# Patient Record
Sex: FEMALE | Race: WHITE | Employment: OTHER | URBAN - METROPOLITAN AREA
[De-identification: names, ages, dates, MRNs, and addresses within clinical notes are randomized per-mention and may not be internally consistent; named-entity substitution may affect disease eponyms.]

---

## 2017-01-04 ENCOUNTER — OFFICE VISIT (OUTPATIENT)
Dept: FAMILY MEDICINE | Facility: CLINIC | Age: 72
End: 2017-01-04
Payer: COMMERCIAL

## 2017-01-04 VITALS
DIASTOLIC BLOOD PRESSURE: 82 MMHG | WEIGHT: 149 LBS | TEMPERATURE: 98.1 F | OXYGEN SATURATION: 99 % | RESPIRATION RATE: 12 BRPM | BODY MASS INDEX: 23.95 KG/M2 | HEIGHT: 66 IN | SYSTOLIC BLOOD PRESSURE: 120 MMHG | HEART RATE: 98 BPM

## 2017-01-04 DIAGNOSIS — M54.41 BILATERAL LOW BACK PAIN WITH RIGHT-SIDED SCIATICA, UNSPECIFIED CHRONICITY: ICD-10-CM

## 2017-01-04 PROCEDURE — 99213 OFFICE O/P EST LOW 20 MIN: CPT | Performed by: NURSE PRACTITIONER

## 2017-01-04 NOTE — MR AVS SNAPSHOT
After Visit Summary   1/4/2017    Kahlil Caputo    MRN: [id number]           Patient Information     Date Of Birth          1945        Visit Information        Provider Department      1/4/2017 1:30 PM Haase, Susan Rachele, APRN CNP [facility]        Today's Diagnoses     Bilateral low back pain with right-sided sciatica, unspecified chronicity            Follow-ups after your visit        Follow-up notes from your care team     Return in about 4 weeks (around 2/1/2017).      Your next 10 appointments already scheduled     Jan 31, 2017 10:00 AM   New Visit with Pilar Ayala MD   [facility] ([facility])    [address]  [address]  [place] [address]   [phone]              Future tests that were ordered for you today     Open Future Orders        Priority Expected Expires Ordered    MR Lumbar Spine w/o Contrast Routine  1/4/2018 1/4/2017            Who to contact     If you have questions or need follow up information about today's clinic visit or your schedule please contact [facility] directly at [phone].  Normal or non-critical lab and imaging results will be communicated to you by [employer], letter or phone within 4 business days after the clinic has received the results. If you do not hear from us within 7 days, please contact the clinic through [employer] or phone. If you have a critical or abnormal lab result, we will notify you by phone as soon as possible.  Submit refill requests through [employer] or call your pharmacy and they will forward the refill request to us. Please allow 3 business days for your refill to be completed.          Additional Information About Your Visit        MyChart Information     [employer] gives you secure access to your electronic health record. If you see a primary care provider, you can also send messages to your care team and make appointments. If you have questions,  "please call your primary care clinic.  If you do not have a primary care provider, please call [phone] and they will assist you.        Care EveryWhere ID     This is your Care EveryWhere ID. This could be used by other organizations to access your [place] medical records  [id number]        Your Vitals Were     Pulse Temperature Respirations Height BMI (Body Mass Index) Pulse Oximetry    98 98.1  F (36.7  C) (Oral) 12 5' 6\" (1.676 m) 24.06 kg/m2 99%       Blood Pressure from Last 3 Encounters:   01/04/17 120/82   12/15/16 130/70   12/05/16 125/68    Weight from Last 3 Encounters:   01/04/17 149 lb (67.586 kg)   12/15/16 149 lb (67.586 kg)   11/16/16 149 lb (67.586 kg)               Primary Care Provider Office Phone # Fax #    Susan Rachele Haase, APRN [phone]       [address] [address]        Thank you!     Thank you for choosing [facility]  for your care. Our goal is always to provide you with excellent care. Hearing back from our patients is one way we can continue to improve our services. Please take a few minutes to complete the written survey that you may receive in the mail after your visit with us. Thank you!             Your Updated Medication List - Protect others around you: Learn how to safely use, store and throw away your medicines at www.disposemymeds.org.          This list is accurate as of: 1/4/17  1:58 PM.  Always use your most recent med list.                   Brand Name Dispense Instructions for use    ascorbic acid 500 MG tablet    VITAMIN C     Take by mouth 2 times daily       calcium carbonate 500 MG tablet    OS-THEA 500 mg Koi. Ca    100 tablet    Take 2 tablets by mouth 2 times daily       CHLOR-TRIMETON 4 MG tablet   Generic drug:  chlorpheniramine      Take 4 mg by mouth every 6 hours as needed for allergies or rhinitis       furosemide 20 MG tablet    LASIX    90 tablet    Take 1 tablet " (20 mg) by mouth daily 1-2 daily       HYDROcodone-acetaminophen 5-325 MG per tablet    NORCO    90 tablet    Take 1-2 tablets by mouth every 6 hours as needed for moderate to severe pain       latanoprost 0.005 % ophthalmic solution    XALATAN     Place 1 drop into both eyes At Bedtime       levothyroxine 150 MCG tablet    SYNTHROID/LEVOTHROID    90 tablet    Take 1 tablet (150 mcg) by mouth every morning       melatonin 3 MG tablet      Take 1-2 tablets (3-6 mg) by mouth nightly as needed for sleep (OTC)       MULTI-VITAMIN PO      Take by mouth daily       STATIN NOT PRESCRIBED (INTENTIONAL)     0 each    1 each See Admin Instructions Statin not prescribed intentionally due to Allergy to statin       vitamin D 1000 UNITS capsule      Take 2 capsules by mouth daily

## 2017-01-04 NOTE — NURSING NOTE
"Chief Complaint   Patient presents with     Musculoskeletal Problem       Initial /82 mmHg  Pulse 98  Temp(Src) 98.1  F (36.7  C) (Oral)  Resp 12  Ht 5' 6\" (1.676 m)  Wt 149 lb (67.586 kg)  BMI 24.06 kg/m2  SpO2 99% Estimated body mass index is 24.06 kg/(m^2) as calculated from the following:    Height as of this encounter: 5' 6\" (1.676 m).    Weight as of this encounter: 149 lb (67.586 kg).  BP completed using cuff size: yolanda Herzog CMA      "

## 2017-01-04 NOTE — PROGRESS NOTES
SUBJECTIVE:                                                    Kahlil Caputo is a 71 year old female who presents to clinic today for the following health issues:    Musculoskeletal problem/pain    Duration: years but much worse in the last few months    Description  Location: R low back and leg    Intensity:  Mild-severe    Accompanying signs and symptoms: none    History  Previous similar problem: YES  Previous evaluation:  x-ray and orthopedic evaluation    Precipitating or alleviating factors:  Trauma or overuse: no   Aggravating factors include: none    Therapies tried and outcome: ice, bio freeze, hydrocodone   Lower back pain that radiates down the outer aspect of the leg and down the calf.    Denies RLE numbness, tingling, bowel/bladder issues.   Rates pain 4/10, at times increases 10/10.  Awake during the night with pain.      Problem list and histories reviewed & adjusted, as indicated.  Additional history: as documented    Patient Active Problem List   Diagnosis     Hypothyroidism     Chronic airway obstruction (H)     Disorder of bone and cartilage     Small bowel obstruction (H)     Stricture of small intestine (H)     S/P Left total hip arthroplasty 11/25/13     Osteoarthritis of hip     Ileitis (ileocecal resection 8/2013)     Lumbago     Tricuspid regurgitation     Palpitations     PVC's (premature ventricular contractions)     Family history of premature CAD     Chronic pain     Pulmonary nodules     ACP (advance care planning)     Coronary artery disease involving native coronary artery of native heart without angina pectoris     Past Surgical History   Procedure Laterality Date     C nonspecific procedure  1970's     D& C X 2     C nonspecific procedure  1964     PILONIDAL CYSTECTOMY     C nonspecific procedure  1959     T&A     C nonspecific procedure  1991     BREAST BX & CERVICAL POLYP     Hc colonoscopy thru stoma, diagnostic       2003     Laparotomy exploratory  8/30/2013     Procedure:  LAPAROTOMY EXPLORATORY;  LAPAROTOMY EXPLORATORY, Ileocecal Resection Resection, Removal of Retained Pill Cam;  Surgeon: Mitchell Fraser MD;  Location: [facility] OR     Resection ileocecal  8/30/2013     Procedure: RESECTION ILEOCECAL;;  Surgeon: Mitchell Fraser MD;  Location: [facility] OR     Arthroplasty hip  11/25/2013     Procedure: ARTHROPLASTY HIP;  Left Total Hip Arthroplasty  ;  Surgeon: Luis Marshall MD;  Location: [facility] OR       Social History   Substance Use Topics     Smoking status: Former Smoker -- 1.00 packs/day for 50 years     Types: Cigarettes     Quit date: 06/07/2010     Smokeless tobacco: Never Used      Comment: Quit June 7, 2010     Alcohol Use: 0.0 oz/week     0 Standard drinks or equivalent per week      Comment: 1 glass wine daily     Family History   Problem Relation Age of Onset     Alcohol/Drug Mother      HEART DISEASE Mother      cardiomyopathy     Alcohol/Drug Father      HEART DISEASE Father      CAD -  1st MI mid 50's     Myocardial Infarction Father      X4     OSTEOPOROSIS Maternal Grandmother      CANCER Maternal Grandfather      stomach or throat - martini drinker/pipe smoker     Breast Cancer No family hx of      Cancer - colorectal No family hx of      Myocardial Infarction Brother          Current Outpatient Prescriptions   Medication Sig Dispense Refill     furosemide (LASIX) 20 MG tablet Take 1 tablet (20 mg) by mouth daily 1-2 daily 90 tablet 1     HYDROcodone-acetaminophen (NORCO) 5-325 MG per tablet Take 1-2 tablets by mouth every 6 hours as needed for moderate to severe pain 90 tablet 0     STATIN NOT PRESCRIBED, INTENTIONAL, 1 each See Admin Instructions Statin not prescribed intentionally due to Allergy to statin 0 each 0     levothyroxine (SYNTHROID, LEVOTHROID) 150 MCG tablet Take 1 tablet (150 mcg) by mouth every morning 90 tablet 3     ascorbic acid (VITAMIN C) 500 MG tablet Take by mouth 2 times daily       chlorpheniramine (CHLOR-TRIMETON) 4 MG tablet Take 4 mg by  "mouth every 6 hours as needed for allergies or rhinitis       Cholecalciferol (VITAMIN D) 1000 UNITS capsule Take 2 capsules by mouth daily        latanoprost (XALATAN) 0.005 % ophthalmic solution Place 1 drop into both eyes At Bedtime        melatonin 3 MG tablet Take 1-2 tablets (3-6 mg) by mouth nightly as needed for sleep (OTC)       Multiple Vitamin (MULTI-VITAMIN PO) Take by mouth daily        calcium carbonate 500 MG tablet Take 2 tablets by mouth 2 times daily  100 tablet 3     Allergies   Allergen Reactions     Latex Difficulty breathing     sensativity - cough, eyes water     Nsaids Shortness Of Breath and Other (See Comments)     bronchospams     Aspirin Difficulty breathing     tight cough     Codeine GI Disturbance     upset GI     Pravastatin      Myalgias     Simvastatin      Myalgias     Tramadol      Heart burn,  Felt \"clammy\", unable to pass gas, unable to urinate and had severe nausea       ROS:  C: NEGATIVE for fever, chills, change in weight  MUSCULOSKELETAL: see HPI   NEURO: NEGATIVE for weakness, dizziness or paresthesias    OBJECTIVE:                                                    /82 mmHg  Pulse 98  Temp(Src) 98.1  F (36.7  C) (Oral)  Resp 12  Ht 5' 6\" (1.676 m)  Wt 149 lb (67.586 kg)  BMI 24.06 kg/m2  SpO2 99%  Body mass index is 24.06 kg/(m^2).   GENERAL: healthy, alert and no distress  MS: right para lumbar spinal tenderness, no gross musculoskeletal defects noted, no edema  NEURO: RLE with normal strength, tone, reflexes.  Able to walk on toes and heels.         ASSESSMENT:                                                    See below    PLAN:                                                    Kahlil was seen today for musculoskeletal problem.    Diagnoses and all orders for this visit:    Bilateral low back pain with right-sided sciatica, unspecified chronicity  -     MR Lumbar Spine w/o Contrast; Future    Also discussed starting on gabapentin, will check with her " insurance.    Has follow up scheduled with pain clinic on 1/31/2017  FUTURE APPOINTMENTS:       - Follow-up visit in 3 months, sooner as needed.    Susan Haase, APRN [facility]

## 2017-01-09 ENCOUNTER — HOSPITAL ENCOUNTER (OUTPATIENT)
Dept: MRI IMAGING | Facility: CLINIC | Age: 72
Discharge: HOME | End: 2017-01-09
Attending: NURSE PRACTITIONER | Admitting: NURSE PRACTITIONER
Payer: COMMERCIAL

## 2017-01-09 DIAGNOSIS — M54.41 BILATERAL LOW BACK PAIN WITH RIGHT-SIDED SCIATICA, UNSPECIFIED CHRONICITY: ICD-10-CM

## 2017-01-09 PROCEDURE — 72148 MRI LUMBAR SPINE W/O DYE: CPT

## 2017-01-11 NOTE — PROGRESS NOTES
Quick Note:    BRADLEY Guillory,  Your MRI is basically unchanged from the scan that was done in 2015. Please let me know if you have any questions.  Sincerely,   Susan Haase, KAR  ______

## 2017-01-31 ENCOUNTER — OFFICE VISIT (OUTPATIENT)
Dept: PALLIATIVE MEDICINE | Facility: CLINIC | Age: 72
End: 2017-01-31
Payer: COMMERCIAL

## 2017-01-31 VITALS
HEART RATE: 81 BPM | OXYGEN SATURATION: 96 % | DIASTOLIC BLOOD PRESSURE: 72 MMHG | WEIGHT: 154 LBS | SYSTOLIC BLOOD PRESSURE: 134 MMHG | BODY MASS INDEX: 24.87 KG/M2

## 2017-01-31 DIAGNOSIS — M25.551 RIGHT HIP PAIN: ICD-10-CM

## 2017-01-31 DIAGNOSIS — G89.29 CHRONIC LUMBOSACRAL PAIN: ICD-10-CM

## 2017-01-31 DIAGNOSIS — G89.4 CHRONIC PAIN SYNDROME: ICD-10-CM

## 2017-01-31 DIAGNOSIS — M79.18 CHRONIC GLUTEAL PAIN: ICD-10-CM

## 2017-01-31 DIAGNOSIS — G89.29 CHRONIC GLUTEAL PAIN: ICD-10-CM

## 2017-01-31 DIAGNOSIS — M70.61 GREATER TROCHANTERIC BURSITIS OF RIGHT HIP: ICD-10-CM

## 2017-01-31 DIAGNOSIS — M54.50 CHRONIC LUMBOSACRAL PAIN: ICD-10-CM

## 2017-01-31 PROCEDURE — 99205 OFFICE O/P NEW HI 60 MIN: CPT | Performed by: PAIN MEDICINE

## 2017-01-31 RX ORDER — GABAPENTIN 100 MG/1
100 CAPSULE ORAL 3 TIMES DAILY
Qty: 90 CAPSULE | Refills: 3 | Status: SHIPPED | OUTPATIENT
Start: 2017-01-31 | End: 2017-07-29 | Stop reason: SINTOL

## 2017-01-31 ASSESSMENT — PAIN SCALES - GENERAL: PAINLEVEL: MODERATE PAIN (4)

## 2017-01-31 NOTE — PATIENT INSTRUCTIONS
PATIENT INSTRUCTIONS:    1. Will order MRI of the pelvis (hips) to evaluate for underlying structural issue(s) that may be contributing to your pain symptoms, as well as to assist with procedure planning. Please schedule this at your convenience.     2. Recommend trial of Gabapentin. Start 100 mg at bedtime. If well tolerated, may increase after > 5-7 days to 100 mg morning and 100 mg night. If well tolerated, may increase after > 5-7 days to 100 mg morning, 100 mg afternoon, 100 mg night. This may take several weeks to demonstrate full effect. This must be taken consistently. Risks/side effects: fatigue, incoordination, water retention/weight gain, mood changes.     3. It is reasonable to try Capsaicin. This needs to be applied consistently, usually 3 times per day. Wear gloves/wash hands, do not rub eyes with application. This may also take several weeks to demonstrate full effect.     4. We talked about possible diagnostic medial branch block of the lumbar spine to determine if a significant portion of your pain is coming from the joints of the back. We will discuss this more in the future.     5. Continue with home exercises. May consider additional therapeutics for you.     6. Return to clinic 1-2 weeks after MRI is complete, if you do not start the Gabapentin medication. Return to clinic at 4-6 weeks to discuss MRI results and medication effect, if you do start the Gabapentin medication.     7. Will have you sign release of medical information form so that records from [facility] (Dr. Luis Marshall) can be obtained.     Thank you!    Scheduling number to the [facility]: [phone]. Call this number to schedule or change appointments.    Nurse Triage line: [phone]  Call this number with any questions or concerns. You can leave a message anytime. Please leave a detailed message. Calls are returned between 8 AM and 4 PM Monday through Thursday and from 8 AM to 12 Noon on  Fridays. We usually get back to you within 24 to 48 hours depending on the issue/request.     We believe regular attendance is key to your success in our program.    Any time you are unable to keep your appointment we ask that you call us at least 24 hours in advance to let us know. This will allow us to offer the appointment time to another patient.

## 2017-01-31 NOTE — PROGRESS NOTES
"  [facility] Consultation    Date of visit: 01/31/2017    PCP/Consulting Provider: Susan Rachele Haase, APRN CNP    Reason for consultation: \"Comprehensive Evaluation and Management\" for \"sacroiliac joint pain, lower back pain\".    History of Present Illness: Kahlil Caputo is a 71-year-old female who presents for initial evaluation of chief complaint of low back pain.    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 She describes episodes of significant muscle spasm, for which she required ED treatment in the past. Over the years, she has been able to manage her symptoms with exercise. However, her pain has been progressively worse over the last 5 years, without clear inciting event. She reports undergoing MRI of the lumbar spine, after which she began spinal injections, first at [facility] then at [facility]. She states that she has also been evaluated for hip issues. She reports undergoing left hip replacement in 11/2013 per Dr. Luis Marshall. She notes that her right hip  looked good . She states that she  will never have surgery  for her back.    She describes an aching and sharp pain of the right >> left low back/buttock. Her history is also significant for pain of the mid back. She reports pain of the right greater trochanter. She notes occasional pain shooting pain down the right lateral thigh to the level of the knee. Her pain may be intermittent or constant, from 2/10 to 10/10 in intensity. Her pain is worse with standing more than 15 minutes (e.g. washing dishes at the sink, vacuuming) or walking more than 20-30 minutes. She notes pain with transitional movements, such as getting out of a chair or car. Going up/down stairs may be painful. She notes that she is sensitive to weather changes. Her pain is improved with changing positions, reducing her activities, or using ice. She denies numbness or paresthesias. She reports " sense of weakness, noting that she is  guarded because it hurts ; she denies foot drag/drop or falls. She denies saddle anesthesia or bowel/bladder incontinence.    Pain Treatments:  1. Clinics: She has been treated at [facility] in the past, mostly for hip-related issues.  2. Medications: She reports use of Hydrocodone-Acetaminophen 5-325 mg 1 tab QHS, which helps her to sleep; she may take an additional 0.5 tab during the day. She notes that she does not take more pain mediation because this may make her  groggy . She reports use of Biofreeze for the right  hip  and left wrist.   3. Therapeutics: She recalls two courses of physical therapy in the past, targeting the hips and low back. Her last course of physical therapy was around 2015. She performs daily home exercises consisting of stretching, range of motion, core, and balance exercises. She walks for 15-20 minutes at the [facility]. She participates in [employer] classes 3 days per week. She has undergone chiropractic adjustment per She Oakes in 2013. She has performed relaxation exercises on her own; she notes that she learned these when pregnant and has taught her son breathing exercises for his sports-induced asthma. She denies pain counseling in the past.  4. Pain procedures: She reports undergoing (?)hip injection under ultrasound per Ros Chang PA-C (Dr. Luis Marshall) at [facility]. She reports some benefit from spinal injection in the past. Chart review indicates right L5-S1 transforaminal epidural steroid injection at [facility] on 05/07/15 (0% immediate relief), and left L5-S1 transforaminal epidural steroid injection at [facility] on 05/28/15 (60% immediate relief). She was more recently seen by me for right SI joint injection on 12/05/16 (66% immediate relief).  5. Surgery: Left total hip arthoplasty per Dr. Luis Marshall, [facility], on 11/25/13. Denies spine surgery in the past.    Review of Pain Questionnaire:  Please see the [facility] health questionnaire, which the patient completed and reviewed with me in detail.    Review of Electronic Chart: Today I have also reviewed available medical information in the patient's medical record at [facility] ([facility]), including relevant provider notes, laboratory work, and imaging.     Review of [place] Prescription Monitoring Program (): Today I have also reviewed the patient's history of controlled substance use, as provided by [place] licensed pharmacies and prescriber dispensers. Hydrocodone-Acetaminophen 5-325 mg per Susan Haase, APRN CNP, #90 tabs filled on 02/29/16, 05/04/16, 07/07/16, 09/09/16, 11/08/16, 12/27/16.    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     Chronic pain      hip and left shoulder     PVC's (premature ventricular contractions)      Family history of premature CAD      Coronary artery disease involving native coronary artery of native heart without angina pectoris 11/16/2016       Past Surgical History:  Past Surgical History   Procedure Laterality Date     C nonspecific procedure  1970's     D& C X 2     C nonspecific procedure  1964     PILONIDAL CYSTECTOMY     C nonspecific procedure  1959     T&A     C nonspecific procedure  1991     BREAST BX & CERVICAL POLYP     Hc colonoscopy thru stoma, diagnostic       2003     Laparotomy exploratory  8/30/2013     Procedure: LAPAROTOMY EXPLORATORY;  LAPAROTOMY EXPLORATORY, Ileocecal Resection Resection, Removal of Retained Pill Cam;  Surgeon: Mitchell Fraser MD;  Location:   OR     Resection ileocecal  8/30/2013     Procedure: RESECTION ILEOCECAL;;  Surgeon: Mitchell Fraser MD;  Location: [facility] OR     Arthroplasty hip  11/25/2013     Procedure: ARTHROPLASTY HIP;  Left Total Hip Arthroplasty  ;  Surgeon: Luis Marshall MD;  Location: [facility] OR       Medications:  Current Outpatient Prescriptions   Medication Sig Dispense Refill     gabapentin (NEURONTIN) 100 MG capsule Take 1 capsule (100 mg) by mouth 3 times daily 90 capsule 3     furosemide (LASIX) 20 MG tablet Take 1 tablet (20 mg) by mouth daily 1-2 daily 90 tablet 1     HYDROcodone-acetaminophen (NORCO) 5-325 MG per tablet Take 1-2 tablets by mouth every 6 hours as needed for moderate to severe pain 90 tablet 0     STATIN NOT PRESCRIBED, INTENTIONAL, 1 each See Admin Instructions Statin not prescribed intentionally due to Allergy to statin 0 each 0     levothyroxine (SYNTHROID, LEVOTHROID) 150 MCG tablet Take 1 tablet (150 mcg) by mouth every morning 90 tablet 3     ascorbic acid (VITAMIN C) 500 MG tablet Take by mouth 2 times daily       chlorpheniramine (CHLOR-TRIMETON) 4 MG tablet Take 4 mg by mouth every 6 hours as needed for allergies or rhinitis       Cholecalciferol (VITAMIN D) 1000 UNITS capsule Take 2 capsules by mouth daily        latanoprost (XALATAN) 0.005 % ophthalmic solution Place 1 drop into both eyes At Bedtime        melatonin 3 MG tablet Take 1-2 tablets (3-6 mg) by mouth nightly as needed for sleep (OTC)       Multiple Vitamin (MULTI-VITAMIN PO) Take by mouth daily        calcium carbonate 500 MG tablet Take 2 tablets by mouth 2 times daily  100 tablet 3       Allergies:  Allergies   Allergen Reactions     Latex Difficulty breathing     sensativity - cough, eyes water     Nsaids Shortness Of Breath and Other (See Comments)     bronchospams     Aspirin Difficulty breathing     tight cough     Codeine GI Disturbance     upset GI     Pravastatin      Myalgias     Simvastatin      Myalgias     Tramadol      Heart  "burn,  Felt \"clammy\", unable to pass gas, unable to urinate and had severe nausea       Psychosocial History:  Home situation: She is  with two children, age 39 and 41 years. She lives alone.  Occupation/Schooling: She completed a college education. She is retired from work as a RN (registered nurse).  Tobacco use: She reports previous tobacco use.   Alcohol use: She reports 3-4 alcoholic beverages per week.   Illicit drug use: Denies.  Mental health history: She denies depression, anxiety, mood swings, or suicidal ideation.    Functional History: She is independent with self care, although reports decreased ADLs and recreational activities due to pain. See Pain Treatments (above) regarding exercise program.    Family history:  Family History   Problem Relation Age of Onset     Alcohol/Drug Mother      HEART DISEASE Mother      cardiomyopathy     Alcohol/Drug Father      HEART DISEASE Father      CAD -  1st MI mid 50's     Myocardial Infarction Father      X4     OSTEOPOROSIS Maternal Grandmother      CANCER Maternal Grandfather      stomach or throat - martini drinker/pipe smoker     Breast Cancer No family hx of      Cancer - colorectal No family hx of      Myocardial Infarction Brother        Review of Systems: As above. A 12-point review of systems was significant for thyroid disease, joint pain, arthritis, stiffness, and back pain.     Physical Exam:  /72 mmHg  Pulse 81  Wt 69.854 kg (154 lb)  SpO2 96%  Breastfeeding? No  Constitutional: Well developed, well nourished, appears stated age.  HEENT: Head atraumatic, normocephalic. Eyes without conjunctival injection or jaundice.  CV/Resp: Symmetric chest wall excursion. Non-labored breathing. No audible wheezing.  Gastrointestinal: Abdomen non-distended.  Skin: No rash or lesions of posterior torso or extremities.   Extremities: Peripheral pulses intact. No distal cyanosis or edema.  Psychiatric/mental status: Alert, without lethargy or stupor. " Speech fluent. Appropriate affect. Mood normal. Able to follow commands without difficulty.   Musculoskeletal exam: No midline spinal tenderness. Mild tenderness of right lumbar facets. Tenderness of right posterolateral gluteal region and right greater trochanter. Mild right SI joint tenderness; LEXUS associated with slight increase in gluteal discomfort on right. No focal piriformis tenderness; FADIR negative. Lumbar range of motion somewhat stiff. No significant change in pain with lumbar facet loading. SLR negative. Hip range of motion reduced to internal more than external rotation on right more than left; she is noted to have tight hip girdle with some groin discomfort noted on right, although this is not consistent with her baseline pain.  Neurologic exam: DTR 1+ at bilateral patella, trace at Achilles. Toes down going. No ankle clonus. Sensation intact to light touch throughout distal bilateral lower extremity dermatomes. Manual muscle testing demonstrates give-way weakness at right hip flexors. Power otherwise 5/5 throughout bilateral hip abductors/adductors, knee flexors/extensors, ADF, APF, EHL.    Diagnostic/Ancillary tests:  01/09/17 MRI lumbar spine: The report is dictated assuming five lumbar-type vertebral bodies. Sagittal images demonstrate normal vertebral body height. Mild degenerative endplate changes at L1-L2, bone marrow signal is otherwise unremarkable. Tip of the conus medullaris and cauda equina are unremarkable. T12-L1: No disc herniation or stenosis. Facet joints are  unremarkable. L1-L2: Broad-based disc bulge lateralizes to the left. Mild central stenosis. Neural foramina are patent. Facet joints are unremarkable. L2-L3: Mild disc bulge and facet hypertrophy. Mild central stenosis. Neural foramina are patent. L3-L4: Mild disc bulge and facet hypertrophy. Mild-to-moderate central stenosis. Neural foramina are patent. L4-L5: Mild disc bulge and facet hypertrophy. Mild-to-moderate central  stenosis. Mild inferior foraminal narrowing bilaterally. L5-S1: Advanced degenerative disc disease with mild disc bulge lateralizing to the left. Mild central stenosis. There is contact without compression of the descending left S1 nerve root. Mild left foraminal stenosis. Right neural foramen is patent. Paraspinous soft tissues: Unremarkable. Impression: 1. At L1-L2, and L2-L3 there is mild central stenosis. 2. At L3-L4, and L4-L5 there is mild-to-moderate central stenosis. 3. Mild inferior foraminal narrowing bilaterally at L4-L5. 4. At L5-S1 there is mild central stenosis. Contact without compression of the descending left S1 nerve root as a result of asymmetric disc bulge. Mild left foraminal stenosis. 5. Findings are very similar to the comparison study.    10/20/15 MRI thoracic spine: Vertebral body bone marrow signal is normal, and the alignment is normal through T12. Thoracic spinal cord is normal with the conus at T12-L1. No compression fractures. There are multiple Schmorl's nodes in the midthoracic and lower thoracic regions from T7-T12. No reactive bone marrow signal change or evidence for acute abnormalities. There is a small central and left central broad-based disc protrusion at T7-T8. This is best seen on series 8 image 28 and series 3 image 9. Exam is otherwise negative. Impression: 1. Small central/left central disc protrusion T7-T8. 2. Schmorl's nodes in the mid and lower thoracic spine. 3. No thoracic cord abnormality through T12.    04/21/15 bone densitometry: Lumbar Spine (L1-L4) T-score: -1.3. Left Femoral Neck T-score: N/A. Right Femoral Neck T-score: -2.3. Lumbar (L1-L4) BMD: 1.036 Previous: 1.063. Total Hip Mean BMD: 0.796 Previous: 0.859. Impression: Osteopenia (low bone mass). Degenerative changes of the spine. Recommendations include ensuring adequate daily Calcium and Vitamin D intake. Follow up scan can be considered in three years.    11/25/13 x-ray left hip (portable): There are new  postsurgical changes of a left total hip arthroplasty. Arthroplasty components are intact and alignment is unremarkable. There is air in the soft tissues about the left hip consistent with recent surgery.    Screening tools:  DIRE Score for ongoing opioid management is calculated as follows:    Diagnosis = 2 pts    Intractability = 2 pts    Risk (Psych + Chem hlth + Reliability + Social) = 10 pts    Efficacy = 2 pts      DIRE Score = 16       7-13: likely NOT suitable candidate for long-term opioid analgesia       14-21: may be a suitable candidate for long-term opioid analgesia     Opioid Risk Tool (ORT) score is calculated as follows:    Family History of Substance Abuse: 1 pt    Personal History of Substance Abuse: 0 pt    Age: 0 pt    History of Pre-adolescent Sexual Abuse: 0 pt    Psychological Disease: 0 pt      ORT Score = 1        0-3: Low risk for opioid abuse       4-7: Moderate risk for opioid abuse       >/= 8: High risk for opioid abuse    Assessment: Kahlil Caputo is a 71-year-old female who presents with a chronic pain syndrome of multifactorial etiology, as follows:      Chronic back pain, predominantly lower back although she reports history of mid-back symptoms as well. Presentation suggests possible discogenic, facetogenic, and myofascial etiology. 10/20/15 MRI thoracic spine demonstrates mild degenerative changes, including small central/left disc protrusion at T7-8 and Schmorl's nodes from T7-T12. 01/09/17 MRI lumbar spine demonstrates stable multilevel degenerative changes, including advanced disc disease and mild left disc bulge at L5-S1 with contact of the left S1 nerve root. There is facet hypertrophy at multiple levels. Central stenosis is most pronounced (mild to moderate) at L3-4 and L4-5.    Right gluteal/thigh pain. Presentation suggests gluteal tendinopathy/greater trochanteric bursitis. There may be a right SI joint component; she has had some limited improvement with right SI joint  injection in the past. Right hip osteoarthritis may be contributing. Today's exam is less consistent with piriformis syndrome.    Osteopenia.    History of Crohn's Disease.    Chronic opioid maintenance for pain.    Recommendations:  The following recommendations were given to the patient. Diagnosis, treatment options, risks, benefits, and alternatives were discussed, and all questions were answered. The patient expressed understanding of the plan for management.      She was asked to sign a release of medical information form so that records can be obtained from [facility] (Ros Chang PA-C, Dr. Luis Marshall).    Will order MRI of the pelvis to evaluate for any underlying issues that may be contributing to her pain symptoms, as well as to assist with procedural planning.    If perceived to be beneficial in maintaining overall level of pain control and daily function, it may be reasonable to continue Hydrocodone-Acetaminophen for as needed use; would avoid significant dose escalation over time. Continue to assess for cognitive effects and fall risk.    Recommend trial of Gabapentin. Given history of medication sensitivity, recommend low dose/slow titration; instructions were provided up to 100 mg TID. Risks/side effects were discussed.    Recommend trial of Capsaicin, which can be purchased over-the-counter. Appropriate use of this medication was discussed.    Continue home exercises. May consider additional therapeutics in the future.    Pending imaging results, may consider candidacy for right gluteal/piriformis vs. right greater trochanteric hip injection.    We discussed possible lumbar medial branch block/radiofrequency lesioning procedure for axial component.    Of note, the patient was somewhat hesitant to start Gabapentin. If she does not choose to start this medication, she should return to clinic 1-2 weeks after MRI, to review findings and discuss interventional/therapeutic options  further. If she chooses to start this medication, she was asked to return at 4-6 weeks, for medication titration.    Total time spent was 60 minutes. Greater than 50% of face-to-face time was spent in patient counseling and/or coordination of care.    Pilar Ayala MD  [facility]

## 2017-01-31 NOTE — NURSING NOTE
"Chief Complaint   Patient presents with     Consult       Initial /72 mmHg  Pulse 81  Wt 69.854 kg (154 lb)  SpO2 96%  Breastfeeding? No Estimated body mass index is 24.87 kg/(m^2) as calculated from the following:    Height as of 1/4/17: 1.676 m (5' 6\").    Weight as of this encounter: 69.854 kg (154 lb).  BP completed using cuff size: yolanda Chung CMA   [facility]    "

## 2017-01-31 NOTE — MR AVS SNAPSHOT
After Visit Summary   1/31/2017    Kahlil Caputo    MRN: [id number]           Patient Information     Date Of Birth          1945        Visit Information        Provider Department      1/31/2017 10:00 AM Pilar Ayala MD [facility]        Today's Diagnoses     Neuropathic pain    -  1     Radicular low back pain         Chronic gluteal pain         Bilateral hip pain         Trochanteric bursitis of both hips           Care Instructions    PATIENT INSTRUCTIONS:    1. Will order MRI of the pelvis (hips) to evaluate for underlying structural issue(s) that may be contributing to your pain symptoms, as well as to assist with procedure planning. Please schedule this at your convenience.     2. Recommend trial of Gabapentin. Start 100 mg at bedtime. If well tolerated, may increase after > 5-7 days to 100 mg morning and 100 mg night. If well tolerated, may increase after > 5-7 days to 100 mg morning, 100 mg afternoon, 100 mg night. This may take several weeks to demonstrate full effect. This must be taken consistently. Risks/side effects: fatigue, incoordination, water retention/weight gain, mood changes.     3. It is reasonable to try Capsaicin. This needs to be applied consistently, usually 3 times per day. Wear gloves/wash hands, do not rub eyes with application. This may also take several weeks to demonstrate full effect.     4. We talked about possible diagnostic medial branch block of the lumbar spine to determine if a significant portion of your pain is coming from the joints of the back. We will discuss this more in the future.     5. Continue with home exercises. May consider additional therapeutics for you.     6. Return to clinic 1-2 weeks after MRI is complete, if you do not start the Gabapentin medication. Return to clinic at 4-6 weeks to discuss MRI results and medication effect, if you do start the Gabapentin medication.     7. Will have you sign release of medical  information form so that records from [facility] (Dr. Luis Marshall) can be obtained.     Thank you!    Scheduling number to the [facility]: [phone]. Call this number to schedule or change appointments.    Nurse Triage line: [phone]  Call this number with any questions or concerns. You can leave a message anytime. Please leave a detailed message. Calls are returned between 8 AM and 4 PM Monday through Thursday and from 8 AM to 12 Noon on Fridays. We usually get back to you within 24 to 48 hours depending on the issue/request.     We believe regular attendance is key to your success in our program.    Any time you are unable to keep your appointment we ask that you call us at least 24 hours in advance to let us know. This will allow us to offer the appointment time to another patient.           Follow-ups after your visit        Future tests that were ordered for you today     Open Future Orders        Priority Expected Expires Ordered    MR Pelvis w/o Contrast Routine  1/31/2018 1/31/2017            Who to contact     If you have questions or need follow up information about today's clinic visit or your schedule please contact [facility] directly at [phone].  Normal or non-critical lab and imaging results will be communicated to you by MyChart, letter or phone within 4 business days after the clinic has received the results. If you do not hear from us within 7 days, please contact the clinic through [employer] or phone. If you have a critical or abnormal lab result, we will notify you by phone as soon as possible.  Submit refill requests through [employer] or call your pharmacy and they will forward the refill request to us. Please allow 3 business days for your refill to be completed.          Additional Information About Your Visit        [employer] Information     [employer] gives you secure access to your electronic health record. If you see a primary care  provider, you can also send messages to your care team and make appointments. If you have questions, please call your primary care clinic.  If you do not have a primary care provider, please call [phone] and they will assist you.        Care EveryWhere ID     This is your Care EveryWhere ID. This could be used by other organizations to access your [facility] medical records  [id number]        Your Vitals Were     Pulse Pulse Oximetry Breastfeeding?             81 96% No          Blood Pressure from Last 3 Encounters:   01/31/17 134/72   01/04/17 120/82   12/15/16 130/70    Weight from Last 3 Encounters:   01/31/17 69.854 kg (154 lb)   01/04/17 67.586 kg (149 lb)   12/15/16 67.586 kg (149 lb)                 Today's Medication Changes          These changes are accurate as of: 1/31/17 11:15 AM.  If you have any questions, ask your nurse or doctor.               Start taking these medicines.        Dose/Directions    gabapentin 100 MG capsule   Commonly known as:  NEURONTIN   Used for:  Neuropathic pain, Radicular low back pain        Dose:  100 mg   Take 1 capsule (100 mg) by mouth 3 times daily   Quantity:  90 capsule   Refills:  3            Where to get your medicines      These medications were sent to [facility] [address] [place] [address] - [address] AT [place] [address] & [facility]  [address], [facility] [address]     Phone:  [phone]    - gabapentin 100 MG capsule             Primary Care Provider Office Phone # Fax #    Susan Rachele Haase, APRN [phone]       [address] [address]        Thank you!     Thank you for choosing [facility]  for your care. Our goal is always to provide you with excellent care. Hearing back from our patients is one way we can continue to improve our services. Please take a few minutes to complete the written survey that you may receive in the mail after your  visit with us. Thank you!             Your Updated Medication List - Protect others around you: Learn how to safely use, store and throw away your medicines at www.disposemymeds.org.          This list is accurate as of: 1/31/17 11:15 AM.  Always use your most recent med list.                   Brand Name Dispense Instructions for use    ascorbic acid 500 MG tablet    VITAMIN C     Take by mouth 2 times daily       calcium carbonate 500 MG tablet    OS-THEA 500 mg [place]. [place]    100 tablet    Take 2 tablets by mouth 2 times daily       CHLOR-TRIMETON 4 MG tablet   Generic drug:  chlorpheniramine      Take 4 mg by mouth every 6 hours as needed for allergies or rhinitis       furosemide 20 MG tablet    LASIX    90 tablet    Take 1 tablet (20 mg) by mouth daily 1-2 daily       gabapentin 100 MG capsule    NEURONTIN    90 capsule    Take 1 capsule (100 mg) by mouth 3 times daily       HYDROcodone-acetaminophen 5-325 MG per tablet    NORCO    90 tablet    Take 1-2 tablets by mouth every 6 hours as needed for moderate to severe pain       latanoprost 0.005 % ophthalmic solution    XALATAN     Place 1 drop into both eyes At Bedtime       levothyroxine 150 MCG tablet    SYNTHROID/LEVOTHROID    90 tablet    Take 1 tablet (150 mcg) by mouth every morning       melatonin 3 MG tablet      Take 1-2 tablets (3-6 mg) by mouth nightly as needed for sleep (OTC)       MULTI-VITAMIN PO      Take by mouth daily       STATIN NOT PRESCRIBED (INTENTIONAL)     0 each    1 each See Admin Instructions Statin not prescribed intentionally due to Allergy to statin       vitamin D 1000 UNITS capsule      Take 2 capsules by mouth daily

## 2017-02-01 ASSESSMENT — PATIENT HEALTH QUESTIONNAIRE - PHQ9: SUM OF ALL RESPONSES TO PHQ QUESTIONS 1-9: 3

## 2017-02-03 ENCOUNTER — HOSPITAL ENCOUNTER (OUTPATIENT)
Dept: MRI IMAGING | Facility: CLINIC | Age: 72
Discharge: HOME | End: 2017-02-03
Attending: PAIN MEDICINE | Admitting: PAIN MEDICINE
Payer: COMMERCIAL

## 2017-02-03 ENCOUNTER — TELEPHONE (OUTPATIENT)
Dept: PALLIATIVE MEDICINE | Facility: CLINIC | Age: 72
End: 2017-02-03

## 2017-02-03 DIAGNOSIS — M70.61 TROCHANTERIC BURSITIS OF BOTH HIPS: ICD-10-CM

## 2017-02-03 DIAGNOSIS — M79.18 CHRONIC GLUTEAL PAIN: ICD-10-CM

## 2017-02-03 DIAGNOSIS — G89.29 CHRONIC GLUTEAL PAIN: ICD-10-CM

## 2017-02-03 DIAGNOSIS — M25.552 BILATERAL HIP PAIN: ICD-10-CM

## 2017-02-03 DIAGNOSIS — M25.551 BILATERAL HIP PAIN: ICD-10-CM

## 2017-02-03 DIAGNOSIS — M70.62 TROCHANTERIC BURSITIS OF BOTH HIPS: ICD-10-CM

## 2017-02-03 PROCEDURE — 72195 MRI PELVIS W/O DYE: CPT

## 2017-02-06 NOTE — TELEPHONE ENCOUNTER
"02/03/17 MRI pelvis completed/reviewed. Per radiology, this demonstrates mild right greater trochanteric bursitis and mild degenerative changes of the right hip joint. Results released through [employer].     Please call patient --  There are no urgent/acute findings. As expected, there is some mild inflammation/irritation of the right side of the hip (greater trochanter), as well as some arthritis of the hip joint itself. Would be happy to consider steroid injection of the right greater trochanter; she is welcome to schedule clinic visit for this at any point.     She should otherwise continue with management plan, as discussed at her visit. She has not yet scheduled follow up (\"Return to clinic 1-2 weeks after MRI is complete, if you do not start the Gabapentin medication. Return to clinic at 4-6 weeks to discuss MRI results and medication effect, if you do start the Gabapentin medication.\")    Thanks,  Pilar Ayala MD  [facility]         "

## 2017-02-07 NOTE — TELEPHONE ENCOUNTER
Spoke with Kahlil about results and discussed Dr. Ayala's message. She has not started the Gabapentin yet, she is undecided if she wants to. She is interested in the hip injection so per Dr. Ayala below, went ahead and set that appointment up for her 2/21/17.    Astrid Velasquez, MSN, RN-BC  Care Coordinator  [facility]

## 2017-02-21 ENCOUNTER — OFFICE VISIT (OUTPATIENT)
Dept: PALLIATIVE MEDICINE | Facility: CLINIC | Age: 72
End: 2017-02-21
Payer: COMMERCIAL

## 2017-02-21 VITALS — DIASTOLIC BLOOD PRESSURE: 82 MMHG | HEART RATE: 89 BPM | SYSTOLIC BLOOD PRESSURE: 130 MMHG | OXYGEN SATURATION: 100 %

## 2017-02-21 DIAGNOSIS — M70.61 GREATER TROCHANTERIC BURSITIS OF RIGHT HIP: Primary | ICD-10-CM

## 2017-02-21 DIAGNOSIS — G89.4 CHRONIC PAIN SYNDROME: ICD-10-CM

## 2017-02-21 DIAGNOSIS — M54.50 CHRONIC LUMBOSACRAL PAIN: ICD-10-CM

## 2017-02-21 DIAGNOSIS — M79.18 CHRONIC GLUTEAL PAIN: ICD-10-CM

## 2017-02-21 DIAGNOSIS — G89.29 CHRONIC LUMBOSACRAL PAIN: ICD-10-CM

## 2017-02-21 DIAGNOSIS — G89.29 CHRONIC GLUTEAL PAIN: ICD-10-CM

## 2017-02-21 PROCEDURE — 20610 DRAIN/INJ JOINT/BURSA W/O US: CPT | Mod: RT | Performed by: PAIN MEDICINE

## 2017-02-21 PROCEDURE — 99214 OFFICE O/P EST MOD 30 MIN: CPT | Mod: 25 | Performed by: PAIN MEDICINE

## 2017-02-21 ASSESSMENT — PAIN SCALES - GENERAL: PAINLEVEL: SEVERE PAIN (6)

## 2017-02-21 NOTE — MR AVS SNAPSHOT
After Visit Summary   2/21/2017    Kahlil Caputo    MRN: [id number]           Patient Information     Date Of Birth          1945        Visit Information        Provider Department      2/21/2017 11:30 AM Pilar Ayala MD [facility]        Care Instructions    [facility]     1. Today you had:  Right greater trochanteric bursa injection        Medications used:  Lidocaine, bupivacaine, Kenalog    2. You may use ice as needed. Heat may be used > 12-24 hours after procedure as needed.     3. Please start Gabapentin, per instructions from last clinic visit.     4. Return to clinic in 6 weeks to check in.     5. Post-procedure instructions:      Go to the emergency room if you develop any shortness of breath    Monitor the injection sites for signs and symptoms of infection-fever, chills, redness, swelling, warmth, or drainage to areas.    You may have soreness at injection sites for up to 24 hours.    You may apply ice to the painful areas to help minimize the discomfort of the needle pokes.    Do not apply heat to sites for at least 12 hours.    You may use anti-inflammatory medications or Tylenol for pain control if necessary    Call this number to schedule or change appointments: [phone]    Nurse triage line (Mon-Thurs 8 AM to 4 PM; Fri 8 AM to 12 PM): [phone]    After hours provider line: [phone]    We believe regular attendance is key to your success in our program.    Any time you are unable to keep your appointment we ask that you call us at least 24 hours in advance to let us know. This will allow us to offer the appointment time to another patient.             Follow-ups after your visit        Who to contact     If you have questions or need follow up information about today's clinic visit or your schedule please contact [facility] directly at [phone].  Normal or non-critical lab and imaging results will be  communicated to you by [employer], letter or phone within 4 business days after the clinic has received the results. If you do not hear from us within 7 days, please contact the clinic through [employer] or phone. If you have a critical or abnormal lab result, we will notify you by phone as soon as possible.  Submit refill requests through [employer] or call your pharmacy and they will forward the refill request to us. Please allow 3 business days for your refill to be completed.          Additional Information About Your Visit        [facility] Information     [employer] gives you secure access to your electronic health record. If you see a primary care provider, you can also send messages to your care team and make appointments. If you have questions, please call your primary care clinic.  If you do not have a primary care provider, please call [phone] and they will assist you.        Care EveryWhere ID     This is your Care EveryWhere ID. This could be used by other organizations to access your [facility] medical records  [id number]        Your Vitals Were     Pulse Pulse Oximetry Breastfeeding?             89 100% No          Blood Pressure from Last 3 Encounters:   02/21/17 130/82   01/31/17 134/72   01/04/17 120/82    Weight from Last 3 Encounters:   01/31/17 69.9 kg (154 lb)   01/04/17 67.6 kg (149 lb)   12/15/16 67.6 kg (149 lb)              Today, you had the following     No orders found for display       Primary Care Provider Office Phone # Fax #    Susan Rachele Haase, APRN [phone]       [address] [address]        Thank you!     Thank you for choosing [facility]  for your care. Our goal is always to provide you with excellent care. Hearing back from our patients is one way we can continue to improve our services. Please take a few minutes to complete the written survey that you may receive in the mail after your visit with us.  Thank you!             Your Updated Medication List - Protect others around you: Learn how to safely use, store and throw away your medicines at www.disposemymeds.org.          This list is accurate as of: 2/21/17 11:58 AM.  Always use your most recent med list.                   Brand Name Dispense Instructions for use    ascorbic acid 500 MG tablet    VITAMIN C     Take by mouth 2 times daily       calcium carbonate 500 MG tablet    OS-THEA 500 mg [place]. [place]    100 tablet    Take 2 tablets by mouth 2 times daily       CHLOR-TRIMETON 4 MG tablet   Generic drug:  chlorpheniramine      Take 4 mg by mouth every 6 hours as needed for allergies or rhinitis       furosemide 20 MG tablet    LASIX    90 tablet    Take 1 tablet (20 mg) by mouth daily 1-2 daily       gabapentin 100 MG capsule    NEURONTIN    90 capsule    Take 1 capsule (100 mg) by mouth 3 times daily       HYDROcodone-acetaminophen 5-325 MG per tablet    NORCO    90 tablet    Take 1-2 tablets by mouth every 6 hours as needed for moderate to severe pain       latanoprost 0.005 % ophthalmic solution    XALATAN     Place 1 drop into both eyes At Bedtime       levothyroxine 150 MCG tablet    SYNTHROID/LEVOTHROID    90 tablet    Take 1 tablet (150 mcg) by mouth every morning       melatonin 3 MG tablet      Take 1-2 tablets (3-6 mg) by mouth nightly as needed for sleep (OTC)       MULTI-VITAMIN PO      Take by mouth daily       STATIN NOT PRESCRIBED (INTENTIONAL)     0 each    1 each See Admin Instructions Statin not prescribed intentionally due to Allergy to statin       vitamin D 1000 UNITS capsule      Take 2 capsules by mouth daily

## 2017-02-21 NOTE — PATIENT INSTRUCTIONS
[facility]     1. Today you had:  Right greater trochanteric bursa injection        Medications used:  Lidocaine, bupivacaine, Kenalog    2. You may use ice as needed. Heat may be used > 12-24 hours after procedure as needed.     3. Please start Gabapentin, per instructions from last clinic visit.     4. Return to clinic in 6 weeks to check in.     5. Post-procedure instructions:      Go to the emergency room if you develop any shortness of breath    Monitor the injection sites for signs and symptoms of infection-fever, chills, redness, swelling, warmth, or drainage to areas.    You may have soreness at injection sites for up to 24 hours.    You may apply ice to the painful areas to help minimize the discomfort of the needle pokes.    Do not apply heat to sites for at least 12 hours.    You may use anti-inflammatory medications or Tylenol for pain control if necessary    Call this number to schedule or change appointments: [phone]    Nurse triage line (Mon-Thurs 8 AM to 4 PM; Fri 8 AM to 12 PM): [phone]    After hours provider line: [phone]    We believe regular attendance is key to your success in our program.    Any time you are unable to keep your appointment we ask that you call us at least 24 hours in advance to let us know. This will allow us to offer the appointment time to another patient.

## 2017-02-21 NOTE — NURSING NOTE
"Chief Complaint   Patient presents with     Pain       Initial /82 (BP Location: Right arm, Patient Position: Chair, Cuff Size: Adult Regular)  Pulse 89  SpO2 100%  Breastfeeding? No Estimated body mass index is 24.86 kg/(m^2) as calculated from the following:    Height as of 1/4/17: 1.676 m (5' 6\").    Weight as of 1/31/17: 69.9 kg (154 lb).  Medication Reconciliation: kari Chung CMA   [facility]    "

## 2017-02-23 ENCOUNTER — TELEPHONE (OUTPATIENT)
Dept: FAMILY MEDICINE | Facility: CLINIC | Age: 72
End: 2017-02-23

## 2017-02-23 DIAGNOSIS — E03.9 HYPOTHYROIDISM, UNSPECIFIED TYPE: Primary | ICD-10-CM

## 2017-02-23 NOTE — TELEPHONE ENCOUNTER
Fax from [facility], informing brand name Synthroid not covered, covers generic, do not see pt has TAMERA, called [facility], informed pt requested TAMERA, called pt, informs feels lousy every time takes generic, has talked with SH before, pt close to annual TSH, pt has 30 tablets, agrees to get TSH now, does not appear due for any other labs, will start formulary exception after TSH to confirm no dosage change, SH FYI, no action needed now    levothyroxine (SYNTHROID, LEVOTHROID) 150 MCG tablet 90 tablet 3 11/8/2016  No     TSH   Date Value Ref Range Status   03/22/2016 0.40 0.40 - 4.00 mU/L Final   12/16/2015 0.08 (L) 0.40 - 4.00 mU/L Final   07/30/2015 3.66 0.40 - 4.00 mU/L Final   02/18/2015 2.91 0.40 - 4.00 mU/L Final     Comment:     Effective 7/30/2014, the reference range for this assay has changed to reflect   new instrumentation/methodology.     12/09/2014 4.23 (H) 0.40 - 4.00 mU/L Final     Comment:     Effective 7/30/2014, the reference range for this assay has changed to reflect   new instrumentation/methodology.     Reina Pena, RN, BSN  Message handled by Nurse Triage.

## 2017-02-28 DIAGNOSIS — E03.9 HYPOTHYROIDISM, UNSPECIFIED TYPE: ICD-10-CM

## 2017-02-28 LAB — TSH SERPL DL<=0.005 MIU/L-ACNC: 3.29 MU/L (ref 0.4–4)

## 2017-02-28 PROCEDURE — 36415 COLL VENOUS BLD VENIPUNCTURE: CPT | Performed by: NURSE PRACTITIONER

## 2017-02-28 PROCEDURE — 84443 ASSAY THYROID STIM HORMONE: CPT | Performed by: NURSE PRACTITIONER

## 2017-03-01 NOTE — PROGRESS NOTES
Duy Guillory,  Your TSH was normal at 3.29.  Please let me know if you have any questions.  Sincerely,     Susan Haase, CNP

## 2017-03-02 DIAGNOSIS — M25.50 MULTIPLE JOINT PAIN: ICD-10-CM

## 2017-03-02 RX ORDER — HYDROCODONE BITARTRATE AND ACETAMINOPHEN 5; 325 MG/1; MG/1
1-2 TABLET ORAL EVERY 6 HOURS PRN
Qty: 90 TABLET | Refills: 0 | Status: SHIPPED | OUTPATIENT
Start: 2017-03-02 | End: 2017-05-24

## 2017-03-02 NOTE — TELEPHONE ENCOUNTER
See below, TSH final, called [phone] for coverage review, they will fax form, will watch for fax    TSH   Date Value Ref Range Status   02/28/2017 3.29 0.40 - 4.00 mU/L Final   ]  Reina Pena RN, BSN  Message handled by Nurse Triage.

## 2017-03-02 NOTE — TELEPHONE ENCOUNTER
Controlled Substance Refill Request for Hydrocodone  Problem List Complete:  Yes  Patient is followed by HAASE, SUSAN RACHELE for ongoing prescription of pain medication. All refills should be approved by this provider, or covering partner.     Medication(s): Norco 5 mg/325 mg take 1 tablet every 4-6 hour prn pain  Maximum quantity per month: 30  Clinic visit frequency required: Q 6 months      Controlled substance agreement on file: Yes  Date(s):      Pain Clinic evaluation in the past: No     DIRE Total Score(s):  No flowsheet data found.     Last [facility] website verification: 9/8/16  Last Written Prescription Date:  12/15/16  Last Fill Quantity: 90,   # refills: 0    Last Office Visit with [facility] primary care provider: 1/04/17 w/Susan Haase    Clinic visit frequency required: Q 6  months     Future Office visit:   Next 5 appointments (look out 90 days)     Apr 04, 2017 11:30 AM CDT   Return Visit with Pilar Ayala MD   [facility] ([facility])    [address] [address] [address]   [phone]                  Controlled substance agreement on file: Yes:  Date 10/15/16.     Processing:  Patient will  in clinic   checked in past 6 months?  Yes, 09/08/16

## 2017-03-06 RX ORDER — LEVOTHYROXINE SODIUM 150 UG/1
150 TABLET ORAL EVERY MORNING
Qty: 90 TABLET | Refills: 2 | Status: SHIPPED | OUTPATIENT
Start: 2017-03-06 | End: 2017-11-09

## 2017-03-06 NOTE — TELEPHONE ENCOUNTER
PA approved.  Effective date: not required per fax, 2/2/17-3/4/18  PA reference #: [id number]  Pt. notified:   Yes   Pt. informed directly per fax, SENT fax informing Melvina Pena RN, BSN  Message handled by Nurse Triage.

## 2017-03-13 NOTE — PROGRESS NOTES
"  [facility] - Follow-up / Procedure Note    Date of visit: 02/21/2017    Primary Care Provider: Susan Rachele Haase, APRN CNP    Interval History: Kahlil Caputo is a 71-year-old female with history of chronic low back pain, who returns to clinic for review of recent pelvic imaging. Her pain is largely unchanged in location and characteristic. Today she describes an aching, nagging, and gnawing pain of the right low back and gluteal region, with radiation to the right proximal lateral thigh. Her pain is constant, from 3/10 to 10/10 in intensity, worse with standing more than 10 minutes. She reports that \"yesterday was awful\", especially when using the stairs. She reports some temporary relief with use of ice. She is prescribed Hydrocodone-Acetaminophen 5-325 mg by per primary care provider, #90 tabs per month. She is cautious about taking too much of this medication since it affects her level of alertness. She was provided with prescription for Gabapentin 100 mg TID at her last clinic visit, although she has decided not to start this yet.    Records were obtained from [facility]. These were reviewed with the patient and scanned into EPIC; summary, as follows:    10/13/15: Follow up visit with Dr. Luis Marshall. History notes low back, buttock/groin, and trochanteric pain. \"She was evaluated by Dr. Coffey who did not feel that this is related to her back and did not recommend back surgery.\" X-rays of pelvis obtained, demonstrate: \"Mild central narrowing of the right hip joint space with good prosthesis alignment and position of the left FANI.\" Recommended conservative approach including right hip injection for diagnostic/therapeutic purpose. Discussed \"possibility that she would have progressive arthritis ultimately requiring FANI although her arthritic changes are mild currently\".    10/15/15: Follow up visit with EDGARD Cardenas. Performed right intra-articular hip " "injection under ultrasound guidance (Depo-Medrol 40 mg, Marcaine 0.25% 10 mL). Reports 90% relief immediately following the procedure.    11/14/16: Follow up visit with EDGARD Cardenas. X-rays of right hip obtained, demonstrate: \"Minimal osteoarthritis of the right hip with decent spacing at the joint line and no osteophytes. She does have some sclerosis at the SI joint and notable degeneration in her low back.\" Performed right greater trochanteric bursa injection (Kenalog 40 mg, Lidocaine 1% 5 mL). Patient was offered, but declined, intra-articular injection and physical therapy. Instructed to follow up with spine surgeon who previously ordered SI joint injection, which she responded to in the past.    Past Pain History (from initial consultation, 01/31/17):  Kahlil Caputo is a 71-year-old female who presents for initial evaluation of chief complaint of low back pain.    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 She describes episodes of significant muscle spasm, for which she required ED treatment in the past. Over the years, she has been able to manage her symptoms with exercise. However, her pain has been progressively worse over the last 5 years, without clear inciting event. She reports undergoing MRI of the lumbar spine, after which she began spinal injections, first at [facility] then at [facility]. She states that she has also been evaluated for hip issues. She reports undergoing left hip replacement in 11/2013 per Dr. Luis Marshall. She notes that her right hip  looked good . She states that she  will never have surgery  for her back.    She describes an aching and sharp pain of the right >> left low back/buttock. Her history is also significant for pain of the mid back. She reports pain of the right greater trochanter. She notes occasional pain shooting pain down the right lateral thigh to the level of the knee. Her pain may be " intermittent or constant, from 2/10 to 10/10 in intensity. Her pain is worse with standing more than 15 minutes (e.g. washing dishes at the sink, vacuuming) or walking more than 20-30 minutes. She notes pain with transitional movements, such as getting out of a chair or car. Going up/down stairs may be painful. She notes that she is sensitive to weather changes. Her pain is improved with changing positions, reducing her activities, or using ice. She denies numbness or paresthesias. She reports sense of weakness, noting that she is  guarded because it hurts ; she denies foot drag/drop or falls. She denies saddle anesthesia or bowel/bladder incontinence.    Pain Treatments:  1. Clinics: She has been treated at [facility] in the past, mostly for hip-related issues.  2. Medications: She reports use of Hydrocodone-Acetaminophen 5-325 mg 1 tab QHS, which helps her to sleep; she may take an additional 0.5 tab during the day. She notes that she does not take more pain mediation because this may make her  groggy . She reports use of Biofreeze for the right  hip  and left wrist.   3. Therapeutics: She recalls two courses of physical therapy in the past, targeting the hips and low back. Her last course of physical therapy was around 2015. She performs daily home exercises consisting of stretching, range of motion, core, and balance exercises. She [facility]-20 [facility]. She participates in [employer] classes 3 days per week. She has undergone chiropractic adjustment per She Oakes in 2013. She has performed relaxation exercises on her own; she notes that she learned these when pregnant and has taught her son breathing exercises for his sports-induced asthma. She denies pain counseling in the past.  4. Pain procedures: She reports undergoing (?)hip injection under ultrasound per Ros Chang PA-C (Dr. Luis Marshall) at [facility]. She reports some benefit from spinal injection in  the past. Chart review indicates right L5-S1 transforaminal epidural steroid injection at [facility] on 05/07/15 (0% immediate relief), and left L5-S1 transforaminal epidural steroid injection at [facility] on 05/28/15 (60% immediate relief). She was more recently seen by me for right SI joint injection on 12/05/16 (66% immediate relief).  5. Surgery: Left total hip arthoplasty per Dr. Luis Marshall, [facility], on 11/25/13. Denies spine surgery in the past.    Review of Electronic Chart: Today I have also reviewed available medical information in the patient's medical record at [facility] ([facility]), including relevant provider notes, laboratory work, and imaging.     Review of [place] Prescription Monitoring Program (): Today I have also reviewed the patient's history of controlled substance use, as provided by [place] licensed pharmacies and prescriber dispensers. Prescriptions since her last clinic visit include: Gabapentin 100 mg per Dr. Pilar Ayala, #90 tabs filled on 01/31/17 (she has not yet taken this).      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     TOBACCO ABUSE-CONTINUOUS      Unspecified glaucoma(365.9)      removed as a diagnosis     Unspecified hypothyroidism        Past Surgical History:  Past Surgical History   Procedure Laterality Date     C nonspecific procedure  1970's     D& C X 2     C nonspecific procedure  1964     PILONIDAL CYSTECTOMY     C nonspecific  procedure  1959     T&A     C nonspecific procedure  1991     BREAST BX & CERVICAL POLYP     Hc colonoscopy thru stoma, diagnostic       2003     Laparotomy exploratory  8/30/2013     Procedure: LAPAROTOMY EXPLORATORY;  LAPAROTOMY EXPLORATORY, Ileocecal Resection Resection, Removal of Retained Pill Cam;  Surgeon: Mitchell Fraser MD;  Location: [facility] OR     Resection ileocecal  8/30/2013     Procedure: RESECTION ILEOCECAL;;  Surgeon: Mitchell Fraser MD;  Location: [facility] OR     Arthroplasty hip  11/25/2013     Procedure: ARTHROPLASTY HIP;  Left Total Hip Arthroplasty  ;  Surgeon: Luis Marshall MD;  Location: [facility] OR       Medications:  Current Outpatient Prescriptions   Medication Sig Dispense Refill     furosemide (LASIX) 20 MG tablet Take 1 tablet (20 mg) by mouth daily 1-2 daily 90 tablet 1     STATIN NOT PRESCRIBED, INTENTIONAL, 1 each See Admin Instructions Statin not prescribed intentionally due to Allergy to statin 0 each 0     ascorbic acid (VITAMIN C) 500 MG tablet Take by mouth 2 times daily       chlorpheniramine (CHLOR-TRIMETON) 4 MG tablet Take 4 mg by mouth every 6 hours as needed for allergies or rhinitis       Cholecalciferol (VITAMIN D) 1000 UNITS capsule Take 2 capsules by mouth daily        latanoprost (XALATAN) 0.005 % ophthalmic solution Place 1 drop into both eyes At Bedtime        melatonin 3 MG tablet Take 1-2 tablets (3-6 mg) by mouth nightly as needed for sleep (OTC)       Multiple Vitamin (MULTI-VITAMIN PO) Take by mouth daily        calcium carbonate 500 MG tablet Take 2 tablets by mouth 2 times daily  100 tablet 3     levothyroxine (SYNTHROID/LEVOTHROID) 150 MCG tablet Take 1 tablet (150 mcg) by mouth every morning 90 tablet 2     HYDROcodone-acetaminophen (NORCO) 5-325 MG per tablet Take 1-2 tablets by mouth every 6 hours as needed for moderate to severe pain 90 tablet 0     gabapentin (NEURONTIN) 100 MG capsule Take 1 capsule (100 mg) by mouth 3 times daily (Patient not taking:  "Reported on 2017) 90 capsule 3       Allergies:  Allergies   Allergen Reactions     Latex Difficulty breathing     sensativity - cough, eyes water     Nsaids Shortness Of Breath and Other (See Comments)     bronchospams     Aspirin Difficulty breathing     tight cough     Codeine GI Disturbance     upset GI     Pravastatin      Myalgias     Simvastatin      Myalgias     Tramadol      Heart burn,  Felt \"clammy\", unable to pass gas, unable to urinate and had severe nausea       Social History: As above, otherwise unchanged from initial consultation.     Family history: Unchanged from initial consultation.     Review of Systems: As above. She denies recent fever, chills, illness, use of antibiotics or blood thinning medications (she states she is allergic to Aspirin). No allergy to local anesthetic or steroid.    Physical Exam:  /82 (BP Location: Right arm, Patient Position: Chair, Cuff Size: Adult Regular)  Pulse 89  SpO2 100%  Breastfeeding? No  Constitutional: Well developed, well nourished, appears stated age.  HEENT: Head atraumatic, normocephalic. Eyes without conjunctival injection.  CV/Resp: Symmetric chest wall excursion. Non-labored breathing. No audible wheezing.  Skin: No new rash or lesions of exposed skin.   Extremities: Distal extremities warm and well perfused.   Psychiatric/mental status: Alert, without lethargy or stupor. Pleasant and appropriately conversant. Mood stable.  Neuro/Musculoskeletal exam: Tenderness of right posterolateral gluteal region and right greater trochanter. No new functional deficits appreciated.    Imagin17 MRI pelvis: Osseous and Cartilaginous Structures: No fracture or osseous lesion is demonstrated. There is a left total hip arthroplasty with corresponding adjacent metal artifact. No other abnormal marrow signal intensity is seen. There are mild degenerative changes of the right hip joint with no focal cartilage defects seen. Acetabular Labrum: No juxta " "acetabular cyst. No obvious labral tear is appreciated, allowing for the large field of view technique. If indicated clinically, MR arthrography would be considered the study of choice to evaluate the labrum. Hip joint space: No significant joint effusion. Trochanteric and Iliopsoas Bursae: There is a small amount of fluid adjacent to the right hip greater trochanter within the trochanteric bursa. There is mild edema in the immediately adjacent soft tissues. No other abnormal bursal fluid collection is seen. Common Hamstring Tendon: No evidence of tear or significant tendinosis. Additional Findings: The muscles are symmetric and demonstrate normal signal intensity. No adjacent soft tissues pathology is seen. Impression: 1. Mild right greater trochanteric bursitis. 2. Surgical changes of the left hip. 3. Mild degenerative changes of the right hip.    Procedure Description:    Pre-procedure Diagnosis: Greater trochanteric tendinopathy/bursitis, right   Post-procedure Diagnosis: Greater trochanteric tendinopathy/bursitis, right  Procedure performed: Right greater trochanteric injection  : Pilar Ayala MD    Medication solution (injectate): 1 mL of 40 mg/mL triamcinolone + 1 mL of 1% lidocaine + 1 mL of 0.5% bupivacaine  Sterile prep/cleansing solution: ChloraPrep, alcohol    The procedure and risks were explained, and informed written consent was obtained from the patient. Risks include but are not limited to: increased pain, infection, bleeding, and damage to soft tissue, nerve, muscle, and vasculature structures. Immediately prior to the start of the procedure, a \"time out\" safety check was conducted to confirm correct patient, procedure, and laterality. The patient was placed in a lateral recumbent position, with affected side facing upward; the hip and knee were flexed to 45 and 80 degrees, respectively. The skin was prepped and draped appropriately. The greater trochanter was palpated for area of " maximal tenderness. A 25-gauge, 3.5 inch Quincke spinal needle was advanced toward the point of maximal tenderness until contacting periosteum; at this point, the needle was withdrawn 1-2 mm. After negative aspiration for heme and air, the medication solution was injected. The needle was withdrawn.    The patient tolerated the procedure well, and there was no evidence of procedural complications. The patient was stable post-procedure and was able to ambulate on discharge home. Post-procedure instructions were provided.     Assessment: Kahlil Caputo is a 71-year-old female who presents with a chronic pain syndrome of multifactorial etiology, as follows:      Chronic back pain, predominantly lower back although she reports history of mid-back symptoms as well. Presentation suggests possible discogenic, facetogenic, and myofascial etiology. 10/20/15 MRI thoracic spine demonstrates mild degenerative changes, including small central/left disc protrusion at T7-8 and Schmorl's nodes from T7-T12. 01/09/17 MRI lumbar spine demonstrates stable multilevel degenerative changes, including advanced disc disease and mild left disc bulge at L5-S1 with contact of the left S1 nerve root. There is facet hypertrophy at multiple levels. Central stenosis is most pronounced (mild to moderate) at L3-4 and L4-5.    Right gluteal/thigh pain. Her presentation suggests gluteal tendinopathy/greater trochanteric bursitis, which correlates to findings seen on 02/03/17 MRI pelvis. Right hip osteoarthritis may be contributing. There may be a right SI joint component; she has had some limited improvement with right SI joint injection in the past. Her exam has been somewhat less consistent with piriformis syndrome.    Osteopenia.    History of Crohn's Disease.    Chronic opioid maintenance for pain.    Recommendations:  The following recommendations were given to the patient. Diagnosis, treatment options, risks, benefits, and alternatives were  discussed, and all questions were answered. The patient expressed understanding of the plan for management.      We discussed results of MRI pelvis in the context of her presentation and clinical exam. She has been given a copy of her radiology report, for her records.    She underwent right greater trochanteric bursa injection. Following today's procedure, the patient was advised to contact the [facility] for any of the following:  Fever, chills, night sweats, or other signs of infection  New onset of pain, numbness, or weakness  Any questions/concerns regarding the procedure  If unable to contact the Pain Center, the patient was instructed to go to a local Emergency Room for any complications.     Recommend use of ice and/or heat (> 12-24 hours after the procedure) for symptom support.    She was encouraged to start Gabapentin titration up to 100 mg TID. Instructions were provided at her last clinic visit. Risks/side effects were reviewed.    She will return to clinic in 6 weeks for post-procedure evaluation and medication titration.      Total time spent in follow up was 25 minutes. An additional 10 minutes was spent in procedure. Greater than 50% of face-to-face time was spent in patient counseling and/or coordination of care.    Pilar Ayala MD  [facility]

## 2017-04-04 ENCOUNTER — OFFICE VISIT (OUTPATIENT)
Dept: PALLIATIVE MEDICINE | Facility: CLINIC | Age: 72
End: 2017-04-04
Payer: COMMERCIAL

## 2017-04-04 VITALS
BODY MASS INDEX: 25.5 KG/M2 | SYSTOLIC BLOOD PRESSURE: 121 MMHG | WEIGHT: 158 LBS | OXYGEN SATURATION: 97 % | DIASTOLIC BLOOD PRESSURE: 78 MMHG | HEART RATE: 78 BPM

## 2017-04-04 DIAGNOSIS — G89.29 CHRONIC GLUTEAL PAIN: ICD-10-CM

## 2017-04-04 DIAGNOSIS — G89.29 CHRONIC LUMBOSACRAL PAIN: ICD-10-CM

## 2017-04-04 DIAGNOSIS — M70.60 GREATER TROCHANTERIC BURSITIS, UNSPECIFIED LATERALITY: ICD-10-CM

## 2017-04-04 DIAGNOSIS — M79.18 CHRONIC GLUTEAL PAIN: ICD-10-CM

## 2017-04-04 DIAGNOSIS — G89.4 CHRONIC PAIN SYNDROME: Primary | ICD-10-CM

## 2017-04-04 DIAGNOSIS — M54.50 CHRONIC LUMBOSACRAL PAIN: ICD-10-CM

## 2017-04-04 PROCEDURE — 99214 OFFICE O/P EST MOD 30 MIN: CPT | Performed by: PAIN MEDICINE

## 2017-04-04 ASSESSMENT — PAIN SCALES - GENERAL: PAINLEVEL: MILD PAIN (2)

## 2017-04-04 NOTE — MR AVS SNAPSHOT
After Visit Summary   4/4/2017    Kahlil Caputo    MRN: [id number]           Patient Information     Date Of Birth          1945        Visit Information        Provider Department      4/4/2017 11:30 AM Pilar Ayala MD [facility]        Care Instructions    PATIENT INSTRUCTIONS:    You responded well to your greater trochanter bursa injection on 02/21/17. This injection is able to be performed up to 4 times in a 12 month period. If your symptoms return and are the same from previous, you can call the clinic to directly schedule a repeat injection with me. This is ok to be done in a normal clinic follow up visit slot.     You have had some relief with Gabapentin, although this is likely causing your blurry vision; this might also be contributing to your weight gain. You have had somewhat better tolerated side effects since decreasing your Gabapentin dose from 100 mg three times per day to 100 mg two times per day. You will be seeing your eye doctor next week. If you decide that you cannot tolerate the side effects, please decrease Gabapentin to 100 mg at bedtime. This can be discontinued for persistent issues.     Consider use of Lidocaine 4% over the counter (cream/ointment, since you can't tolerate Latex for patches). This can be applied to the greater trochanter at the side of the hip.    We reviewed recent laboratory work. Your basic blood work/chemistries, thyroid function, and vitamin B12 have been checked relatively recently. If persistent body aches/pains, may consider additional laboratory testing such as CK, vitamin D, possible Lyme titer.     Please return to clinic as needed.    Thank you!    Scheduling number to the [facility]: [phone]. Call this number to schedule or change appointments.    Nurse Triage line: [phone]  Call this number with any questions or concerns. You can leave a message anytime. Please leave a detailed message.  Calls are returned between 8 AM and 4 PM Monday through Thursday and from 8 AM to 12 Noon on Fridays. We usually get back to you within 24 to 48 hours depending on the issue/request.     We believe regular attendance is key to your success in our program.    Any time you are unable to keep your appointment we ask that you call us at least 24 hours in advance to let us know. This will allow us to offer the appointment time to another patient.           Follow-ups after your visit        Who to contact     If you have questions or need follow up information about today's clinic visit or your schedule please contact [facility] directly at [phone].  Normal or non-critical lab and imaging results will be communicated to you by [employer], letter or phone within 4 business days after the clinic has received the results. If you do not hear from us within 7 days, please contact the clinic through [employer] or phone. If you have a critical or abnormal lab result, we will notify you by phone as soon as possible.  Submit refill requests through [employer] or call your pharmacy and they will forward the refill request to us. Please allow 3 business days for your refill to be completed.          Additional Information About Your Visit        [employer] Information     [employer] gives you secure access to your electronic health record. If you see a primary care provider, you can also send messages to your care team and make appointments. If you have questions, please call your primary care clinic.  If you do not have a primary care provider, please call [phone] and they will assist you.        Care EveryWhere ID     This is your Care EveryWhere ID. This could be used by other organizations to access your [facility] medical records  [id number]        Your Vitals Were     Pulse Pulse Oximetry Breastfeeding? BMI (Body Mass Index)          78 97% No 25.5 kg/m2         Blood Pressure from Last 3 Encounters:   04/04/17  121/78   02/21/17 130/82   01/31/17 134/72    Weight from Last 3 Encounters:   04/04/17 71.7 kg (158 lb)   01/31/17 69.9 kg (154 lb)   01/04/17 67.6 kg (149 lb)              Today, you had the following     No orders found for display       Primary Care Provider Office Phone # Fax #    Susan Rachele Haase MALIK [phone]       [address] [address]        Thank you!     Thank you for choosing [facility]  for your care. Our goal is always to provide you with excellent care. Hearing back from our patients is one way we can continue to improve our services. Please take a few minutes to complete the written survey that you may receive in the mail after your visit with us. Thank you!             Your Updated Medication List - Protect others around you: Learn how to safely use, store and throw away your medicines at www.disposemymeds.org.          This list is accurate as of: 4/4/17 12:14 PM.  Always use your most recent med list.                   Brand Name Dispense Instructions for use    ascorbic acid 500 MG tablet    VITAMIN C     Take by mouth 2 times daily       calcium carbonate 500 MG tablet    OS-THEA 500 mg [place]    100 tablet    Take 2 tablets by mouth 2 times daily       CHLOR-TRIMETON 4 MG tablet   Generic drug:  chlorpheniramine      Take 4 mg by mouth every 6 hours as needed for allergies or rhinitis       furosemide 20 MG tablet    LASIX    90 tablet    Take 1 tablet (20 mg) by mouth daily 1-2 daily       gabapentin 100 MG capsule    NEURONTIN    90 capsule    Take 1 capsule (100 mg) by mouth 3 times daily       HYDROcodone-acetaminophen 5-325 MG per tablet    NORCO    90 tablet    Take 1-2 tablets by mouth every 6 hours as needed for moderate to severe pain       latanoprost 0.005 % ophthalmic solution    XALATAN     Place 1 drop into both eyes At Bedtime       levothyroxine 150 MCG tablet    SYNTHROID/LEVOTHROID    90  tablet    Take 1 tablet (150 mcg) by mouth every morning       melatonin 3 MG tablet      Take 1-2 tablets (3-6 mg) by mouth nightly as needed for sleep (OTC)       MULTI-VITAMIN PO      Take by mouth daily       STATIN NOT PRESCRIBED (INTENTIONAL)     0 each    1 each See Admin Instructions Statin not prescribed intentionally due to Allergy to statin       vitamin D 1000 UNITS capsule      Take 2 capsules by mouth daily

## 2017-04-04 NOTE — NURSING NOTE
"Chief Complaint   Patient presents with     Pain     low back, right bursa       Initial /78  Pulse 78  Wt 71.7 kg (158 lb)  SpO2 97%  Breastfeeding? No  BMI 25.5 kg/m2 Estimated body mass index is 25.5 kg/(m^2) as calculated from the following:    Height as of 1/4/17: 1.676 m (5' 6\").    Weight as of this encounter: 71.7 kg (158 lb).  Medication Reconciliation: kari Chung CMA   [facility]    "

## 2017-04-04 NOTE — PATIENT INSTRUCTIONS
PATIENT INSTRUCTIONS:    You responded well to your greater trochanter bursa injection on 02/21/17. This injection is able to be performed up to 4 times in a 12 month period. If your symptoms return and are the same from previous, you can call the clinic to directly schedule a repeat injection with me. This is ok to be done in a normal clinic follow up visit slot.     You have had some relief with Gabapentin, although this is likely causing your blurry vision; this might also be contributing to your weight gain. You have had somewhat better tolerated side effects since decreasing your Gabapentin dose from 100 mg three times per day to 100 mg two times per day. You will be seeing your eye doctor next week. If you decide that you cannot tolerate the side effects, please decrease Gabapentin to 100 mg at bedtime. This can be discontinued for persistent issues.     Consider use of Lidocaine 4% over the counter (cream/ointment, since you can't tolerate Latex for patches). This can be applied to the greater trochanter at the side of the hip.    We reviewed recent laboratory work. Your basic blood work/chemistries, thyroid function, and vitamin B12 have been checked relatively recently. If persistent body aches/pains, may consider additional laboratory testing such as CK, vitamin D, possible Lyme titer.     Please return to clinic as needed.    Thank you!    Scheduling number to the [facility]: [phone]. Call this number to schedule or change appointments.    Nurse Triage line: [phone]  Call this number with any questions or concerns. You can leave a message anytime. Please leave a detailed message. Calls are returned between 8 AM and 4 PM Monday through Thursday and from 8 AM to 12 Noon on Fridays. We usually get back to you within 24 to 48 hours depending on the issue/request.     We believe regular attendance is key to your success in our program.    Any time you are unable to keep your  appointment we ask that you call us at least 24 hours in advance to let us know. This will allow us to offer the appointment time to another patient.

## 2017-04-15 NOTE — PROGRESS NOTES
[facility] - Follow-up Visit    Date of visit: 04/04/2017    Primary Care Provider: Susan Rachele Haase, APRN CNP    Interval History: Kahlil Caputo is a 71-year-old female with history of chronic low back and thigh pain, who returns to clinic for routine follow up. She denies significant pain symptoms today and states that she has  nothing  to report. One week prior, she experienced a 2-3 day episode of right low back and  hip  pain, as well as shoulder pain, although this has since resolved. She may experience some discomfort of the right gluteal region/thigh at night when sleeping on her side, however this is improved overall. She was last seen in clinic on 02/21/17, at which time she underwent right greater trochanteric bursa injection. She started Gabapentin 100 mg TID, although she reports blurred vision, watery eyes, rhinorrhea, weight gain, and slight balance impairment. She remarks that she will be seeing optho next week for  cataract surgery and a pressure check . She has since reduced her dose to 100 mg BID. She reports some improvement in her nighttime pain with this, noting that she went a couple of nights without using her Vicodin. She may otherwise continue to take Vicodin   tab during the day. She may use topical medications such as Biofreeze  when [she] remember[s] . She is participating in exercises at the [facility]. This includes a [employer] circuit (she specifies that this is  out of chair , involving stretching, coordination, and aerobic activities). She adds in other work-out time before her classes three times per week, including walking. When asked about pool exercise, she notes that she doesn t own a bathing suit and would be less inclined for this.    Past Pain History (from initial consultation, 01/31/17):  Kahlil Caputo is a 71-year-old female who presents for initial evaluation of chief complaint of low back pain.    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. She describes episodes of significant muscle spasm, for which she required ED treatment in the past. Over the years, she has been able to manage her symptoms with exercise. However, her pain has been progressively worse over the last 5 years, without clear inciting event. She reports undergoing MRI of the lumbar spine, after which she began spinal injections, first at [facility] then at [facility]. She states that she has also been evaluated for hip issues. She reports undergoing left hip replacement in 11/2013 per Dr. Luis Marshall. She notes that her right hip  looked good . She states that she  will never have surgery  for her back.    She describes an aching and sharp pain of the right >> left low back/buttock. Her history is also significant for pain of the mid back. She reports pain of the right greater trochanter. She notes occasional pain shooting pain down the right lateral thigh to the level of the knee. Her pain may be intermittent or constant, from 2/10 to 10/10 in intensity. Her pain is worse with standing more than 15 minutes (e.g. washing dishes at the sink, vacuuming) or walking more than 20-30 minutes. She notes pain with transitional movements, such as getting out of a chair or car. Going up/down stairs may be painful. She notes that she is sensitive to weather changes. Her pain is improved with changing positions, reducing her activities, or using ice. She denies numbness or paresthesias. She reports sense of weakness, noting that she is  guarded because it hurts ; she denies foot drag/drop or falls. She denies saddle anesthesia or bowel/bladder incontinence.    Pain Treatments:  1. Clinics: She has been treated at [facility] in the past, mostly for hip-related issues.  2. Medications: She reports use of Hydrocodone-Acetaminophen 5-325 mg 1 tab QHS, which helps her to sleep; she may take an additional 0.5 tab during  the day. She notes that she does not take more pain mediation because this may make her  groggy . She reports use of Biofreeze for the right  hip  and left wrist.   3. Therapeutics: She recalls two courses of physical therapy in the past, targeting the hips and low back. Her last course of physical therapy was around 2015. She performs daily home exercises consisting of stretching, range of motion, core, and balance exercises. She walks for 15-20 minutes at the [facility]. She participates in [employer] classes 3 days per week. She has undergone chiropractic adjustment per She Oakes in 2013. She has performed relaxation exercises on her own; she notes that she learned these when pregnant and has taught her son breathing exercises for his sports-induced asthma. She denies pain counseling in the past.  4. Pain procedures: She reports undergoing (?)hip injection under ultrasound per Ros Chang PA-C (Dr. Luis Marshall) at [facility]. She reports some benefit from spinal injection in the past. Chart review indicates right L5-S1 transforaminal epidural steroid injection at [facility] on 05/07/15 (0% immediate relief), and left L5-S1 transforaminal epidural steroid injection at [facility] on 05/28/15 (60% immediate relief). She was more recently seen by me for right SI joint injection on 12/05/16 (66% immediate relief). Addendum: Outside records indicate that she underwent right intra-articular hip injection under ultrasound guidance (Depo-Medrol 40 mg, Marcaine 0.25% 10 mL) per EDGARD Cardenas at [facility] on 10/15/15; she was documented to have 90% relief immediately following the procedure. She underwent right greater trochanteric bursa injection (Kenalog 40 mg, Lidocaine 1% 5 mL) on 11/14/16.   5. Surgery: Left total hip arthoplasty per Dr. Luis Marshall, [facility], on 11/25/13. Denies spine surgery in the past.    Review of Electronic Chart: Today I have also reviewed available  medical information in the patient's medical record at [facility] ([facility]), including relevant provider notes, laboratory work, and imaging.     Review of [place] Prescription Monitoring Program (): Today I have also reviewed the patient's history of controlled substance use, as provided by [place] licensed pharmacies and prescriber dispensers. Prescriptions since her last visit include: Hydrocodone-Acetaminophen 5-325 mg per Dr. Susan Haase, #90 tabs filled on 03/03/17; Gabapentin 100 mg per Dr. Pilar Ayala, #90 caps filled on 03/17/17.     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     TOBACCO ABUSE-CONTINUOUS      Unspecified glaucoma(365.9)     removed as a diagnosis     Unspecified hypothyroidism        Past Surgical History:  Past Surgical History:   Procedure Laterality Date     ARTHROPLASTY HIP  11/25/2013    Procedure: ARTHROPLASTY HIP;  Left Total Hip Arthroplasty  ;  Surgeon: Luis Marshall MD;  Location: [facility] OR     C NONSPECIFIC PROCEDURE  1970's    D& C X 2     C NONSPECIFIC PROCEDURE  1964    PILONIDAL CYSTECTOMY     C NONSPECIFIC PROCEDURE  1959    T&A     C NONSPECIFIC PROCEDURE  1991    BREAST BX & CERVICAL POLYP     HC COLONOSCOPY THRU STOMA, DIAGNOSTIC      2003     LAPAROTOMY EXPLORATORY  8/30/2013    Procedure: LAPAROTOMY EXPLORATORY;  LAPAROTOMY EXPLORATORY, Ileocecal Resection Resection, Removal of Retained Pill Cam;  Surgeon: Mitchell Fraser MD;   "Location: [facility] OR     RESECTION ILEOCECAL  8/30/2013    Procedure: RESECTION ILEOCECAL;;  Surgeon: Mitchell Fraser MD;  Location: [facility] OR       Medications:  Current Outpatient Prescriptions   Medication Sig Dispense Refill     levothyroxine (SYNTHROID/LEVOTHROID) 150 MCG tablet Take 1 tablet (150 mcg) by mouth every morning 90 tablet 2     HYDROcodone-acetaminophen (NORCO) 5-325 MG per tablet Take 1-2 tablets by mouth every 6 hours as needed for moderate to severe pain 90 tablet 0     gabapentin (NEURONTIN) 100 MG capsule Take 1 capsule (100 mg) by mouth 3 times daily 90 capsule 3     furosemide (LASIX) 20 MG tablet Take 1 tablet (20 mg) by mouth daily 1-2 daily 90 tablet 1     STATIN NOT PRESCRIBED, INTENTIONAL, 1 each See Admin Instructions Statin not prescribed intentionally due to Allergy to statin 0 each 0     ascorbic acid (VITAMIN C) 500 MG tablet Take by mouth 2 times daily       chlorpheniramine (CHLOR-TRIMETON) 4 MG tablet Take 4 mg by mouth every 6 hours as needed for allergies or rhinitis       Cholecalciferol (VITAMIN D) 1000 UNITS capsule Take 2 capsules by mouth daily        latanoprost (XALATAN) 0.005 % ophthalmic solution Place 1 drop into both eyes At Bedtime        melatonin 3 MG tablet Take 1-2 tablets (3-6 mg) by mouth nightly as needed for sleep (OTC)       Multiple Vitamin (MULTI-VITAMIN PO) Take by mouth daily        calcium carbonate 500 MG tablet Take 2 tablets by mouth 2 times daily  100 tablet 3       Allergies:  Allergies   Allergen Reactions     Latex Difficulty breathing     sensativity - cough, eyes water     Nsaids Shortness Of Breath and Other (See Comments)     bronchospams     Aspirin Difficulty breathing     tight cough     Codeine GI Disturbance     upset GI     Pravastatin      Myalgias     Simvastatin      Myalgias     Tramadol      Heart burn,  Felt \"clammy\", unable to pass gas, unable to urinate and had severe nausea       Social History:  Social History     Social History "     Marital status:      Spouse name: N/A     Number of children: 2     Years of education: 17     Occupational History     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      Retired     Social History Main Topics     Smoking status: Former Smoker     Packs/day: 1.00     Years: 50.00     Types: Cigarettes     Quit date: 6/7/2010     Smokeless tobacco: Never Used      Comment: Quit June 7, 2010     Alcohol use 0.0 oz/week     0 Standard drinks or equivalent per week      Comment: 1 glass wine daily     Drug use: No     Sexual activity: No     Other Topics Concern      Service No     Blood Transfusions Yes     With second miscarriage in 1972 and after childbirth in 1975     Caffeine Concern Yes     0-1 cup coffee per day     Occupational Exposure Yes     sick building syndrome     Hobby Hazards No     Sleep Concern Yes     takes melatonin, sleeps 5 hours     Stress Concern Yes     lesion removed from left thigh, pathology questionable, will have re-excision next week     Weight Concern No     Special Diet Yes     low residue, steamed veggies, greek yogurt, low sodium     Back Care Yes     low back     Exercise Yes     Curves 3 days week     Seat Belt Yes     Self-Exams Yes     sporadic     Parent/Sibling W/ Cabg, Mi Or Angioplasty Before 65f 55m? No     Social History Narrative       Family history:  Family History   Problem Relation Age of Onset     Alcohol/Drug Mother      HEART DISEASE Mother      cardiomyopathy     Alcohol/Drug Father      HEART DISEASE Father      CAD -  1st MI mid 50's     Myocardial Infarction Father      X4     OSTEOPOROSIS Maternal Grandmother      CANCER Maternal Grandfather      stomach or throat - martini drinker/pipe smoker     Breast Cancer No family hx of      Cancer - colorectal No family hx of      Myocardial Infarction Brother        Review of Systems: As above, otherwise unchanged from previous.    Physical Exam:  /78  Pulse 78  Wt 71.7 kg (158 lb)  SpO2 97%   Breastfeeding? No  BMI 25.5 kg/m2  Constitutional: Well developed, well nourished, appears stated age.  HEENT: Head atraumatic, normocephalic. Eyes without conjunctival injection.  CV/Resp: Symmetric chest wall excursion. Non-labored breathing. No audible wheezing.  Extremities: Distal extremities warm and well perfused.   Psychiatric/mental status: Alert, without lethargy or stupor. Pleasant and appropriately conversant. Mood stable.  Neuro/Musculoskeletal exam: No new functional deficits appreciated.    Assessment: Kahlil Caputo is a 71-year-old female who presents with a chronic pain syndrome of multifactorial etiology, as follows:      Chronic back pain, predominantly lower back although she reports history of mid-back symptoms as well. Presentation suggests possible discogenic, facetogenic, and myofascial etiology. 10/20/15 MRI thoracic spine demonstrates mild degenerative changes, including small central/left disc protrusion at T7-8 and Schmorl's nodes from T7-T12. 01/09/17 MRI lumbar spine demonstrates stable multilevel degenerative changes, including advanced disc disease and mild left disc bulge at L5-S1 with contact of the left S1 nerve root. There is facet hypertrophy at multiple levels. Central stenosis is most pronounced (mild to moderate) at L3-4 and L4-5.    Right gluteal/thigh pain. Her presentation suggests gluteal tendinopathy/greater trochanteric bursitis, which correlates to findings seen on 02/03/17 MRI pelvis. Right hip osteoarthritis may be contributing. There may be a right SI joint component; she has had some limited improvement with right SI joint injection in the past. Her exam has been somewhat less consistent with piriformis syndrome.    Osteopenia.    History of Crohn's Disease.    Chronic opioid maintenance for pain.    Recommendations:  The following recommendations were given to the patient. Diagnosis, treatment options, risks, benefits, and alternatives were discussed, and all  questions were answered. The patient expressed understanding of the plan for management.      She reports good relief with right greater trochanteric bursa injection performed on 02/21/17. This may be repeated as needed in the future; would limit to no more than 4 times in 12 moth period.    She is currently taking Gabapentin 100 mg BID. She reports improvement, although has noted some side effects (blurred vision, watery eyes, rhinorrhea, weight gain, and slight balance impairment) at higher doses. She will be seeing her eye doctor next week. If recurrent/persistent side effects, she was instructed to decrease Gabapentin to 100 mg QHS, then discontinue.    Consider trial of topical analgesic such as Lidocaine, for the right greater trochanteric/gluteal region. We discussed over-the-counter options for this. She may consider cream/ointment/spray if unable to tolerate patch use due to Latex allergy.    The patient's questions were answered regarding underlying conditions that may contribute to chronic pain. We discussed previous laboratory work up, which was within normal limits (02/28/17 TSH 3.29; 12/15/16 CBC wnl, BMP wnl; 07/06/16 vit B12 493; 12/16/15 CK total 49, vit D 42). She should continue to follow up with her primary care provider for routine health maintenance. Additional laboratory testing that may be considered for work up of myalgia might include JUAN JOSÉ, ESR, CRP, Lyme titer, etc.    She is welcome to return to clinic as needed. She is welcome to schedule repeat greater trochanteric bursa injection in clinic at any time.     Total time spent in follow up was 25 minutes. Greater than 50% of face-to-face time was spent in patient counseling and/or coordination of care.    Pilar Ayala MD  [facility]

## 2017-05-24 ENCOUNTER — OFFICE VISIT (OUTPATIENT)
Dept: FAMILY MEDICINE | Facility: CLINIC | Age: 72
End: 2017-05-24
Payer: COMMERCIAL

## 2017-05-24 VITALS
SYSTOLIC BLOOD PRESSURE: 122 MMHG | WEIGHT: 163 LBS | HEART RATE: 110 BPM | BODY MASS INDEX: 26.2 KG/M2 | TEMPERATURE: 98.1 F | HEIGHT: 66 IN | DIASTOLIC BLOOD PRESSURE: 70 MMHG | OXYGEN SATURATION: 98 % | RESPIRATION RATE: 14 BRPM

## 2017-05-24 DIAGNOSIS — M54.50 BILATERAL LOW BACK PAIN WITHOUT SCIATICA, UNSPECIFIED CHRONICITY: ICD-10-CM

## 2017-05-24 DIAGNOSIS — G89.4 CHRONIC PAIN SYNDROME: Primary | ICD-10-CM

## 2017-05-24 DIAGNOSIS — M25.50 MULTIPLE JOINT PAIN: ICD-10-CM

## 2017-05-24 DIAGNOSIS — I25.10 CORONARY ARTERY DISEASE INVOLVING NATIVE CORONARY ARTERY OF NATIVE HEART WITHOUT ANGINA PECTORIS: ICD-10-CM

## 2017-05-24 LAB
BASOPHILS # BLD AUTO: 0 10E9/L (ref 0–0.2)
BASOPHILS NFR BLD AUTO: 0.2 %
CRP SERPL-MCNC: <2.9 MG/L (ref 0–8)
DIFFERENTIAL METHOD BLD: NORMAL
EOSINOPHIL # BLD AUTO: 0.3 10E9/L (ref 0–0.7)
EOSINOPHIL NFR BLD AUTO: 3 %
ERYTHROCYTE [DISTWIDTH] IN BLOOD BY AUTOMATED COUNT: 13.2 % (ref 10–15)
ERYTHROCYTE [SEDIMENTATION RATE] IN BLOOD BY WESTERGREN METHOD: 10 MM/H (ref 0–30)
HCT VFR BLD AUTO: 41.8 % (ref 35–47)
HGB BLD-MCNC: 14 G/DL (ref 11.7–15.7)
LYMPHOCYTES # BLD AUTO: 2 10E9/L (ref 0.8–5.3)
LYMPHOCYTES NFR BLD AUTO: 24 %
MCH RBC QN AUTO: 29.9 PG (ref 26.5–33)
MCHC RBC AUTO-ENTMCNC: 33.5 G/DL (ref 31.5–36.5)
MCV RBC AUTO: 89 FL (ref 78–100)
MONOCYTES # BLD AUTO: 0.7 10E9/L (ref 0–1.3)
MONOCYTES NFR BLD AUTO: 7.9 %
NEUTROPHILS # BLD AUTO: 5.3 10E9/L (ref 1.6–8.3)
NEUTROPHILS NFR BLD AUTO: 64.9 %
PLATELET # BLD AUTO: 306 10E9/L (ref 150–450)
RBC # BLD AUTO: 4.69 10E12/L (ref 3.8–5.2)
WBC # BLD AUTO: 8.2 10E9/L (ref 4–11)

## 2017-05-24 PROCEDURE — 99213 OFFICE O/P EST LOW 20 MIN: CPT | Performed by: NURSE PRACTITIONER

## 2017-05-24 PROCEDURE — 83721 ASSAY OF BLOOD LIPOPROTEIN: CPT | Performed by: NURSE PRACTITIONER

## 2017-05-24 PROCEDURE — 86038 ANTINUCLEAR ANTIBODIES: CPT | Performed by: NURSE PRACTITIONER

## 2017-05-24 PROCEDURE — 80053 COMPREHEN METABOLIC PANEL: CPT | Performed by: NURSE PRACTITIONER

## 2017-05-24 PROCEDURE — 82306 VITAMIN D 25 HYDROXY: CPT | Performed by: NURSE PRACTITIONER

## 2017-05-24 PROCEDURE — 84443 ASSAY THYROID STIM HORMONE: CPT | Performed by: NURSE PRACTITIONER

## 2017-05-24 PROCEDURE — 36415 COLL VENOUS BLD VENIPUNCTURE: CPT | Performed by: NURSE PRACTITIONER

## 2017-05-24 PROCEDURE — 85025 COMPLETE CBC W/AUTO DIFF WBC: CPT | Performed by: NURSE PRACTITIONER

## 2017-05-24 PROCEDURE — 86618 LYME DISEASE ANTIBODY: CPT | Performed by: NURSE PRACTITIONER

## 2017-05-24 PROCEDURE — 86140 C-REACTIVE PROTEIN: CPT | Performed by: NURSE PRACTITIONER

## 2017-05-24 PROCEDURE — 85652 RBC SED RATE AUTOMATED: CPT | Performed by: NURSE PRACTITIONER

## 2017-05-24 RX ORDER — HYDROCODONE BITARTRATE AND ACETAMINOPHEN 5; 325 MG/1; MG/1
1-2 TABLET ORAL EVERY 6 HOURS PRN
Qty: 90 TABLET | Refills: 0 | Status: SHIPPED | OUTPATIENT
Start: 2017-05-24 | End: 2017-07-24

## 2017-05-24 NOTE — NURSING NOTE
"Chief Complaint   Patient presents with     Medication Problem     gabapentin       Initial /70 (BP Location: Right arm, Patient Position: Chair, Cuff Size: Adult Regular)  Pulse 110  Temp 98.1  F (36.7  C) (Oral)  Resp 14  Ht 5' 6\" (1.676 m)  Wt 163 lb (73.9 kg)  SpO2 98%  BMI 26.31 kg/m2 Estimated body mass index is 26.31 kg/(m^2) as calculated from the following:    Height as of this encounter: 5' 6\" (1.676 m).    Weight as of this encounter: 163 lb (73.9 kg).  Medication Reconciliation: complete   Masha Herzog CMA      "

## 2017-05-24 NOTE — PROGRESS NOTES
"  SUBJECTIVE:                                                    Kahlil Caputo is a 71 year old female who presents to clinic today for the following health issues:    History of Present Illness   Diet::  Regular (no restrictions)  Frequency of exercise::  4-5 days/week  Duration of exercise::  45-60 minutes  Taking medications regularly::  Yes  Medication side effects::  Muscle aches, Lightheadedness and Other  Additional concerns today::  YES    Medication Followup of gabapentin    Taking Medication as prescribed: yes    Side Effects:  Fatigue, muscle aches, vision concerns    Medication Helping Symptoms:  NO    Currently taking 100 mg TID. Discontinued for a time due to side effects. Restarted due to increase in pain of lower back, hips, and posterior lower extremities bilaterally. Pain worsens when sitting or lying for extended periods of time. She is still experiencing side effects since restarting including rhinorrhea, watery eyes, need to constantly clear throat, poor balance, feeling \"foggy,\" inability to focus, random episodes of crying, and weight gain. Especially noticed decreased balance and focus while performing exercises in class at the gym. She is currently exercising 5 days/week (classes x3 weekly at gym and home exercises x2 weekly). Has continued to take gabapentin despite these symptoms because pain is so bad. States no changes in diet. Denies dizziness, depression, or feelings of hopelessness. Has not tried pregabalin for pain.     Problem list and histories reviewed & adjusted, as indicated.  Additional history: as documented    Patient Active Problem List   Diagnosis     Hypothyroidism     Chronic airway obstruction (H)     Disorder of bone and cartilage     Small bowel obstruction (H)     Stricture of small intestine (H)     S/P Left total hip arthroplasty 11/25/13     Osteoarthritis of hip     Ileitis (ileocecal resection 8/2013)     Lumbago     Tricuspid regurgitation     Palpitations     " PVC's (premature ventricular contractions)     Family history of premature CAD     Chronic pain     Pulmonary nodules     ACP (advance care planning)     Coronary artery disease involving native coronary artery of native heart without angina pectoris     Past Surgical History:   Procedure Laterality Date     ARTHROPLASTY HIP  11/25/2013    Procedure: ARTHROPLASTY HIP;  Left Total Hip Arthroplasty  ;  Surgeon: Luis Marshall MD;  Location: [facility] OR     C NONSPECIFIC PROCEDURE  1970's    D& C X 2     C NONSPECIFIC PROCEDURE  1964    PILONIDAL CYSTECTOMY     C NONSPECIFIC PROCEDURE  1959    T&A     C NONSPECIFIC PROCEDURE  1991    BREAST BX & CERVICAL POLYP     HC COLONOSCOPY THRU STOMA, DIAGNOSTIC      2003     LAPAROTOMY EXPLORATORY  8/30/2013    Procedure: LAPAROTOMY EXPLORATORY;  LAPAROTOMY EXPLORATORY, Ileocecal Resection Resection, Removal of Retained Pill Cam;  Surgeon: Mitchell Fraser MD;  Location: [facility] OR     RESECTION ILEOCECAL  8/30/2013    Procedure: RESECTION ILEOCECAL;;  Surgeon: Mitchell Fraser MD;  Location: [facility] OR       Social History   Substance Use Topics     Smoking status: Former Smoker     Packs/day: 1.00     Years: 50.00     Types: Cigarettes     Quit date: 6/7/2010     Smokeless tobacco: Never Used      Comment: Quit June 7, 2010     Alcohol use 0.0 oz/week     0 Standard drinks or equivalent per week      Comment: 1 glass wine daily     Family History   Problem Relation Age of Onset     Alcohol/Drug Mother      HEART DISEASE Mother      cardiomyopathy     Alcohol/Drug Father      HEART DISEASE Father      CAD -  1st MI mid 50's     Myocardial Infarction Father      X4     OSTEOPOROSIS Maternal Grandmother      CANCER Maternal Grandfather      stomach or throat - martini drinker/pipe smoker     Myocardial Infarction Brother      Breast Cancer No family hx of      Cancer - colorectal No family hx of          Current Outpatient Prescriptions   Medication Sig Dispense Refill      "levothyroxine (SYNTHROID/LEVOTHROID) 150 MCG tablet Take 1 tablet (150 mcg) by mouth every morning 90 tablet 2     HYDROcodone-acetaminophen (NORCO) 5-325 MG per tablet Take 1-2 tablets by mouth every 6 hours as needed for moderate to severe pain 90 tablet 0     gabapentin (NEURONTIN) 100 MG capsule Take 1 capsule (100 mg) by mouth 3 times daily (Patient taking differently: Take 100 mg by mouth ) 90 capsule 3     furosemide (LASIX) 20 MG tablet Take 1 tablet (20 mg) by mouth daily 1-2 daily 90 tablet 1     STATIN NOT PRESCRIBED, INTENTIONAL, 1 each See Admin Instructions Statin not prescribed intentionally due to Allergy to statin 0 each 0     ascorbic acid (VITAMIN C) 500 MG tablet Take by mouth 2 times daily       chlorpheniramine (CHLOR-TRIMETON) 4 MG tablet Take 4 mg by mouth every 6 hours as needed for allergies or rhinitis       Cholecalciferol (VITAMIN D) 1000 UNITS capsule Take 2 capsules by mouth daily        latanoprost (XALATAN) 0.005 % ophthalmic solution Place 1 drop into both eyes At Bedtime        melatonin 3 MG tablet Take 1-2 tablets (3-6 mg) by mouth nightly as needed for sleep (OTC)       Multiple Vitamin (MULTI-VITAMIN PO) Take by mouth daily        calcium carbonate 500 MG tablet Take 2 tablets by mouth 2 times daily  100 tablet 3     Allergies   Allergen Reactions     Latex Difficulty breathing     sensativity - cough, eyes water     Nsaids Shortness Of Breath and Other (See Comments)     bronchospams     Aspirin Difficulty breathing     tight cough     Codeine GI Disturbance     upset GI     Pravastatin      Myalgias     Simvastatin      Myalgias     Tramadol      Heart burn,  Felt \"clammy\", unable to pass gas, unable to urinate and had severe nausea     ROS:  Constitutional, HEENT, cardiovascular, pulmonary, GI, , musculoskeletal, neuro, skin, endocrine and psych systems are negative, except as otherwise noted.    This document serves as a record of the services and decisions personally " "performed and made by Susan Haase, CNP. It was created on her behalf by Clare Shook, a trained medical scribe. The creation of this document is based the provider's statements to the medical scribe.  Clare Shook May 24, 2017 1:41 PM   OBJECTIVE:                                                    /70 (BP Location: Right arm, Patient Position: Chair, Cuff Size: Adult Regular)  Pulse 110  Temp 98.1  F (36.7  C) (Oral)  Resp 14  Ht 1.676 m (5' 6\")  Wt 73.9 kg (163 lb)  SpO2 98%  BMI 26.31 kg/m2  Body mass index is 26.31 kg/(m^2).  GENERAL: healthy, alert and no distress  HENT: ear canals and TM's normal, nose and mouth without ulcers or lesions  NECK: no adenopathy, no asymmetry, masses, or scars and thyroid normal to palpation  RESP: lungs clear to auscultation - no rales, rhonchi or wheezes  CV: regular rate and rhythm, normal S1 S2, no S3 or S4, no murmur, click or rub, no peripheral edema   MS: lumbar paraspinal tenderness to palpation, no gross musculoskeletal defects noted, no edema  NEURO: BLE with normal strength, reflexes and tone, mentation intact and speech normal  PSYCH: mentation appears normal, affect normal/bright    Diagnostic Test Results:  none      ASSESSMENT/PLAN:                                                    Kahlil was seen today for medication problem.    Diagnoses and all orders for this visit:    Chronic pain syndrome, Bilateral low back pain without sciatica, unspecified chronicity,Multiple joint pain;  Will continue gabapentin 100 mg tid for several more weeks to see if side effects resolve.     -     CBC with platelets differential  -     Comprehensive metabolic panel  -     TSH with free T4 reflex  -     Vitamin D Deficiency  -     CRP inflammation  -     ESR: Erythrocyte sedimentation rate  -     Lyme Disease Deya with reflex to WB Serum  -     Antinuclear antibody screen by EIA  -     HYDROcodone-acetaminophen (NORCO) 5-325 MG per tablet; Take 1-2 tablets by mouth " every 6 hours as needed for moderate to severe pain      Follow up visit in 6 months, sooner as needed.    The information in this document, created by the medical scribe for me, accurately reflects the services I personally performed and the decisions made by me. I have reviewed and approved this document for accuracy.   Susan Haase, APRN [facility]

## 2017-05-24 NOTE — MR AVS SNAPSHOT
After Visit Summary   5/24/2017    Kahlil Caputo    MRN: [id number]           Patient Information     Date Of Birth          1945        Visit Information        Provider Department      5/24/2017 2:00 PM Haase, Susan Rachele, APRN CNP [facility]        Today's Diagnoses     Chronic pain syndrome    -  1    Bilateral low back pain without sciatica, unspecified chronicity        Multiple joint pain           Follow-ups after your visit        Follow-up notes from your care team     Return in about 6 months (around 11/24/2017).      Who to contact     If you have questions or need follow up information about today's clinic visit or your schedule please contact [facility] directly at [phone].  Normal or non-critical lab and imaging results will be communicated to you by [email or web address], letter or phone within 4 business days after the clinic has received the results. If you do not hear from us within 7 days, please contact the clinic through [email or web address] or phone. If you have a critical or abnormal lab result, we will notify you by phone as soon as possible.  Submit refill requests through [employer] or call your pharmacy and they will forward the refill request to us. Please allow 3 business days for your refill to be completed.          Additional Information About Your Visit        MyChart Information     [employer] gives you secure access to your electronic health record. If you see a primary care provider, you can also send messages to your care team and make appointments. If you have questions, please call your primary care clinic.  If you do not have a primary care provider, please call [phone] and they will assist you.        Care EveryWhere ID     This is your Care EveryWhere ID. This could be used by other organizations to access your [place] medical records  [id number]        Your Vitals Were     Pulse Temperature Respirations Height Pulse Oximetry BMI (Body  "Mass Index)    110 98.1  F (36.7  C) (Oral) 14 5' 6\" (1.676 m) 98% 26.31 kg/m2       Blood Pressure from Last 3 Encounters:   05/24/17 122/70   04/04/17 121/78   02/21/17 130/82    Weight from Last 3 Encounters:   05/24/17 163 lb (73.9 kg)   04/04/17 158 lb (71.7 kg)   01/31/17 154 lb (69.9 kg)              We Performed the Following     CBC with platelets differential     Comprehensive metabolic panel     CRP inflammation     ESR: Erythrocyte sedimentation rate     TSH with free T4 reflex     Vitamin D Deficiency          Today's Medication Changes          These changes are accurate as of: 5/24/17  2:26 PM.  If you have any questions, ask your nurse or doctor.               These medicines have changed or have updated prescriptions.        Dose/Directions    gabapentin 100 MG capsule   Commonly known as:  NEURONTIN   This may have changed:  when to take this        Dose:  100 mg   Take 1 capsule (100 mg) by mouth 3 times daily   Quantity:  90 capsule   Refills:  3            Where to get your medicines      Some of these will need a paper prescription and others can be bought over the counter.  Ask your nurse if you have questions.     Bring a paper prescription for each of these medications     HYDROcodone-acetaminophen 5-325 MG per tablet                Primary Care Provider Office Phone # Fax #    Susan Rachele Haase, APRN -[phone]       [address] [address]        Thank you!     Thank you for choosing [facility]  for your care. Our goal is always to provide you with excellent care. Hearing back from our patients is one way we can continue to improve our services. Please take a few minutes to complete the written survey that you may receive in the mail after your visit with us. Thank you!             Your Updated Medication List - Protect others around you: Learn how to safely use, store and throw away your medicines at " www.disposemymeds.org.          This list is accurate as of: 5/24/17  2:26 PM.  Always use your most recent med list.                   Brand Name Dispense Instructions for use    ascorbic acid 500 MG tablet    VITAMIN C     Take by mouth 2 times daily       calcium carbonate 500 MG tablet    OS-THEA 500 mg [place]. [place]    100 tablet    Take 2 tablets by mouth 2 times daily       CHLOR-TRIMETON 4 MG tablet   Generic drug:  chlorpheniramine      Take 4 mg by mouth every 6 hours as needed for allergies or rhinitis       furosemide 20 MG tablet    LASIX    90 tablet    Take 1 tablet (20 mg) by mouth daily 1-2 daily       gabapentin 100 MG capsule    NEURONTIN    90 capsule    Take 1 capsule (100 mg) by mouth 3 times daily       HYDROcodone-acetaminophen 5-325 MG per tablet    NORCO    90 tablet    Take 1-2 tablets by mouth every 6 hours as needed for moderate to severe pain       latanoprost 0.005 % ophthalmic solution    XALATAN     Place 1 drop into both eyes At Bedtime       levothyroxine 150 MCG tablet    SYNTHROID/LEVOTHROID    90 tablet    Take 1 tablet (150 mcg) by mouth every morning       melatonin 3 MG tablet      Take 1-2 tablets (3-6 mg) by mouth nightly as needed for sleep (OTC)       MULTI-VITAMIN PO      Take by mouth daily       STATIN NOT PRESCRIBED (INTENTIONAL)     0 each    1 each See Admin Instructions Statin not prescribed intentionally due to Allergy to statin       vitamin D 1000 UNITS capsule      Take 2 capsules by mouth daily

## 2017-05-25 LAB
ALBUMIN SERPL-MCNC: 3.8 G/DL (ref 3.4–5)
ALP SERPL-CCNC: 97 U/L (ref 40–150)
ALT SERPL W P-5'-P-CCNC: 23 U/L (ref 0–50)
ANION GAP SERPL CALCULATED.3IONS-SCNC: 9 MMOL/L (ref 3–14)
AST SERPL W P-5'-P-CCNC: 17 U/L (ref 0–45)
BILIRUB SERPL-MCNC: 0.4 MG/DL (ref 0.2–1.3)
BUN SERPL-MCNC: 12 MG/DL (ref 7–30)
CALCIUM SERPL-MCNC: 8.8 MG/DL (ref 8.5–10.1)
CHLORIDE SERPL-SCNC: 102 MMOL/L (ref 94–109)
CO2 SERPL-SCNC: 25 MMOL/L (ref 20–32)
CREAT SERPL-MCNC: 0.67 MG/DL (ref 0.52–1.04)
GFR SERPL CREATININE-BSD FRML MDRD: 86 ML/MIN/1.7M2
GLUCOSE SERPL-MCNC: 99 MG/DL (ref 70–99)
LDLC SERPL DIRECT ASSAY-MCNC: 95 MG/DL
POTASSIUM SERPL-SCNC: 3.7 MMOL/L (ref 3.4–5.3)
PROT SERPL-MCNC: 6.7 G/DL (ref 6.8–8.8)
SODIUM SERPL-SCNC: 136 MMOL/L (ref 133–144)
TSH SERPL DL<=0.005 MIU/L-ACNC: 0.97 MU/L (ref 0.4–4)

## 2017-05-26 LAB
ANA SER QL IA: NORMAL
B BURGDOR IGG+IGM SER QL: 0.05 (ref 0–0.89)
DEPRECATED CALCIDIOL+CALCIFEROL SERPL-MC: 35 UG/L (ref 20–75)

## 2017-05-26 NOTE — PROGRESS NOTES
Duy Guillory,  Your lab results are as below:  1)  TSH (thyroid level) 0.97 which is normal (range 0.4-4)  2)  Your LDL (bad cholesterol) was normal at 95. Continue to follow a low cholesterol diet and we will recheck this in 1 year.  3)  Glucose was normal at 99 (normal fasting is <100).  4)  CRP (checks for inflammation) was normal.     If you have any questions do not hesitate to call the clinic to discuss the results with me further.     Sincerely,    Susan Haase, CNP

## 2017-05-27 NOTE — PROGRESS NOTES
Duy Guillory,  Your test for lyme disease was negative.  The JUAN JOSÉ (test for autoimmune disorders) was negative.  Vitamin D level was 35, normal range is 20-75.  Sincerely,  Susan Haase, CNP

## 2017-06-04 DIAGNOSIS — D62 ANEMIA DUE TO BLOOD LOSS, ACUTE: ICD-10-CM

## 2017-06-05 NOTE — TELEPHONE ENCOUNTER
furosemide (LASIX) 20 MG tablet      Last Written Prescription Date: 12/29/16  Last Fill Quantity: 90, # refills: 1  Last Office Visit with G, P or [facility] prescribing provider: 5/24/2017         Potassium   Date Value Ref Range Status   05/24/2017 3.7 3.4 - 5.3 mmol/L Final     Creatinine   Date Value Ref Range Status   05/24/2017 0.67 0.52 - 1.04 mg/dL Final     BP Readings from Last 3 Encounters:   05/24/17 122/70   04/04/17 121/78   02/21/17 130/82         ANGELA Burgess  June 5, 2017  2:40 PM

## 2017-06-07 RX ORDER — FUROSEMIDE 20 MG
TABLET ORAL
Qty: 90 TABLET | Refills: 1 | Status: SHIPPED | OUTPATIENT
Start: 2017-06-07 | End: 2017-09-08

## 2017-06-07 NOTE — TELEPHONE ENCOUNTER
Prescription approved per [facility] Refill Protocol.  Reina Pena RN, BSN

## 2017-06-20 ENCOUNTER — OFFICE VISIT (OUTPATIENT)
Dept: PALLIATIVE MEDICINE | Facility: CLINIC | Age: 72
End: 2017-06-20
Payer: COMMERCIAL

## 2017-06-20 VITALS
BODY MASS INDEX: 26.47 KG/M2 | SYSTOLIC BLOOD PRESSURE: 122 MMHG | HEART RATE: 88 BPM | DIASTOLIC BLOOD PRESSURE: 62 MMHG | WEIGHT: 164 LBS | OXYGEN SATURATION: 98 %

## 2017-06-20 DIAGNOSIS — G89.4 CHRONIC PAIN SYNDROME: Primary | ICD-10-CM

## 2017-06-20 DIAGNOSIS — M54.50 CHRONIC LUMBOSACRAL PAIN: ICD-10-CM

## 2017-06-20 DIAGNOSIS — M70.61 GREATER TROCHANTERIC BURSITIS OF RIGHT HIP: ICD-10-CM

## 2017-06-20 DIAGNOSIS — G89.29 CHRONIC LUMBOSACRAL PAIN: ICD-10-CM

## 2017-06-20 PROCEDURE — 20610 DRAIN/INJ JOINT/BURSA W/O US: CPT | Mod: RT | Performed by: PAIN MEDICINE

## 2017-06-20 PROCEDURE — 99214 OFFICE O/P EST MOD 30 MIN: CPT | Mod: 25 | Performed by: PAIN MEDICINE

## 2017-06-20 ASSESSMENT — PAIN SCALES - GENERAL: PAINLEVEL: SEVERE PAIN (7)

## 2017-06-20 NOTE — NURSING NOTE
"Chief Complaint   Patient presents with     Pain     right hip, low back and legs       Initial /62  Pulse 88  Wt 74.4 kg (164 lb)  SpO2 98%  BMI 26.47 kg/m2 Estimated body mass index is 26.47 kg/(m^2) as calculated from the following:    Height as of 5/24/17: 1.676 m (5' 6\").    Weight as of this encounter: 74.4 kg (164 lb).  Medication Reconciliation: kari Chung CMA   [facility]    "

## 2017-06-20 NOTE — MR AVS SNAPSHOT
After Visit Summary   6/20/2017    Kahlil Caputo    MRN: [id number]           Patient Information     Date Of Birth          1945        Visit Information        Provider Department      6/20/2017 11:00 AM Pilar Ayala MD [facility]        Care Instructions    [facility]     1. Today you had:  Right greater trochanter bursa injection        Medications used:  Lidocaine, Bupivacaine, Kenalog    2. Recommend gentle stretching/strengthening exercises for the affected area. You may use ice as needed. Heat may be used > 24-48 hours after procedure as needed.     3. If perceived to be beneficial, you may repeat greater trochanter bursa injection up to 4 times in 12 months. Low back injection may also be considered as needed in the future.    4. Please discontinue Gabapentin as follows: 100 mg morning/100 mg night > 2-3 days; then, decrease to 100 mg night > 2-3 days; then discontinue.    5. We dicussed the fact that Dr. Ayala will be leaving [facility] in upcoming weeks. Your primary care provider may place direct referral for procedure as needed. Recommend Dr. Michelle Castaenda.     There will otherwise be new providers joining the pain practice in the fall. You may consider re-consultation with one of those providers, as needed.      6. Post-procedure instructions:      Go to the emergency room if you develop any shortness of breath    Monitor the injection sites for signs and symptoms of infection-fever, chills, redness, swelling, warmth, or drainage to areas.    You may have soreness at injection sites for up to 24 hours.    You may apply ice to the painful areas to help minimize the discomfort of the needle pokes.    Do not apply heat to sites for at least 24 hours.    You may use anti-inflammatory medications or Tylenol for pain control if necessary    Call this number to schedule or change appointments: [phone]    Nurse triage line (Mon-Thurs 8 AM to 4 PM;  Fri 8 AM to 12 PM): [phone]    After hours provider line: [phone]    We believe regular attendance is key to your success in our program.    Any time you are unable to keep your appointment we ask that you call us at least 24 hours in advance to let us know. This will allow us to offer the appointment time to another patient.               Follow-ups after your visit        Who to contact     If you have questions or need follow up information about today's clinic visit or your schedule please contact [facility] directly at [phone].  Normal or non-critical lab and imaging results will be communicated to you by [employer], letter or phone within 4 business days after the clinic has received the results. If you do not hear from us within 7 days, please contact the clinic through [employer] or phone. If you have a critical or abnormal lab result, we will notify you by phone as soon as possible.  Submit refill requests through [employer] or call your pharmacy and they will forward the refill request to us. Please allow 3 business days for your refill to be completed.          Additional Information About Your Visit        Fixyahart Information     [employer] gives you secure access to your electronic health record. If you see a primary care provider, you can also send messages to your care team and make appointments. If you have questions, please call your primary care clinic.  If you do not have a primary care provider, please call [phone] and they will assist you.        Care EveryWhere ID     This is your Care EveryWhere ID. This could be used by other organizations to access your [place] medical records  [id number]        Your Vitals Were     Pulse Pulse Oximetry BMI (Body Mass Index)             88 98% 26.47 kg/m2          Blood Pressure from Last 3 Encounters:   06/20/17 122/62   05/24/17 122/70   04/04/17 121/78    Weight from Last 3 Encounters:   06/20/17 74.4 kg (164 lb)   05/24/17 73.9  kg (163 lb)   04/04/17 71.7 kg (158 lb)              Today, you had the following     No orders found for display         Today's Medication Changes          These changes are accurate as of: 6/20/17 12:06 PM.  If you have any questions, ask your nurse or doctor.               These medicines have changed or have updated prescriptions.        Dose/Directions    gabapentin 100 MG capsule   Commonly known as:  NEURONTIN   This may have changed:  when to take this        Dose:  100 mg   Take 1 capsule (100 mg) by mouth 3 times daily   Quantity:  90 capsule   Refills:  3                Primary Care Provider Office Phone # Fax #    Vero Eugenie Haase, APRN -[phone]       [address] [address]        Thank you!     Thank you for choosing [facility]  for your care. Our goal is always to provide you with excellent care. Hearing back from our patients is one way we can continue to improve our services. Please take a few minutes to complete the written survey that you may receive in the mail after your visit with us. Thank you!             Your Updated Medication List - Protect others around you: Learn how to safely use, store and throw away your medicines at www.disposemymeds.org.          This list is accurate as of: 6/20/17 12:06 PM.  Always use your most recent med list.                   Brand Name Dispense Instructions for use    ACE/ARB NOT PRESCRIBED (INTENTIONAL)      Please choose reason not prescribed, below       ascorbic acid 500 MG tablet    VITAMIN C     Take by mouth 2 times daily       ASPIRIN NOT PRESCRIBED    INTENTIONAL    0 each    Please choose reason not prescribed, below       BETA BLOCKER NOT PRESCRIBED (INTENTIONAL)      Beta Blocker not prescribed intentionally due to COPD       calcium carbonate 1250 MG tablet    OS-THEA 500 mg [place]. Ca    100 tablet    Take 2 tablets by mouth 2 times daily       CHLOR-TRIMETON 4 MG  tablet   Generic drug:  chlorpheniramine      Take 4 mg by mouth every 6 hours as needed for allergies or rhinitis       furosemide 20 MG tablet    LASIX    90 tablet    TAKE 1-2 TABLETS BY MOUTH DAILY       gabapentin 100 MG capsule    NEURONTIN    90 capsule    Take 1 capsule (100 mg) by mouth 3 times daily       HYDROcodone-acetaminophen 5-325 MG per tablet    NORCO    90 tablet    Take 1-2 tablets by mouth every 6 hours as needed for moderate to severe pain       latanoprost 0.005 % ophthalmic solution    XALATAN     Place 1 drop into both eyes At Bedtime       levothyroxine 150 MCG tablet    SYNTHROID/LEVOTHROID    90 tablet    Take 1 tablet (150 mcg) by mouth every morning       melatonin 3 MG tablet      Take 1-2 tablets (3-6 mg) by mouth nightly as needed for sleep (OTC)       MULTI-VITAMIN PO      Take by mouth daily       STATIN NOT PRESCRIBED (INTENTIONAL)     0 each    1 each See Admin Instructions Statin not prescribed intentionally due to Allergy to statin       vitamin D 1000 UNITS capsule      Take 2 capsules by mouth daily

## 2017-06-20 NOTE — PATIENT INSTRUCTIONS
[facility]     1. Today you had:  Right greater trochanter bursa injection        Medications used:  Lidocaine, Bupivacaine, Kenalog    2. Recommend gentle stretching/strengthening exercises for the affected area. You may use ice as needed. Heat may be used > 24-48 hours after procedure as needed.     3. If perceived to be beneficial, you may repeat greater trochanter bursa injection up to 4 times in 12 months. Low back injection may also be considered as needed in the future.    4. Please discontinue Gabapentin as follows: 100 mg morning/100 mg night > 2-3 days; then, decrease to 100 mg night > 2-3 days; then discontinue.    5. We dicussed the fact that Dr. Ayala will be leaving [facility] in upcoming weeks. Your primary care provider may place direct referral for procedure as needed. Recommend Dr. Michelle Castaneda.     There will otherwise be new providers joining the pain practice in the fall. You may consider re-consultation with one of those providers, as needed.      6. Post-procedure instructions:      Go to the emergency room if you develop any shortness of breath    Monitor the injection sites for signs and symptoms of infection-fever, chills, redness, swelling, warmth, or drainage to areas.    You may have soreness at injection sites for up to 24 hours.    You may apply ice to the painful areas to help minimize the discomfort of the needle pokes.    Do not apply heat to sites for at least 24 hours.    You may use anti-inflammatory medications or Tylenol for pain control if necessary    Call this number to schedule or change appointments: [phone]    Nurse triage line (Mon-Thurs 8 AM to 4 PM; Fri 8 AM to 12 PM): [phone]    After hours provider line: [phone]    We believe regular attendance is key to your success in our program.    Any time you are unable to keep your appointment we ask that you call us at least 24 hours in advance to let us know. This will allow us to offer  the appointment time to another patient.

## 2017-07-24 DIAGNOSIS — M25.50 MULTIPLE JOINT PAIN: ICD-10-CM

## 2017-07-24 NOTE — TELEPHONE ENCOUNTER
[phone]    Pending Prescriptions:                       Disp   Refills    HYDROcodone-acetaminophen (NORCO) 5-325 M*90 tab*0            Sig: Take 1-2 tablets by mouth every 6 hours as needed           for moderate to severe pain  Controlled Substance Refill Request for   Problem List Complete:  No     PROVIDER TO CONSIDER COMPLETION OF PROBLEM LIST AND OVERVIEW/CONTROLLED SUBSTANCE AGREEMENT    Last Written Prescription Date:  05/24/2017  Last Fill Quantity: 90,   # refills: 0    Last Office Visit with [facility] primary care provider: 11/08/2016    Future Office visit:     Controlled substance agreement on file: Yes:  Date 10/19/2015.     Processing:  Patient will  in clinic     checked in past 6 months?  No, route to RN     Patient is followed by [facility], SUSAN RACHELE for ongoing prescription of pain medication.  All refills should be approved by this provider, or covering partner.     Medication(s): Norco 5 mg/325 mg take 1 tablet every 4-6 hour prn pain  Maximum quantity per month: 30  Clinic visit frequency required: Q 6  months      Controlled substance agreement on file: Yes       Date(s):      Pain Clinic evaluation in the past: No     DIRE Total Score(s):  No flowsheet data found.     Last [facility] website verification: 9/8/16    Miladys Marshall

## 2017-07-27 RX ORDER — HYDROCODONE BITARTRATE AND ACETAMINOPHEN 5; 325 MG/1; MG/1
1-2 TABLET ORAL EVERY 6 HOURS PRN
Qty: 90 TABLET | Refills: 0 | Status: SHIPPED | OUTPATIENT
Start: 2017-07-27 | End: 2017-10-20

## 2017-07-29 NOTE — PROGRESS NOTES
"  [facility] - Follow-up Visit / Procedure Note    Date of visit: 06/20/2017    Primary Care Provider: Susan Rachele Haase, APRN CNP    Interval History: Kahlil Caputo is a pleasant, 71-year-old female who returns to clinic for follow-up of chronic low back and thigh pain.  She was last seen in clinic on 04/04/17. She continues to describe pain of the low back, which has been historically worse on the right, but has involved both sides more recently. She notes that  everything hurts . Her pain is worse with sitting, walking, or standing. She denies new numbness, tingling, weakness, or changes in bowel/bladder function. She reports tenderness along the right greater trochanter, worse with pressure to this area and when lying on her side. Her symptoms have become more noticeable over the last 10 days. She reports benefit from greater trochanteric bursa injection performed on 02/21/17, and she is interested in repeating this today. She continues to practice home exercises, although notes that physical activity is limited by pain. She tries walking as much as she can. She participates in a class at the [facility] that includes range of motion, strengthening (e.g. squats), and aerobic exercises. She has been less interested in pursuing pool exercise.    She continues to take Vicodin 5-325 mg as needed, typically 1/2 tab. She is currently using lidocaine patch on her low back at night; she notes that this is \"not 100% [relief]\", but may be helpful. The fact that she can only use lidocaine patch for 12 hour period may limit its utility, since her pain is variably worse at night or during the daytime. She states that cream works better than the patch for pain of the greater trochanter. She reports occasional  faint headache\" with lidocaine use. She is unable to take NSAID medications. She reports use of Gabapentin 100 mg morning and 200 mg night. She notes weight gain of 14 lbs associated with this, as well " "as foot and ankle swelling. Overall, she is hesitant to try new medications, remarking that she tends to be  sensitive  to things.    At her last visit, she noted vision changes (including \"fuzzy vision\"), with some question as to whether Gabapentin was contributing to this. She recently saw her eye doctor. 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 She states that her right eye has also  developed a film . 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 She states that she would need to repeat laser surgery to correct this.    Past Pain History (from initial consultation, 01/31/17):  Kahlil Caputo is a 71-year-old female who presents for initial evaluation of chief complaint of low back pain.    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 She describes episodes of significant muscle spasm, for which she required ED treatment in the past. Over the years, she has been able to manage her symptoms with exercise. However, her pain has been progressively worse over the last 5 years, without clear inciting event. She reports undergoing MRI of the lumbar spine, after which she began spinal injections, first at [facility] then at [place]. She states that she has also been evaluated for hip issues. She reports undergoing left hip replacement in 11/2013 per Dr. Luis Marshall. She notes that her right hip  looked good . She states that she  will never have surgery  for her back.    She describes an aching and sharp pain of the right >> left low back/buttock. Her history is also significant for pain of the mid back. She reports pain of the right greater trochanter. She notes occasional pain shooting pain down the right lateral thigh to the level of the knee. Her pain may be intermittent or constant, from 2/10 to 10/10 in intensity. Her " pain is worse with standing more than 15 minutes (e.g. washing dishes at the sink, vacuuming) or walking more than 20-30 minutes. She notes pain with transitional movements, such as getting out of a chair or car. Going up/down stairs may be painful. She notes that she is sensitive to weather changes. Her pain is improved with changing positions, reducing her activities, or using ice. She denies numbness or paresthesias. She reports sense of weakness, noting that she is  guarded because it hurts ; she denies foot drag/drop or falls. She denies saddle anesthesia or bowel/bladder incontinence.    Pain Treatments:  1. Clinics: She has been treated at [facility] in the past, mostly for hip-related issues.  2. Medications: She reports use of Hydrocodone-Acetaminophen 5-325 mg 1 tab QHS, which helps her to sleep; she may take an additional 0.5 tab during the day. She notes that she does not take more pain mediation because this may make her  groggy . She reports use of Biofreeze for the right  hip  and left wrist.   3. Therapeutics: She recalls two courses of physical therapy in the past, targeting the hips and low back. Her last course of physical therapy was around 2015. She performs daily home exercises consisting of stretching, range of motion, core, and balance exercises. She walks for 15-20 minutes at the [facility]. She participates in [employer] classes 3 days per week. She has undergone chiropractic adjustment per She Oakes in 2013. She has performed relaxation exercises on her own; she notes that she learned these when pregnant and has taught her son breathing exercises for his sports-induced asthma. She denies pain counseling in the past.  4. Pain procedures: She reports undergoing (?)hip injection under ultrasound per Ros Chang PA-C (Dr. Luis Marshall) at [facility]. She reports some benefit from spinal injection in the past. Chart review indicates right L5-S1 transforaminal  epidural steroid injection at [facility] on 05/07/15 (0% immediate relief), and left L5-S1 transforaminal epidural steroid injection at [facility] on 05/28/15 (60% immediate relief). She was more recently seen by me for right SI joint injection on 12/05/16 (66% immediate relief). Addendum: Outside records indicate that she underwent right intra-articular hip injection under ultrasound guidance (Depo-Medrol 40 mg, Marcaine 0.25% 10 mL) per EDGARD Cardenas at [facility] on 10/15/15; she was documented to have 90% relief immediately following the procedure. She underwent right greater trochanteric bursa injection (Kenalog 40 mg, Lidocaine 1% 5 mL) on 11/14/16.   5. Surgery: Left total hip arthoplasty per Dr. Luis Marshall, [facility], on 11/25/13. Denies spine surgery in the past.    Review of Electronic Chart: Today I have also reviewed available medical information in the patient's medical record at [place] ([facility] including relevant provider notes, laboratory work, and imaging.     Review of [place] Prescription Monitoring Program (): Today I have also reviewed the patient's history of controlled substance use, as provided by [place] licensed pharmacies and prescriber dispensers. Prescriptions since her last visit include: Hydrocodone-Acetaminophen 5-325 mg per Dr. Susan Haase, #90 tabs filled on 05/24/17; Gabapentin 100 mg per Dr. Pilar Ayala, #90 caps filled on 05/08/17 and 06/05/17.     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     TOBACCO ABUSE-CONTINUOUS      Unspecified glaucoma(365.9)     removed as a diagnosis     Unspecified hypothyroidism        Past Surgical History:  Past Surgical History:   Procedure Laterality Date     ARTHROPLASTY HIP  11/25/2013    Procedure: ARTHROPLASTY HIP;  Left Total Hip Arthroplasty  ;  Surgeon: Luis Marshall MD;  Location: [facility] OR     C NONSPECIFIC PROCEDURE  1970's    D& C X 2     C NONSPECIFIC PROCEDURE  1964    PILONIDAL CYSTECTOMY     C NONSPECIFIC PROCEDURE  1959    T&A     C NONSPECIFIC PROCEDURE  1991    BREAST BX & CERVICAL POLYP     HC COLONOSCOPY THRU STOMA, DIAGNOSTIC      2003     LAPAROTOMY EXPLORATORY  8/30/2013    Procedure: LAPAROTOMY EXPLORATORY;  LAPAROTOMY EXPLORATORY, Ileocecal Resection Resection, Removal of Retained Pill Cam;  Surgeon: Mitchell Fraser MD;  Location: [facility] OR     RESECTION ILEOCECAL  8/30/2013    Procedure: RESECTION ILEOCECAL;;  Surgeon: Mitchell Fraser MD;  Location: [facility] OR       Medications:  Current Outpatient Prescriptions   Medication Sig Dispense Refill     furosemide (LASIX) 20 MG tablet TAKE 1-2 TABLETS BY MOUTH DAILY 90 tablet 1     ASPIRIN NOT PRESCRIBED (INTENTIONAL) Please choose reason not prescribed, below 0 each 0     BETA BLOCKER NOT PRESCRIBED, INTENTIONAL, Beta Blocker not prescribed intentionally due to COPD  0     ACE/ARB NOT PRESCRIBED, INTENTIONAL, Please choose reason not prescribed, below       levothyroxine (SYNTHROID/LEVOTHROID) 150 MCG tablet Take 1 tablet (150 mcg) by mouth every morning 90 tablet 2     gabapentin (NEURONTIN) 100 MG capsule Take 1 capsule (100 mg) by mouth 3 times daily (Patient taking differently: Take 100 mg by mouth ) 90 capsule 3     STATIN NOT PRESCRIBED, INTENTIONAL, 1 each See Admin Instructions Statin not prescribed intentionally due to Allergy to statin 0 each 0     ascorbic acid (VITAMIN C) 500 MG tablet Take by mouth 2 times daily   "     chlorpheniramine (CHLOR-TRIMETON) 4 MG tablet Take 4 mg by mouth every 6 hours as needed for allergies or rhinitis       Cholecalciferol (VITAMIN D) 1000 UNITS capsule Take 2 capsules by mouth daily        latanoprost (XALATAN) 0.005 % ophthalmic solution Place 1 drop into both eyes At Bedtime        melatonin 3 MG tablet Take 1-2 tablets (3-6 mg) by mouth nightly as needed for sleep (OTC)       Multiple Vitamin (MULTI-VITAMIN PO) Take by mouth daily        calcium carbonate 500 MG tablet Take 2 tablets by mouth 2 times daily  100 tablet 3     HYDROcodone-acetaminophen (NORCO) 5-325 MG per tablet Take 1-2 tablets by mouth every 6 hours as needed for moderate to severe pain 90 tablet 0       Allergies:  Allergies   Allergen Reactions     Latex Difficulty breathing     sensativity - cough, eyes water     Nsaids Shortness Of Breath and Other (See Comments)     bronchospams     Aspirin Difficulty breathing     tight cough     Codeine GI Disturbance     upset GI     Pravastatin      Myalgias     Simvastatin      Myalgias     Tramadol      Heart burn,  Felt \"clammy\", unable to pass gas, unable to urinate and had severe nausea       Social History:  Social History     Social History     Marital status:      Spouse name: N/A     Number of children: 2     Years of education: 17     Occupational History     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      Retired     Social History Main Topics     Smoking status: Former Smoker     Packs/day: 1.00     Years: 50.00     Types: Cigarettes     Quit date: 6/7/2010     Smokeless tobacco: Never Used      Comment: Quit June 7, 2010     Alcohol use 0.0 oz/week     0 Standard drinks or equivalent per week      Comment: 1 glass wine daily     Drug use: No     Sexual activity: No     Other Topics Concern      Service No     Blood Transfusions Yes     With second miscarriage in 1972 and after childbirth in 1975     Caffeine Concern Yes     0-1 cup coffee per day     " Occupational Exposure Yes     sick building syndrome     Hobby Hazards No     Sleep Concern Yes     takes melatonin, sleeps 5 hours     Stress Concern Yes     lesion removed from left thigh, pathology questionable, will have re-excision next week     Weight Concern No     Special Diet Yes     low residue, steamed veggies, greek yogurt, low sodium     Back Care Yes     low back     Exercise Yes     Curves 3 days week     Seat Belt Yes     Self-Exams Yes     sporadic     Parent/Sibling W/ Cabg, Mi Or Angioplasty Before 65f 55m? No     Social History Narrative       Family history:  Family History   Problem Relation Age of Onset     Alcohol/Drug Mother      HEART DISEASE Mother      cardiomyopathy     Alcohol/Drug Father      HEART DISEASE Father      CAD -  1st MI mid 50's     Myocardial Infarction Father      X4     OSTEOPOROSIS Maternal Grandmother      CANCER Maternal Grandfather      stomach or throat - martini drinker/pipe smoker     Myocardial Infarction Brother      Breast Cancer No family hx of      Cancer - colorectal No family hx of        Review of Systems: As above. She denies recent fever, chills, systemic illness, or use of anticoagulants. She states that she donated blood last week.    Physical Exam:  /62  Pulse 88  Wt 74.4 kg (164 lb)  SpO2 98%  BMI 26.47 kg/m2  Constitutional: Well developed, well nourished, appears stated age.  HEENT: Head atraumatic, normocephalic. Eyes without conjunctival injection.  CV/Resp: Symmetric chest wall excursion. Non-labored breathing. No audible wheezing.  Extremities: Distal extremities warm and well perfused.   Psychiatric/mental status: Alert, without lethargy or stupor. Pleasant and appropriately conversant. Mood stable.  Neuro/Musculoskeletal exam: Tenderness of right greater trochanter. No new functional deficits appreciated.    Laboratory studies:  Office Visit on 05/24/2017   Component Date Value Ref Range Status     WBC 05/24/2017 8.2  4.0 - 11.0  10e9/L Final     RBC Count 05/24/2017 4.69  3.8 - 5.2 10e12/L Final     Hemoglobin 05/24/2017 14.0  11.7 - 15.7 g/dL Final     Hematocrit 05/24/2017 41.8  35.0 - 47.0 % Final     MCV 05/24/2017 89  78 - 100 fl Final     MCH 05/24/2017 29.9  26.5 - 33.0 pg Final     MCHC 05/24/2017 33.5  31.5 - 36.5 g/dL Final     RDW 05/24/2017 13.2  10.0 - 15.0 % Final     Platelet Count 05/24/2017 306  150 - 450 10e9/L Final     Diff Method 05/24/2017 Automated Method   Final     % Neutrophils 05/24/2017 64.9  % Final     % Lymphocytes 05/24/2017 24.0  % Final     % Monocytes 05/24/2017 7.9  % Final     % Eosinophils 05/24/2017 3.0  % Final     % Basophils 05/24/2017 0.2  % Final     Absolute Neutrophil 05/24/2017 5.3  1.6 - 8.3 10e9/L Final     Absolute Lymphocytes 05/24/2017 2.0  0.8 - 5.3 10e9/L Final     Absolute Monocytes 05/24/2017 0.7  0.0 - 1.3 10e9/L Final     Absolute Eosinophils 05/24/2017 0.3  0.0 - 0.7 10e9/L Final     Absolute Basophils 05/24/2017 0.0  0.0 - 0.2 10e9/L Final     Sodium 05/24/2017 136  133 - 144 mmol/L Final     Potassium 05/24/2017 3.7  3.4 - 5.3 mmol/L Final     Chloride 05/24/2017 102  94 - 109 mmol/L Final     Carbon Dioxide 05/24/2017 25  20 - 32 mmol/L Final     Anion Gap 05/24/2017 9  3 - 14 mmol/L Final     Glucose 05/24/2017 99  70 - 99 mg/dL Final     Urea Nitrogen 05/24/2017 12  7 - 30 mg/dL Final     Creatinine 05/24/2017 0.67  0.52 - 1.04 mg/dL Final     GFR Estimate 05/24/2017 86  >60 mL/min/1.7m2 Final    Non  GFR Calc     GFR Estimate If Black 05/24/2017   >60 mL/min/1.7m2 Final                    Value:>90   GFR Calc       Calcium 05/24/2017 8.8  8.5 - 10.1 mg/dL Final     Bilirubin Total 05/24/2017 0.4  0.2 - 1.3 mg/dL Final     Albumin 05/24/2017 3.8  3.4 - 5.0 g/dL Final     Protein Total 05/24/2017 6.7* 6.8 - 8.8 g/dL Final     Alkaline Phosphatase 05/24/2017 97  40 - 150 U/L Final     ALT 05/24/2017 23  0 - 50 U/L Final     AST 05/24/2017 17  0 -  "45 U/L Final     TSH 05/24/2017 0.97  0.40 - 4.00 mU/L Final     Vitamin D Deficiency screening 05/24/2017 35  20 - 75 ug/L Final     CRP Inflammation 05/24/2017 <2.9  0.0 - 8.0 mg/L Final     Sed Rate 05/24/2017 10  0 - 30 mm/h Final     Lyme Disease Antibodies Serum 05/24/2017 0.05  0.00 - 0.89 Final     JUAN JOSÉ Screen by EIA 05/24/2017   <1.0 Final                    Value:<1.0  Interpretation:  Negative       LDL Cholesterol Direct 05/24/2017 95  <100 mg/dL Final    Desirable:       <100 mg/dl       Procedure Description:    Pre-procedure Diagnosis: Right greater trochanteric tendinopathy/bursitis  Post-procedure Diagnosis: Right greater trochanteric tendinopathy/bursitis  Procedure performed: Right greater trochanteric bursa injection  : Pilar Ayala MD    Medication solution (injectate): 1 mL of 40 mg/mL triamcinolone + 1.5 mL of 0.5% bupivacaine + 1.5 mL of 1% lidocaine (preservative free)  Sterile prep/cleansing solution: ChloraPrep, alcohol    The procedure and risks were explained, and informed written consent was obtained from the patient. Risks include but are not limited to: increased pain, infection, bleeding, and damage to soft tissue, nerve, muscle, and vasculature structures. Immediately prior to the start of the procedure, a \"time out\" safety check was conducted to confirm correct patient, procedure, and laterality. The patient was placed in a lateral recumbent position, with affected side facing upward; the hip and knee were flexed to 45 and 80 degrees, respectively. The greater trochanter was palpated for area of maximal tenderness. The skin was prepped and draped appropriately. A 25-gauge, 3.5 inch Quincke spinal needle was advanced toward the point of maximal tenderness until contacting periosteum; at this point, the needle was withdrawn 1-2 mm. After negative aspiration for heme and air, the medication solution was injected. The needle was withdrawn. The patient tolerated the procedure " well, and there was no evidence of procedural complications. The patient was stable post-procedure and able to ambulate on discharge home. Post-procedure instructions were provided.    Assessment: Kahlil Caputo is a 71-year-old female who presents with a chronic pain syndrome of multifactorial etiology, as follows:      Chronic back pain; she has history of mid-back pain, although symptoms predominantly involve the lumbosacral region. Presentation suggests possible discogenic, facetogenic, and myofascial etiology. 10/20/15 MRI thoracic spine demonstrates mild degenerative changes, including small central/left disc protrusion at T7-8 and Schmorl's nodes from T7-T12. 01/09/17 MRI lumbar spine demonstrates stable multilevel degenerative changes, including advanced disc disease and mild left disc bulge at L5-S1 with contact of the left S1 nerve root. There is facet hypertrophy at multiple levels. Central stenosis is most pronounced (mild to moderate) at L3-4 and L4-5.    Right gluteal/thigh pain. Her presentation suggests gluteal tendinopathy/greater trochanteric bursitis, which correlates to findings seen on 02/03/17 MRI pelvis. Right hip osteoarthritis may be contributing. There may be a right SI joint component; she has had some limited improvement with right SI joint injection in the past. Her exam has been somewhat less consistent with piriformis syndrome.    Osteopenia.    History of Crohn's Disease.    Chronic opioid maintenance for pain (intermittent, low dose).     Recommendations:  The following recommendations were given to the patient. Diagnosis, treatment options, risks, benefits, and alternatives were discussed, and all questions were answered. The patient expressed understanding of the plan for management.      The patient underwent repeat right greater trochanteric bursa injection today. Following the procedure, she was advised to contact the [facility] for any of the following:  "Fever, chills, night sweats, or other signs of infection; new onset of pain, numbness, or weakness; any questions/concerns regarding the procedure. If unable to contact the Pain Center, the patient was instructed to go to a local Emergency Room for any complications.    If perceived to be beneficial, greater trochanteric bursa injection may be repeated up to 4 times per 12 month period. Her primary care provider may place direct referral for this as needed in the future (EPIC order: \"Pain injection eval/treat/follow up\" --> \"Interventional injection only\" --> Right greater trochanteric bursa injection, Radiology: No).    Pending symptom course, consideration may be given towards spinal injection. For disc/nerve component, may consider L5-S1 interlaminar epidural steroid injection. For facet component, may consider intra-articular steroid injection vs. lumbar medial branch block/RFA at right L4/L5/sacral ala. Repeat right SI joint injection may otherwise be considered.    We discussed discontinuation of Gabapentin, given side effects reported with use (including recent weight gain, extremity swelling). She was provided with instructions to decrease every 2-3 days as tolerated, from 100 mg/200 mg, to 100 mg BID, to 100 mg QHS, then off.    No other medication changes were made at today's visit. She will continue to follow up with her primary care provider for medication management, including episodic refill of Hydrocodone-Acetaminophen; she has demonstrated appropriate use of this medication in the past. Continue use of topical agents, including Lidocaine.    We discussed my upcoming departure from [place]. All recommendations will be provided to her primary care provider for ongoing management. We discussed the fact that new providers will be joining the clinic in the fall; depending on her future pain management needs, she is welcome to consider re-consultation with the clinic in the future.    Total time spent in " follow up was 25 minutes. Greater than 50% of face-to-face time was spent in patient counseling and/or coordination of care. An additional 8 minutes was spent in procedure time.    Pilar Ayala MD  [facility]

## 2017-09-08 DIAGNOSIS — D62 ANEMIA DUE TO BLOOD LOSS, ACUTE: ICD-10-CM

## 2017-09-08 RX ORDER — FUROSEMIDE 20 MG
20 TABLET ORAL DAILY
Qty: 90 TABLET | Refills: 3 | Status: SHIPPED | OUTPATIENT
Start: 2017-09-08 | End: 2017-10-23

## 2017-09-08 NOTE — TELEPHONE ENCOUNTER
Prescription approved per [facility] Refill Protocol  Sweetie Brunson RN BS

## 2017-09-08 NOTE — TELEPHONE ENCOUNTER
furosemide (LASIX) 20 MG tablet      Sig: TAKE 1-2 TABLETS BY MOUTH DAILY      Last Written Prescription Date: 6/7/17  Last Fill Quantity: 90, # refills: 1  Last Office Visit with [facility], [facility] or [facility] prescribing provider: 5/24/2017         Potassium   Date Value Ref Range Status   05/24/2017 3.7 3.4 - 5.3 mmol/L Final     Creatinine   Date Value Ref Range Status   05/24/2017 0.67 0.52 - 1.04 mg/dL Final     BP Readings from Last 3 Encounters:   06/20/17 122/62   05/24/17 122/70   04/04/17 121/78         ANGELA Burgess  September 8, 2017  9:33 AM

## 2017-10-20 DIAGNOSIS — M25.50 MULTIPLE JOINT PAIN: ICD-10-CM

## 2017-10-20 RX ORDER — HYDROCODONE BITARTRATE AND ACETAMINOPHEN 5; 325 MG/1; MG/1
1-2 TABLET ORAL EVERY 6 HOURS PRN
Qty: 90 TABLET | Refills: 0 | Status: SHIPPED | OUTPATIENT
Start: 2017-10-20 | End: 2017-12-27

## 2017-10-20 NOTE — TELEPHONE ENCOUNTER
Controlled Substance Refill Request for hydrocodone  Problem List Complete:  Yes  Problem Detail      Noted:  10/16/2015      Priority:  Medium      Overview Addendum 9/8/2016 12:08 PM by Reina Pena RN     Patient is followed by HAASE, SUSAN RACHELE for ongoing prescription of pain medication.  All refills should be approved by this provider, or covering partner.     Medication(s): Norco 5 mg/325 mg take 1 tablet every 4-6 hour prn pain  Maximum quantity per month: 30  Clinic visit frequency required: Q 6  months      Controlled substance agreement on file: Yes       Date(s):      Pain Clinic evaluation in the past: No     DIRE Total Score(s):  No flowsheet data found.     Last [facility] website verification: 9/8/16   [email or web address]       Last Written Prescription Date:  7/27/17  Last Fill Quantity: 90,   # refills: 0    Last Office Visit with [facility] primary care provider: 5/24/17    Clinic visit frequency required: Q 6  months     Future Office visit:     Controlled substance agreement on file: Yes:  Date 10/16/15.     Processing:  Patient will  in clinic     checked in past 6 months?  No, route to RN

## 2017-10-23 ENCOUNTER — TELEPHONE (OUTPATIENT)
Dept: FAMILY MEDICINE | Facility: CLINIC | Age: 72
End: 2017-10-23

## 2017-10-23 DIAGNOSIS — D62 ANEMIA DUE TO BLOOD LOSS, ACUTE: ICD-10-CM

## 2017-10-23 RX ORDER — FUROSEMIDE 20 MG
TABLET ORAL
Qty: 60 TABLET | Refills: 0 | Status: SHIPPED | OUTPATIENT
Start: 2017-10-23 | End: 2017-11-09

## 2017-10-23 NOTE — TELEPHONE ENCOUNTER
"Patient called,    PCP please update Sig for Furosemide to reflect 1-2 tablets daily as insurance will not accept just 1 tablet daily and is \"coming up refilled too soon\".     Noreen COOLEY RN, BSN, PHN  [facility]    "

## 2017-10-23 NOTE — TELEPHONE ENCOUNTER
I changed the sig but Kahlil needs to get a bmp done in the next month (not since May) to make sure she tolerates the two a day regimen then her PCP will refill for 90 days again.

## 2017-10-24 NOTE — TELEPHONE ENCOUNTER
Called pt, informed, plans on seeing SH in November, will get BMP at visit, FYI to SH  Reina Pena RN, BSN  Message handled by Nurse Triage.

## 2017-11-09 ENCOUNTER — OFFICE VISIT (OUTPATIENT)
Dept: FAMILY MEDICINE | Facility: CLINIC | Age: 72
End: 2017-11-09
Payer: COMMERCIAL

## 2017-11-09 VITALS
SYSTOLIC BLOOD PRESSURE: 130 MMHG | BODY MASS INDEX: 26.31 KG/M2 | WEIGHT: 163 LBS | HEART RATE: 90 BPM | TEMPERATURE: 98 F | OXYGEN SATURATION: 100 % | DIASTOLIC BLOOD PRESSURE: 70 MMHG | RESPIRATION RATE: 14 BRPM

## 2017-11-09 DIAGNOSIS — G89.4 CHRONIC PAIN SYNDROME: ICD-10-CM

## 2017-11-09 DIAGNOSIS — Z87.891 HISTORY OF NICOTINE DEPENDENCE: ICD-10-CM

## 2017-11-09 DIAGNOSIS — M94.9 DISORDER OF BONE AND CARTILAGE: ICD-10-CM

## 2017-11-09 DIAGNOSIS — Z23 NEED FOR VACCINATION WITH 13-POLYVALENT PNEUMOCOCCAL CONJUGATE VACCINE: ICD-10-CM

## 2017-11-09 DIAGNOSIS — M89.9 DISORDER OF BONE AND CARTILAGE: ICD-10-CM

## 2017-11-09 DIAGNOSIS — D62 ANEMIA DUE TO BLOOD LOSS, ACUTE: ICD-10-CM

## 2017-11-09 DIAGNOSIS — E03.9 HYPOTHYROIDISM, UNSPECIFIED TYPE: ICD-10-CM

## 2017-11-09 DIAGNOSIS — R60.0 LOWER EXTREMITY EDEMA: Primary | ICD-10-CM

## 2017-11-09 DIAGNOSIS — M54.50 BILATERAL LOW BACK PAIN WITHOUT SCIATICA, UNSPECIFIED CHRONICITY: ICD-10-CM

## 2017-11-09 DIAGNOSIS — Z96.642 STATUS POST TOTAL REPLACEMENT OF LEFT HIP: ICD-10-CM

## 2017-11-09 PROCEDURE — G0009 ADMIN PNEUMOCOCCAL VACCINE: HCPCS | Performed by: NURSE PRACTITIONER

## 2017-11-09 PROCEDURE — 36415 COLL VENOUS BLD VENIPUNCTURE: CPT | Performed by: NURSE PRACTITIONER

## 2017-11-09 PROCEDURE — 80048 BASIC METABOLIC PNL TOTAL CA: CPT | Performed by: NURSE PRACTITIONER

## 2017-11-09 PROCEDURE — 99214 OFFICE O/P EST MOD 30 MIN: CPT | Mod: 25 | Performed by: NURSE PRACTITIONER

## 2017-11-09 PROCEDURE — 90670 PCV13 VACCINE IM: CPT | Performed by: NURSE PRACTITIONER

## 2017-11-09 RX ORDER — VITAMIN E 268 MG
CAPSULE ORAL DAILY
COMMUNITY
End: 2022-03-02

## 2017-11-09 RX ORDER — FUROSEMIDE 20 MG
TABLET ORAL
Qty: 60 TABLET | Refills: 5 | Status: SHIPPED | OUTPATIENT
Start: 2017-11-09 | End: 2018-02-23

## 2017-11-09 RX ORDER — LEVOTHYROXINE SODIUM 150 UG/1
150 TABLET ORAL EVERY MORNING
Qty: 90 TABLET | Refills: 3 | Status: SHIPPED | OUTPATIENT
Start: 2017-11-09 | End: 2018-10-18

## 2017-11-09 NOTE — PROGRESS NOTES
SUBJECTIVE:                                                    Kahlil Caputo is a 72 year old female who presents to clinic today for the following health issues:    History of Present Illness     Diet:  Regular (no restrictions)  Frequency of exercise:  4-5 days/week  Duration of exercise:  45-60 minutes  Taking medications regularly:  Yes  Additional concerns today:  No    Medication Follow up of lasix    Taking Medication as prescribed: yes    Side Effects:  None    Medication Helping Symptoms:  yes       Chronic pain: She denies swelling, numbness or tingling in her legs. Kahlil continues to have pain in her lumbar back. She has been using 12 hour 7% lidocaine patches from [employer] (5-325 mg) to help her control pain. She received a right greater trochanteric bursa injection from Dr. Ayala at the [place] pain [facility] on 6/20/17.   PHQ9 score: 0  GAD7 score: 0    Respiratory: She continues to work out and does not note any chest pain. She notes occasional SOB, but is not concerned about it at this time    Problem list and histories reviewed & adjusted, as indicated.  Additional history: as documented      ROS:  Constitutional, cardiovascular, pulmonary, MS, neuro and psych systems are negative, except as otherwise noted.    This document serves as a record of the services and decisions personally performed and made by Susan Haase, CNP. It was created on her behalf by Jaylan Perez, a trained medical scribe. The creation of this document is based the provider's statements to the medical scribe.  Jaylan Perez November 9, 2017 2:08 PM      OBJECTIVE:   /70 (BP Location: Right arm, Patient Position: Chair, Cuff Size: Adult Regular)  Pulse 90  Temp 98  F (36.7  C) (Oral)  Resp 14  Wt 73.9 kg (163 lb)  SpO2 100%  BMI 26.31 kg/m2  Body mass index is 26.31 kg/(m^2).  GENERAL: healthy, alert and no distress  RESP: lungs clear to auscultation - no rales, rhonchi or wheezes  CV: regular rate and  rhythm, normal S1 S2, no S3 or S4, no murmur, click or rub, no peripheral edema  PSYCH: mentation appears normal, affect normal/bright      ASSESSMENT/PLAN:   Kahlil was seen today for recheck medication.    Diagnoses and all orders for this visit:    Lower extremity edema  -     Basic metabolic panel   furosemide (LASIX) 20 MG tablet; Take one or two daily as needed for edema  Chronic pain syndrome  -     PAIN MANAGEMENT REFERRAL    History of nicotine dependence:  Annual CT  -     CT Chest Lung Cancer Scrn Low Dose wo; Future    Need for vaccination with 13-polyvalent pneumococcal conjugate vaccine  -     Pneumococcal vaccine 13 valent PCV13 IM (Prevnar) [97482]  -     ADMIN: Vaccine, Initial (11646)     Hypothyroidism, unspecified type  -     levothyroxine (SYNTHROID/LEVOTHROID) 150 MCG tablet; Take 1 tablet (150 mcg) by mouth every morning  Follow up in 6 months, sooner as needed.   The information in this document, created by the medical scribe for me, accurately reflects the services I personally performed and the decisions made by me. I have reviewed and approved this document for accuracy.   Susan Haase, CNP Susan Haase, APRN [facility]  Answers for HPI/ROS submitted by the patient on 11/6/2017   PHQ-2 Score: 0

## 2017-11-09 NOTE — MR AVS SNAPSHOT
After Visit Summary   11/9/2017    Kahlil Caputo    MRN: [id number]           Patient Information     Date Of Birth          1945        Visit Information        Provider Department      11/9/2017 1:45 PM Haase, Susan Rachele, APRN CNP [facility]        Today's Diagnoses     Disorder of bone and cartilage    -  1    Status post total replacement of left hip        Bilateral low back pain without sciatica, unspecified chronicity        Chronic pain syndrome        Lower extremity edema        History of nicotine dependence        Need for vaccination with 13-polyvalent pneumococcal conjugate vaccine           Follow-ups after your visit        Additional Services     PAIN MANAGEMENT REFERRAL       Your provider has referred you to: [facility]: [facility] -    Reason for Referral: Comprehensive Evaluation and Management    Please complete the following questions:    What is your diagnosis for the patient's pain? Chronic lower back and SI joint pain    Do you have any specific questions for the pain specialist? No    Are there any red flags that may impact the assessment or management of the patient? None    For any questions, contact the [facility] [place] at [phone].     **ANY DIAGNOSTIC TESTS THAT ARE NOT IN EPIC SHOULD BE SENT TO THE PAIN CENTER**    REGARDING OPIOID MEDICATIONS:  We will always address appropriateness of opioid pain medications, but we generally will not automatically take on a prescribing role. When we do take on prescribing of opioids for chronic pain, it is in collaboration with the referring physician for an intermediate period of time (months), with an expectation that the primary physician or provider will assume the prescribing role if medications are effective at stable doses with demonstrated compliance.  Therefore, please do not assume that your prescribing responsibilities end on the day of pain clinic consultation.   Is this agreeable to you? YES    Please be aware that coverage of these services is subject to the terms and limitations of your health insurance plan.  Call member services at your health plan with any benefit or coverage questions.      Please bring the following with you to your appointment:    (1) Any X-Rays, CTs or MRIs which have been performed.  Contact the facility where they were done to arrange for  prior to your scheduled appointment.    (2) List of current medications   (3) This referral request   (4) Any documents/labs given to you for this referral                  Follow-up notes from your care team     Return in about 6 months (around 5/9/2018).      Future tests that were ordered for you today     Open Future Orders        Priority Expected Expires Ordered    CT Chest Lung Cancer Scrn Low Dose wo Routine  11/9/2018 11/9/2017            Who to contact     If you have questions or need follow up information about today's clinic visit or your schedule please contact [facility] directly at [phone].  Normal or non-critical lab and imaging results will be communicated to you by [employer], letter or phone within 4 business days after the clinic has received the results. If you do not hear from us within 7 days, please contact the clinic through [employer] or phone. If you have a critical or abnormal lab result, we will notify you by phone as soon as possible.  Submit refill requests through [employer] or call your pharmacy and they will forward the refill request to us. Please allow 3 business days for your refill to be completed.          Additional Information About Your Visit        [employer] Information     [employer] gives you secure access to your electronic health record. If you see a primary care provider, you can also send messages to your care team and make appointments. If you have questions, please call your primary care clinic.  If you do not have a primary care provider,  please call [phone] and they will assist you.        Care EveryWhere ID     This is your Care EveryWhere ID. This could be used by other organizations to access your [facility] medical records  [id number]        Your Vitals Were     Pulse Temperature Respirations Pulse Oximetry BMI (Body Mass Index)       90 98  F (36.7  C) (Oral) 14 100% 26.31 kg/m2        Blood Pressure from Last 3 Encounters:   11/09/17 130/70   06/20/17 122/62   05/24/17 122/70    Weight from Last 3 Encounters:   11/09/17 163 lb (73.9 kg)   06/20/17 164 lb (74.4 kg)   05/24/17 163 lb (73.9 kg)              We Performed the Following     ADMIN: Vaccine, Initial (40111)     Basic metabolic panel     PAIN MANAGEMENT REFERRAL     Pneumococcal vaccine 13 valent PCV13 IM (Prevnar) [79267]        Primary Care Provider Office Phone # Fax #    Susan Rachele Haase, APRN [phone]       [address] [facility] [address]        Equal Access to Services     [facility]: Hadii aad ku hadasho Soomaali, waaxda luqadaha, qaybta kaalmada adeamiyamery, sebastien dave . So [facility] [phone].    ATENCIÓN: Si habla español, tiene a denson disposición servicios gratuitos de asistencia lingüística. Llame al [phone].    We comply with applicable federal civil rights laws and [place] laws. We do not discriminate on the basis of race, color, national origin, age, disability, sex, sexual orientation, or gender identity.            Thank you!     Thank you for choosing [facility]  for your care. Our goal is always to provide you with excellent care. Hearing back from our patients is one way we can continue to improve our services. Please take a few minutes to complete the written survey that you may receive in the mail after your visit with us. Thank you!             Your Updated Medication List - Protect others around you: Learn how to safely use, store and throw away your medicines at  www.disposemymeds.org.          This list is accurate as of: 11/9/17  2:34 PM.  Always use your most recent med list.                   Brand Name Dispense Instructions for use Diagnosis    ACE/ARB/ARNI NOT PRESCRIBED (INTENTIONAL)      Please choose reason not prescribed, below    Coronary artery disease involving native coronary artery of native heart without angina pectoris       ascorbic acid 500 MG tablet    VITAMIN C     Take by mouth 2 times daily        ASPIRIN NOT PRESCRIBED    INTENTIONAL    0 each    Please choose reason not prescribed, below    Coronary artery disease involving native coronary artery of native heart without angina pectoris       BETA BLOCKER NOT PRESCRIBED (INTENTIONAL)      Beta Blocker not prescribed intentionally due to COPD    Coronary artery disease involving native coronary artery of native heart without angina pectoris       calcium carbonate 1250 MG tablet    OS-THEA 500 mg [place]    100 tablet    Take 2 tablets by mouth 2 times daily    SOB (shortness of breath), Limb pain       CHLOR-TRIMETON 4 MG tablet   Generic drug:  chlorpheniramine      Take 4 mg by mouth every 6 hours as needed for allergies or rhinitis        furosemide 20 MG tablet    LASIX    60 tablet    Take one or two daily as needed for edam    Anemia due to blood loss, acute       HYDROcodone-acetaminophen 5-325 MG per tablet    NORCO    90 tablet    Take 1-2 tablets by mouth every 6 hours as needed for moderate to severe pain    Multiple joint pain       latanoprost 0.005 % ophthalmic solution    XALATAN     Place 1 drop into both eyes At Bedtime        levothyroxine 150 MCG tablet    SYNTHROID/LEVOTHROID    90 tablet    Take 1 tablet (150 mcg) by mouth every morning    Hypothyroidism, unspecified type       melatonin 3 MG tablet      Take 1-2 tablets (3-6 mg) by mouth nightly as needed for sleep (OTC)        MULTI-VITAMIN PO      Take by mouth daily        STATIN NOT PRESCRIBED (INTENTIONAL)     0 each    1  each See Admin Instructions Statin not prescribed intentionally due to Allergy to statin    Coronary artery disease involving native coronary artery of native heart without angina pectoris, Tobacco abuse       vitamin D 1000 UNITS capsule      Take 2 capsules by mouth daily        vitamin E 400 UNIT capsule      Take by mouth daily

## 2017-11-10 LAB
ANION GAP SERPL CALCULATED.3IONS-SCNC: 9 MMOL/L (ref 3–14)
BUN SERPL-MCNC: 15 MG/DL (ref 7–30)
CALCIUM SERPL-MCNC: 8.6 MG/DL (ref 8.5–10.1)
CHLORIDE SERPL-SCNC: 100 MMOL/L (ref 94–109)
CO2 SERPL-SCNC: 26 MMOL/L (ref 20–32)
CREAT SERPL-MCNC: 0.64 MG/DL (ref 0.52–1.04)
GFR SERPL CREATININE-BSD FRML MDRD: >90 ML/MIN/1.7M2
GLUCOSE SERPL-MCNC: 96 MG/DL (ref 70–99)
POTASSIUM SERPL-SCNC: 4.2 MMOL/L (ref 3.4–5.3)
SODIUM SERPL-SCNC: 135 MMOL/L (ref 133–144)

## 2017-11-11 NOTE — PROGRESS NOTES
Duy Guillory,  Your potassium and other electrolytes are normal.  Please let me know if you have any questions.  Sincerely,  Susan Haase, CNP

## 2017-11-15 ENCOUNTER — HOSPITAL ENCOUNTER (OUTPATIENT)
Dept: CT IMAGING | Facility: CLINIC | Age: 72
Discharge: HOME | End: 2017-11-15
Attending: NURSE PRACTITIONER | Admitting: NURSE PRACTITIONER
Payer: COMMERCIAL

## 2017-11-15 DIAGNOSIS — Z87.891 HISTORY OF NICOTINE DEPENDENCE: ICD-10-CM

## 2017-11-15 PROCEDURE — G0297 LDCT FOR LUNG CA SCREEN: HCPCS

## 2017-11-16 ENCOUNTER — TELEPHONE (OUTPATIENT)
Dept: FAMILY MEDICINE | Facility: CLINIC | Age: 72
End: 2017-11-16

## 2017-11-16 DIAGNOSIS — M54.41 CHRONIC BILATERAL LOW BACK PAIN WITH RIGHT-SIDED SCIATICA: Primary | ICD-10-CM

## 2017-11-16 DIAGNOSIS — G89.29 CHRONIC BILATERAL LOW BACK PAIN WITH RIGHT-SIDED SCIATICA: Primary | ICD-10-CM

## 2017-11-16 NOTE — PROGRESS NOTES
Duy Guillory,  Your lung CT was without changes from last year.  Please let me know if you have any questions.  Sincerely,     Susan Haase, CNP

## 2017-11-16 NOTE — TELEPHONE ENCOUNTER
PCP:    Pt called in requesting order for Lumbar steroid injection to be administered by pain clinic provider Dr. Castaneda.   The Pt reports she spoke with you about this at [facility] on 11/9/17. She said you would know the details.     Please let triage know if you need more information.     Pilar Bernal, RN -- [facility]

## 2017-11-17 ENCOUNTER — TELEPHONE (OUTPATIENT)
Dept: PALLIATIVE MEDICINE | Facility: CLINIC | Age: 72
End: 2017-11-17

## 2017-11-17 NOTE — TELEPHONE ENCOUNTER
Pt returned call  Referral that was placed was for hip pain    Needs referral to Dr Castaneda at pain [facility] for epidural lumbar steroid injection    Route to provider to review and advise    Jim RN Nurse Triage

## 2017-11-17 NOTE — TELEPHONE ENCOUNTER
"Received call from patient stating that her PCP's office told her that the order they sent was for a Lumbar JOSE LUIS. Advised that the order we have is for a \"right greater trochanter\" which is a bursa injection, not Lumbar JOSE LUIS.    Interventional Injection Only - Type of Injection: right greater trochanter  Radiology? No     Advised to contact referring provider for change in order.     Jessica Rodrigez    [facility]    "

## 2017-11-17 NOTE — TELEPHONE ENCOUNTER
Can you let Annice know that the order for the injection has been placed.    Thanks,  Susan Haase, CNP

## 2017-11-17 NOTE — TELEPHONE ENCOUNTER
Patient is wanting a LESI, she is going to call her clinic back.      Ericka DUMONT    [facility]

## 2017-11-20 NOTE — TELEPHONE ENCOUNTER
Pre-screening Questions for Radiology Injections:    Injection to be done at which interventional clinic site? [facility]    Procedure ordered by Angelica    Procedure ordered? Lumbar Epidural Steroid Injection    What insurance would patient like us to bill for this procedure? [employer]      Worker's comp or MVA (motor vehicle accident) -Any injection DO NOT SCHEDULE and route to Meryl Ortiz.      [employer] insurance - For SI joint injections, DO NOT SCHEDULE and route Ericka Roth.      [employer]- MBB's must be scheduled at LEAST two weeks apart      [employer] - Any injection besides hip/shoulder/knee joint DO NOT SCHEDULE and route to Ericka Roth. She will obtain PA and call pt back to schedule procedure or notify pt of denial.       [place] [employer]-PA REQUIRED FOR NON-JOSE LUIS OR Joint injections    Any chance of pregnancy? NO   If YES, do NOT schedule and route to RN pool    Is an  needed? No     Patient has a drive home? (mandatory) YES:     Is patient taking any blood thinners (plavix, coumadin, jantoven, warfarin, heparin, pradaxa or dabigatran )? No   If hold needed, do NOT schedule, route to RN pool     Is patient taking any aspirin products? No     If more than 325mg/day do NOT schedule; route to RN pool     For CERVICAL procedures, hold all aspirin products for 6 days.      Does the patient have a bleeding or clotting disorder? No     If YES, okay to schedule AND route to RN nurse pool    **For any patients with platelet count <100, must be forwarded to provider**    Is patient diabetic?  No  If YES, have them bring their glucometer.    Does patient have an active infection or treated for one within the past week? No     Is patient currently taking any antibiotics?  No     For patients on chronic, preventative, or prophylactic antibiotics, procedures may be scheduled.     For patients on antibiotics for active or recent infection:    Ngoc Mccall Burton, Snitzer-antibiotic  course must have been completed for 4 days    Dr. Galindo-antibiotic course must have been completed for 7 days    Is patient currently taking any steroid medications? (i.e. Prednisone, Medrol)  No     For patients on steroid medications:    Ngoc Mccall Burton, Snitzer-steroid course must have been completed for 4 days    -steroid course must have been completed for 7 days    Reviewed with patient:  If you are started on any steroids or antibiotics between now and your appointment, you must contact us because it may affect our ability to perform your procedure.  Yes    Is patient actively being treated for cancer or immunocompromised? No  If YES, do NOT schedule and route to RN pool     Are you able to get on and off an exam table with minimal or no assistance? Yes  If NO, do NOT schedule and route to RN pool    Are you able to roll over and lay on your stomach with minimal or no assistance? Yes  If NO, do NOT schedule and route to RN pool     Any allergies to contrast dye, iodine, shellfish, or numbing and steroid medications? No  If YES, route to RN pool AND add allergy information to appointment notes    Allergies: Latex; Nsaids; Aspirin; Codeine; Pravastatin; Simvastatin; and Tramadol      Has the patient had a flu shot or any other vaccinations within 7 days before or after the procedure.  No     Does patient have an MRI/CT?  YES: MRI  (SI joint, hip injections, lumbar sympathetic blocks, and stellate ganglion blocks do not require an MRI)    Was the MRI done w/in the last 3 years?  Yes    Was MRI done at [facility]? Yes      If not, where was it done? N/A       If MRI was not done at [facility], [facility] or [facility] do NOT schedule and route to nursing.  If pt has an imaging disc, the injection may be scheduled but pt has to bring disc to appt. If they show up w/out disc the injection cannot be done    Reminders (please tell patient if applicable):       Instructed pt to arrive 30 minutes early  for IV start if this is for a cervical procedure, ALL sympathetic (stellate ganglion, hypogastric, or lumbar sympathetic block) and all sedation procedures (RFA, spinal cord stimulation trials).  Not Applicable   -IVs are not routinely placed for Dr. Castaneda cervical cases   -Dr. Weaver: IVs for cervical ESIs and cervical TBDs (not CMBBs/facet inj)      If NPO for sedation, informed patient that it is okay to take medications with sips of water (except if they are to hold blood thinners).  Not Applicable   *DO take blood pressure medication if it is prescribed*      If this is for a cervical JOSE LUIS, informed patient that aspirin needs to be held for 6 days.   Not Applicable      For all patients not having spinal cord stimulator (SCS) trials or radiofrequency ablations (RFAs), informed patient:    IV sedation is not provided for this procedure.  If you feel that an oral anti-anxiety medication is needed, you can discuss this further with your referring provider or primary care provider.  The Pain Clinic provider will discuss specifics of what the procedure includes at your appointment.  Most procedures last 10-20 minutes.  We use numbing medications to help with any discomfort during the procedure.  Not Applicable      Do not schedule procedures requiring IV placement in the first appointment of the day or first appointment after lunch.       For patients 85 or older we recommend having an adult stay w/ them for the remainder of the day.       Does the patient have any questions?  NO  Mreyl Ortiz  [facility]

## 2017-11-20 NOTE — TELEPHONE ENCOUNTER
Can you please let Annkerry know that I am sorry I placed the wrong order,the correct order has been placed so she can call the pain clinic to get it set up.   Thanks,  Susan Haase, CNP

## 2017-11-28 NOTE — PROGRESS NOTES
[facility] - Procedure Note    Date of Visit: 11/29/2017    Procedure performed: Right SI joint injection and right trochanteric bursa injection  Diagnosis: Sacroiliac joint dysfunction  : Michelle Castaneda MD & Cynthia Ventura MD (pain fellow)    Anesthesia: none    Indications: Kahlil Caputo is a 72 year old female who is seen at the request of Susan Haase, CNP for a lumbar epidural steroid injection. The patient describes bilateral lower back and buttock pain (R>L). Pain radiates into her groin. She also reports right hip pain, that has been worse in the past 5 days. The patient has been exhibiting symptoms consistent with R SI joint dysfunction. Symptoms have been persistent, disabling, and intermittently severe. The patient reports minimal improvement with conservative treatment, including PT and medications.    Lumbar MRI was done on 1/09/2017 which showed   FINDINGS: The report is dictated assuming five lumbar-type vertebral  bodies.  Sagittal images demonstrate normal vertebral body height.  Mild degenerative endplate changes at L1-L2, bone marrow signal is  otherwise unremarkable. Tip of the conus medullaris and cauda equina  are unremarkable.      T12-L1:  No disc herniation or stenosis. Facet joints are  unremarkable.     L1-L2:  Broad-based disc bulge lateralizes to the left. Mild central  stenosis. Neural foramina are patent. Facet joints are unremarkable.     L2-L3:  Mild disc bulge and facet hypertrophy. Mild central stenosis.  Neural foramina are patent.     L3-L4:  Mild disc bulge and facet hypertrophy. Mild-to-moderate  central stenosis. Neural foramina are patent.     L4-L5:  Mild disc bulge and facet hypertrophy. Mild-to-moderate  central stenosis. Mild inferior foraminal narrowing bilaterally.     L5-S1:  Advanced degenerative disc disease with mild disc bulge  lateralizing to the left. Mild central stenosis. There is contact  without compression of the descending left  "S1 nerve root. Mild left  foraminal stenosis. Right neural foramen is patent.     Paraspinous soft tissues:  Unremarkable.       IMPRESSION:    1. At L1-L2, and L2-L3 there is mild central stenosis.  2. At L3-L4, and L4-L5 there is mild-to-moderate central stenosis.  3. Mild inferior foraminal narrowing bilaterally at L4-L5.  4. At L5-S1 there is mild central stenosis. Contact without  compression of the descending left S1 nerve root as a result of  asymmetric disc bulge. Mild left foraminal stenosis.  5. Findings are very similar to the comparison study.    Allergies:      Allergies   Allergen Reactions     Latex Difficulty breathing     sensativity - cough, eyes water     Nsaids Shortness Of Breath and Other (See Comments)     bronchospams     Aspirin Difficulty breathing     tight cough     Codeine GI Disturbance     upset GI     Pravastatin      Myalgias     Simvastatin      Myalgias     Tramadol      Heart burn,  Felt \"clammy\", unable to pass gas, unable to urinate and had severe nausea        Vitals:  /85  Pulse 81  SpO2 98%    Review of Systems: The patient denies recent fever, chills, illness, use of antibiotics or anticoagulants. All other 10-point review of systems negative.     Procedure:  After getting informed consent, patient was brought into the procedure suite and was placed in a prone position on the procedure table.   A Pause for the Cause was performed.  Patient was prepped and draped in sterile fashion.     After identifying the right SI joint, the C-arm was rotated to a obliquely to obtain the best view of the inferior angle of the joint.  A total of 2 ml of Lidocaine 1%  was used to anesthetize the skin at a skin entry site coaxial with the fluoroscopy beam at this location.  A 22 gauge 3.5 inch needle was advanced under intermittent fluoroscopy until it was felt to enter the SI joint.    A total of 1 ml of Omnipaque-300 was injected, confirming appropriate position, with spread into " the intraarticular space, with no intravascular uptake noted.  9 ml of Omnipaque-300 was wasted. Location was verified in lateral view.    1 ml of 1% lidocaine, 1 ml of 0.5% bupivacaine with 40 mg of kenalog was injected.  The needle was removed. Hemostasis was achieved, the area was cleaned, and bandaids were placed when appropriate.  The patient tolerated the procedure well, and was taken to the recovery room.    Images were saved to PACS.    Pre-procedure pain score: 6/10  Post-procedure pain score: 2-3/10  Following today's procedure, the patient was advised to contact the [facility] for any of the following:   Fever, chills, or night sweats   New onset of pain, numbness, or weakness   Any questions/concerns regarding the procedure    Follow-up includes:   -f/u phone call in one week  -f/u with your PCP    [facility]     RIGHT Greater Trochanteric Bursa Injection Procedure Note:    Diagnosis: Trochanteric bursitis    Benefits, risks, and alternatives of the procedure were discussed with the patient, including bleeding, infection, and pain.  Patient elected to proceed and informed consent was signed.    The area over the R trochanter was palpated, and the tender area was identified, corresponding to the area of the trochanteric bursa.  The area was cleaned with Chloroprep.  A 22-gauge, 1.5-inch needle was inserted, aiming towards the trochanter.  When bone was encountered, the needle was slightly drawn.  A solution containing local anesthesic and steroid was injected.  The needle was removed.  Hemostasis was achieved. Bandaids were placed as appropriate.    The procedure was NOT  repeated on the opposite side.    In total, 4 ml of 0.5% bupivacaine and 1 ml (40 mg) of kenalog was injected.    Michelle Castaneda MD

## 2017-11-29 ENCOUNTER — RADIANT APPOINTMENT (OUTPATIENT)
Dept: GENERAL RADIOLOGY | Facility: CLINIC | Age: 72
End: 2017-11-29
Attending: ANESTHESIOLOGY

## 2017-11-29 ENCOUNTER — RADIOLOGY INJECTION OFFICE VISIT (OUTPATIENT)
Dept: PALLIATIVE MEDICINE | Facility: CLINIC | Age: 72
End: 2017-11-29
Payer: COMMERCIAL

## 2017-11-29 VITALS — HEART RATE: 92 BPM | OXYGEN SATURATION: 98 % | SYSTOLIC BLOOD PRESSURE: 162 MMHG | DIASTOLIC BLOOD PRESSURE: 93 MMHG

## 2017-11-29 DIAGNOSIS — M46.1 SACROILIITIS (H): Primary | ICD-10-CM

## 2017-11-29 DIAGNOSIS — M54.41 CHRONIC BILATERAL LOW BACK PAIN WITH RIGHT-SIDED SCIATICA: ICD-10-CM

## 2017-11-29 DIAGNOSIS — M70.61 TROCHANTERIC BURSITIS OF RIGHT HIP: ICD-10-CM

## 2017-11-29 DIAGNOSIS — G89.29 CHRONIC BILATERAL LOW BACK PAIN WITH RIGHT-SIDED SCIATICA: ICD-10-CM

## 2017-11-29 PROCEDURE — 27096 INJECT SACROILIAC JOINT: CPT | Mod: RT | Performed by: ANESTHESIOLOGY

## 2017-11-29 PROCEDURE — 20610 DRAIN/INJ JOINT/BURSA W/O US: CPT | Mod: 59 | Performed by: ANESTHESIOLOGY

## 2017-11-29 NOTE — PATIENT INSTRUCTIONS
[facility] Procedure Discharge Instructions    Today you saw:     Dr. Michelle Castaneda        Your procedure:  Right Sacro-iliac joint, Right trochanteric bursa    Medications used:  Lidocaine (anesthetic)  Bupivacaine (anesthetic)   Kenalog (steroid)  Omnipaque (contrast)              Be cautious when walking as numbness and/or weakness in the legs may occur up to 6-8 hours after the procedure due to effect of the local anesthetic    Do not drive for 6 hours. The effect of the local anesthetic could slow your reflexes.     Avoid strenuous activity for the first 24 hours. You may resume your regular activities after that.     You may shower, however avoid swimming, tub baths or hot tubs for 24 hours following your procedure    You may have a mild to moderate increase in pain for several days following the injection.      You may use ice packs for 10-15 minutes, 3 to 4 times a day at the injection site for comfort    Do not use heat to painful areas for 6 to 8 hours. This will give the local anesthetic time to wear off and prevent you from accidentally burning your skin.    You may use anti-inflammatory medications (such as Ibuprofen/Advil or Aleve) or Tylenol for pain control if necessary    It may take up to 14 days for the steroid medication to start working although you may feel the effect as early as a few days after the procedure.     Follow up with your referring provider in 2 - 3 weeks.       If you experience any of the following, call the pain center  line during work hours at [phone] or on-call physician after hours at [phone]:  -Fever over 100 degree F  -Swelling, bleeding, redness, drainage, warmth at the injection site  -Progressive weakness or numbness in your legs or arms  -Loss of bowel or bladder function  -Unusual headache that is not relieved by Tylenol  -Unusual new onset of pain that is not improving    Phone #s:  Nurse line for general questions: [phone]

## 2017-11-29 NOTE — MR AVS SNAPSHOT
After Visit Summary   11/29/2017    Kahlil Caputo    MRN: [id number]           Patient Information     Date Of Birth          1945        Visit Information        Provider Department      11/29/2017 8:45 AM Michelle Castaneda MD [facility]        Today's Diagnoses     Sacroiliitis (H)    -  1    Trochanteric bursitis of right hip          Care Instructions    [facility] Procedure Discharge Instructions    Today you saw:     Dr. Michelle Castaneda        Your procedure:  Right Sacro-iliac joint, Right trochanteric bursa    Medications used:  Lidocaine (anesthetic)  Bupivacaine (anesthetic)   Kenalog (steroid)  Omnipaque (contrast)              Be cautious when walking as numbness and/or weakness in the legs may occur up to 6-8 hours after the procedure due to effect of the local anesthetic    Do not drive for 6 hours. The effect of the local anesthetic could slow your reflexes.     Avoid strenuous activity for the first 24 hours. You may resume your regular activities after that.     You may shower, however avoid swimming, tub baths or hot tubs for 24 hours following your procedure    You may have a mild to moderate increase in pain for several days following the injection.      You may use ice packs for 10-15 minutes, 3 to 4 times a day at the injection site for comfort    Do not use heat to painful areas for 6 to 8 hours. This will give the local anesthetic time to wear off and prevent you from accidentally burning your skin.    You may use anti-inflammatory medications (such as Ibuprofen/Advil or Aleve) or Tylenol for pain control if necessary    It may take up to 14 days for the steroid medication to start working although you may feel the effect as early as a few days after the procedure.     Follow up with your referring provider in 2 - 3 weeks.       If you experience any of the following, call the pain center  line during work hours at [phone] or on-call physician  after hours at [phone]:  -Fever over 100 degree F  -Swelling, bleeding, redness, drainage, warmth at the injection site  -Progressive weakness or numbness in your legs or arms  -Loss of bowel or bladder function  -Unusual headache that is not relieved by Tylenol  -Unusual new onset of pain that is not improving    Phone #s:  Nurse line for general questions: [phone]          Follow-ups after your visit        Who to contact     If you have questions or need follow up information about today's clinic visit or your schedule please contact [facility] directly at [phone].  Normal or non-critical lab and imaging results will be communicated to you by [employer], letter or phone within 4 business days after the clinic has received the results. If you do not hear from us within 7 days, please contact the clinic through [employer] or phone. If you have a critical or abnormal lab result, we will notify you by phone as soon as possible.  Submit refill requests through [employer] or call your pharmacy and they will forward the refill request to us. Please allow 3 business days for your refill to be completed.          Additional Information About Your Visit        [employer] Information     [employer] gives you secure access to your electronic health record. If you see a primary care provider, you can also send messages to your care team and make appointments. If you have questions, please call your primary care clinic.  If you do not have a primary care provider, please call [phone] and they will assist you.        Care EveryWhere ID     This is your Care EveryWhere ID. This could be used by other organizations to access your [facility] medical records  [id number]        Your Vitals Were     Pulse Pulse Oximetry                81 98%           Blood Pressure from Last 3 Encounters:   11/29/17 140/85   11/09/17 130/70   06/20/17 122/62    Weight from Last 3 Encounters:   11/09/17 73.9 kg (163 lb)    06/20/17 74.4 kg (164 lb)   05/24/17 73.9 kg (163 lb)              Today, you had the following     No orders found for display       Primary Care Provider Office Phone # Fax #    Susan Rachele Haase, APRN -[phone]  [phone]        Equal Access to Services     [facility] : Hadii aad ku hadasho Soomaali, waaxda luqadaha, qaybta kaalmada adeegyada, waxay idiin hayaan adeeg kharash la'aan ah. So [facility] [phone].    ATENCIÓN: Si habla español, tiene a denson disposición servicios gratuitos de asistencia lingüística. Llame al [phone].    We comply with applicable federal civil rights laws and [place] laws. We do not discriminate on the basis of race, color, national origin, age, disability, sex, sexual orientation, or gender identity.            Thank you!     Thank you for choosing [facility]  for your care. Our goal is always to provide you with excellent care. Hearing back from our patients is one way we can continue to improve our services. Please take a few minutes to complete the written survey that you may receive in the mail after your visit with us. Thank you!             Your Updated Medication List - Protect others around you: Learn how to safely use, store and throw away your medicines at www.disposemymeds.org.          This list is accurate as of: 11/29/17  9:08 AM.  Always use your most recent med list.                   Brand Name Dispense Instructions for use Diagnosis    ACE/ARB/ARNI NOT PRESCRIBED (INTENTIONAL)      Please choose reason not prescribed, below    Coronary artery disease involving native coronary artery of native heart without angina pectoris       ascorbic acid 500 MG tablet    VITAMIN C     Take by mouth 2 times daily        ASPIRIN NOT PRESCRIBED    INTENTIONAL    0 each    Please choose reason not prescribed, below    Coronary artery disease involving native coronary artery of native heart without angina  pectoris       BETA BLOCKER NOT PRESCRIBED (INTENTIONAL)      Beta Blocker not prescribed intentionally due to COPD    Coronary artery disease involving native coronary artery of native heart without angina pectoris       calcium carbonate 1250 MG tablet    OS-THEA 500 mg [place]    100 tablet    Take 2 tablets by mouth 2 times daily    SOB (shortness of breath), Limb pain       CHLOR-TRIMETON 4 MG tablet   Generic drug:  chlorpheniramine      Take 4 mg by mouth every 6 hours as needed for allergies or rhinitis        furosemide 20 MG tablet    LASIX    60 tablet    Take one or two daily as needed for edam    Lower extremity edema       HYDROcodone-acetaminophen 5-325 MG per tablet    NORCO    90 tablet    Take 1-2 tablets by mouth every 6 hours as needed for moderate to severe pain    Multiple joint pain       latanoprost 0.005 % ophthalmic solution    XALATAN     Place 1 drop into both eyes At Bedtime        levothyroxine 150 MCG tablet    SYNTHROID/LEVOTHROID    90 tablet    Take 1 tablet (150 mcg) by mouth every morning    Hypothyroidism, unspecified type       melatonin 3 MG tablet      Take 1-2 tablets (3-6 mg) by mouth nightly as needed for sleep (OTC)        MULTI-VITAMIN PO      Take by mouth daily        STATIN NOT PRESCRIBED (INTENTIONAL)     0 each    1 each See Admin Instructions Statin not prescribed intentionally due to Allergy to statin    Coronary artery disease involving native coronary artery of native heart without angina pectoris, Tobacco abuse       vitamin D 1000 UNITS capsule      Take 2 capsules by mouth daily        vitamin E 400 UNIT capsule      Take by mouth daily

## 2017-11-29 NOTE — NURSING NOTE
Discharge Information    IV Discontiued Time:  NA    Amount of Fluid Infused:  NA    Discharge Criteria = When patient returns to baseline or as per MD order    Consciousness:  Pt is fully awake    Circulation:  BP +/- 20% of pre-procedure level    Respiration:  Patient is able to breathe deeply    O2 Sat:  Patient is able to maintain O2 Sat >92% on room air    Activity:  Moves 4 extremities on command    Ambulation:  Patient is able to stand and walk or stand and pivot into wheelchair    Dressing:  Clean/dry or No Dressing    Notes:   Discharge instructions and AVS given to patient    Patient meets criteria for discharge?  YES    Admitted to PCU?  No    Responsible adult present to accompany patient home?  Yes    Signature/Title:    Kylie Catherine RN Care Coordinator  [facility] [place]

## 2017-11-29 NOTE — NURSING NOTE
Pre-procedure Intake    Have you been fasting? NA    If yes, for how long?     Are you taking a prescribed blood thinner such as coumadin, Plavix, Xarelto?    No    If yes, when did you take your last dose?     Do you take aspirin?  No    If cervical procedure, have you held aspirin for 6 days?   NA    Do you have any allergies to contrast dye, iodine, steroid and/or numbing medications?  NO    Are you currently taking antibiotics or have an active infection?  NO    Have you had a fever/elevated temperature within the past week? NO    Are you currently taking oral steroids? NO    Do you have a ? Yes       Are you pregnant or breastfeeding?  Not Applicable    Are the vital signs normal?  Yes

## 2017-12-06 ENCOUNTER — TELEPHONE (OUTPATIENT)
Dept: PALLIATIVE MEDICINE | Facility: CLINIC | Age: 72
End: 2017-12-06

## 2017-12-06 NOTE — TELEPHONE ENCOUNTER
VM left at 3:22PM    Pt states they are experiencing pain buttocks and groin. Pt requesting a call back

## 2017-12-06 NOTE — TELEPHONE ENCOUNTER
Patient had a Right SI joint injection and right trochanteric bursa injection on 11/29/17.  Called patient for an update.      Left message that we were calling for an update about how she was doing after the injection.  LM that if she has any problems or questions to call the nurse line at [phone].

## 2017-12-07 NOTE — TELEPHONE ENCOUNTER
Called patient-She states that her pain is in the same area as prior to injection, she is not having any pain relief. She is not having any new numbness/tingling or weakness. No s/s of infection. Advised that steroid can take 2 weeks to take effect, advised that she should wait the 2 weeks, if no improvement to call referring provider to discuss plan of care.  Advised patient to contact us if she has worsening of symptoms-numbness/tinglingweakness. Patient verbalized understanding    Bree KNAPPN-RN Care Coordinator  [facility]

## 2017-12-07 NOTE — TELEPHONE ENCOUNTER
"Called patient.     Per injection note 11/29/17,   \"lumbar epidural steroid injection. The patient describes bilateral lower back and buttock pain (R>L). Pain radiates into her groin. She also reports right hip pain, that has been worse in the past 5 days. The patient has been exhibiting symptoms consistent with R SI joint dysfunction.   "

## 2017-12-20 ENCOUNTER — RADIANT APPOINTMENT (OUTPATIENT)
Dept: MAMMOGRAPHY | Facility: CLINIC | Age: 72
End: 2017-12-20
Attending: NURSE PRACTITIONER
Payer: COMMERCIAL

## 2017-12-20 DIAGNOSIS — Z12.31 VISIT FOR SCREENING MAMMOGRAM: ICD-10-CM

## 2017-12-20 PROCEDURE — G0202 SCR MAMMO BI INCL CAD: HCPCS | Mod: TC

## 2017-12-27 DIAGNOSIS — M25.50 MULTIPLE JOINT PAIN: ICD-10-CM

## 2017-12-27 RX ORDER — HYDROCODONE BITARTRATE AND ACETAMINOPHEN 5; 325 MG/1; MG/1
1-2 TABLET ORAL EVERY 6 HOURS PRN
Qty: 90 TABLET | Refills: 0 | Status: SHIPPED | OUTPATIENT
Start: 2017-12-27 | End: 2018-01-23

## 2017-12-27 NOTE — TELEPHONE ENCOUNTER
Called and spoke to patient. Advised patient that her prescription is available for  at the  at the [facility].    Kylie Costa/

## 2017-12-27 NOTE — TELEPHONE ENCOUNTER
Controlled Substance Refill Request for Norco 5-325 Mg  Problem List Complete:  Yes     checked in past 6 months?  Yes 11/09/2017     ACP (advance care planning)      Hypothyroidism     Chronic airway obstruction (H)     Disorder of bone and cartilage     Small bowel obstruction     Stricture of small intestine     S/P Left total hip arthroplasty 11/25/13     Osteoarthritis of hip     Ileitis (ileocecal resection 8/2013)     Lumbago     Tricuspid regurgitation     Palpitations     PVC's (premature ventricular contractions)     Family history of premature CAD     Chronic pain     Pulmonary nodules     Coronary artery disease involving native coronary artery of native heart without angina pectoris     Please call patient when paper prescription is available for  at the  at the [facility].  Patient can be reached at [phone].    Kylie Costa/

## 2018-01-23 ENCOUNTER — HOSPITAL ENCOUNTER (EMERGENCY)
Facility: CLINIC | Age: 73
Discharge: HOME | End: 2018-01-23
Attending: EMERGENCY MEDICINE | Admitting: EMERGENCY MEDICINE
Payer: COMMERCIAL

## 2018-01-23 ENCOUNTER — APPOINTMENT (OUTPATIENT)
Dept: GENERAL RADIOLOGY | Facility: CLINIC | Age: 73
End: 2018-01-23
Attending: EMERGENCY MEDICINE
Payer: COMMERCIAL

## 2018-01-23 VITALS
WEIGHT: 160 LBS | SYSTOLIC BLOOD PRESSURE: 149 MMHG | OXYGEN SATURATION: 91 % | HEIGHT: 66 IN | RESPIRATION RATE: 22 BRPM | BODY MASS INDEX: 25.71 KG/M2 | TEMPERATURE: 97.6 F | DIASTOLIC BLOOD PRESSURE: 92 MMHG

## 2018-01-23 DIAGNOSIS — S20.212A RIB CONTUSION, LEFT, INITIAL ENCOUNTER: ICD-10-CM

## 2018-01-23 DIAGNOSIS — M25.50 MULTIPLE JOINT PAIN: ICD-10-CM

## 2018-01-23 DIAGNOSIS — S62.102A FRACTURE OF WRIST, LEFT, CLOSED, INITIAL ENCOUNTER: ICD-10-CM

## 2018-01-23 PROCEDURE — 73502 X-RAY EXAM HIP UNI 2-3 VIEWS: CPT

## 2018-01-23 PROCEDURE — 25000128 H RX IP 250 OP 636

## 2018-01-23 PROCEDURE — 25000128 H RX IP 250 OP 636: Performed by: EMERGENCY MEDICINE

## 2018-01-23 PROCEDURE — 96376 TX/PRO/DX INJ SAME DRUG ADON: CPT | Mod: 59

## 2018-01-23 PROCEDURE — 64450 NJX AA&/STRD OTHER PN/BRANCH: CPT

## 2018-01-23 PROCEDURE — 96375 TX/PRO/DX INJ NEW DRUG ADDON: CPT

## 2018-01-23 PROCEDURE — 73110 X-RAY EXAM OF WRIST: CPT | Mod: LT

## 2018-01-23 PROCEDURE — 99285 EMERGENCY DEPT VISIT HI MDM: CPT | Mod: 25

## 2018-01-23 PROCEDURE — 96374 THER/PROPH/DIAG INJ IV PUSH: CPT

## 2018-01-23 PROCEDURE — 71045 X-RAY EXAM CHEST 1 VIEW: CPT

## 2018-01-23 PROCEDURE — 25600 CLTX DST RDL FX/EPHYS SEP WO: CPT | Mod: LT

## 2018-01-23 RX ORDER — HYDROMORPHONE HYDROCHLORIDE 1 MG/ML
0.5 INJECTION, SOLUTION INTRAMUSCULAR; INTRAVENOUS; SUBCUTANEOUS
Status: COMPLETED | OUTPATIENT
Start: 2018-01-23 | End: 2018-01-23

## 2018-01-23 RX ORDER — LIDOCAINE HYDROCHLORIDE 10 MG/ML
INJECTION, SOLUTION EPIDURAL; INFILTRATION; INTRACAUDAL; PERINEURAL
Status: DISCONTINUED
Start: 2018-01-23 | End: 2018-01-23 | Stop reason: HOSPADM

## 2018-01-23 RX ORDER — FENTANYL CITRATE 50 UG/ML
50 INJECTION, SOLUTION INTRAMUSCULAR; INTRAVENOUS ONCE
Status: COMPLETED | OUTPATIENT
Start: 2018-01-23 | End: 2018-01-23

## 2018-01-23 RX ORDER — HYDROCODONE BITARTRATE AND ACETAMINOPHEN 5; 325 MG/1; MG/1
1-2 TABLET ORAL EVERY 6 HOURS PRN
Qty: 12 TABLET | Refills: 0 | Status: SHIPPED | OUTPATIENT
Start: 2018-01-23 | End: 2018-02-07

## 2018-01-23 RX ORDER — HYDROMORPHONE HYDROCHLORIDE 1 MG/ML
INJECTION, SOLUTION INTRAMUSCULAR; INTRAVENOUS; SUBCUTANEOUS
Status: COMPLETED
Start: 2018-01-23 | End: 2018-01-23

## 2018-01-23 RX ORDER — BETAXOLOL HYDROCHLORIDE 5 MG/ML
SOLUTION/ DROPS OPHTHALMIC
Refills: 3 | COMMUNITY
Start: 2017-12-07

## 2018-01-23 RX ADMIN — FENTANYL CITRATE 50 MCG: 50 INJECTION, SOLUTION INTRAMUSCULAR; INTRAVENOUS at 13:03

## 2018-01-23 RX ADMIN — Medication 0.5 MG: at 16:49

## 2018-01-23 RX ADMIN — Medication 0.5 MG: at 14:45

## 2018-01-23 RX ADMIN — HYDROMORPHONE HYDROCHLORIDE 0.5 MG: 1 INJECTION, SOLUTION INTRAMUSCULAR; INTRAVENOUS; SUBCUTANEOUS at 14:45

## 2018-01-23 ASSESSMENT — ENCOUNTER SYMPTOMS
COUGH: 0
HEADACHES: 0
BACK PAIN: 0
NAUSEA: 0
SHORTNESS OF BREATH: 0
ARTHRALGIAS: 1
FEVER: 0
DIZZINESS: 0
VOMITING: 0
RHINORRHEA: 0

## 2018-01-23 NOTE — ED AVS SNAPSHOT
[facility] Emergency Department    [address]    [facility] [address]    Phone:  [phone]    Fax:  [phone]                                       Kahlil Caputo   MRN: [id number]    Department:  [facility] Emergency Department   Date of Visit:  1/23/2018           After Visit Summary Signature Page     I have received my discharge instructions, and my questions have been answered. I have discussed any challenges I see with this plan with the nurse or doctor.    ..........................................................................................................................................  Patient/Patient Representative Signature      ..........................................................................................................................................  Patient Representative Print Name and Relationship to Patient    ..................................................               ................................................  Date                                            Time    ..........................................................................................................................................  Reviewed by Signature/Title    ...................................................              ..............................................  Date                                                            Time

## 2018-01-23 NOTE — ED PROVIDER NOTES
History     Chief Complaint:  Fall    HPI   Kahlil Caputo is a 72 year old female with a medical history of hypothyroidism, crohn's disease, coronary artery disease, and glaucoma who presents with a fall. The patient presents to the emergency department via EMS. The patient was shoveling the snow this afternoon until she had a mechanical fall after slipping on ice. The patient was not sure if she hit her head, but did not lose consciousness. She states that she hit her left rib first, and then the left side of her arm. She also has pain in her groin when she was trying to ambulate but feels it is more of a strained muscle pain. Patient denies headache, nausea, vomiting, dizziness, cough, cold, or any other illnesses. The patient is not on any anticoagulants. Of note, patient has an artificial left hip. Patient lives alone.     Allergies:  Latex  Nsaids  Aspirin  Codeine  Pravastatin  Simvastatin  Tramadol     Medications:    Vitamin E  Lasix  Synthroid/Levothroid  Vitamin C  Chlor-trimeton  Vitamin D  Calcium carbonate    Past Medical History:    Chronic airway obstruction  Chronic pain  Coronary artery disease  Crohn's disease  Migraine  Premature ventricular contractions  Stricture of small intestine  Tobacco abuse  Glaucoma  Hypothyroidism  Pulmonary nodules    Past Surgical History:    Arthroplasty hip  D&C x2  Pilonidal cystectomy  T&A  Laparotomy exploratory  Resection ileocecal    Family History:    Alcohol/drug  Heart disease  Myocardial infarction    Social History:  Smoking status: Former smoker, 2010  Alcohol use: Yes   Marital Status:   [4]     Review of Systems   Constitutional: Negative for fever.   HENT: Negative for congestion and rhinorrhea.    Respiratory: Negative for cough and shortness of breath.    Gastrointestinal: Negative for nausea and vomiting.   Musculoskeletal: Positive for arthralgias. Negative for back pain.   Neurological: Negative for dizziness, syncope and headaches.  "  All other systems reviewed and are negative.    Physical Exam   Patient Vitals for the past 24 hrs:   BP Temp Temp src Heart Rate Resp SpO2 Height Weight   01/23/18 1415 (!) 161/99 - - - - 91 % - -   01/23/18 1330 (!) 153/91 - - - - 91 % - -   01/23/18 1315 (!) 160/92 - - - - - - -   01/23/18 1300 (!) 161/92 - - - - 93 % - -   01/23/18 1245 (!) 163/98 - - - - 92 % - -   01/23/18 1232 (!) 172/103 97.6  F (36.4  C) Oral 113 22 96 % 1.676 m (5' 6\") 72.6 kg (160 lb)      Physical Exam  Constitutional:  Well developed, Well nourished.  HENT:  Bilateral external ears normal, Mucous membranes moist, Nose normal. Neck- Normal range of motion, Supple  Respiratory:  Normal breath sounds, No respiratory distress, No wheezing,  Cardiovascular:  Normal heart rate, Normal rhythm, No murmurs,    GI:  Bowel sounds normal, Soft, No tenderness,   Musculoskeletal:  Intact distal pulses, No edema, grossly unremarkable range of motion. Left wrist with a small ulnar deformity and tenderness. Neurovascularly intact. Tenderness over the left lower ribs.         Integument:  Warm, Dry   Neurologic:  Alert, attentive and appropriately oriented  Psychiatric:  Mood and affect normal.      Emergency Department Course   Imaging:  Radiographic findings were communicated with the patient who voiced understanding of the findings.  Wrist XR, G/E 3 views, left  There is a comminuted intra-articular compression  fracture of the distal radius with mild distraction of fracture  fragments. Degenerative changes at the thumb CMC joint.  As read by Radiology.     XR Chest 2 Views  Mild scarring or atelectasis at the left costophrenic  angle. No evidence for acute traumatic injury. Exam otherwise within  normal limits for portable AP technique.   As read by Radiology.     XR Pelvis and Hip Left 2 Views  No acute process. Left hip arthroplasty without evidence  for complication. The tip of the femoral component is not included on  the lateral view.   As " read by Radiology.     Interventions:  1303: Sublimaze 50 mcg IV  1445: Dilaudid 0.5 mg IV     Splint Placement    Anesthesia: Hematoma block was placed in the L wrist using standard sterile precautions, 3mL of 1%lidocaine without epinephrine, with fair improvement in pain.    PLACEMENT: Custom Orthoglass sugartong splint was applied to the left extremity. Of note, somewhat protruding fracture fragment was easily manipulated into a non protruding position with gentle pressure. After placement, I checked and adjusted the fit to ensure proper positioning. The patient was more comfortable with the splint in place. Sensation and circulation are intact after splint placement.       Emergency Department Course:  Past medical records, nursing notes, and vitals reviewed.  1321: I performed an exam of the patient and obtained history, as documented above.    The patient was sent for a wrist x-ray, chest x-ray, pelvis and hip x-ray while in the emergency department, findings above.     1520: I discussed the case with EDGARD Blake of orthopedics.  Also discussed the case with Dr. Cornelius.    I rechecked the patient. Findings and plan explained to the Patient. Patient discharged home with instructions regarding supportive care, medications, and reasons to return. The importance of close follow-up was reviewed.      Impression & Plan    Medical Decision Making:  Kahlil Caputo is a 72 year old female who presents for evaluation of wrist pain after fall. CMS is intact distally in the extremity.  Xrays reveal a fracture that does not need reduction at this time.  The patient/family understand that this may change with time and orthopedic follow-up is indicated.  There is no indication for ortho consultation from the ED. Rather, close follow-up is indicated in the next days.  Splint and fracture precautions for home.  She has left sided rib and groin pain, but x-rays show no fractures.  The patients head to toe trauma exam is  otherwise normal at this time and no further trauma workup is needed as I believe there is no signs of serious head, neck, chest, spinal, extremity or abdominal injuries.  I discussed at length with the patient and her son the diagnosis of left wrist fracture which will require her to follow-up, left rib contusion, left inguinal strain.  We discussed indications for return.  She and her son are completely comfortable with plan.        Diagnosis:    ICD-10-CM    1. Fracture of wrist, left, closed, initial encounter S62.102A    2. Rib contusion, left, initial encounter S20.212A    3. Multiple joint pain M25.50 HYDROcodone-acetaminophen (NORCO) 5-325 MG per tablet       Disposition:  discharged to home    Discharge Medications:  Discharge Medication List as of 1/23/2018  4:56 PM        Regine Escalante  1/23/2018   [facility] EMERGENCY DEPARTMENT  Regine BOB Do, am serving as a scribe at 1:21 PM on 1/23/2018 to document services personally performed by Herminia Deluca MD based on my observations and the provider's statements to me.       Herminia Deluca MD  01/24/18 [id number]

## 2018-01-23 NOTE — ED NOTES
Patient educated on narcotic pain medicine, Norco, as well as follow-up with [facility] and PCP. Educated to not drive or operate equipment while taking this medication. Patient educated that medication can make them drowsy or impaired. Educated that pain medications can cause addiction and that opioids can cause constipation, and to drink plenty of fluids and consume fiber. Patient received discharge instructions and has no other questions at this time.

## 2018-01-23 NOTE — ED NOTES
Patient presents after fall around 11. Patient fell on ice, and fell onto her left side. She hit her ribs first, then wrist. Deformity to left wrist and patient reports pain in her left rib area. Patient given 50 mcg Fentanyl en route.

## 2018-01-23 NOTE — ED AVS SNAPSHOT
[facility]    [address]    [facility] [address]    Phone:  [phone]    Fax:  [phone]                                       Kahlil Caputo   MRN: [id number]    Department:  [facility]   Date of Visit:  1/23/2018           Patient Information     Date Of Birth          1945        Your diagnoses for this visit were:     Fracture of wrist, left, closed, initial encounter     Rib contusion, left, initial encounter     Multiple joint pain        You were seen by Herminia Deluca MD.      Follow-up Information     Follow up with Haase, Susan Rachele, APRN CNP. Schedule an appointment as soon as possible for a visit in 4 days.    Specialty:  Nurse Practitioner    Contact information:    [id number]  [facility] [id number] [phone]          Follow up with Melina Arriola MD. Schedule an appointment as soon as possible for a visit in 2 days.    Specialty:  Orthopedics    Contact information:    [facility]  [address]  [facility] [address]  [phone]          Discharge Instructions       Take your hydrocodone/acetaminophen as prescribed.        Wrist Fracture, General  You have a broken bone (fracture) in your wrist. This may be a small crack or chip in the bone. Or it may be a major break, with the broken parts pushed out of position. Wrist fractures are often treated with a splint or cast. They take about 4 to 6 weeks to heal. Severe injuries may need surgery.    Home care  Follow these guidelines when caring for yourself at home:    Keep your arm elevated to reduce pain and swelling. When sitting or lying down keep your arm above the level of your heart. You can do this by placing your arm on a pillow that rests on your chest or on a pillow at your side. This is most important during the first 2 days (48 hours) after the injury.    Put an ice pack on the injured area. Do this for 20 minutes  every 1 to 2 hours the first day for pain relief. You can make an ice pack by wrapping a plastic bag of ice cubes in a thin towel. As the ice melts, be careful that the cast or splint doesn t get wet. Continue using the ice pack 3 to 4 times a day for the next 2 days. Then use the ice pack as needed to ease pain and swelling.    Keep the cast or splint completely dry at all times. Bathe with your cast or splint out of the water. Protect it with a large plastic bag, rubber-banded at the top end. If a fiberglass cast or splint gets wet, you can dry it with a hair dryer on a cool setting.    You may use acetaminophen or ibuprofen to control pain, unless another pain medicine was prescribed. If you have chronic liver or kidney disease, talk with your healthcare provider before using these medicines. Also talk with your provider if you ve had a stomach ulcer or gastrointestinal bleeding.    Don t put creams, lotions, or objects under the cast.  Follow-up care  Follow up with your healthcare provider, or as advised. This is to make sure the bone is healing the way it should. If a splint was put on, it may be changed to a cast during your follow-up visit. A cast may need to be changed at 2 to 3 weeks, as the swelling goes down.  If X-rays were taken, a radiologist may look at them. You will be told of any new findings that may affect your care.  When to seek medical advice  Call your healthcare provider right away if any of these occur:    The plaster cast or splint becomes wet or soft    The cast or splint cracks    Bad odor from the cast or wound fluid stains the cast    The fiberglass cast or splint stays wet for more than 24 hours    Tightness or pain under the cast or splint gets worse    Fingers become swollen, cold, blue, numb, or tingly    You can t move your fingers    Skin around cast becomes red, swollen, or irritated  Date Last Reviewed: 5/1/2017 2000-2017 [employer]. [address] [facility],  [place] [address]. All rights reserved. This information is not intended as a substitute for professional medical care. Always follow your healthcare professional's instructions.        Discharge Instructions: Caring for Your Splint  You will be going home with a splint. This is sometimes called a removable cast. A splint helps your body heal by holding your injured bones or joints in place. Take good care of your splint. A damaged splint can keep your injury from healing well. If your splint becomes damaged or loses its shape, you may need to replace it.   You have a broken ___________________ bone.  This bone is located in your ____________.   Home care    Wear your splint according to your doctor s instructions.    Keep the splint dry at all times. Bathe with your splint well out of the water. You can hold the splint outside the tub or shower when bathing. Protect it with a large plastic bag closed at the top end with a rubber band. Use two layers of plastic to help keep the splint dry. Or you can buy a waterproof shield.    If a splint gets wet, dry it with a hair dryer on the  cool  setting. Don t use the warm or hot setting, because those settings can burn your skin.    Always keep the splint clean and away from dirt.    Wash the Velcro straps and inner cloth sleeve (stockinet) with soapy water and air dry.    Keep your splint away from open flames.    Don t expose your splint to heat, space heaters, or prolonged sunlight. Excessive heat will cause the splint to change shape.    Don t cut or tear the splint.     Exercise all the nearby joints not kept still by the splint. If you have a long leg splint, exercise your hip joint and your toes. If you have an arm splint, exercise your shoulder, elbow, thumb, and fingers.    Elevate the part of your body that is in the splint. This helps reduce swelling.  Follow-up care  Make a follow-up appointment with your healthcare provider, or as advised.  When to call your  healthcare provider  Call your healthcare provider right away if you have any of these:    Tingling or numbness in the affected area    Severe pain that cannot be relieved with medicine    Cast that feels too tight or too loose    Swelling, coldness, or blue-gray color in the fingers or toes    Cast that is damaged, cracked, or has rough edges that hurt    Pressure sores or red marks that don t go away within 1 hour after removing the splint    Blisters   Date Last Reviewed: 7/1/2016 2000-2017 The [employer]. [address] [place] [address]. All rights reserved. This information is not intended as a substitute for professional medical care. Always follow your healthcare professional's instructions.        Rib Contusion     A rib contusion is a bruise to one or more rib bones. It may cause pain, tenderness, swelling and a purplish discoloration. There may be a sharp pain while breathing.  You will be assessed for other injuries. You will likely be given pain medicine. Rib contusions heal on their own, without further treatment. However, pain may take weeks to months to go away.   Note that a small crack (fracture) in the rib may cause the same symptoms as a rib contusion. The small crack may not be seen on a chest X-ray. However, the conditions are managed in the same way.  Home care    Rest. Avoid heavy lifting, strenuous exertion, or any activity that causes pain.    Ice the area to reduce pain and swelling. Put ice cubes in a plastic bag or use a cold pack. (Wrap the cold source in a thin towel. Do not place it directly on your skin.) Ice the injured area for 20 minutes every 1 to 2 hours the first day. Continue with ice packs 3 to 4 times a day for the next 2 days, then as needed for the relief of pain and swelling.    Take any prescribed pain medicine as directed by your healthcare provider. If none was prescribed, take acetaminophen, ibuprofen, or naproxen to control pain.    If you have  a significant injury, you may be given a device called an incentive spirometer to keep your lungs healthy. Use as directed.  Follow-up care  Follow up with your healthcare provider during the next week or as directed.  When to seek medical advice  Call your healthcare provider for any of the following:    Shortness of breath or trouble breathing    Increasing chest pain with breathing    Coughing    Dizziness, weakness, or fainting    New or worsening pain    Fever of 100.4 F (38 C) or higher, or as directed by your healthcare provider  Date Last Reviewed: 2/1/2017 2000-2017 [employer]. [address] [place] [address]. All rights reserved. This information is not intended as a substitute for professional medical care. Always follow your healthcare professional's instructions.          24 Hour Appointment Hotline       To make an appointment at any [facility], call [phone] ([phone]). If you don't have a family doctor or clinic, we will help you find one. [facility] clinics are conveniently located to serve the needs of you and your family.             Review of your medicines      Our records show that you are taking the medicines listed below. If these are incorrect, please call your family doctor or clinic.        Dose / Directions Last dose taken    ACE/ARB/ARNI NOT PRESCRIBED (INTENTIONAL)        Please choose reason not prescribed, below   Refills:  0        ascorbic acid 500 MG tablet   Commonly known as:  VITAMIN C        Take by mouth 2 times daily   Refills:  0        ASPIRIN NOT PRESCRIBED   Commonly known as:  INTENTIONAL   Quantity:  0 each        Please choose reason not prescribed, below   Refills:  0        BETA BLOCKER NOT PRESCRIBED (INTENTIONAL)        Beta Blocker not prescribed intentionally due to COPD   Refills:  0        betaxolol 0.5 % ophthalmic solution   Commonly known as:  BETOPTIC        INSTILL 1 DROP INTO EACH EYE QD   Refills:  3         calcium carbonate 1250 MG tablet   Commonly known as:  OS-THEA 500 mg [place]. Ca   Dose:  2 tablet   Quantity:  100 tablet        Take 2 tablets by mouth 2 times daily   Refills:  3        CHLOR-TRIMETON 4 MG tablet   Dose:  4 mg   Generic drug:  chlorpheniramine        Take 4 mg by mouth every 6 hours as needed for allergies or rhinitis   Refills:  0        furosemide 20 MG tablet   Commonly known as:  LASIX   Quantity:  60 tablet        Take one or two daily as needed for edam   Refills:  5        HYDROcodone-acetaminophen 5-325 MG per tablet   Commonly known as:  NORCO   Dose:  1-2 tablet   Quantity:  12 tablet        Take 1-2 tablets by mouth every 6 hours as needed for moderate to severe pain   Refills:  0        levothyroxine 150 MCG tablet   Commonly known as:  SYNTHROID/LEVOTHROID   Dose:  150 mcg   Quantity:  90 tablet        Take 1 tablet (150 mcg) by mouth every morning   Refills:  3        melatonin 3 MG tablet   Dose:  3-6 mg        Take 1-2 tablets (3-6 mg) by mouth nightly as needed for sleep (OTC)   Refills:  0        MULTI-VITAMIN PO        Take by mouth daily   Refills:  0        STATIN NOT PRESCRIBED (INTENTIONAL)   Dose:  1 each   Quantity:  0 each        1 each See Admin Instructions Statin not prescribed intentionally due to Allergy to statin   Refills:  0        vitamin D 1000 UNITS capsule   Dose:  2 capsule        Take 2 capsules by mouth daily   Refills:  0        vitamin E 400 UNIT capsule        Take by mouth daily   Refills:  0                Prescriptions were sent or printed at these locations (1 Prescription)                   Other Prescriptions                Printed at Department/Unit printer (1 of 1)         HYDROcodone-acetaminophen (NORCO) 5-325 MG per tablet                Procedures and tests performed during your visit     Wrist XR, G/E 3 views, left    XR Chest 1 View    XR Pelvis and Hip Left 2 Views      Orders Needing Specimen Collection     None      Pending Results     No  orders found from 1/21/2018 to 1/24/2018.            Pending Culture Results     No orders found from 1/21/2018 to 1/24/2018.            Pending Results Instructions     If you had any lab results that were not finalized at the time of your Discharge, you can call the ED Lab Result RN at [phone]. You will be contacted by this team for any positive Lab results or changes in treatment. The nurses are available 7 days a week from 10A to 6:30P.  You can leave a message 24 hours per day and they will return your call.        Test Results From Your Hospital Stay        1/23/2018  2:39 PM      Narrative     LEFT WRIST THREE OR MORE VIEWS  1/23/2018 2:01 PM    HISTORY:  Fall.     COMPARISON:  None.        Impression     IMPRESSION:  There is a comminuted intra-articular compression  fracture of the distal radius with mild distraction of fracture  fragments. Degenerative changes at the thumb CMC joint.    IRVIN PEREZ MD               1/23/2018  2:38 PM      Narrative     PELVIS AND HIP LEFT TWO VIEWS  January 23, 2018 2:03 PM    HISTORY: Fall pain.     COMPARISON: 11/25/2013.        Impression     IMPRESSION: No acute process. Left hip arthroplasty without evidence  for complication. The tip of the femoral component is not included on  the lateral view.     IRVIN PEREZ MD         1/23/2018  2:39 PM      Narrative     CHEST ONE VIEW  1/23/2018 2:03 PM    HISTORY:  Fall.     COMPARISON:  1/20/2016        Impression     IMPRESSION:  Mild scarring or atelectasis at the left costophrenic  angle. No evidence for acute traumatic injury. Exam otherwise within  normal limits for portable AP technique.     IRVIN PEREZ MD                Clinical Quality Measure: Blood Pressure Screening     Your blood pressure was checked while you were in the emergency department today. The last reading we obtained was  BP: (!) 161/99 . Please read the guidelines below about what these numbers mean and what you should do about them.  If  your systolic blood pressure (the top number) is less than 120 and your diastolic blood pressure (the bottom number) is less than 80, then your blood pressure is normal. There is nothing more that you need to do about it.  If your systolic blood pressure (the top number) is 120-139 or your diastolic blood pressure (the bottom number) is 80-89, your blood pressure may be higher than it should be. You should have your blood pressure rechecked within a year by a primary care provider.  If your systolic blood pressure (the top number) is 140 or greater or your diastolic blood pressure (the bottom number) is 90 or greater, you may have high blood pressure. High blood pressure is treatable, but if left untreated over time it can put you at risk for heart attack, stroke, or kidney failure. You should have your blood pressure rechecked by a primary care provider within the next 4 weeks.  If your provider in the emergency department today gave you specific instructions to follow-up with your doctor or provider even sooner than that, you should follow that instruction and not wait for up to 4 weeks for your follow-up visit.        Thank you for choosing [facility]       Thank you for choosing [facility] for your care. Our goal is always to provide you with excellent care. Hearing back from our patients is one way we can continue to improve our services. Please take a few minutes to complete the written survey that you may receive in the mail after you visit with us. Thank you!        [employer] Information     [facility] gives you secure access to your electronic health record. If you see a primary care provider, you can also send messages to your care team and make appointments. If you have questions, please call your primary care clinic.  If you do not have a primary care provider, please call [phone] and they will assist you.        Care EveryWhere ID     This is your Care EveryWhere ID. This could be used by other  organizations to access your [facility] medical records  [id number]        Equal Access to Services     VIANEY COTTON : Trista Sarkar, chung frost, sebastien de leon. So [facility] [phone].    ATENCIÓN: Si habla español, tiene a denson disposición servicios gratuitos de asistencia lingüística. Llame al [phone].    We comply with applicable federal civil rights laws and [place] laws. We do not discriminate on the basis of race, color, national origin, age, disability, sex, sexual orientation, or gender identity.            After Visit Summary       This is your record. Keep this with you and show to your community pharmacist(s) and doctor(s) at your next visit.

## 2018-01-23 NOTE — DISCHARGE INSTRUCTIONS
Take your hydrocodone/acetaminophen as prescribed.        Wrist Fracture, General  You have a broken bone (fracture) in your wrist. This may be a small crack or chip in the bone. Or it may be a major break, with the broken parts pushed out of position. Wrist fractures are often treated with a splint or cast. They take about 4 to 6 weeks to heal. Severe injuries may need surgery.    Home care  Follow these guidelines when caring for yourself at home:    Keep your arm elevated to reduce pain and swelling. When sitting or lying down keep your arm above the level of your heart. You can do this by placing your arm on a pillow that rests on your chest or on a pillow at your side. This is most important during the first 2 days (48 hours) after the injury.    Put an ice pack on the injured area. Do this for 20 minutes every 1 to 2 hours the first day for pain relief. You can make an ice pack by wrapping a plastic bag of ice cubes in a thin towel. As the ice melts, be careful that the cast or splint doesn t get wet. Continue using the ice pack 3 to 4 times a day for the next 2 days. Then use the ice pack as needed to ease pain and swelling.    Keep the cast or splint completely dry at all times. Bathe with your cast or splint out of the water. Protect it with a large plastic bag, rubber-banded at the top end. If a fiberglass cast or splint gets wet, you can dry it with a hair dryer on a cool setting.    You may use acetaminophen or ibuprofen to control pain, unless another pain medicine was prescribed. If you have chronic liver or kidney disease, talk with your healthcare provider before using these medicines. Also talk with your provider if you ve had a stomach ulcer or gastrointestinal bleeding.    Don t put creams, lotions, or objects under the cast.  Follow-up care  Follow up with your healthcare provider, or as advised. This is to make sure the bone is healing the way it should. If a splint was put on, it may be changed  to a cast during your follow-up visit. A cast may need to be changed at 2 to 3 weeks, as the swelling goes down.  If X-rays were taken, a radiologist may look at them. You will be told of any new findings that may affect your care.  When to seek medical advice  Call your healthcare provider right away if any of these occur:    The plaster cast or splint becomes wet or soft    The cast or splint cracks    Bad odor from the cast or wound fluid stains the cast    The fiberglass cast or splint stays wet for more than 24 hours    Tightness or pain under the cast or splint gets worse    Fingers become swollen, cold, blue, numb, or tingly    You can t move your fingers    Skin around cast becomes red, swollen, or irritated  Date Last Reviewed: 5/1/2017 2000-2017 The [employer]. [address] [place] [address]. All rights reserved. This information is not intended as a substitute for professional medical care. Always follow your healthcare professional's instructions.        Discharge Instructions: Caring for Your Splint  You will be going home with a splint. This is sometimes called a removable cast. A splint helps your body heal by holding your injured bones or joints in place. Take good care of your splint. A damaged splint can keep your injury from healing well. If your splint becomes damaged or loses its shape, you may need to replace it.   You have a broken ___________________ bone.  This bone is located in your ____________.   Home care    Wear your splint according to your doctor s instructions.    Keep the splint dry at all times. Bathe with your splint well out of the water. You can hold the splint outside the tub or shower when bathing. Protect it with a large plastic bag closed at the top end with a rubber band. Use two layers of plastic to help keep the splint dry. Or you can buy a waterproof shield.    If a splint gets wet, dry it with a hair dryer on the  cool  setting. Don t use the  warm or hot setting, because those settings can burn your skin.    Always keep the splint clean and away from dirt.    Wash the Velcro straps and inner cloth sleeve (stockinet) with soapy water and air dry.    Keep your splint away from open flames.    Don t expose your splint to heat, space heaters, or prolonged sunlight. Excessive heat will cause the splint to change shape.    Don t cut or tear the splint.     Exercise all the nearby joints not kept still by the splint. If you have a long leg splint, exercise your hip joint and your toes. If you have an arm splint, exercise your shoulder, elbow, thumb, and fingers.    Elevate the part of your body that is in the splint. This helps reduce swelling.  Follow-up care  Make a follow-up appointment with your healthcare provider, or as advised.  When to call your healthcare provider  Call your healthcare provider right away if you have any of these:    Tingling or numbness in the affected area    Severe pain that cannot be relieved with medicine    Cast that feels too tight or too loose    Swelling, coldness, or blue-gray color in the fingers or toes    Cast that is damaged, cracked, or has rough edges that hurt    Pressure sores or red marks that don t go away within 1 hour after removing the splint    Blisters   Date Last Reviewed: 7/1/2016 2000-2017 The [employer]. [address] [place], [place] [id number]. All rights reserved. This information is not intended as a substitute for professional medical care. Always follow your healthcare professional's instructions.        Rib Contusion     A rib contusion is a bruise to one or more rib bones. It may cause pain, tenderness, swelling and a purplish discoloration. There may be a sharp pain while breathing.  You will be assessed for other injuries. You will likely be given pain medicine. Rib contusions heal on their own, without further treatment. However, pain may take weeks to months to go away.   Note that  a small crack (fracture) in the rib may cause the same symptoms as a rib contusion. The small crack may not be seen on a chest X-ray. However, the conditions are managed in the same way.  Home care    Rest. Avoid heavy lifting, strenuous exertion, or any activity that causes pain.    Ice the area to reduce pain and swelling. Put ice cubes in a plastic bag or use a cold pack. (Wrap the cold source in a thin towel. Do not place it directly on your skin.) Ice the injured area for 20 minutes every 1 to 2 hours the first day. Continue with ice packs 3 to 4 times a day for the next 2 days, then as needed for the relief of pain and swelling.    Take any prescribed pain medicine as directed by your healthcare provider. If none was prescribed, take acetaminophen, ibuprofen, or naproxen to control pain.    If you have a significant injury, you may be given a device called an incentive spirometer to keep your lungs healthy. Use as directed.  Follow-up care  Follow up with your healthcare provider during the next week or as directed.  When to seek medical advice  Call your healthcare provider for any of the following:    Shortness of breath or trouble breathing    Increasing chest pain with breathing    Coughing    Dizziness, weakness, or fainting    New or worsening pain    Fever of 100.4 F (38 C) or higher, or as directed by your healthcare provider  Date Last Reviewed: 2/1/2017 2000-2017 The [employer]. [address] [place] [address]. All rights reserved. This information is not intended as a substitute for professional medical care. Always follow your healthcare professional's instructions.

## 2018-01-24 ENCOUNTER — OFFICE VISIT (OUTPATIENT)
Dept: FAMILY MEDICINE | Facility: CLINIC | Age: 73
End: 2018-01-24
Payer: COMMERCIAL

## 2018-01-24 VITALS
BODY MASS INDEX: 25.71 KG/M2 | TEMPERATURE: 97.8 F | WEIGHT: 160 LBS | DIASTOLIC BLOOD PRESSURE: 84 MMHG | RESPIRATION RATE: 14 BRPM | HEIGHT: 66 IN | SYSTOLIC BLOOD PRESSURE: 126 MMHG | OXYGEN SATURATION: 97 % | HEART RATE: 98 BPM

## 2018-01-24 DIAGNOSIS — Z01.818 PREOP GENERAL PHYSICAL EXAM: Primary | ICD-10-CM

## 2018-01-24 DIAGNOSIS — S52.502P CLOSED FRACTURE OF DISTAL END OF LEFT RADIUS WITH MALUNION, UNSPECIFIED FRACTURE MORPHOLOGY, SUBSEQUENT ENCOUNTER: ICD-10-CM

## 2018-01-24 LAB
BASOPHILS # BLD AUTO: 0 10E9/L (ref 0–0.2)
BASOPHILS NFR BLD AUTO: 0.2 %
DIFFERENTIAL METHOD BLD: NORMAL
EOSINOPHIL # BLD AUTO: 0.1 10E9/L (ref 0–0.7)
EOSINOPHIL NFR BLD AUTO: 1.2 %
ERYTHROCYTE [DISTWIDTH] IN BLOOD BY AUTOMATED COUNT: 14.9 % (ref 10–15)
HCT VFR BLD AUTO: 40.5 % (ref 35–47)
HGB BLD-MCNC: 13.6 G/DL (ref 11.7–15.7)
LYMPHOCYTES # BLD AUTO: 1.1 10E9/L (ref 0.8–5.3)
LYMPHOCYTES NFR BLD AUTO: 12.7 %
MCH RBC QN AUTO: 28.6 PG (ref 26.5–33)
MCHC RBC AUTO-ENTMCNC: 33.6 G/DL (ref 31.5–36.5)
MCV RBC AUTO: 85 FL (ref 78–100)
MONOCYTES # BLD AUTO: 0.8 10E9/L (ref 0–1.3)
MONOCYTES NFR BLD AUTO: 9.2 %
NEUTROPHILS # BLD AUTO: 6.6 10E9/L (ref 1.6–8.3)
NEUTROPHILS NFR BLD AUTO: 76.7 %
PLATELET # BLD AUTO: 274 10E9/L (ref 150–450)
RBC # BLD AUTO: 4.75 10E12/L (ref 3.8–5.2)
WBC # BLD AUTO: 8.6 10E9/L (ref 4–11)

## 2018-01-24 PROCEDURE — 36415 COLL VENOUS BLD VENIPUNCTURE: CPT | Performed by: NURSE PRACTITIONER

## 2018-01-24 PROCEDURE — 80053 COMPREHEN METABOLIC PANEL: CPT | Performed by: NURSE PRACTITIONER

## 2018-01-24 PROCEDURE — 85025 COMPLETE CBC W/AUTO DIFF WBC: CPT | Performed by: NURSE PRACTITIONER

## 2018-01-24 PROCEDURE — 93000 ELECTROCARDIOGRAM COMPLETE: CPT | Performed by: NURSE PRACTITIONER

## 2018-01-24 PROCEDURE — 99214 OFFICE O/P EST MOD 30 MIN: CPT | Performed by: NURSE PRACTITIONER

## 2018-01-24 ASSESSMENT — ANXIETY QUESTIONNAIRES
5. BEING SO RESTLESS THAT IT IS HARD TO SIT STILL: NOT AT ALL
3. WORRYING TOO MUCH ABOUT DIFFERENT THINGS: NOT AT ALL
2. NOT BEING ABLE TO STOP OR CONTROL WORRYING: NOT AT ALL
7. FEELING AFRAID AS IF SOMETHING AWFUL MIGHT HAPPEN: NOT AT ALL
1. FEELING NERVOUS, ANXIOUS, OR ON EDGE: NOT AT ALL
GAD7 TOTAL SCORE: 0
IF YOU CHECKED OFF ANY PROBLEMS ON THIS QUESTIONNAIRE, HOW DIFFICULT HAVE THESE PROBLEMS MADE IT FOR YOU TO DO YOUR WORK, TAKE CARE OF THINGS AT HOME, OR GET ALONG WITH OTHER PEOPLE: NOT DIFFICULT AT ALL
6. BECOMING EASILY ANNOYED OR IRRITABLE: NOT AT ALL

## 2018-01-24 ASSESSMENT — PATIENT HEALTH QUESTIONNAIRE - PHQ9: 5. POOR APPETITE OR OVEREATING: NOT AT ALL

## 2018-01-24 NOTE — MR AVS SNAPSHOT
After Visit Summary   1/24/2018    Kahlil Caputo    MRN: [id number]           Patient Information     Date Of Birth          1945        Visit Information        Provider Department      1/24/2018 1:30 PM Haase, Susan Rachele, APRN CNP [facility]        Today's Diagnoses     Preop general physical exam    -  1      Care Instructions      Before Your Surgery      Call your surgeon if there is any change in your health. This includes signs of a cold or flu (such as a sore throat, runny nose, cough, rash or fever).    Do not smoke, drink alcohol or take over the counter medicine (unless your surgeon or primary care doctor tells you to) for the 24 hours before and after surgery.    If you take prescribed drugs: Follow your doctor s orders about which medicines to take and which to stop until after surgery.    Eating and drinking prior to surgery: follow the instructions from your surgeon    Take a shower or bath the night before surgery. Use the soap your surgeon gave you to gently clean your skin. If you do not have soap from your surgeon, use your regular soap. Do not shave or scrub the surgery site.  Wear clean pajamas and have clean sheets on your bed.           Follow-ups after your visit        Follow-up notes from your care team     Return if symptoms worsen or fail to improve.      Who to contact     If you have questions or need follow up information about today's clinic visit or your schedule please contact [facility] directly at [phone].  Normal or non-critical lab and imaging results will be communicated to you by MyChart, letter or phone within 4 business days after the clinic has received the results. If you do not hear from us within 7 days, please contact the clinic through MyChart or phone. If you have a critical or abnormal lab result, we will notify you by phone as soon as possible.  Submit refill requests through [employer] or call your  "pharmacy and they will forward the refill request to us. Please allow 3 business days for your refill to be completed.          Additional Information About Your Visit        MyChart Information     [employer] gives you secure access to your electronic health record. If you see a primary care provider, you can also send messages to your care team and make appointments. If you have questions, please call your primary care clinic.  If you do not have a primary care provider, please call [phone] and they will assist you.        Care EveryWhere ID     This is your Care EveryWhere ID. This could be used by other organizations to access your [facility] medical records  [id number]        Your Vitals Were     Pulse Temperature Respirations Height Pulse Oximetry BMI (Body Mass Index)    98 97.8  F (36.6  C) (Oral) 14 5' 6\" (1.676 m) 97% 25.82 kg/m2       Blood Pressure from Last 3 Encounters:   01/24/18 126/84   01/23/18 (!) 149/92   11/29/17 (!) 162/93    Weight from Last 3 Encounters:   01/24/18 160 lb (72.6 kg)   01/23/18 160 lb (72.6 kg)   11/09/17 163 lb (73.9 kg)              We Performed the Following     CBC with platelets differential     Comprehensive metabolic panel     EKG 12-lead complete w/read - Clinics        Primary Care Provider Office Phone # Fax #    Susan Rachele Haase, APRN [phone]       [address] [facility] [address]        Equal Access to Services     [facility] LULA : Hadii aad ku hadasho Soomaali, waaxda luqadaha, qaybta kaalmada adeegyada, waxay reina epstein adeami dave . So [facility] [phone].    ATENCIÓN: Si habla español, tiene a denson disposición servicios gratuitos de asistencia lingüística. Llame al [phone].    We comply with applicable federal civil rights laws and [place] laws. We do not discriminate on the basis of race, color, national origin, age, disability, sex, sexual orientation, or gender identity.            Thank you!     Thank you for " choosing [facility]  for your care. Our goal is always to provide you with excellent care. Hearing back from our patients is one way we can continue to improve our services. Please take a few minutes to complete the written survey that you may receive in the mail after your visit with us. Thank you!             Your Updated Medication List - Protect others around you: Learn how to safely use, store and throw away your medicines at www.disposemymeds.org.          This list is accurate as of 1/24/18  1:58 PM.  Always use your most recent med list.                   Brand Name Dispense Instructions for use Diagnosis    ACE/ARB/ARNI NOT PRESCRIBED (INTENTIONAL)      Please choose reason not prescribed, below    Coronary artery disease involving native coronary artery of native heart without angina pectoris       ascorbic acid 500 MG tablet    VITAMIN C     Take by mouth 2 times daily        ASPIRIN NOT PRESCRIBED    INTENTIONAL    0 each    Please choose reason not prescribed, below    Coronary artery disease involving native coronary artery of native heart without angina pectoris       BETA BLOCKER NOT PRESCRIBED (INTENTIONAL)      Beta Blocker not prescribed intentionally due to COPD    Coronary artery disease involving native coronary artery of native heart without angina pectoris       betaxolol 0.5 % ophthalmic solution    BETOPTIC     INSTILL 1 DROP INTO EACH EYE QD        calcium carbonate 1250 MG tablet    OS-THEA 500 mg [place]    100 tablet    Take 2 tablets by mouth 2 times daily    SOB (shortness of breath), Limb pain       CHLOR-TRIMETON 4 MG tablet   Generic drug:  chlorpheniramine      Take 4 mg by mouth every 6 hours as needed for allergies or rhinitis        furosemide 20 MG tablet    LASIX    60 tablet    Take one or two daily as needed for edam    Lower extremity edema       HYDROcodone-acetaminophen 5-325 MG per tablet    NORCO    12 tablet    Take 1-2 tablets by mouth every 6  hours as needed for moderate to severe pain    Multiple joint pain       levothyroxine 150 MCG tablet    SYNTHROID/LEVOTHROID    90 tablet    Take 1 tablet (150 mcg) by mouth every morning    Hypothyroidism, unspecified type       melatonin 3 MG tablet      Take 1-2 tablets (3-6 mg) by mouth nightly as needed for sleep (OTC)        MULTI-VITAMIN PO      Take by mouth daily        STATIN NOT PRESCRIBED (INTENTIONAL)     0 each    1 each See Admin Instructions Statin not prescribed intentionally due to Allergy to statin    Coronary artery disease involving native coronary artery of native heart without angina pectoris, Tobacco abuse       vitamin D 1000 UNITS capsule      Take 2 capsules by mouth daily        vitamin E 400 UNIT capsule      Take by mouth daily

## 2018-01-24 NOTE — PROGRESS NOTES
[facility]  [address] [address] [address]  [phone]  Dept: [phone]    PRE-OP EVALUATION:  Today's date: 2018    Kahlil Caputo (: 1945) presents for pre-operative evaluation assessment as requested by Dr. Melina Arriola.  She requires evaluation and anesthesia risk assessment prior to undergoing surgery/procedure for treatment of left wrist fracture .  Proposed procedure: L wrist fracture repair    Date of Surgery/ Procedure: 2018  Time of Surgery/ Procedure: 11 am  Hospital/Surgical Facility: [facility]  Fax number for surgical facility: [facility]  Fax: [phone]  Primary Physician: Haase, Susan Rachele  Type of Anesthesia Anticipated: General    Patient has a Health Care Directive or Living Will:  YES     1. NO - Do you have a history of heart attack, stroke, stent, bypass or surgery on an artery in the head, neck, heart or legs?  2. YES - DO YOU EVER HAVE ANY PAIN OR DISCOMFORT IN YOUR CHEST? Left rib pain from fall   3. NO - Do you have a history of  Heart Failure?  4. NO - Are you troubled by shortness of breath when: walking on the level, up a slight hill or at night?  5. NO - Do you currently have a cold, bronchitis or other respiratory infection?  6. NO - Do you have a cough, shortness of breath or wheezing?  7. NO - Do you sometimes get pains in the calves of your legs when you walk?  8. NO - Do you or anyone in your family have previous history of blood clots?  9. NO - Do you or does anyone in your family have a serious bleeding problem such as prolonged bleeding following surgeries or cuts?  10. YES - HAVE YOU EVER HAD PROBLEMS WITH ANEMIA OR BEEN TOLD TO TAKE IRON PILLS? Not currently   11. NO - Have you had any abnormal blood loss such as black, tarry or bloody stools, or abnormal vaginal bleeding?  12. YES - HAVE YOU EVER HAD A BLOOD TRANSFUSION? Yes, 1975 after child birth  13. NO - Have you or any of your  relatives ever had problems with anesthesia?  14. NO - Do you have sleep apnea, excessive snoring or daytime drowsiness?  15. NO - Do you have any prosthetic heart valves?  16. YES - DO YOU HAVE PROSTHETIC JOINTS? Left hip  17. NO - Is there any chance that you may be pregnant?    HPI:                                                      Brief HPI related to upcoming procedure: Kahlil Caputo is a 72 year old female who presents to the clinic for a pre - op for a left wrist fracture repair. She reports feeling well, denies URI symptoms. She's taking hydrocodone-acetaminophen 5-325 mg, up to 4 tablets a day, to manage wrist and rib pain. Rates pain 7-8/10.   Denies shortness of breath. Denies use of dentures, loss of consciousness/hitting head during fall.     HYPOTHYROIDISM - Patient has a longstanding history of hypothyroidism. Patient has been doing well, takes levothyroxine 150 mg. Last TSH value of 0.97.                                                                                                                                                                  MEDICAL HISTORY:                                                      Patient Active Problem List    Diagnosis Date Noted     Coronary artery disease involving native coronary artery of native heart without angina pectoris 11/16/2016     Priority: Medium     Significant coronary Ca++ on CT scans       ACP (advance care planning) 06/07/2016     Priority: Medium     Advance Care Planning 6/7/2016: ACP Facilitation Session:    Kahlil Caputo presented for ACP Facilitation session at a group session. She was accompanied by no one. [facility] Choices information provided and resources reviewed. She currently wishes to give additional consideration to ACP  She currently has the following questions or concerns about Advance Care Planning: none known.  Confirmed/documented legally designated decision maker(s).  Added by Margie Hewitt RN, Advance Care Planning  Liaison.         Pulmonary nodules 11/22/2015     Priority: Medium     11/18/2015:  Low dose CT, recommend follow up CT in 1 year.        Chronic pain 10/16/2015     Priority: Medium     Patient is followed by HAASE, SUSAN RACHELE for ongoing prescription of pain medication.  All refills should be approved by this provider, or covering partner.    Medication(s): Norco 5 mg/325 mg take 1 tablet every 4-6 hour prn pain  Maximum quantity per month: 30  Clinic visit frequency required: Q 6  months     Controlled substance agreement on file: Yes       Date(s):     Pain Clinic evaluation in the past: No    DIRE Total Score(s):  No flowsheet data found.    Last [facility] website verification: 9/8/16   [email or web address]       Family history of premature CAD 08/24/2015     Priority: Medium     Tricuspid regurgitation 08/20/2015     Priority: Medium     Palpitations 08/20/2015     Priority: Medium     PVCs on holter 08/2015       PVC's (premature ventricular contractions) 08/20/2015     Priority: Medium     Lumbago 06/09/2015     Priority: Medium     Ileitis (ileocecal resection 8/2013) 04/15/2014     Priority: Medium     Osteoarthritis of hip 11/29/2013     Priority: Medium     Do you wish to do the replacement in the background? yes         S/P Left total hip arthroplasty 11/25/13 11/25/2013     Priority: Medium     Stricture of small intestine 09/01/2013     Priority: Medium     Small bowel obstruction 08/28/2013     Priority: Medium     Findings at the time of surgery include a segment of active Crohns' Ileitis with stenosis and pill cam which became lodged in the narrowed area.       Hypothyroidism 07/09/2003     Priority: Medium     Problem list name updated by automated process. Provider to review       Chronic airway obstruction (H) 07/09/2003     Priority: Medium     Problem list name updated by automated process. Provider to review       Disorder of bone and cartilage 07/09/2003     Priority: Medium      Problem list name updated by automated process. Provider to review        Past Medical History:   Diagnosis Date     Chronic airway obstruction, not elsewhere classified      Chronic pain     hip and left shoulder     Coronary artery disease involving native coronary artery of native heart without angina pectoris 11/16/2016     Crohn's disease (H) 9/2013    with stricture in small intestine     Family history of premature CAD      Migraine, unspecified, without mention of intractable migraine without mention of status migrainosus      PVC's (premature ventricular contractions)      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     TOBACCO ABUSE-CONTINUOUS      Unspecified glaucoma(365.9)     removed as a diagnosis     Unspecified hypothyroidism      Past Surgical History:   Procedure Laterality Date     ARTHROPLASTY HIP  11/25/2013    Procedure: ARTHROPLASTY HIP;  Left Total Hip Arthroplasty  ;  Surgeon: Luis Marshall MD;  Location: [facility] OR     C NONSPECIFIC PROCEDURE  1970's    D& C X 2     C NONSPECIFIC PROCEDURE  1964    PILONIDAL CYSTECTOMY     C NONSPECIFIC PROCEDURE  1959    T&A     C NONSPECIFIC PROCEDURE  1991    BREAST BX & CERVICAL POLYP     HC COLONOSCOPY THRU STOMA, DIAGNOSTIC      2003     LAPAROTOMY EXPLORATORY  8/30/2013    Procedure: LAPAROTOMY EXPLORATORY;  LAPAROTOMY EXPLORATORY, Ileocecal Resection Resection, Removal of Retained Pill Cam;  Surgeon: Mitchell Fraser MD;  Location: [facility] OR     RESECTION ILEOCECAL  8/30/2013    Procedure: RESECTION ILEOCECAL;;  Surgeon: Mitchell Fraser MD;  Location: [facility] OR     Current Outpatient Prescriptions   Medication Sig Dispense Refill     betaxolol (BETOPTIC) 0.5 % ophthalmic solution INSTILL 1 DROP INTO EACH EYE QD  3     HYDROcodone-acetaminophen (NORCO) 5-325 MG per tablet Take 1-2 tablets by mouth every 6 hours as needed for moderate to severe pain 12 tablet 0     vitamin E 400 UNIT capsule Take by mouth daily       furosemide (LASIX) 20 MG tablet Take one  "or two daily as needed for edam 60 tablet 5     levothyroxine (SYNTHROID/LEVOTHROID) 150 MCG tablet Take 1 tablet (150 mcg) by mouth every morning 90 tablet 3     ASPIRIN NOT PRESCRIBED (INTENTIONAL) Please choose reason not prescribed, below 0 each 0     BETA BLOCKER NOT PRESCRIBED, INTENTIONAL, Beta Blocker not prescribed intentionally due to COPD  0     ACE/ARB NOT PRESCRIBED, INTENTIONAL, Please choose reason not prescribed, below       STATIN NOT PRESCRIBED, INTENTIONAL, 1 each See Admin Instructions Statin not prescribed intentionally due to Allergy to statin 0 each 0     ascorbic acid (VITAMIN C) 500 MG tablet Take by mouth 2 times daily       chlorpheniramine (CHLOR-TRIMETON) 4 MG tablet Take 4 mg by mouth every 6 hours as needed for allergies or rhinitis       Cholecalciferol (VITAMIN D) 1000 UNITS capsule Take 2 capsules by mouth daily        melatonin 3 MG tablet Take 1-2 tablets (3-6 mg) by mouth nightly as needed for sleep (OTC)       Multiple Vitamin (MULTI-VITAMIN PO) Take by mouth daily        calcium carbonate 500 MG tablet Take 2 tablets by mouth 2 times daily  100 tablet 3     OTC products: None, except as noted above    Allergies   Allergen Reactions     Latex Difficulty breathing     sensativity - cough, eyes water     Nsaids Shortness Of Breath and Other (See Comments)     bronchospams     Aspirin Difficulty breathing     tight cough     Codeine GI Disturbance     upset GI     Pravastatin      Myalgias     Simvastatin      Myalgias     Tramadol      Heart burn,  Felt \"clammy\", unable to pass gas, unable to urinate and had severe nausea      Latex Allergy: YES, difficulty breathing    Social History   Substance Use Topics     Smoking status: Former Smoker     Packs/day: 1.00     Years: 50.00     Types: Cigarettes     Quit date: 6/7/2010     Smokeless tobacco: Never Used      Comment: Quit June 7, 2010     Alcohol use 0.0 oz/week     0 Standard drinks or equivalent per week      Comment: 1 glass " "wine daily     History   Drug Use No       REVIEW OF SYSTEMS:                                                    C: NEGATIVE for fever, chills, change in weight  I: NEGATIVE for worrisome rashes, moles or lesions  E: NEGATIVE for vision changes or irritation  E/M: NEGATIVE for ear, mouth and throat problems  R: NEGATIVE for significant cough or SOB, left rib pain  CV: NEGATIVE for chest pain, palpitations or peripheral edema  GI: NEGATIVE for nausea, abdominal pain, heartburn, or change in bowel habits  : NEGATIVE for frequency, dysuria, or hematuria  M:Left wrist pain, see HPI   N: NEGATIVE for weakness, dizziness or paresthesias  E: NEGATIVE for temperature intolerance, skin/hair changes  H: NEGATIVE for bleeding problems  P: NEGATIVE for changes in mood or affect    This document serves as a record of the services and decisions personally performed and made by Susan Haase, CNP. It was created on her behalf by Helene Shen, a trained medical scribe. The creation of this document is based on the provider's statements to the medical scribe.  Helene Shen 1:31 PM January 24, 2018  EXAM:                                                    /84 (BP Location: Right arm, Patient Position: Chair, Cuff Size: Adult Regular)  Pulse 98  Temp 97.8  F (36.6  C) (Oral)  Resp 14  Ht 1.676 m (5' 6\")  Wt 72.6 kg (160 lb)  SpO2 97%  BMI 25.82 kg/m2  GENERAL APPEARANCE: healthy, alert and no distress  HENT: ear canals and TM's normal and nose and mouth without ulcers or lesions  NECK: no adenopathy, no asymmetry, masses, or scars and thyroid normal to palpation  RESP: lungs clear to auscultation - no rales, rhonchi or wheezes  CV: regular rates and rhythm, normal S1 S2, no S3 or S4 and no murmur, click or rub  MS:Left arm with aden powell splint in place.  Left ribs along midaxillary line with pain upon palpation.   SKIN: no suspicious lesions or rashes  NEURO: Normal strength and tone, sensory exam grossly normal, " mentation intact and speech normal, LUE:  CRT immediate, feels light touch, fingertips are cool.    PSYCH: mentation appears normal. and affect normal/bright  LYMPHATICS: No cervical, or supraclavicular nodes    DIAGNOSTICS:                                                    EKG: appears normal, NSR, Normal Sinus Rhythm, normal axis, normal intervals, no acute ST/T changes c/w ischemia, no LVH by voltage criteria, unchanged from previous tracings  HGB:  13.6    Recent Labs   Lab Test  11/09/17   1430  05/24/17   1431  12/15/16   1327   11/28/13   0545  11/27/13   0602   HGB   --   14.0  14.1   < >   --   9.3*   PLT   --   306  324   < >   --    --    INR   --    --    --    --   1.85*  1.54*   NA  135  136  138   < >   --    --    POTASSIUM  4.2  3.7  3.5   < >   --    --    CR  0.64  0.67  0.72   < >   --    --     < > = values in this interval not displayed.        IMPRESSION:                                                    Reason for surgery/procedure: Left wrist fracture repair   Diagnosis/reason for consult: Pre - op     The proposed surgical procedure is considered INTERMEDIATE risk.    REVISED CARDIAC RISK INDEX  The patient has the following serious cardiovascular risks for perioperative complications such as (MI, PE, VFib and 3  AV Block):  No serious cardiac risks  INTERPRETATION: 0 risks: Class I (very low risk - 0.4% complication rate)    The patient has the following additional risks for perioperative complications:  No identified additional risks    RECOMMENDATIONS:                                                    Kahlil was seen today for pre-op exam.    Diagnoses and all orders for this visit:    Preop general physical exam  -     CBC with platelets differential  -     Comprehensive metabolic panel  -     EKG 12-lead complete w/read - Clinics    Closed fracture of distal end of left radius with malunion, unspecified fracture morphology, subsequent encounter    Latex allergy:  Difficulty  breathing.      Approval given to proceed with proposed procedure, without further diagnostic evaluation  Is aware of NPO orders and need to refrain from taking NSAIDS, Aspirin prior to surgery.    Follow up as needed if symptoms get worse or do not improve.    The information in this document, created by the medical scribe for me, accurately reflects the services I personally performed and the decisions made by me. I have reviewed and approved this document for accuracy prior to leaving the patient care area.  January 24, 2018 1:40 PM  Signed Electronically by: Susan Haase, APRN CNP    Copy of this evaluation report is provided to requesting physician.    [facility] Preop Guidelines

## 2018-01-25 LAB
ALBUMIN SERPL-MCNC: 3.7 G/DL (ref 3.4–5)
ALP SERPL-CCNC: 105 U/L (ref 40–150)
ALT SERPL W P-5'-P-CCNC: 20 U/L (ref 0–50)
ANION GAP SERPL CALCULATED.3IONS-SCNC: 9 MMOL/L (ref 3–14)
AST SERPL W P-5'-P-CCNC: 22 U/L (ref 0–45)
BILIRUB SERPL-MCNC: 1 MG/DL (ref 0.2–1.3)
BUN SERPL-MCNC: 13 MG/DL (ref 7–30)
CALCIUM SERPL-MCNC: 8.5 MG/DL (ref 8.5–10.1)
CHLORIDE SERPL-SCNC: 96 MMOL/L (ref 94–109)
CO2 SERPL-SCNC: 24 MMOL/L (ref 20–32)
CREAT SERPL-MCNC: 0.58 MG/DL (ref 0.52–1.04)
GFR SERPL CREATININE-BSD FRML MDRD: >90 ML/MIN/1.7M2
GLUCOSE SERPL-MCNC: 101 MG/DL (ref 70–99)
POTASSIUM SERPL-SCNC: 3.8 MMOL/L (ref 3.4–5.3)
PROT SERPL-MCNC: 6.7 G/DL (ref 6.8–8.8)
SODIUM SERPL-SCNC: 129 MMOL/L (ref 133–144)

## 2018-01-25 ASSESSMENT — PATIENT HEALTH QUESTIONNAIRE - PHQ9: SUM OF ALL RESPONSES TO PHQ QUESTIONS 1-9: 0

## 2018-01-25 ASSESSMENT — ANXIETY QUESTIONNAIRES: GAD7 TOTAL SCORE: 0

## 2018-01-26 ENCOUNTER — TELEPHONE (OUTPATIENT)
Dept: FAMILY MEDICINE | Facility: CLINIC | Age: 73
End: 2018-01-26

## 2018-01-26 DIAGNOSIS — E87.1 HYPONATREMIA: Primary | ICD-10-CM

## 2018-01-26 NOTE — TELEPHONE ENCOUNTER
Informed Annice of sodium level of 129, has been drinking a lot of water recently, limits salt intake.  Also on lasix twice per day.  Discussed eating a can of soup/broth every day.  REturn in about 4 weeks for a lab only appt to have this checked again, an order has been placed. Had surgery today, doing well, pain adequately controlled.  Susan Haase, CNP

## 2018-01-29 DIAGNOSIS — M25.50 MULTIPLE JOINT PAIN: ICD-10-CM

## 2018-01-29 NOTE — TELEPHONE ENCOUNTER
Controlled Substance Refill Request for HYDROcodone-acetaminophen (NORCO) 5-325 MG per tablet  Problem List Complete:  No     PROVIDER TO CONSIDER COMPLETION OF PROBLEM LIST AND OVERVIEW/CONTROLLED SUBSTANCE AGREEMENT    Last Written Prescription Date:  01/23/2018  Last Fill Quantity: 12,   # refills: 0    Last Office Visit with [facility] primary care provider: 01/24/2018    Future Office visit:     Controlled substance agreement on file: No.     Processing:  Patient will  in clinic   checked in past 6 months?  No, route to DORA Yu

## 2018-01-30 RX ORDER — HYDROCODONE BITARTRATE AND ACETAMINOPHEN 5; 325 MG/1; MG/1
1-2 TABLET ORAL EVERY 6 HOURS PRN
Qty: 12 TABLET | Refills: 0 | Status: CANCELLED | OUTPATIENT
Start: 2018-01-30

## 2018-01-30 RX ORDER — HYDROCODONE BITARTRATE AND ACETAMINOPHEN 5; 325 MG/1; MG/1
1-2 TABLET ORAL EVERY 6 HOURS PRN
Qty: 90 TABLET | Refills: 0 | Status: SHIPPED | OUTPATIENT
Start: 2018-01-30 | End: 2018-02-07

## 2018-01-30 NOTE — TELEPHONE ENCOUNTER
Spoke to Viv about this and she said the prescription was placed up front and she talked to the pt.

## 2018-02-06 ENCOUNTER — TRANSFERRED RECORDS (OUTPATIENT)
Dept: HEALTH INFORMATION MANAGEMENT | Facility: CLINIC | Age: 73
End: 2018-02-06

## 2018-02-07 ENCOUNTER — OFFICE VISIT (OUTPATIENT)
Dept: FAMILY MEDICINE | Facility: CLINIC | Age: 73
End: 2018-02-07
Payer: COMMERCIAL

## 2018-02-07 VITALS
HEART RATE: 94 BPM | DIASTOLIC BLOOD PRESSURE: 84 MMHG | TEMPERATURE: 97.6 F | RESPIRATION RATE: 16 BRPM | SYSTOLIC BLOOD PRESSURE: 136 MMHG | BODY MASS INDEX: 25.82 KG/M2 | OXYGEN SATURATION: 98 % | WEIGHT: 160 LBS

## 2018-02-07 DIAGNOSIS — R07.81 RIB PAIN ON LEFT SIDE: ICD-10-CM

## 2018-02-07 DIAGNOSIS — S52.502D CLOSED FRACTURE OF DISTAL END OF LEFT RADIUS WITH ROUTINE HEALING, UNSPECIFIED FRACTURE MORPHOLOGY, SUBSEQUENT ENCOUNTER: Primary | ICD-10-CM

## 2018-02-07 DIAGNOSIS — R10.12 LUQ ABDOMINAL PAIN: ICD-10-CM

## 2018-02-07 PROCEDURE — 99214 OFFICE O/P EST MOD 30 MIN: CPT | Performed by: NURSE PRACTITIONER

## 2018-02-07 RX ORDER — HYDROCODONE BITARTRATE AND ACETAMINOPHEN 5; 325 MG/1; MG/1
1-2 TABLET ORAL EVERY 6 HOURS PRN
Qty: 90 TABLET | Refills: 0 | Status: SHIPPED | OUTPATIENT
Start: 2018-02-07 | End: 2018-02-23

## 2018-02-07 NOTE — MR AVS SNAPSHOT
After Visit Summary   2/7/2018    Kahlil Caputo    MRN: [id number]           Patient Information     Date Of Birth          1945        Visit Information        Provider Department      2/7/2018 2:15 PM Haase, Susan Rachele, APRN CNP [facility]        Today's Diagnoses     LUQ abdominal pain    -  1    Rib pain on left side        Multiple joint pain           Follow-ups after your visit        Follow-up notes from your care team     Return in about 2 weeks (around 2/21/2018).      Future tests that were ordered for you today     Open Future Orders        Priority Expected Expires Ordered    US Abdomen Limited Routine  2/7/2019 2/7/2018            Who to contact     If you have questions or need follow up information about today's clinic visit or your schedule please contact [facility] directly at [phone].  Normal or non-critical lab and imaging results will be communicated to you by Infinancial[employer]art, letter or phone within 4 business days after the clinic has received the results. If you do not hear from us within 7 days, please contact the clinic through Infinancialshart or phone. If you have a critical or abnormal lab result, we will notify you by phone as soon as possible.  Submit refill requests through go[employer] Fetch or call your pharmacy and they will forward the refill request to us. Please allow 3 business days for your refill to be completed.          Additional Information About Your Visit        MyChart Information     [employer]etch gives you secure access to your electronic health record. If you see a primary care provider, you can also send messages to your care team and make appointments. If you have questions, please call your primary care clinic.  If you do not have a primary care provider, please call [phone] and they will assist you.        Care EveryWhere ID     This is your Care EveryWhere ID. This could be used by other organizations to access your [place]  medical records  [id number]        Your Vitals Were     Pulse Temperature Respirations Pulse Oximetry BMI (Body Mass Index)       94 97.6  F (36.4  C) (Oral) 16 98% 25.82 kg/m2        Blood Pressure from Last 3 Encounters:   02/07/18 136/84   01/24/18 126/84   01/23/18 (!) 149/92    Weight from Last 3 Encounters:   02/07/18 160 lb (72.6 kg)   01/24/18 160 lb (72.6 kg)   01/23/18 160 lb (72.6 kg)                 Today's Medication Changes          These changes are accurate as of 2/7/18  2:49 PM.  If you have any questions, ask your nurse or doctor.               Start taking these medicines.        Dose/Directions    order for DME   Used for:  Rib pain on left side   Started by:  Haase, Susan Rachele, APRN CNP        Equipment being ordered: Rib belt   Quantity:  1 each   Refills:  0            Where to get your medicines      Some of these will need a paper prescription and others can be bought over the counter.  Ask your nurse if you have questions.     Bring a paper prescription for each of these medications     HYDROcodone-acetaminophen 5-325 MG per tablet    order for DME                Primary Care Provider Office Phone # Fax #    Susan Rachele Haase, APRN [phone] [address] [facility] [address]        Equal Access to Services     VIANEY COTTON AH: Hadii haley ku hadasho Soomaali, waaxda luqadaha, qaybta kaalmada adeegyada, sebastien liu. So [facility] [phone].    ATENCIÓN: Si habla español, tiene a denson disposición servicios gratuitos de asistencia lingüística. Llame al [phone].    We comply with applicable federal civil rights laws and [place] laws. We do not discriminate on the basis of race, color, national origin, age, disability, sex, sexual orientation, or gender identity.            Thank you!     Thank you for choosing [facility]  for your care. Our goal is always to provide you with excellent care. Hearing back from  our patients is one way we can continue to improve our services. Please take a few minutes to complete the written survey that you may receive in the mail after your visit with us. Thank you!             Your Updated Medication List - Protect others around you: Learn how to safely use, store and throw away your medicines at www.disposemymeds.org.          This list is accurate as of 2/7/18  2:49 PM.  Always use your most recent med list.                   Brand Name Dispense Instructions for use Diagnosis    ACE/ARB/ARNI NOT PRESCRIBED (INTENTIONAL)      Please choose reason not prescribed, below    Coronary artery disease involving native coronary artery of native heart without angina pectoris       ascorbic acid 500 MG tablet    VITAMIN C     Take by mouth 2 times daily        ASPIRIN NOT PRESCRIBED    INTENTIONAL    0 each    Please choose reason not prescribed, below    Coronary artery disease involving native coronary artery of native heart without angina pectoris       BETA BLOCKER NOT PRESCRIBED (INTENTIONAL)      Beta Blocker not prescribed intentionally due to COPD    Coronary artery disease involving native coronary artery of native heart without angina pectoris       betaxolol 0.5 % ophthalmic solution    BETOPTIC     INSTILL 1 DROP INTO EACH EYE QD        calcium carbonate 1250 MG tablet    OS-THEA 500 mg [place]. Ca    100 tablet    Take 2 tablets by mouth 2 times daily    SOB (shortness of breath), Limb pain       CHLOR-TRIMETON 4 MG tablet   Generic drug:  chlorpheniramine      Take 4 mg by mouth every 6 hours as needed for allergies or rhinitis        furosemide 20 MG tablet    LASIX    60 tablet    Take one or two daily as needed for edam    Lower extremity edema       HYDROcodone-acetaminophen 5-325 MG per tablet    NORCO    90 tablet    Take 1-2 tablets by mouth every 6 hours as needed for moderate to severe pain    Multiple joint pain       levothyroxine 150 MCG tablet    SYNTHROID/LEVOTHROID    90  tablet    Take 1 tablet (150 mcg) by mouth every morning    Hypothyroidism, unspecified type       melatonin 3 MG tablet      Take 1-2 tablets (3-6 mg) by mouth nightly as needed for sleep (OTC)        MULTI-VITAMIN PO      Take by mouth daily        order for DME     1 each    Equipment being ordered: Rib belt    Rib pain on left side       STATIN NOT PRESCRIBED (INTENTIONAL)     0 each    1 each See Admin Instructions Statin not prescribed intentionally due to Allergy to statin    Coronary artery disease involving native coronary artery of native heart without angina pectoris, Tobacco abuse       vitamin D 1000 UNITS capsule      Take 2 capsules by mouth daily        vitamin E 400 UNIT capsule      Take by mouth daily

## 2018-02-07 NOTE — PROGRESS NOTES
SUBJECTIVE:   Kahlil Caputo is a 72 year old female who presents to clinic today for the following health issues:    HPI  General Follow Up    Concern: rib injury  Problem started: follow up from 1/24/2018 visit   Progression of symptoms: some improvement    Patient fell 1/24, fracturing left wrist, hurting left ribs and left groin.   1. Left distal radius fracture:  Surgery on 1/23/2018,had post operative follow up appt on 2/6/2018, splint changed and sutures removed.  Has mild pain of wrist.    2.  Left rib pain:  Currently rates rib pain as 10/10. Pain is also located along left upper abdomen.  Taking 2 Norco 5-325 mg at a time, four doses or 8 tablets per day with adequate control of the pain. Has tried decreasing dosage of norco to 1 tablet with increase in pain.    3.  Left groin continues to hurt, pain is deep in the groin.  Using a cane to ambulate.  Going from sitting to standing exacerbates pain. Denies paresthesia or weakness of LLE.     Problem list and histories reviewed & adjusted, as indicated.  Additional history: as documented    Patient Active Problem List   Diagnosis     Hypothyroidism     Chronic airway obstruction (H)     Disorder of bone and cartilage     Small bowel obstruction     Stricture of small intestine     S/P Left total hip arthroplasty 11/25/13     Osteoarthritis of hip     Ileitis (ileocecal resection 8/2013)     Lumbago     Tricuspid regurgitation     Palpitations     PVC's (premature ventricular contractions)     Family history of premature CAD     Chronic pain     Pulmonary nodules     ACP (advance care planning)     Coronary artery disease involving native coronary artery of native heart without angina pectoris     Past Surgical History:   Procedure Laterality Date     ARTHROPLASTY HIP  11/25/2013    Procedure: ARTHROPLASTY HIP;  Left Total Hip Arthroplasty  ;  Surgeon: Luis Marshall MD;  Location: [facility] OR     C NONSPECIFIC PROCEDURE  1970's    D& C X 2     C  NONSPECIFIC PROCEDURE  1964    PILONIDAL CYSTECTOMY     C NONSPECIFIC PROCEDURE  1959    T&A     C NONSPECIFIC PROCEDURE  1991    BREAST BX & CERVICAL POLYP     HC COLONOSCOPY THRU STOMA, DIAGNOSTIC      2003     LAPAROTOMY EXPLORATORY  8/30/2013    Procedure: LAPAROTOMY EXPLORATORY;  LAPAROTOMY EXPLORATORY, Ileocecal Resection Resection, Removal of Retained Pill Cam;  Surgeon: Mitchell Fraser MD;  Location: [facility] OR     RESECTION ILEOCECAL  8/30/2013    Procedure: RESECTION ILEOCECAL;;  Surgeon: Mitchell Fraser MD;  Location: [facility] OR       Social History   Substance Use Topics     Smoking status: Former Smoker     Packs/day: 1.00     Years: 50.00     Types: Cigarettes     Quit date: 6/7/2010     Smokeless tobacco: Never Used      Comment: Quit June 7, 2010     Alcohol use 0.0 oz/week     0 Standard drinks or equivalent per week      Comment: 1 glass wine daily     Family History   Problem Relation Age of Onset     Alcohol/Drug Mother      HEART DISEASE Mother      cardiomyopathy     Alcohol/Drug Father      HEART DISEASE Father      CAD -  1st MI mid 50's     Myocardial Infarction Father      X4     OSTEOPOROSIS Maternal Grandmother      CANCER Maternal Grandfather      stomach or throat - martini drinker/pipe smoker     Myocardial Infarction Brother      Breast Cancer No family hx of      Cancer - colorectal No family hx of          Current Outpatient Prescriptions   Medication Sig Dispense Refill     HYDROcodone-acetaminophen (NORCO) 5-325 MG per tablet Take 1-2 tablets by mouth every 6 hours as needed for moderate to severe pain 90 tablet 0     betaxolol (BETOPTIC) 0.5 % ophthalmic solution INSTILL 1 DROP INTO EACH EYE QD  3     vitamin E 400 UNIT capsule Take by mouth daily       furosemide (LASIX) 20 MG tablet Take one or two daily as needed for edam 60 tablet 5     levothyroxine (SYNTHROID/LEVOTHROID) 150 MCG tablet Take 1 tablet (150 mcg) by mouth every morning 90 tablet 3     ASPIRIN NOT PRESCRIBED  "(INTENTIONAL) Please choose reason not prescribed, below 0 each 0     BETA BLOCKER NOT PRESCRIBED, INTENTIONAL, Beta Blocker not prescribed intentionally due to COPD  0     ACE/ARB NOT PRESCRIBED, INTENTIONAL, Please choose reason not prescribed, below       STATIN NOT PRESCRIBED, INTENTIONAL, 1 each See Admin Instructions Statin not prescribed intentionally due to Allergy to statin 0 each 0     ascorbic acid (VITAMIN C) 500 MG tablet Take by mouth 2 times daily       chlorpheniramine (CHLOR-TRIMETON) 4 MG tablet Take 4 mg by mouth every 6 hours as needed for allergies or rhinitis       Cholecalciferol (VITAMIN D) 1000 UNITS capsule Take 2 capsules by mouth daily        melatonin 3 MG tablet Take 1-2 tablets (3-6 mg) by mouth nightly as needed for sleep (OTC)       Multiple Vitamin (MULTI-VITAMIN PO) Take by mouth daily        calcium carbonate 500 MG tablet Take 2 tablets by mouth 2 times daily  100 tablet 3     Allergies   Allergen Reactions     Latex Difficulty breathing     sensativity - cough, eyes water     Nsaids Shortness Of Breath and Other (See Comments)     bronchospams     Aspirin Difficulty breathing     tight cough     Codeine GI Disturbance     upset GI     Pravastatin      Myalgias     Simvastatin      Myalgias     Tramadol      Heart burn,  Felt \"clammy\", unable to pass gas, unable to urinate and had severe nausea       Reviewed and updated as needed this visit by clinical staff       Reviewed and updated as needed this visit by Provider         ROS:  Constitutional, cardiovascular, pulmonary, GI, musculoskeletal, neuro, psych systems are negative, except as otherwise noted.    This document serves as a record of the services and decisions personally performed and made by Susan Haase, CNP. It was created on her behalf by Helene Shen, a trained medical scribe. The creation of this document is based on the provider's statements to the medical scribe.  Helene Shen 2:33 PM February 7, 2018  OBJECTIVE: "     /84 (BP Location: Right arm, Patient Position: Chair, Cuff Size: Adult Regular)  Pulse 94  Temp 97.6  F (36.4  C) (Oral)  Resp 16  Wt 72.6 kg (160 lb)  SpO2 98%  BMI 25.82 kg/m2  Body mass index is 25.82 kg/(m^2).  GENERAL: healthy, alert and no distress  RESP: lungs clear to auscultation - no rales, rhonchi or wheezes  CV: regular rate and rhythm, normal S1 S2, no S3 or S4, no murmur, click or rub, no peripheral edema   ABDOMEN: soft, LUQ tenderness,  no hepatosplenomegaly, no masses and bowel sounds normal  MS:Left arm with splint in place.  Left ribs along midaxillary line with pain upon palpation.   NEURO:  LUE:  CRT immediate, feels light touch, fingertips are warm    PSYCH: mentation appears normal, affect normal/bright  ASSESSMENT/PLAN:     Kahlil was seen today for recheck.    Diagnoses and all orders for this visit:    Closed fracture of distal end of left radius with routine healing, unspecified fracture morphology, subsequent encounter:  Healing well, followed by [facility], has been seen for post operative follow up with recheck in 2 weeks.     LUQ abdominal pain;  Pain began after her fall, due to continued pain will obtain US to check spleen.   -     US Abdomen Limited; Future    Rib pain on left side:  Has been 2 weeks since injury, taking norco (max of #10 or 50 mg per day, over the next 2 weeks will decrease to #8 or 40 mg per day). Will not drive while taking norco.  Has allergy to NSAID's, has tried gabapentin with side effect of dizziness, Encouraged to apply topical lidocaine gel as well as wear rib belt. Will follow up in 2 weeks if pain is still severe at that time will refer for intercostal block through pain clinic.    -     order for DME; Equipment being ordered: Rib belt  -     HYDROcodone-acetaminophen (NORCO) 5-325 MG per tablet; Take 1-2 tablets by mouth every 6 hours as needed for moderate to severe pain    Follow up in 2 weeks, sooner as needed.       The information in this  document, created by the medical scribe for me, accurately reflects the services I personally performed and the decisions made by me. I have reviewed and approved this document for accuracy prior to leaving the patient care area.  February 7, 2018 2:42 PM  Susan Haase, APRN [facility]

## 2018-02-12 ENCOUNTER — HOSPITAL ENCOUNTER (OUTPATIENT)
Dept: ULTRASOUND IMAGING | Facility: CLINIC | Age: 73
Discharge: HOME | End: 2018-02-12
Attending: NURSE PRACTITIONER | Admitting: NURSE PRACTITIONER
Payer: COMMERCIAL

## 2018-02-12 DIAGNOSIS — R10.12 LUQ ABDOMINAL PAIN: ICD-10-CM

## 2018-02-12 PROCEDURE — 76705 ECHO EXAM OF ABDOMEN: CPT

## 2018-02-13 NOTE — PROGRESS NOTES
Duy Guilolry,  The ultrasound of your upper abdoemn was normal, no issues with your spleen or kidney.  I hope that your pain is improving.  Sincerely,     Susan Haase, CNP

## 2018-02-23 ENCOUNTER — OFFICE VISIT (OUTPATIENT)
Dept: FAMILY MEDICINE | Facility: CLINIC | Age: 73
End: 2018-02-23
Payer: COMMERCIAL

## 2018-02-23 VITALS
WEIGHT: 160 LBS | OXYGEN SATURATION: 98 % | RESPIRATION RATE: 14 BRPM | TEMPERATURE: 97.7 F | HEART RATE: 102 BPM | BODY MASS INDEX: 25.82 KG/M2 | SYSTOLIC BLOOD PRESSURE: 110 MMHG | DIASTOLIC BLOOD PRESSURE: 70 MMHG

## 2018-02-23 DIAGNOSIS — R07.81 RIB PAIN ON LEFT SIDE: ICD-10-CM

## 2018-02-23 DIAGNOSIS — S52.502D CLOSED FRACTURE OF DISTAL END OF LEFT RADIUS WITH ROUTINE HEALING, UNSPECIFIED FRACTURE MORPHOLOGY, SUBSEQUENT ENCOUNTER: Primary | ICD-10-CM

## 2018-02-23 DIAGNOSIS — R10.32 LEFT GROIN PAIN: ICD-10-CM

## 2018-02-23 DIAGNOSIS — R60.0 LOWER EXTREMITY EDEMA: ICD-10-CM

## 2018-02-23 PROCEDURE — 99214 OFFICE O/P EST MOD 30 MIN: CPT | Performed by: NURSE PRACTITIONER

## 2018-02-23 RX ORDER — HYDROCODONE BITARTRATE AND ACETAMINOPHEN 5; 325 MG/1; MG/1
1-2 TABLET ORAL EVERY 6 HOURS PRN
Qty: 75 TABLET | Refills: 0 | Status: SHIPPED | OUTPATIENT
Start: 2018-02-23 | End: 2018-03-12

## 2018-02-23 RX ORDER — FUROSEMIDE 20 MG
TABLET ORAL
Qty: 180 TABLET | Refills: 1 | Status: SHIPPED | OUTPATIENT
Start: 2018-02-23 | End: 2018-09-05

## 2018-02-23 NOTE — PROGRESS NOTES
SUBJECTIVE:   Kahlil Caputo is a 72 year old female who presents to clinic today for the following health issues:    HPI  General Follow Up    Concern: fall  Problem started: follow up from 2/7/2018  Progression of symptoms: a little better  Description: L sided rib pain    Kahlil reports that her symptoms are better than they were 2 weeks ago. Her left arm is throbbing, but slowly improving. She denies numbness or tingling in her fingers. She rates the pain as 6-7/10 normally, and at it's worse it is 9/10. Her groin is also still similarly painful. She states that walking makes her groin pain worse. She hs been using heat on her ribs where she can reach with mild relief. She also tried a rib belt that made her rib pain worse. She is down to taking 5 Norco/day, and she has been taking 2-3, 500 mg Tylenol/day to supplement her pain management. She has a follow up with orthopedics 2/27/2018.      Problem list and histories reviewed & adjusted, as indicated.  Additional history: as documented    Reviewed and updated as needed this visit by clinical staff  Tobacco  Allergies  Meds  Med Hx  Surg Hx  Fam Hx  Soc Hx      Reviewed and updated as needed this visit by Provider       ROS:  Constitutional, cardiovascular, pulmonary, musculoskeletal, skin and psych systems are negative, except as otherwise noted.    This document serves as a record of the services and decisions personally performed and made by Susan Haase, CNP. It was created on her behalf by Jaylan Perez, a trained medical scribe. The creation of this document is based the provider's statements to the medical scribe.  Jaylan Perez February 23, 2018 11:01 AM      OBJECTIVE:   /70 (BP Location: Right arm, Patient Position: Chair, Cuff Size: Adult Regular)  Pulse 102  Temp 97.7  F (36.5  C) (Oral)  Resp 14  Wt 72.6 kg (160 lb)  SpO2 98%  BMI 25.82 kg/m2  Body mass index is 25.82 kg/(m^2).  GENERAL: healthy, alert and no distress  RESP: lungs  clear to auscultation - no rales, rhonchi or wheezes  CV: regular rate and rhythm, normal S1 S2, no S3 or S4, no murmur, click or rub, no peripheral edema   MS:Left arm with splint in place.  Left ribs along midaxillary line with pain upon palpation.   NEURO:  LUE:  CRT immediate, feels light touch, fingertips are warm    PSYCH: mentation appears normal, affect normal/bright    ASSESSMENT/PLAN:   Kahlil was seen today for recheck.    Diagnoses and all orders for this visit:    Closed fracture of distal end of left radius with routine healing, unspecified fracture morphology, subsequent encounter follow up with ortho on 2/27.    Rib pain on left side: pain improving, will decrease to 5 tablets per day (25 mg). Refilled for 15 days, will call for refill,plan to continue to taper down on dosage. Continue topical ointment, heat/ice.   -     HYDROcodone-acetaminophen (NORCO) 5-325 MG per tablet; Take 1-2 tablets by mouth every 6 hours as needed for moderate to severe pain Take max of 5 per day.    Left groin pain;  Improving.    Lower extremity edema;  Refill, extremity edema well controlled.  -     furosemide (LASIX) 20 MG tablet; Take 20-40 mg every day for edema    Follow up in 4 weeks for clinic visit, will call in 2 weeks for norco refill.   The information in this document, created by the medical scribe for me, accurately reflects the services I personally performed and the decisions made by me. I have reviewed and approved this document for accuracy.   Susan Haase, CNP Susan Haase, APRN CNP  [facility]

## 2018-02-23 NOTE — MR AVS SNAPSHOT
After Visit Summary   2/23/2018    Kahlil Caputo    MRN: [id number]           Patient Information     Date Of Birth          1945        Visit Information        Provider Department      2/23/2018 11:00 AM Haase, Susan Rachele, APRN CNP [facility]        Today's Diagnoses     Rib pain on left side    -  1    Lower extremity edema           Follow-ups after your visit        Follow-up notes from your care team     Return in about 2 weeks (around 3/9/2018).      Who to contact     If you have questions or need follow up information about today's clinic visit or your schedule please contact [facility] directly at [phone].  Normal or non-critical lab and imaging results will be communicated to you by MyChart, letter or phone within 4 business days after the clinic has received the results. If you do not hear from us within 7 days, please contact the clinic through [employer] or phone. If you have a critical or abnormal lab result, we will notify you by phone as soon as possible.  Submit refill requests through [employer] or call your pharmacy and they will forward the refill request to us. Please allow 3 business days for your refill to be completed.          Additional Information About Your Visit        MyChart Information     [employer] gives you secure access to your electronic health record. If you see a primary care provider, you can also send messages to your care team and make appointments. If you have questions, please call your primary care clinic.  If you do not have a primary care provider, please call [phone] and they will assist you.        Care EveryWhere ID     This is your Care EveryWhere ID. This could be used by other organizations to access your [place] medical records  [id number]        Your Vitals Were     Pulse Temperature Respirations Pulse Oximetry BMI (Body Mass Index)       102 97.7  F (36.5  C) (Oral) 14 98% 25.82 kg/m2        Blood  Pressure from Last 3 Encounters:   02/23/18 110/70   02/07/18 136/84   01/24/18 126/84    Weight from Last 3 Encounters:   02/23/18 160 lb (72.6 kg)   02/07/18 160 lb (72.6 kg)   01/24/18 160 lb (72.6 kg)              Today, you had the following     No orders found for display         Today's Medication Changes          These changes are accurate as of 2/23/18 11:42 AM.  If you have any questions, ask your nurse or doctor.               These medicines have changed or have updated prescriptions.        Dose/Directions    furosemide 20 MG tablet   Commonly known as:  LASIX   This may have changed:  additional instructions   Used for:  Lower extremity edema   Changed by:  Haase, Susan Rachele, APRN CNP        Take 20-40 mg every day for edema   Quantity:  180 tablet   Refills:  1       HYDROcodone-acetaminophen 5-325 MG per tablet   Commonly known as:  NORCO   This may have changed:  additional instructions   Used for:  Rib pain on left side   Changed by:  Haase, Susan Rachele, APRN CNP        Dose:  1-2 tablet   Take 1-2 tablets by mouth every 6 hours as needed for moderate to severe pain Take max of 5 per day.   Quantity:  75 tablet   Refills:  0            Where to get your medicines      These medications were sent to [id number] - [place], [place] - [address]  [address], [facility] [address]     Phone:  [phone]     furosemide 20 MG tablet         Some of these will need a paper prescription and others can be bought over the counter.  Ask your nurse if you have questions.     Bring a paper prescription for each of these medications     HYDROcodone-acetaminophen 5-325 MG per tablet                Primary Care Provider Office Phone # Fax #    Susan Rachele Haase, APRN -[phone] [address] [facility] [address]        Equal Access to Services     VIANEY COTTON AH: Trista Sarkar, chung frost, sebastien de leon  cristy mclaughlinvirgilionaun butts'aaadin ah. So [facility] [phone].    ATENCIÓN: Si mirian fritz, tiene a denson disposición servicios gratuitos de asistencia lingüística. Estee al [phone].    We comply with applicable federal civil rights laws and [place] laws. We do not discriminate on the basis of race, color, national origin, age, disability, sex, sexual orientation, or gender identity.            Thank you!     Thank you for choosing [facility]  for your care. Our goal is always to provide you with excellent care. Hearing back from our patients is one way we can continue to improve our services. Please take a few minutes to complete the written survey that you may receive in the mail after your visit with us. Thank you!             Your Updated Medication List - Protect others around you: Learn how to safely use, store and throw away your medicines at www.disposemymeds.org.          This list is accurate as of 2/23/18 11:42 AM.  Always use your most recent med list.                   Brand Name Dispense Instructions for use Diagnosis    ACE/ARB/ARNI NOT PRESCRIBED (INTENTIONAL)      Please choose reason not prescribed, below    Coronary artery disease involving native coronary artery of native heart without angina pectoris       ascorbic acid 500 MG tablet    VITAMIN C     Take by mouth 2 times daily        ASPIRIN NOT PRESCRIBED    INTENTIONAL    0 each    Please choose reason not prescribed, below    Coronary artery disease involving native coronary artery of native heart without angina pectoris       BETA BLOCKER NOT PRESCRIBED (INTENTIONAL)      Beta Blocker not prescribed intentionally due to COPD    Coronary artery disease involving native coronary artery of native heart without angina pectoris       betaxolol 0.5 % ophthalmic solution    BETOPTIC     INSTILL 1 DROP INTO EACH EYE QD        calcium carbonate 1250 MG tablet    OS-THEA 500 mg [place]. Ca    100 tablet    Take 2 tablets by mouth 2 times daily     SOB (shortness of breath), Limb pain       CHLOR-TRIMETON 4 MG tablet   Generic drug:  chlorpheniramine      Take 4 mg by mouth every 6 hours as needed for allergies or rhinitis        furosemide 20 MG tablet    LASIX    180 tablet    Take 20-40 mg every day for edema    Lower extremity edema       HYDROcodone-acetaminophen 5-325 MG per tablet    NORCO    75 tablet    Take 1-2 tablets by mouth every 6 hours as needed for moderate to severe pain Take max of 5 per day.    Rib pain on left side       levothyroxine 150 MCG tablet    SYNTHROID/LEVOTHROID    90 tablet    Take 1 tablet (150 mcg) by mouth every morning    Hypothyroidism, unspecified type       melatonin 3 MG tablet      Take 1-2 tablets (3-6 mg) by mouth nightly as needed for sleep (OTC)        MULTI-VITAMIN PO      Take by mouth daily        order for DME     1 each    Equipment being ordered: Rib belt    Rib pain on left side       STATIN NOT PRESCRIBED (INTENTIONAL)     0 each    1 each See Admin Instructions Statin not prescribed intentionally due to Allergy to statin    Coronary artery disease involving native coronary artery of native heart without angina pectoris, Tobacco abuse       vitamin D 1000 UNITS capsule      Take 2 capsules by mouth daily        vitamin E 400 UNIT capsule      Take by mouth daily

## 2018-02-27 ENCOUNTER — TRANSFERRED RECORDS (OUTPATIENT)
Dept: HEALTH INFORMATION MANAGEMENT | Facility: CLINIC | Age: 73
End: 2018-02-27

## 2018-03-11 ENCOUNTER — MYC MEDICAL ADVICE (OUTPATIENT)
Dept: FAMILY MEDICINE | Facility: CLINIC | Age: 73
End: 2018-03-11

## 2018-03-11 DIAGNOSIS — R07.81 RIB PAIN ON LEFT SIDE: ICD-10-CM

## 2018-03-12 RX ORDER — HYDROCODONE BITARTRATE AND ACETAMINOPHEN 5; 325 MG/1; MG/1
1-2 TABLET ORAL EVERY 6 HOURS PRN
Qty: 55 TABLET | Refills: 0 | Status: SHIPPED | OUTPATIENT
Start: 2018-03-12 | End: 2018-03-30

## 2018-03-30 ENCOUNTER — OFFICE VISIT (OUTPATIENT)
Dept: FAMILY MEDICINE | Facility: CLINIC | Age: 73
End: 2018-03-30
Payer: COMMERCIAL

## 2018-03-30 VITALS
OXYGEN SATURATION: 99 % | HEART RATE: 112 BPM | WEIGHT: 160 LBS | SYSTOLIC BLOOD PRESSURE: 130 MMHG | BODY MASS INDEX: 25.82 KG/M2 | DIASTOLIC BLOOD PRESSURE: 80 MMHG | TEMPERATURE: 98.1 F

## 2018-03-30 DIAGNOSIS — E87.1 HYPONATREMIA: ICD-10-CM

## 2018-03-30 DIAGNOSIS — S52.502D CLOSED FRACTURE OF DISTAL END OF LEFT RADIUS WITH ROUTINE HEALING, UNSPECIFIED FRACTURE MORPHOLOGY, SUBSEQUENT ENCOUNTER: Primary | ICD-10-CM

## 2018-03-30 DIAGNOSIS — G89.4 CHRONIC PAIN SYNDROME: ICD-10-CM

## 2018-03-30 DIAGNOSIS — R07.81 RIB PAIN ON LEFT SIDE: ICD-10-CM

## 2018-03-30 PROCEDURE — 36415 COLL VENOUS BLD VENIPUNCTURE: CPT | Performed by: NURSE PRACTITIONER

## 2018-03-30 PROCEDURE — 80048 BASIC METABOLIC PNL TOTAL CA: CPT | Performed by: NURSE PRACTITIONER

## 2018-03-30 PROCEDURE — 99214 OFFICE O/P EST MOD 30 MIN: CPT | Performed by: NURSE PRACTITIONER

## 2018-03-30 RX ORDER — HYDROCODONE BITARTRATE AND ACETAMINOPHEN 5; 325 MG/1; MG/1
1-2 TABLET ORAL DAILY PRN
Qty: 60 TABLET | Refills: 0 | Status: SHIPPED | OUTPATIENT
Start: 2018-03-30 | End: 2018-04-30

## 2018-03-30 NOTE — PROGRESS NOTES
"  SUBJECTIVE:   Kahlil Caputo is a 72 year old female who presents to clinic today for the following health issues:    Chronic Pain Follow-Up       Type / Location of Pain: left rib and groin and hand   Analgesia/pain control:       Recent changes:  improved      Overall control: Comfortably manageable  Activity level/function:      Daily activities:  Able to do light housework, cooking    Work:  Unable to work  Adverse effects:  No  Adherance    Taking medication as directed?  Yes    Participating in other treatments: no -   Risk Factors:    Sleep:  Fair    Mood/anxiety:  controlled    Recent family or social stressors:  none noted    Other aggravating factors: none  PHQ-9 SCORE 11/8/2016 1/31/2017 1/24/2018   Total Score 2 3 0     JOSELINE-7 SCORE 11/16/2015 11/8/2016 1/24/2018   Total Score 0 0 0     Encounter-Level CSA - 10/16/2015:          Controlled Substance Agreement - Scan on 10/19/2015  9:47 AM : CONTROLLED SUBSTANCE AGREEMENT 10-16-15 (below)                Amount of exercise or physical activity: None    Problems taking medications regularly: No    Medication side effects: none    MS: Kahlil reports that her left wrist is healing well. The swelling has gone down dramatically and she is recovering strength and ROM. She saw her hand therapist this morning. Her next appointment with her orthopedic surgeon is 4/10/2018.  She reports that her ribs are also doing much better, and she rates the pain as 4-5/10. She describes is as feeling \"like a band\" along her ribs. The pain does sometimes increase at night. She still takes two hydrocodone at night, and occasionally takes tylenol during the day.  She reports that she has not had groin pain recently.    Hyponatremia: at visit in 1/2018 sodium was 129.      Problem list and histories reviewed & adjusted, as indicated.  Additional history: as documented    Reviewed and updated as needed this visit by clinical staff  Tobacco  Allergies  Meds       Reviewed and " updated as needed this visit by Provider       ROS:  Constitutional, cardiovascular, pulmonary, musculoskeletal and psych systems are negative, except as otherwise noted.    This document serves as a record of the services and decisions personally performed and made by Susan Haase, CNP. It was created on her behalf by Jaylan Perez, a trained medical scribe. The creation of this document is based the provider's statements to the medical scribe.  Jaylan Perez March 30, 2018 1:42 PM      OBJECTIVE:   /80 (BP Location: Right arm, Patient Position: Chair, Cuff Size: Adult Regular)  Pulse 112  Temp 98.1  F (36.7  C) (Oral)  Wt 72.6 kg (160 lb)  SpO2 99%  BMI 25.82 kg/m2  Body mass index is 25.82 kg/(m^2).  GENERAL: healthy, alert and no distress  RESP: lungs clear to auscultation - no rales, rhonchi or wheezes  CV: regular rate and rhythm, normal S1 S2, no S3 or S4, no murmur, click or rub,   MS: left wrist in a compression dressing, increased pain with ulnar deviation, ulnar tenderness.  Midaxillary left rib area with tenderness. No gross musculoskeletal defects noted, no edema  PSYCH: mentation appears normal, affect normal/bright      ASSESSMENT/PLAN:   Kahlil was seen today for recheck medication.    Diagnoses and all orders for this visit:    Closed fracture of distal end of left radius with routine healing, unspecified fracture morphology, subsequent encounter: much improved, attending hand therapy.  Has follow up with ortho in 2 weeks.     Rib pain on left side: pain is improved, will decrease hydrocodone to 2 tablets at bedtime. Will take tylenol during the day,is aware or appropriate dosage.  -     HYDROcodone-acetaminophen (NORCO) 5-325 MG per tablet; Take 1-2 tablets by mouth daily as needed for moderate to severe pain Maximum of 2 tablets per day.    Hyponatremia  -     **Basic metabolic panel FUTURE 6mo    Follow up in 3 months, sooner as needed.   The information in this document, created by the  medical scribe for me, accurately reflects the services I personally performed and the decisions made by me. I have reviewed and approved this document for accuracy.   Susan Haase, CNP Susan Haase, APRN [facility]

## 2018-03-30 NOTE — MR AVS SNAPSHOT
After Visit Summary   3/30/2018    Kahlil Caputo    MRN: [id number]           Patient Information     Date Of Birth          1945        Visit Information        Provider Department      3/30/2018 1:30 PM Haase, Susan Rachele, APRN CNP [facility]        Today's Diagnoses     Closed fracture of distal end of left radius with routine healing, unspecified fracture morphology, subsequent encounter    -  1    Chronic pain syndrome        Rib pain on left side           Follow-ups after your visit        Follow-up notes from your care team     Return in about 3 months (around 6/30/2018).      Your next 10 appointments already scheduled     May 09, 2018  9:30 AM CDT   Return Visit with Darek Iglesias MD   [facility] ([facility])    [address]  [facility] [address] [phone]              Who to contact     If you have questions or need follow up information about today's clinic visit or your schedule please contact [facility] directly at [phone].  Normal or non-critical lab and imaging results will be communicated to you by MyChart, letter or phone within 4 business days after the clinic has received the results. If you do not hear from us within 7 days, please contact the clinic through [employer] or phone. If you have a critical or abnormal lab result, we will notify you by phone as soon as possible.  Submit refill requests through [facility] or call your pharmacy and they will forward the refill request to us. Please allow 3 business days for your refill to be completed.          Additional Information About Your Visit        MyChart Information     [facility] gives you secure access to your electronic health record. If you see a primary care provider, you can also send messages to your care team and make appointments. If you have questions, please call your primary care clinic.  If  you do not have a primary care provider, please call [phone] and they will assist you.        Care EveryWhere ID     This is your Care EveryWhere ID. This could be used by other organizations to access your [place] medical records  [id number]        Your Vitals Were     Pulse Temperature Pulse Oximetry BMI (Body Mass Index)          112 98.1  F (36.7  C) (Oral) 99% 25.82 kg/m2         Blood Pressure from Last 3 Encounters:   03/30/18 130/80   02/23/18 110/70   02/07/18 136/84    Weight from Last 3 Encounters:   03/30/18 160 lb (72.6 kg)   02/23/18 160 lb (72.6 kg)   02/07/18 160 lb (72.6 kg)              Today, you had the following     No orders found for display         Today's Medication Changes          These changes are accurate as of 3/30/18  2:03 PM.  If you have any questions, ask your nurse or doctor.               These medicines have changed or have updated prescriptions.        Dose/Directions    HYDROcodone-acetaminophen 5-325 MG per tablet   Commonly known as:  NORCO   This may have changed:    - when to take this  - additional instructions   Used for:  Rib pain on left side   Changed by:  Haase, Susan Rachele, APRN CNP        Dose:  1-2 tablet   Take 1-2 tablets by mouth daily as needed for moderate to severe pain Maximum of 2 tablets per day.   Quantity:  60 tablet   Refills:  0            Where to get your medicines      Some of these will need a paper prescription and others can be bought over the counter.  Ask your nurse if you have questions.     Bring a paper prescription for each of these medications     HYDROcodone-acetaminophen 5-325 MG per tablet                Primary Care Provider Office Phone # Fax #    Susan Rachele Haase, APRN -[phone] [address] [facility] [address]        Equal Access to Services     VIANEY COTTON AH: Trista Sarkar, chung frost, charmaine kaalallen zhao, sebsatien liu. So [facility]  [phone].    ATENCIÓN: Si mirian fritz, tiene a denson disposición servicios gratuitos de asistencia lingüística. Estee nieto [phone].    We comply with applicable federal civil rights laws and [place] laws. We do not discriminate on the basis of race, color, national origin, age, disability, sex, sexual orientation, or gender identity.            Thank you!     Thank you for choosing [facility]  for your care. Our goal is always to provide you with excellent care. Hearing back from our patients is one way we can continue to improve our services. Please take a few minutes to complete the written survey that you may receive in the mail after your visit with us. Thank you!             Your Updated Medication List - Protect others around you: Learn how to safely use, store and throw away your medicines at www.disposemymeds.org.          This list is accurate as of 3/30/18  2:03 PM.  Always use your most recent med list.                   Brand Name Dispense Instructions for use Diagnosis    ACE/ARB/ARNI NOT PRESCRIBED (INTENTIONAL)      Please choose reason not prescribed, below    Coronary artery disease involving native coronary artery of native heart without angina pectoris       ascorbic acid 500 MG tablet    VITAMIN C     Take by mouth 2 times daily        ASPIRIN NOT PRESCRIBED    INTENTIONAL    0 each    Please choose reason not prescribed, below    Coronary artery disease involving native coronary artery of native heart without angina pectoris       BETA BLOCKER NOT PRESCRIBED (INTENTIONAL)      Beta Blocker not prescribed intentionally due to COPD    Coronary artery disease involving native coronary artery of native heart without angina pectoris       betaxolol 0.5 % ophthalmic solution    BETOPTIC     INSTILL 1 DROP INTO EACH EYE QD        calcium carbonate 1250 MG tablet    OS-THEA 500 mg [place]. Ca    100 tablet    Take 2 tablets by mouth 2 times daily    SOB (shortness of breath), Limb  pain       CHLOR-TRIMETON 4 MG tablet   Generic drug:  chlorpheniramine      Take 4 mg by mouth every 6 hours as needed for allergies or rhinitis        furosemide 20 MG tablet    LASIX    180 tablet    Take 20-40 mg every day for edema    Lower extremity edema       HYDROcodone-acetaminophen 5-325 MG per tablet    NORCO    60 tablet    Take 1-2 tablets by mouth daily as needed for moderate to severe pain Maximum of 2 tablets per day.    Rib pain on left side       levothyroxine 150 MCG tablet    SYNTHROID/LEVOTHROID    90 tablet    Take 1 tablet (150 mcg) by mouth every morning    Hypothyroidism, unspecified type       melatonin 3 MG tablet      Take 1-2 tablets (3-6 mg) by mouth nightly as needed for sleep (OTC)        MULTI-VITAMIN PO      Take by mouth daily        order for DME     1 each    Equipment being ordered: Rib belt    Rib pain on left side       STATIN NOT PRESCRIBED (INTENTIONAL)     0 each    1 each See Admin Instructions Statin not prescribed intentionally due to Allergy to statin    Coronary artery disease involving native coronary artery of native heart without angina pectoris, Tobacco abuse       vitamin D 1000 UNITS capsule      Take 2 capsules by mouth daily        vitamin E 400 UNIT capsule      Take by mouth daily

## 2018-03-31 LAB
ANION GAP SERPL CALCULATED.3IONS-SCNC: 8 MMOL/L (ref 3–14)
BUN SERPL-MCNC: 15 MG/DL (ref 7–30)
CALCIUM SERPL-MCNC: 8.8 MG/DL (ref 8.5–10.1)
CHLORIDE SERPL-SCNC: 103 MMOL/L (ref 94–109)
CO2 SERPL-SCNC: 27 MMOL/L (ref 20–32)
CREAT SERPL-MCNC: 0.62 MG/DL (ref 0.52–1.04)
GFR SERPL CREATININE-BSD FRML MDRD: >90 ML/MIN/1.7M2
GLUCOSE SERPL-MCNC: 94 MG/DL (ref 70–99)
POTASSIUM SERPL-SCNC: 3.8 MMOL/L (ref 3.4–5.3)
SODIUM SERPL-SCNC: 138 MMOL/L (ref 133–144)

## 2018-04-30 ENCOUNTER — TELEPHONE (OUTPATIENT)
Dept: FAMILY MEDICINE | Facility: CLINIC | Age: 73
End: 2018-04-30

## 2018-04-30 DIAGNOSIS — M89.9 DISORDER OF BONE AND CARTILAGE: Primary | ICD-10-CM

## 2018-04-30 DIAGNOSIS — M94.9 DISORDER OF BONE AND CARTILAGE: Primary | ICD-10-CM

## 2018-04-30 DIAGNOSIS — R07.81 RIB PAIN ON LEFT SIDE: ICD-10-CM

## 2018-04-30 DIAGNOSIS — M85.80 OSTEOPENIA: ICD-10-CM

## 2018-04-30 NOTE — TELEPHONE ENCOUNTER
Controlled Substance Refill Request for HYDROcodone-acetaminophen (NORCO) 5-325 MG per tablet  Last Written Prescription Date:  3/30/18  Last Fill Quantity: 60,  # refills: 0   Last office visit: 3/30/2018 with prescribing provider:  Haase - Pt reports that she is still needing 2 at night  Future Office Visit:   Next 5 appointments (look out 90 days)     May 09, 2018  9:30 AM CDT   Return Visit with Darek Iglesias MD   [facility] ([facility])    [address]  [facility] [address] [phone]              Call pt when ready for  at [phone] OK to [facility] with details       Problem List Complete:  Yes   checked in past 6 months?  No, route to RN   ---------------------------------------------------------------------------------------------------------------------------------      Noted:  10/16/2015       Priority:  Medium      Overview Addendum 9/8/2016 12:08 PM by Reina Pena RN     Patient is followed by HAASE, SUSAN RACHELE for ongoing prescription of pain medication.  All refills should be approved by this provider, or covering partner.     Medication(s): Norco 5 mg/325 mg take 1 tablet every 4-6 hour prn pain  Maximum quantity per month: 30  Clinic visit frequency required: Q 6  months      Controlled substance agreement on file: Yes       Date(s):      Pain Clinic evaluation in the past: No     DIRE Total Score(s):  No flowsheet data found.     Last [facility] website verification: 9/8/16     Gabe Jesus   04/30/18 3:08 PM

## 2018-04-30 NOTE — TELEPHONE ENCOUNTER
Reason for call:  Reason for Call: Request for an order or referral:    Order or referral being requested: ORDER DEXA    Date needed: at your convenience    Has the patient been seen by the PCP for this problem? YES    Additional comments:     Phone number Patient can be reached at:  Home number on file [phone] (home)    Best Time:      Can we leave a detailed message on this number?  Not Applicable    Call taken on 4/30/2018 at 3:05 PM by Nino Epps

## 2018-04-30 NOTE — TELEPHONE ENCOUNTER
Routed to , approve order, route to  for scheduling, last dex 4/2015, added osteopenia to problem list    IMPRESSION  Osteopenia (low bone mass)  Degenerative changes of the spine  Recommendations include ensuring adequate daily Calcium and Vitamin D intake  Follow up scan can be considered in three years.    Reina Pena RN, BSN  Message handled by Nurse Triage.

## 2018-05-01 RX ORDER — HYDROCODONE BITARTRATE AND ACETAMINOPHEN 5; 325 MG/1; MG/1
1-2 TABLET ORAL DAILY PRN
Qty: 60 TABLET | Refills: 0 | Status: SHIPPED | OUTPATIENT
Start: 2018-05-01 | End: 2018-05-31

## 2018-05-01 NOTE — TELEPHONE ENCOUNTER
[place] reviewed:  reviewed- no concerns    [place]:  [email or web address]

## 2018-05-09 ENCOUNTER — OFFICE VISIT (OUTPATIENT)
Dept: CARDIOLOGY | Facility: CLINIC | Age: 73
End: 2018-05-09
Payer: COMMERCIAL

## 2018-05-09 VITALS
SYSTOLIC BLOOD PRESSURE: 132 MMHG | BODY MASS INDEX: 25.44 KG/M2 | HEART RATE: 88 BPM | DIASTOLIC BLOOD PRESSURE: 78 MMHG | WEIGHT: 158.3 LBS | HEIGHT: 66 IN

## 2018-05-09 DIAGNOSIS — T39.015A ASPIRIN SENSITIVE ASTHMA: ICD-10-CM

## 2018-05-09 DIAGNOSIS — I25.10 CORONARY ARTERY DISEASE INVOLVING NATIVE CORONARY ARTERY OF NATIVE HEART WITHOUT ANGINA PECTORIS: Primary | ICD-10-CM

## 2018-05-09 DIAGNOSIS — I73.9 PAD (PERIPHERAL ARTERY DISEASE) (H): ICD-10-CM

## 2018-05-09 DIAGNOSIS — I49.3 PVC'S (PREMATURE VENTRICULAR CONTRACTIONS): ICD-10-CM

## 2018-05-09 DIAGNOSIS — J45.998 ASPIRIN SENSITIVE ASTHMA: ICD-10-CM

## 2018-05-09 DIAGNOSIS — Z78.9 STATIN INTOLERANCE: ICD-10-CM

## 2018-05-09 PROCEDURE — 99214 OFFICE O/P EST MOD 30 MIN: CPT | Performed by: INTERNAL MEDICINE

## 2018-05-09 RX ORDER — CLOPIDOGREL BISULFATE 75 MG/1
75 TABLET ORAL DAILY
Qty: 30 TABLET | Refills: 11 | Status: SHIPPED | OUTPATIENT
Start: 2018-05-09 | End: 2018-08-03

## 2018-05-09 RX ORDER — ACETAMINOPHEN 500 MG
500-1000 TABLET ORAL PRN
COMMUNITY

## 2018-05-09 NOTE — MR AVS SNAPSHOT
After Visit Summary   5/9/2018    Kahlil Caputo    MRN: [id number]           Patient Information     Date Of Birth          1945        Visit Information        Provider Department      5/9/2018 9:30 AM Darek Iglesias MD [facility]        Today's Diagnoses     Coronary artery disease involving native coronary artery of native heart without angina pectoris    -  1    PVC's (premature ventricular contractions)        Statin intolerance        PAD (peripheral artery disease) (H)        Aspirin sensitive asthma           Follow-ups after your visit        Your next 10 appointments already scheduled     May 24, 2018  5:00 PM CDT   DX HIP/PELVIS/SPINE with RIDX1   [facility] ([facility])    [address]  [address]  [facility] [address]              Please do not take any of the following 24 hours prior to the day of your exam: vitamins, calcium tablets, antacids.  If possible, please wear clothes without metal (snaps, zippers). A sweatsuit works well.              Who to contact     If you have questions or need follow up information about today's clinic visit or your schedule please contact [facility] directly at [phone].  Normal or non-critical lab and imaging results will be communicated to you by MyChart, letter or phone within 4 business days after the clinic has received the results. If you do not hear from us within 7 days, please contact the clinic through [employer] or phone. If you have a critical or abnormal lab result, we will notify you by phone as soon as possible.  Submit refill requests through [employer] or call your pharmacy and they will forward the refill request to us. Please allow 3 business days for your refill to be completed.          Additional Information About Your Visit        MyChart Information     [employer] gives you secure  "access to your electronic health record. If you see a primary care provider, you can also send messages to your care team and make appointments. If you have questions, please call your primary care clinic.  If you do not have a primary care provider, please call [phone] and they will assist you.        Care EveryWhere ID     This is your Care EveryWhere ID. This could be used by other organizations to access your [place] medical records  [id number]        Your Vitals Were     Pulse Height BMI (Body Mass Index)             88 1.676 m (5' 6\") 25.55 kg/m2          Blood Pressure from Last 3 Encounters:   05/09/18 132/78   03/30/18 130/80   02/23/18 110/70    Weight from Last 3 Encounters:   05/09/18 71.8 kg (158 lb 4.8 oz)   03/30/18 72.6 kg (160 lb)   02/23/18 72.6 kg (160 lb)              Today, you had the following     No orders found for display         Today's Medication Changes          These changes are accurate as of 5/9/18 10:05 AM.  If you have any questions, ask your nurse or doctor.               Start taking these medicines.        Dose/Directions    clopidogrel 75 MG tablet   Commonly known as:  PLAVIX   Used for:  Coronary artery disease involving native coronary artery of native heart without angina pectoris, PAD (peripheral artery disease) (H)   Started by:  Darek Iglesias MD        Dose:  75 mg   Take 1 tablet (75 mg) by mouth daily   Quantity:  30 tablet   Refills:  11         These medicines have changed or have updated prescriptions.        Dose/Directions    furosemide 20 MG tablet   Commonly known as:  LASIX   This may have changed:    - how much to take  - when to take this  - additional instructions   Used for:  Lower extremity edema        Take 20-40 mg every day for edema   Quantity:  180 tablet   Refills:  1            Where to get your medicines      These medications were sent to [id number] - [place], [place] - [address]  [address] " [place] [address]     Phone:  [phone]     clopidogrel 75 MG tablet                Primary Care Provider Office Phone # Fax #    Susan Rachele Haase, APRN [phone] [address]  [facility] [address]        Equal Access to Services     VIANEY COTTON : Hadii aad ku hadasho Soomaali, waaxda luqadaha, qaybta kaalmada adeegyada, waxay idiin hayaan adeeg kharash la'aan . So [facility] [phone].    ATENCIÓN: Si habla español, tiene a denson disposición servicios gratuitos de asistencia lingüística. Aristidesame al [phone].    We comply with applicable federal civil rights laws and [place] laws. We do not discriminate on the basis of race, color, national origin, age, disability, sex, sexual orientation, or gender identity.            Thank you!     Thank you for choosing [facility]  for your care. Our goal is always to provide you with excellent care. Hearing back from our patients is one way we can continue to improve our services. Please take a few minutes to complete the written survey that you may receive in the mail after your visit with us. Thank you!             Your Updated Medication List - Protect others around you: Learn how to safely use, store and throw away your medicines at www.disposemymeds.org.          This list is accurate as of 5/9/18 10:05 AM.  Always use your most recent med list.                   Brand Name Dispense Instructions for use Diagnosis    ACE/ARB/ARNI NOT PRESCRIBED (INTENTIONAL)      Please choose reason not prescribed, below    Coronary artery disease involving native coronary artery of native heart without angina pectoris       ascorbic acid 500 MG tablet    VITAMIN C     Take by mouth 2 times daily        ASPIRIN NOT PRESCRIBED    INTENTIONAL    0 each    Please choose reason not prescribed, below    Coronary artery disease involving native coronary artery of native heart without angina pectoris       BETA BLOCKER NOT  PRESCRIBED (INTENTIONAL)      Beta Blocker not prescribed intentionally due to COPD    Coronary artery disease involving native coronary artery of native heart without angina pectoris       betaxolol 0.5 % ophthalmic solution    BETOPTIC     INSTILL 1 DROP INTO EACH EYE QD        calcium carbonate 500 tablet    OS-THEA 500 mg [place]. Ca    100 tablet    Take 2 tablets by mouth 2 times daily    SOB (shortness of breath), Limb pain       CHLOR-TRIMETON 4 MG tablet   Generic drug:  chlorpheniramine      Take 4 mg by mouth every 6 hours as needed for allergies or rhinitis        clopidogrel 75 MG tablet    PLAVIX    30 tablet    Take 1 tablet (75 mg) by mouth daily    Coronary artery disease involving native coronary artery of native heart without angina pectoris, PAD (peripheral artery disease) (H)       furosemide 20 MG tablet    LASIX    180 tablet    Take 20-40 mg every day for edema    Lower extremity edema       HYDROcodone-acetaminophen 5-325 MG per tablet    NORCO    60 tablet    Take 1-2 tablets by mouth daily as needed for moderate to severe pain Maximum of 2 tablets per day.    Rib pain on left side       levothyroxine 150 MCG tablet    SYNTHROID/LEVOTHROID    90 tablet    Take 1 tablet (150 mcg) by mouth every morning    Hypothyroidism, unspecified type       melatonin 3 MG tablet      Take 1-2 tablets (3-6 mg) by mouth nightly as needed for sleep (OTC)        MULTI-VITAMIN PO      Take by mouth daily        STATIN NOT PRESCRIBED (INTENTIONAL)     0 each    1 each See Admin Instructions Statin not prescribed intentionally due to Allergy to statin    Coronary artery disease involving native coronary artery of native heart without angina pectoris, Tobacco abuse       TYLENOL 500 MG tablet   Generic drug:  acetaminophen      Take 500-1,000 mg by mouth as needed for mild pain        vitamin D 1000 units capsule      Take 2 capsules by mouth daily        vitamin E 400 UNIT capsule      Take by mouth daily

## 2018-05-09 NOTE — LETTER
5/9/2018    Veroan Haase, APRN CNP  [address]  [facility] [address]    RE: Kahlil Caputo       Dear Colleague,    I had the pleasure of seeing Kahlil Grantjermaine in the [facility].    HPI and Plan:   This 72 year-old woman seen in follow-up of her history of symptomatic PVCs, coronary artery disease demonstrated by significant coronary calcification, and peripheral vascular disease.  There is also marked tobacco history, family history of coronary disease, significant COPD and history of wheezing induced by aspirin, and significant statin intolerance.  Previous evaluation suggested these were benign PVCs.  Ejection fraction was normal.  A stress study done in 2015 because of her risks factors was also normal.  She states she's otherwise been stable this last 12 months.     She did slip on the ice, fall, and break her wrist and several ribs.  She required internal fixation of her left radius.  She is now finally recovered from this and started back exercising.  Her stamina took a hit but she states it is steadily improving now that she is back to exercising.  [facility] and does not notice any palpitations during that, dizziness, syncope or near-syncope, or sustained rapid or irregular heart rate, unusual dyspnea on exertion, any chest or neck or jaw discomfort, or unusual fatigue or decline in stamina.  She is without orthopnea, edema, focal neurologic symptoms, claudication.  She continues to have periodic bouts of nonsustained palpitations, mostly when she is at rest at night.  She does not notice them when she is up exercising or moving around.   She has successfully lost significant weight intentionally this year.  She had terrible myalgias to low dose simvastatin and pravastatin and will not consider statins further.  She notes she has a history of wheezing induced by aspirin.      In 2015 a Holter monitor and had around 25,000 PVCs in 48 hours.  She had  no ventricular tachycardia.  These were unifocal.  There were not rate related.   She is very nervous because her mother  suddenly in her 60s, her father had premature coronary disease and multiple MIs, and a brother  suddenly at age 72. She has a 50-pack-year tobacco history but quit in . There is no history of diabetes or hypertension.   She has some COPD and some chronic mild dyspnea.       Physical exam  -Vital signs stable.  Blood pressure 1 132/78.  Weight  158 pounds, BMI 25.6.    -Clear lungs without increased respiratory effort-distant breath sounds  -Regular rhythm.     No murmur rub gallop heave or JVD  -Abdomen soft, nontender, no bruits.  No grossly enlarged abdominal aorta.  No organomegaly  -No cyanosis, pallor, icterus.  No trauma.  -Carotid upstrokes normal without bruit.  No neck masses  -Skin grossly clear  -Extremities are without edema, cyanosis, erythema, deformities  -Femoral pulses are decreased but somewhat deep but 1+-2+ without bruit.  Pedal pulses are 1-2+.    In 2016 an abdominal aortic ultrasound did not show any evidence of aneurysm.  However did show diffuse abdominal aortic atherosclerosis.  There is also evidence of increased velocities in the right internal iliac system system with further PAD.  As above she is having no claudication.     Impression/plan  1-frequent ventricular ectopy, current evidence consistent with benign PVCs.  No evidence of PVC-induced cardiomyopathy.  I have recommended continued observation.     2-CAD as documented by significant coronary calcification.  Recent normal stress study.  She states she cannot afford ezetimibe.  She does not want to consider PCSK9 agents and it would be likely difficult to get those approved for primary prevention.     3-peripheral vascular disease.     No abdominal aortic aneurysm.  But  at least moderate internal iliac disease on the right.  No claudication.    4-intolerant to statins.    5-high risk side effect of  aspirin.  Therefore I am going to start her on Plavix 75 mg a day given her coronary and peripheral vascular disease as a approach to further prevention.    Since she is stable she requested as needed follow-up and I am okay with that.       No orders of the defined types were placed in this encounter.      Orders Placed This Encounter   Medications     acetaminophen (TYLENOL) 500 MG tablet     Sig: Take 500-1,000 mg by mouth as needed for mild pain     clopidogrel (PLAVIX) 75 MG tablet     Sig: Take 1 tablet (75 mg) by mouth daily     Dispense:  30 tablet     Refill:  11       Medications Discontinued During This Encounter   Medication Reason     order for DME Stopped by Patient         Encounter Diagnoses   Name Primary?     Coronary artery disease involving native coronary artery of native heart without angina pectoris Yes     PVC's (premature ventricular contractions)      Statin intolerance      PAD (peripheral artery disease) (H)      Aspirin sensitive asthma        CURRENT MEDICATIONS:  Current Outpatient Prescriptions   Medication Sig Dispense Refill     acetaminophen (TYLENOL) 500 MG tablet Take 500-1,000 mg by mouth as needed for mild pain       ascorbic acid (VITAMIN C) 500 MG tablet Take by mouth 2 times daily       betaxolol (BETOPTIC) 0.5 % ophthalmic solution INSTILL 1 DROP INTO EACH EYE QD  3     calcium carbonate 500 MG tablet Take 2 tablets by mouth 2 times daily  100 tablet 3     chlorpheniramine (CHLOR-TRIMETON) 4 MG tablet Take 4 mg by mouth every 6 hours as needed for allergies or rhinitis       Cholecalciferol (VITAMIN D) 1000 UNITS capsule Take 2 capsules by mouth daily        clopidogrel (PLAVIX) 75 MG tablet Take 1 tablet (75 mg) by mouth daily 30 tablet 11     furosemide (LASIX) 20 MG tablet Take 20-40 mg every day for edema (Patient taking differently: 20 mg 2 times daily Take 20-40 mg every day for edema) 180 tablet 1     HYDROcodone-acetaminophen (NORCO) 5-325 MG per tablet Take 1-2  "tablets by mouth daily as needed for moderate to severe pain Maximum of 2 tablets per day. 60 tablet 0     levothyroxine (SYNTHROID/LEVOTHROID) 150 MCG tablet Take 1 tablet (150 mcg) by mouth every morning 90 tablet 3     melatonin 3 MG tablet Take 1-2 tablets (3-6 mg) by mouth nightly as needed for sleep (OTC)       Multiple Vitamin (MULTI-VITAMIN PO) Take by mouth daily        vitamin E 400 UNIT capsule Take by mouth daily       ACE/ARB NOT PRESCRIBED, INTENTIONAL, Please choose reason not prescribed, below (Patient not taking: Reported on 5/9/2018)       ASPIRIN NOT PRESCRIBED (INTENTIONAL) Please choose reason not prescribed, below 0 each 0     BETA BLOCKER NOT PRESCRIBED, INTENTIONAL, Beta Blocker not prescribed intentionally due to COPD (Patient not taking: Reported on 5/9/2018)  0     STATIN NOT PRESCRIBED, INTENTIONAL, 1 each See Admin Instructions Statin not prescribed intentionally due to Allergy to statin (Patient not taking: Reported on 5/9/2018) 0 each 0       ALLERGIES     Allergies   Allergen Reactions     Latex Difficulty breathing     sensativity - cough, eyes water     Nsaids Shortness Of Breath and Other (See Comments)     bronchospams     Aspirin Difficulty breathing     tight cough     Codeine GI Disturbance     upset GI     Pravastatin      Myalgias     Simvastatin      Myalgias     Tramadol      Heart burn,  Felt \"clammy\", unable to pass gas, unable to urinate and had severe nausea       PAST MEDICAL HISTORY:  Past Medical History:   Diagnosis Date     Aspirin sensitive asthma 5/9/2018     Chronic airway obstruction, not elsewhere classified      Chronic pain     hip and left shoulder     Coronary artery disease involving native coronary artery of native heart without angina pectoris 11/16/2016     Crohn's disease (H) 9/2013    with stricture in small intestine     Family history of premature CAD      Migraine, unspecified, without mention of intractable migraine without mention of status " migrainosus      PVC's (premature ventricular contractions)      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     TOBACCO ABUSE-CONTINUOUS      Unspecified glaucoma(365.9)     removed as a diagnosis     Unspecified hypothyroidism        PAST SURGICAL HISTORY:  Past Surgical History:   Procedure Laterality Date     ARTHROPLASTY HIP  11/25/2013    Procedure: ARTHROPLASTY HIP;  Left Total Hip Arthroplasty  ;  Surgeon: Luis Marshall MD;  Location: [facility] OR     C NONSPECIFIC PROCEDURE  1970's    D& C X 2     C NONSPECIFIC PROCEDURE  1964    PILONIDAL CYSTECTOMY     C NONSPECIFIC PROCEDURE  1959    T&A     C NONSPECIFIC PROCEDURE  1991    BREAST BX & CERVICAL POLYP     HC COLONOSCOPY THRU STOMA, DIAGNOSTIC      2003     LAPAROTOMY EXPLORATORY  8/30/2013    Procedure: LAPAROTOMY EXPLORATORY;  LAPAROTOMY EXPLORATORY, Ileocecal Resection Resection, Removal of Retained Pill Cam;  Surgeon: Mitchell Fraser MD;  Location: [facility] OR     RESECTION ILEOCECAL  8/30/2013    Procedure: RESECTION ILEOCECAL;;  Surgeon: Mitchell Fraser MD;  Location: [facility] OR       FAMILY HISTORY:  Family History   Problem Relation Age of Onset     Alcohol/Drug Mother      HEART DISEASE Mother      cardiomyopathy     Alcohol/Drug Father      HEART DISEASE Father      CAD -  1st MI mid 50's     Myocardial Infarction Father      X4     OSTEOPOROSIS Maternal Grandmother      CANCER Maternal Grandfather      stomach or throat - martini drinker/pipe smoker     Myocardial Infarction Brother      Breast Cancer No family hx of      Cancer - colorectal No family hx of        SOCIAL HISTORY:  Social History     Social History     Marital status:      Spouse name: N/A     Number of children: 2     Years of education: 17     Occupational History     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     Drug use: No     Sexual activity: No     Other Topics Concern      Service No     Blood Transfusions Yes     With second miscarriage in 1972 and after childbirth in 1975     Caffeine Concern Yes     0-1 cup coffee per day     Occupational Exposure Yes     sick building syndrome     Hobby Hazards No     Sleep Concern Yes     takes melatonin, sleeps 5 hours     Stress Concern Yes     lesion removed from left thigh, pathology questionable, will have re-excision next week     Weight Concern No     Special Diet Yes     low residue, steamed veggies, greek yogurt, low sodium     Back Care Yes     low back     Exercise Yes     Curves 3 days week     Seat Belt Yes     Self-Exams Yes     sporadic     Parent/Sibling W/ Cabg, Mi Or Angioplasty Before 65f 55m? No     Social History Narrative       Review of Systems:  Skin:  Negative       Eyes:  Positive for glasses;cataracts occular hypertension,  cataract removed in both eyes   ENT:  Positive for sinus trouble seasonal sinus issues   Respiratory:  Negative cough;wheezing slight wheezing / cough - allergies    Cardiovascular:         Gastroenterology: Negative      Genitourinary:  Negative      Musculoskeletal:  Positive for back pain;neck pain;arthritis;muscular weakness fell - 1/23/2018 broke left wrist - surgery 1/25 ,  cracked / bruised  ribs -  pretty much healed   Neurologic:  Negative      Psychiatric:  Negative      Heme/Lymph/Imm:  Positive for allergies;hay fever    Endocrine:  Positive for thyroid disorder      Physical Exam:  Vitals: /78 (BP Location: Right arm, Patient Position: Sitting, Cuff Size: Adult Regular)  Pulse 88  Ht 1.676 m (5' 6\")  Wt 71.8 kg (158 lb 4.8 oz)  BMI 25.55 kg/m2    Constitutional:  cooperative, alert and oriented, well developed, well nourished, in no acute " distress        Skin:  warm and dry to the touch, no apparent skin lesions or masses noted          Head:  normocephalic, no masses or lesions        Eyes:  pupils equal and round;sclera white;EOMS intact        Lymph:      ENT:  no pallor or cyanosis        Neck:  carotid pulses are full and equal bilaterally;JVP normal;no carotid bruit        Respiratory:  clear to auscultation;normal respiratory excursion diminished breath sounds bilaterally       Cardiac: regular rhythm, normal S1/S2, no S3 or S4, apical impulse not displaced, no murmurs, gallops or rubs                pulses full and equal, no bruits auscultated                                        GI:  abdomen soft, non-tender, BS normoactive, no mass, no HSM, no bruits        Extremities and Muscular Skeletal:  no deformities, clubbing, cyanosis, erythema observed   bilateral LE edema;trace          Neurological:  no gross motor deficits        Psych:  affect appropriate, oriented to time, person and place        Thank you for allowing me to participate in the care of your patient.    Sincerely,     Darek Iglesias MD     [facility]

## 2018-05-09 NOTE — PROGRESS NOTES
HPI and Plan:   This 72 year-old woman seen in follow-up of her history of symptomatic PVCs, coronary artery disease demonstrated by significant coronary calcification, and peripheral vascular disease.  There is also marked tobacco history, family history of coronary disease, significant COPD and history of wheezing induced by aspirin, and significant statin intolerance.  Previous evaluation suggested these were benign PVCs.  Ejection fraction was normal.  A stress study done in 2015 because of her risks factors was also normal.  She states she's otherwise been stable this last 12 months.    She did slip on the ice, fall, and break her wrist and several ribs.  She required internal fixation of her left radius.  She is now finally recovered from this and started back exercising.  Her stamina took a hit but she states it is steadily improving now that she is back to exercising.  [facility] and does not notice any palpitations during that, dizziness, syncope or near-syncope, or sustained rapid or irregular heart rate, unusual dyspnea on exertion, any chest or neck or jaw discomfort, or unusual fatigue or decline in stamina.  She is without orthopnea, edema, focal neurologic symptoms, claudication.  She continues to have periodic bouts of nonsustained palpitations, mostly when she is at rest at night.  She does not notice them when she is up exercising or moving around.   She has successfully lost significant weight intentionally this year.  She had terrible myalgias to low dose simvastatin and pravastatin and will not consider statins further.  She notes she has a history of wheezing induced by aspirin.      In 2015 a Holter monitor and had around 25,000 PVCs in 48 hours.  She had no ventricular tachycardia.  These were unifocal.  There were not rate related.   She is very nervous because her mother  suddenly in her 60s, her father had premature coronary disease and multiple MIs, and a  brother  suddenly at age 72. She has a 50-pack-year tobacco history but quit in . There is no history of diabetes or hypertension.   She has some COPD and some chronic mild dyspnea.       Physical exam  -Vital signs stable.  Blood pressure 1 132/78.  Weight 158 pounds, BMI 25.6.    -Clear lungs without increased respiratory effort-distant breath sounds  -Regular rhythm.     No murmur rub gallop heave or JVD  -Abdomen soft, nontender, no bruits.  No grossly enlarged abdominal aorta.  No organomegaly  -No cyanosis, pallor, icterus.  No trauma.  -Carotid upstrokes normal without bruit.  No neck masses  -Skin grossly clear  -Extremities are without edema, cyanosis, erythema, deformities  -Femoral pulses are decreased but somewhat deep but 1+-2+ without bruit.  Pedal pulses are 1-2+.    In 2016 an abdominal aortic ultrasound did not show any evidence of aneurysm.  However did show diffuse abdominal aortic atherosclerosis.  There is also evidence of increased velocities in the right internal iliac system system with further PAD.  As above she is having no claudication.     Impression/plan  1-frequent ventricular ectopy, current evidence consistent with benign PVCs.  No evidence of PVC-induced cardiomyopathy.  I have recommended continued observation.     2-CAD as documented by significant coronary calcification.  Recent normal stress study.  She states she cannot afford ezetimibe.  She does not want to consider PCSK9 agents and it would be likely difficult to get those approved for primary prevention.     3-peripheral vascular disease.    No abdominal aortic aneurysm.  But  at least moderate internal iliac disease on the right.  No claudication.    4-intolerant to statins.    5-high risk side effect of aspirin.  Therefore I am going to start her on Plavix 75 mg a day given her coronary and peripheral vascular disease as a approach to further prevention.    Since she is stable she requested as needed follow-up and  I am okay with that.       No orders of the defined types were placed in this encounter.      Orders Placed This Encounter   Medications     acetaminophen (TYLENOL) 500 MG tablet     Sig: Take 500-1,000 mg by mouth as needed for mild pain     clopidogrel (PLAVIX) 75 MG tablet     Sig: Take 1 tablet (75 mg) by mouth daily     Dispense:  30 tablet     Refill:  11       Medications Discontinued During This Encounter   Medication Reason     order for DME Stopped by Patient         Encounter Diagnoses   Name Primary?     Coronary artery disease involving native coronary artery of native heart without angina pectoris Yes     PVC's (premature ventricular contractions)      Statin intolerance      PAD (peripheral artery disease) (H)      Aspirin sensitive asthma        CURRENT MEDICATIONS:  Current Outpatient Prescriptions   Medication Sig Dispense Refill     acetaminophen (TYLENOL) 500 MG tablet Take 500-1,000 mg by mouth as needed for mild pain       ascorbic acid (VITAMIN C) 500 MG tablet Take by mouth 2 times daily       betaxolol (BETOPTIC) 0.5 % ophthalmic solution INSTILL 1 DROP INTO EACH EYE QD  3     calcium carbonate 500 MG tablet Take 2 tablets by mouth 2 times daily  100 tablet 3     chlorpheniramine (CHLOR-TRIMETON) 4 MG tablet Take 4 mg by mouth every 6 hours as needed for allergies or rhinitis       Cholecalciferol (VITAMIN D) 1000 UNITS capsule Take 2 capsules by mouth daily        clopidogrel (PLAVIX) 75 MG tablet Take 1 tablet (75 mg) by mouth daily 30 tablet 11     furosemide (LASIX) 20 MG tablet Take 20-40 mg every day for edema (Patient taking differently: 20 mg 2 times daily Take 20-40 mg every day for edema) 180 tablet 1     HYDROcodone-acetaminophen (NORCO) 5-325 MG per tablet Take 1-2 tablets by mouth daily as needed for moderate to severe pain Maximum of 2 tablets per day. 60 tablet 0     levothyroxine (SYNTHROID/LEVOTHROID) 150 MCG tablet Take 1 tablet (150 mcg) by mouth every morning 90 tablet 3  "    melatonin 3 MG tablet Take 1-2 tablets (3-6 mg) by mouth nightly as needed for sleep (OTC)       Multiple Vitamin (MULTI-VITAMIN PO) Take by mouth daily        vitamin E 400 UNIT capsule Take by mouth daily       ACE/ARB NOT PRESCRIBED, INTENTIONAL, Please choose reason not prescribed, below (Patient not taking: Reported on 5/9/2018)       ASPIRIN NOT PRESCRIBED (INTENTIONAL) Please choose reason not prescribed, below 0 each 0     BETA BLOCKER NOT PRESCRIBED, INTENTIONAL, Beta Blocker not prescribed intentionally due to COPD (Patient not taking: Reported on 5/9/2018)  0     STATIN NOT PRESCRIBED, INTENTIONAL, 1 each See Admin Instructions Statin not prescribed intentionally due to Allergy to statin (Patient not taking: Reported on 5/9/2018) 0 each 0       ALLERGIES     Allergies   Allergen Reactions     Latex Difficulty breathing     sensativity - cough, eyes water     Nsaids Shortness Of Breath and Other (See Comments)     bronchospams     Aspirin Difficulty breathing     tight cough     Codeine GI Disturbance     upset GI     Pravastatin      Myalgias     Simvastatin      Myalgias     Tramadol      Heart burn,  Felt \"clammy\", unable to pass gas, unable to urinate and had severe nausea       PAST MEDICAL HISTORY:  Past Medical History:   Diagnosis Date     Aspirin sensitive asthma 5/9/2018     Chronic airway obstruction, not elsewhere classified      Chronic pain     hip and left shoulder     Coronary artery disease involving native coronary artery of native heart without angina pectoris 11/16/2016     Crohn's disease (H) 9/2013    with stricture in small intestine     Family history of premature CAD      Migraine, unspecified, without mention of intractable migraine without mention of status migrainosus      PVC's (premature ventricular contractions)      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     TOBACCO ABUSE-CONTINUOUS      Unspecified glaucoma(365.9)     removed as a diagnosis     Unspecified hypothyroidism  "       PAST SURGICAL HISTORY:  Past Surgical History:   Procedure Laterality Date     ARTHROPLASTY HIP  11/25/2013    Procedure: ARTHROPLASTY HIP;  Left Total Hip Arthroplasty  ;  Surgeon: Luis Marshall MD;  Location: [facility] OR     C NONSPECIFIC PROCEDURE  1970's    D& C X 2     C NONSPECIFIC PROCEDURE  1964    PILONIDAL CYSTECTOMY     C NONSPECIFIC PROCEDURE  1959    T&A     C NONSPECIFIC PROCEDURE  1991    BREAST BX & CERVICAL POLYP     HC COLONOSCOPY THRU STOMA, DIAGNOSTIC      2003     LAPAROTOMY EXPLORATORY  8/30/2013    Procedure: LAPAROTOMY EXPLORATORY;  LAPAROTOMY EXPLORATORY, Ileocecal Resection Resection, Removal of Retained Pill Cam;  Surgeon: Mitchell Fraser MD;  Location: [facility] OR     RESECTION ILEOCECAL  8/30/2013    Procedure: RESECTION ILEOCECAL;;  Surgeon: Mitchell Fraser MD;  Location: [facility] OR       FAMILY HISTORY:  Family History   Problem Relation Age of Onset     Alcohol/Drug Mother      HEART DISEASE Mother      cardiomyopathy     Alcohol/Drug Father      HEART DISEASE Father      CAD -  1st MI mid 50's     Myocardial Infarction Father      X4     OSTEOPOROSIS Maternal Grandmother      CANCER Maternal Grandfather      stomach or throat - martini drinker/pipe smoker     Myocardial Infarction Brother      Breast Cancer No family hx of      Cancer - colorectal No family hx of        SOCIAL HISTORY:  Social History     Social History     Marital status:      Spouse name: N/A     Number of children: 2     Years of education: 17     Occupational History     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     Drug use: No     Sexual activity: No     Other Topics  "Concern      Service No     Blood Transfusions Yes     With second miscarriage in 1972 and after childbirth in 1975     Caffeine Concern Yes     0-1 cup coffee per day     Occupational Exposure Yes     sick building syndrome     Hobby Hazards No     Sleep Concern Yes     takes melatonin, sleeps 5 hours     Stress Concern Yes     lesion removed from left thigh, pathology questionable, will have re-excision next week     Weight Concern No     Special Diet Yes     low residue, steamed veggies, greek yogurt, low sodium     Back Care Yes     low back     Exercise Yes     Curves 3 days week     Seat Belt Yes     Self-Exams Yes     sporadic     Parent/Sibling W/ Cabg, Mi Or Angioplasty Before 65f 55m? No     Social History Narrative       Review of Systems:  Skin:  Negative       Eyes:  Positive for glasses;cataracts occular hypertension,  cataract removed in both eyes   ENT:  Positive for sinus trouble seasonal sinus issues   Respiratory:  Negative cough;wheezing slight wheezing / cough - allergies    Cardiovascular:         Gastroenterology: Negative      Genitourinary:  Negative      Musculoskeletal:  Positive for back pain;neck pain;arthritis;muscular weakness fell - 1/23/2018 broke left wrist - surgery 1/25 ,  cracked / bruised  ribs -  pretty much healed   Neurologic:  Negative      Psychiatric:  Negative      Heme/Lymph/Imm:  Positive for allergies;hay fever    Endocrine:  Positive for thyroid disorder      Physical Exam:  Vitals: /78 (BP Location: Right arm, Patient Position: Sitting, Cuff Size: Adult Regular)  Pulse 88  Ht 1.676 m (5' 6\")  Wt 71.8 kg (158 lb 4.8 oz)  BMI 25.55 kg/m2    Constitutional:  cooperative, alert and oriented, well developed, well nourished, in no acute distress        Skin:  warm and dry to the touch, no apparent skin lesions or masses noted          Head:  normocephalic, no masses or lesions        Eyes:  pupils equal and round;sclera white;EOMS intact        Lymph:  "     ENT:  no pallor or cyanosis        Neck:  carotid pulses are full and equal bilaterally;JVP normal;no carotid bruit        Respiratory:  clear to auscultation;normal respiratory excursion diminished breath sounds bilaterally       Cardiac: regular rhythm, normal S1/S2, no S3 or S4, apical impulse not displaced, no murmurs, gallops or rubs                pulses full and equal, no bruits auscultated                                        GI:  abdomen soft, non-tender, BS normoactive, no mass, no HSM, no bruits        Extremities and Muscular Skeletal:  no deformities, clubbing, cyanosis, erythema observed   bilateral LE edema;trace          Neurological:  no gross motor deficits        Psych:  affect appropriate, oriented to time, person and place        CC  No referring provider defined for this encounter.

## 2018-05-11 ENCOUNTER — TELEPHONE (OUTPATIENT)
Dept: BONE DENSITY | Facility: CLINIC | Age: 73
End: 2018-05-11

## 2018-05-24 ENCOUNTER — RADIANT APPOINTMENT (OUTPATIENT)
Dept: BONE DENSITY | Facility: CLINIC | Age: 73
End: 2018-05-24
Attending: NURSE PRACTITIONER
Payer: COMMERCIAL

## 2018-05-24 ENCOUNTER — RADIANT APPOINTMENT (OUTPATIENT)
Dept: BONE DENSITY | Facility: CLINIC | Age: 73
End: 2018-05-24
Payer: COMMERCIAL

## 2018-05-24 DIAGNOSIS — Z78.0 ASYMPTOMATIC POSTMENOPAUSAL STATUS: ICD-10-CM

## 2018-05-24 DIAGNOSIS — M94.9 DISORDER OF BONE AND CARTILAGE: ICD-10-CM

## 2018-05-24 DIAGNOSIS — M89.9 DISORDER OF BONE AND CARTILAGE: ICD-10-CM

## 2018-05-24 PROCEDURE — 77081 DXA BONE DENSITY APPENDICULR: CPT | Performed by: INTERNAL MEDICINE

## 2018-05-25 PROBLEM — M80.00XD AGE-RELATED OSTEOPOROSIS WITH CURRENT PATHOLOGICAL FRACTURE WITH ROUTINE HEALING: Status: ACTIVE | Noted: 2018-04-30

## 2018-05-26 NOTE — PROGRESS NOTES
Duy Guillory,  Your DEXA shows that you do have osteoporosis.  Based on this result I would like you to meet with our endocinologist, Surekha Galicia.  She will likely complete further testing and determine if you need to be on a medication for osteoporosis.  You can call the clinic to set up an appointment with her.  Please let me know if you have any questions.  Sincerely,  Susan Haase, CNP

## 2018-05-31 DIAGNOSIS — R07.81 RIB PAIN ON LEFT SIDE: ICD-10-CM

## 2018-05-31 NOTE — TELEPHONE ENCOUNTER
Controlled Substance Refill Request for HYDROcodone-acetaminophen (NORCO) 5-325 MG per tablet  Problem List Complete:  Yes    Patient is followed by HAASE, SUSAN RACHELE for ongoing prescription of pain medication.  All refills should be approved by this provider, or covering partner.     Medication(s): Norco 5 mg/325 mg take 1 tablet every 4-6 hour prn pain  Maximum quantity per month: 30  Clinic visit frequency required: Q 6  months      Controlled substance agreement on file: Yes       Date(s):      Pain Clinic evaluation in the past: No     DIRE Total Score(s):  No flowsheet data found.     Last [facility] website verification: 9/8/16    Last Written Prescription Date:  05/01/18  Last Fill Quantity: 60,   # refills: 0    Last Office Visit with [facility] primary care provider: 03/30/18    Clinic visit frequency required: Q 6  months     Future Office visit:   Next 5 appointments (look out 90 days)     Jun 26, 2018  1:00 PM CDT   SHORT with Susan Rachele Haase, APRN CNP   [facility] ([facility])    [id number] [place] [id number] [phone]                  Controlled substance agreement on file: No.     Processing:  Patient will  in clinic   checked in past 6 months?  No, route to RN

## 2018-06-04 RX ORDER — HYDROCODONE BITARTRATE AND ACETAMINOPHEN 5; 325 MG/1; MG/1
1-2 TABLET ORAL DAILY PRN
Qty: 60 TABLET | Refills: 0 | Status: SHIPPED | OUTPATIENT
Start: 2018-06-04 | End: 2018-06-26

## 2018-06-04 NOTE — TELEPHONE ENCOUNTER
Please call patient when script is placed at [facility], if approved.    [place] reviewed:  reviewed- recommend provider review    [place]  [email or web address]      Last Filled:     5/14/2018,#60 (REQUEST IS TOO EARLY)  3/30/2018, #60    Noreen COOLEY RN, BSN, PHN  [facility] RN

## 2018-06-26 ENCOUNTER — OFFICE VISIT (OUTPATIENT)
Dept: FAMILY MEDICINE | Facility: CLINIC | Age: 73
End: 2018-06-26
Payer: COMMERCIAL

## 2018-06-26 VITALS
RESPIRATION RATE: 14 BRPM | WEIGHT: 156 LBS | DIASTOLIC BLOOD PRESSURE: 74 MMHG | BODY MASS INDEX: 25.18 KG/M2 | SYSTOLIC BLOOD PRESSURE: 120 MMHG | OXYGEN SATURATION: 99 % | TEMPERATURE: 98.1 F | HEART RATE: 94 BPM

## 2018-06-26 DIAGNOSIS — E03.9 HYPOTHYROIDISM, UNSPECIFIED TYPE: ICD-10-CM

## 2018-06-26 DIAGNOSIS — Z88.6 ASPIRIN SENSITIVITY: ICD-10-CM

## 2018-06-26 DIAGNOSIS — G89.4 CHRONIC PAIN SYNDROME: Primary | ICD-10-CM

## 2018-06-26 DIAGNOSIS — M54.50 BILATERAL LOW BACK PAIN WITHOUT SCIATICA, UNSPECIFIED CHRONICITY: ICD-10-CM

## 2018-06-26 DIAGNOSIS — M80.00XD AGE-RELATED OSTEOPOROSIS WITH CURRENT PATHOLOGICAL FRACTURE WITH ROUTINE HEALING: ICD-10-CM

## 2018-06-26 DIAGNOSIS — S52.502D CLOSED FRACTURE OF DISTAL END OF LEFT RADIUS WITH ROUTINE HEALING, UNSPECIFIED FRACTURE MORPHOLOGY, SUBSEQUENT ENCOUNTER: ICD-10-CM

## 2018-06-26 PROCEDURE — 99214 OFFICE O/P EST MOD 30 MIN: CPT | Performed by: NURSE PRACTITIONER

## 2018-06-26 RX ORDER — HYDROCODONE BITARTRATE AND ACETAMINOPHEN 5; 325 MG/1; MG/1
1-2 TABLET ORAL DAILY PRN
Qty: 60 TABLET | Refills: 0 | Status: SHIPPED | OUTPATIENT
Start: 2018-08-01 | End: 2018-06-26

## 2018-06-26 RX ORDER — HYDROCODONE BITARTRATE AND ACETAMINOPHEN 5; 325 MG/1; MG/1
1-2 TABLET ORAL DAILY PRN
Qty: 60 TABLET | Refills: 0 | Status: SHIPPED | OUTPATIENT
Start: 2018-08-31 | End: 2018-09-25

## 2018-06-26 RX ORDER — HYDROCODONE BITARTRATE AND ACETAMINOPHEN 5; 325 MG/1; MG/1
1-2 TABLET ORAL DAILY PRN
Qty: 60 TABLET | Refills: 0 | Status: SHIPPED | OUTPATIENT
Start: 2018-07-02 | End: 2018-06-26

## 2018-06-26 NOTE — MR AVS SNAPSHOT
After Visit Summary   6/26/2018    Kahlil Caputo    MRN: [id number]           Patient Information     Date Of Birth          1945        Visit Information        Provider Department      6/26/2018 1:00 PM Haase, Susan Rachele, APRN CNP [facility]        Today's Diagnoses     Aspirin sensitivity    -  1    Chronic pain syndrome        Hypothyroidism, unspecified type        Rib pain on left side           Follow-ups after your visit        Follow-up notes from your care team     Return in about 3 months (around 9/26/2018) for Routine Visit.      Your next 10 appointments already scheduled     Jun 29, 2018 12:30 PM CDT   New Visit with MALIK Trujillo CNP   [facility] ([facility])    [address]  [facility] [address] [phone]              Future tests that were ordered for you today     Open Future Orders        Priority Expected Expires Ordered    CBC with platelets differential Routine  7/26/2018 6/26/2018    TSH with free T4 reflex Routine  7/26/2018 6/26/2018    Comprehensive metabolic panel Routine  7/26/2018 6/26/2018    Vitamin D Deficiency Routine  7/26/2018 6/26/2018            Who to contact     If you have questions or need follow up information about today's clinic visit or your schedule please contact [facility] directly at [phone].  Normal or non-critical lab and imaging results will be communicated to you by MyChart, letter or phone within 4 business days after the clinic has received the results. If you do not hear from us within 7 days, please contact the clinic through MyChart or phone. If you have a critical or abnormal lab result, we will notify you by phone as soon as possible.  Submit refill requests through [employer] or call your pharmacy and they will forward the refill request to us. Please allow 3 business days for your refill to be completed.          Additional  Information About Your Visit        [facility] Information     [employer] gives you secure access to your electronic health record. If you see a primary care provider, you can also send messages to your care team and make appointments. If you have questions, please call your primary care clinic.  If you do not have a primary care provider, please call [phone] and they will assist you.        Care EveryWhere ID     This is your Care EveryWhere ID. This could be used by other organizations to access your [place] medical records  [id number]        Your Vitals Were     Pulse Temperature Respirations Pulse Oximetry BMI (Body Mass Index)       94 98.1  F (36.7  C) (Oral) 14 99% 25.18 kg/m2        Blood Pressure from Last 3 Encounters:   06/26/18 120/74   05/09/18 132/78   03/30/18 130/80    Weight from Last 3 Encounters:   06/26/18 156 lb (70.8 kg)   05/09/18 158 lb 4.8 oz (71.8 kg)   03/30/18 160 lb (72.6 kg)                 Today's Medication Changes          These changes are accurate as of 6/26/18  1:33 PM.  If you have any questions, ask your nurse or doctor.               Start taking these medicines.        Dose/Directions    HYDROcodone-acetaminophen 5-325 MG per tablet   Commonly known as:  NORCO   Used for:  Chronic pain syndrome   Started by:  Haase, Susan Rachele, APRN CNP        Dose:  1-2 tablet   Start taking on:  8/31/2018   Take 1-2 tablets by mouth daily as needed for moderate to severe pain Maximum of 2 tablets per day.   Quantity:  60 tablet   Refills:  0         These medicines have changed or have updated prescriptions.        Dose/Directions    furosemide 20 MG tablet   Commonly known as:  LASIX   This may have changed:    - how much to take  - when to take this  - additional instructions   Used for:  Lower extremity edema        Take 20-40 mg every day for edema   Quantity:  180 tablet   Refills:  1            Where to get your medicines      Some of these will need a paper prescription and  others can be bought over the counter.  Ask your nurse if you have questions.     Bring a paper prescription for each of these medications     HYDROcodone-acetaminophen 5-325 MG per tablet               Information about OPIOIDS     PRESCRIPTION OPIOIDS: WHAT YOU NEED TO KNOW   We gave you an opioid (narcotic) pain medicine. It is important to manage your pain, but opioids are not always the best choice. You should first try all the other options your care team gave you. Take this medicine for as short a time (and as few doses) as possible.     These medicines have risks:    DO NOT drive when on new or higher doses of pain medicine. These medicines can affect your alertness and reaction times, and you could be arrested for driving under the influence (DUI). If you need to use opioids long-term, talk to your care team about driving.    DO NOT operate heave machinery    DO NOT do any other dangerous activities while taking these medicines.     DO NOT drink any alcohol while taking these medicines.      If the opioid prescribed includes acetaminophen, DO NOT take with any other medicines that contain acetaminophen. Read all labels carefully. Look for the word  acetaminophen  or  Tylenol.  Ask your pharmacist if you have questions or are unsure.    You can get addicted to pain medicines, especially if you have a history of addiction (chemical, alcohol or substance dependence). Talk to your care team about ways to reduce this risk.    Store your pills in a secure place, locked if possible. We will not replace any lost or stolen medicine. If you don t finish your medicine, please throw away (dispose) as directed by your pharmacist. The [place] Pollution Control Agency has more information about safe disposal: https://www.pca.state.[place]/living-green/managing-unwanted-medications.     All opioids tend to cause constipation. Drink plenty of water and eat foods that have a lot of fiber, such as fruits, vegetables, prune  juice, apple juice and high-fiber cereal. Take a laxative (Miralax, milk of magnesia, Colace, Senna) if you don t move your bowels at least every other day.          Primary Care Provider Office Phone # Fax #    Susan Rachele Haase, APRN -[phone] [address]  [facility] [address]        Equal Access to Services     VIANEY COTTON : Hadii aad ku hadasho Soomaali, waaxda luqadaha, qaybta kaalmada adeegyada, waxay idiin hayaan adeeg kharash la'aan ah. So [facility] [phone].    ATENCIÓN: Si habla espsylvain, tiene a denson disposición servicios gratuitos de asistencia lingüística. Llame al [phone].    We comply with applicable federal civil rights laws and [place] laws. We do not discriminate on the basis of race, color, national origin, age, disability, sex, sexual orientation, or gender identity.            Thank you!     Thank you for choosing [facility]  for your care. Our goal is always to provide you with excellent care. Hearing back from our patients is one way we can continue to improve our services. Please take a few minutes to complete the written survey that you may receive in the mail after your visit with us. Thank you!             Your Updated Medication List - Protect others around you: Learn how to safely use, store and throw away your medicines at www.disposemymeds.org.          This list is accurate as of 6/26/18  1:33 PM.  Always use your most recent med list.                   Brand Name Dispense Instructions for use Diagnosis    ACE/ARB/ARNI NOT PRESCRIBED (INTENTIONAL)      Please choose reason not prescribed, below    Coronary artery disease involving native coronary artery of native heart without angina pectoris       ascorbic acid 500 MG tablet    VITAMIN C     Take by mouth 2 times daily        ASPIRIN NOT PRESCRIBED    INTENTIONAL    0 each    Please choose reason not prescribed, below    Coronary artery disease involving native coronary artery of  native heart without angina pectoris       BETA BLOCKER NOT PRESCRIBED (INTENTIONAL)      Beta Blocker not prescribed intentionally due to COPD    Coronary artery disease involving native coronary artery of native heart without angina pectoris       betaxolol 0.5 % ophthalmic solution    BETOPTIC     INSTILL 1 DROP INTO EACH EYE QD        calcium carbonate 500 MG tablet    OS-THEA 500 mg [place]. Ca    100 tablet    Take 2 tablets by mouth 2 times daily    SOB (shortness of breath), Limb pain       CHLOR-TRIMETON 4 MG tablet   Generic drug:  chlorpheniramine      Take 4 mg by mouth every 6 hours as needed for allergies or rhinitis        clopidogrel 75 MG tablet    PLAVIX    30 tablet    Take 1 tablet (75 mg) by mouth daily    Coronary artery disease involving native coronary artery of native heart without angina pectoris, PAD (peripheral artery disease) (H)       furosemide 20 MG tablet    LASIX    180 tablet    Take 20-40 mg every day for edema    Lower extremity edema       HYDROcodone-acetaminophen 5-325 MG per tablet   Start taking on:  8/31/2018    NORCO    60 tablet    Take 1-2 tablets by mouth daily as needed for moderate to severe pain Maximum of 2 tablets per day.    Chronic pain syndrome       levothyroxine 150 MCG tablet    SYNTHROID/LEVOTHROID    90 tablet    Take 1 tablet (150 mcg) by mouth every morning    Hypothyroidism, unspecified type       melatonin 3 MG tablet      Take 1-2 tablets (3-6 mg) by mouth nightly as needed for sleep (OTC)        MULTI-VITAMIN PO      Take by mouth daily        STATIN NOT PRESCRIBED (INTENTIONAL)     0 each    1 each See Admin Instructions Statin not prescribed intentionally due to Allergy to statin    Coronary artery disease involving native coronary artery of native heart without angina pectoris, Tobacco abuse       TYLENOL 500 MG tablet   Generic drug:  acetaminophen      Take 500-1,000 mg by mouth as needed for mild pain        vitamin D 1000 units capsule      Take  1,000 Units by mouth daily        vitamin E 400 UNIT capsule      Take by mouth daily

## 2018-06-26 NOTE — PROGRESS NOTES
SUBJECTIVE:   Kahlil Caputo is a 72 year old female who presents to clinic today for the following health issues:    HPI  General Follow Up    Concern: wrist fracture  Problem started: follow up from 3/30/2018 visit  Progression of symptoms: slowly getting better    Left wrist pain: fractured on 1/23/2018, surgery required.  Continues to have pain along the lateral aspect, had a steroid injection last week with some relief.    Chronic lower back pain: is back exercising at the [facility] 3-4 times per day, getting activity level back up. Is using ice and lidocaine with some relief, also taking  Norco 10 mg prior to bed with relief of pain during the night.      Osteoporosis:  Diagnosed via DEXA on 5/24/2018, taking calcium and vitamin D, has an endocrinology appt on Friday.   Hypothyroidism:  Taking levothyroxine 150 mcg every day, TSH will be obtained this week.   Problem list and histories reviewed & adjusted, as indicated.  Additional history: as documented    Reviewed and updated as needed this visit by clinical staff       Reviewed and updated as needed this visit by Provider       ROS:  Constitutional, cardiovascular, pulmonary,  musculoskeletal, neuro,  endocrine and psych systems are negative, except as otherwise noted.    This document serves as a record of the services and decisions personally performed and made by Susan Haase, CNP. It was created on her behalf by Jaylan Perez, a trained medical scribe. The creation of this document is based the provider's statements to the medical scribe.  Jaylan Perez June 26, 2018 1:10 PM      OBJECTIVE:     /74 (BP Location: Left arm, Patient Position: Chair, Cuff Size: Adult Regular)  Pulse 94  Temp 98.1  F (36.7  C) (Oral)  Resp 14  Wt 70.8 kg (156 lb)  SpO2 99%  BMI 25.18 kg/m2  Body mass index is 25.18 kg/(m^2).  GENERAL: healthy, alert and no distress  CV: regular rate and rhythm, normal S1 S2, no S3 or S4, no murmur, click or rub, no peripheral  edema  LUNGS:  Clear/equal bilaterally  MS: lateral aspect of wrist with tenderness, no gross musculoskeletal defects noted, no edema  NEURO: CMS to LUE intact. Mentation intact and speech normal  PSYCH: mentation appears normal, affect normal/bright    ASSESSMENT/PLAN:   Kahlil was seen today for recheck.    Diagnoses and all orders for this visit:    Chronic pain;Closed fracture of distal end of left radius with routine healing, unspecified fracture morphology, subsequent encounter; Bilateral low back pain without sciatica, unspecified chronicity:  well controlled with Norco 2 tablets per day, will refill for 3 months.   -     CBC with platelets differential; Future  -     Comprehensive metabolic panel; Future  -     Vitamin D Deficiency; Future  -     Discontinue: HYDROcodone-acetaminophen (NORCO) 5-325 MG per tablet; Take 1-2 tablets by mouth daily as needed for moderate to severe pain Maximum of 2 tablets per day.  -     Discontinue: HYDROcodone-acetaminophen (NORCO) 5-325 MG per tablet; Take 1-2 tablets by mouth daily as needed for moderate to severe pain Maximum of 2 tablets per day.  -     HYDROcodone-acetaminophen (NORCO) 5-325 MG per tablet; Take 1-2 tablets by mouth daily as needed for moderate to severe pain Maximum of 2 tablets per day.      Age-related osteoporosis with current pathological fracture with routine healing: appt with endocrinology 6/29/2018    Hypothyroidism, unspecified type  -     TSH with free T4 reflex; Future    Aspirin sensitivity      Follow up  In 3 months, sooner as needed.   The information in this document, created by the medical scribe for me, accurately reflects the services I personally performed and the decisions made by me. I have reviewed and approved this document for accuracy.   Susan Haase, CNP Susan Haase, APRN [facility]

## 2018-06-29 ENCOUNTER — OFFICE VISIT (OUTPATIENT)
Dept: ENDOCRINOLOGY | Facility: CLINIC | Age: 73
End: 2018-06-29
Payer: COMMERCIAL

## 2018-06-29 VITALS
WEIGHT: 156 LBS | TEMPERATURE: 98.2 F | BODY MASS INDEX: 25.18 KG/M2 | DIASTOLIC BLOOD PRESSURE: 81 MMHG | SYSTOLIC BLOOD PRESSURE: 130 MMHG | HEART RATE: 93 BPM

## 2018-06-29 DIAGNOSIS — M89.9 DISORDER OF BONE AND CARTILAGE: ICD-10-CM

## 2018-06-29 DIAGNOSIS — M94.9 DISORDER OF BONE AND CARTILAGE: ICD-10-CM

## 2018-06-29 DIAGNOSIS — E03.9 HYPOTHYROIDISM, UNSPECIFIED TYPE: Primary | ICD-10-CM

## 2018-06-29 DIAGNOSIS — G89.4 CHRONIC PAIN SYNDROME: ICD-10-CM

## 2018-06-29 DIAGNOSIS — M80.00XD AGE-RELATED OSTEOPOROSIS WITH CURRENT PATHOLOGICAL FRACTURE WITH ROUTINE HEALING: ICD-10-CM

## 2018-06-29 LAB
BASOPHILS # BLD AUTO: 0 10E9/L (ref 0–0.2)
BASOPHILS NFR BLD AUTO: 0.3 %
DIFFERENTIAL METHOD BLD: NORMAL
EOSINOPHIL # BLD AUTO: 0.2 10E9/L (ref 0–0.7)
EOSINOPHIL NFR BLD AUTO: 3.5 %
ERYTHROCYTE [DISTWIDTH] IN BLOOD BY AUTOMATED COUNT: 13.8 % (ref 10–15)
HCT VFR BLD AUTO: 42.8 % (ref 35–47)
HGB BLD-MCNC: 14.4 G/DL (ref 11.7–15.7)
LYMPHOCYTES # BLD AUTO: 1.6 10E9/L (ref 0.8–5.3)
LYMPHOCYTES NFR BLD AUTO: 23.1 %
MCH RBC QN AUTO: 29.6 PG (ref 26.5–33)
MCHC RBC AUTO-ENTMCNC: 33.6 G/DL (ref 31.5–36.5)
MCV RBC AUTO: 88 FL (ref 78–100)
MONOCYTES # BLD AUTO: 0.7 10E9/L (ref 0–1.3)
MONOCYTES NFR BLD AUTO: 9.8 %
NEUTROPHILS # BLD AUTO: 4.4 10E9/L (ref 1.6–8.3)
NEUTROPHILS NFR BLD AUTO: 63.3 %
PLATELET # BLD AUTO: 282 10E9/L (ref 150–450)
PTH-INTACT SERPL-MCNC: 32 PG/ML (ref 18–80)
RBC # BLD AUTO: 4.87 10E12/L (ref 3.8–5.2)
WBC # BLD AUTO: 6.9 10E9/L (ref 4–11)

## 2018-06-29 PROCEDURE — 84165 PROTEIN E-PHORESIS SERUM: CPT | Performed by: CLINICAL NURSE SPECIALIST

## 2018-06-29 PROCEDURE — 36415 COLL VENOUS BLD VENIPUNCTURE: CPT | Performed by: CLINICAL NURSE SPECIALIST

## 2018-06-29 PROCEDURE — 82306 VITAMIN D 25 HYDROXY: CPT | Performed by: CLINICAL NURSE SPECIALIST

## 2018-06-29 PROCEDURE — 84439 ASSAY OF FREE THYROXINE: CPT | Performed by: CLINICAL NURSE SPECIALIST

## 2018-06-29 PROCEDURE — 83937 ASSAY OF OSTEOCALCIN: CPT | Mod: 90 | Performed by: CLINICAL NURSE SPECIALIST

## 2018-06-29 PROCEDURE — 80053 COMPREHEN METABOLIC PANEL: CPT | Performed by: CLINICAL NURSE SPECIALIST

## 2018-06-29 PROCEDURE — 84443 ASSAY THYROID STIM HORMONE: CPT | Performed by: CLINICAL NURSE SPECIALIST

## 2018-06-29 PROCEDURE — 83970 ASSAY OF PARATHORMONE: CPT | Performed by: CLINICAL NURSE SPECIALIST

## 2018-06-29 PROCEDURE — 85025 COMPLETE CBC W/AUTO DIFF WBC: CPT | Performed by: CLINICAL NURSE SPECIALIST

## 2018-06-29 PROCEDURE — 99000 SPECIMEN HANDLING OFFICE-LAB: CPT | Performed by: CLINICAL NURSE SPECIALIST

## 2018-06-29 PROCEDURE — 00000402 ZZHCL STATISTIC TOTAL PROTEIN: Performed by: CLINICAL NURSE SPECIALIST

## 2018-06-29 PROCEDURE — 99204 OFFICE O/P NEW MOD 45 MIN: CPT | Performed by: CLINICAL NURSE SPECIALIST

## 2018-06-29 NOTE — MR AVS SNAPSHOT
After Visit Summary   6/29/2018    Kahlil Caputo    MRN: [id number]           Patient Information     Date Of Birth          1945        Visit Information        Provider Department      6/29/2018 12:30 PM Surekha Galicia APRN CNP [facility]        Today's Diagnoses     Hypothyroidism, unspecified type    -  1    Age-related osteoporosis with current pathological fracture with routine healing        Disorder of bone and cartilage        Chronic pain syndrome           Follow-ups after your visit        Who to contact     If you have questions or need follow up information about today's clinic visit or your schedule please contact [facility] directly at [phone].  Normal or non-critical lab and imaging results will be communicated to you by MyChart, letter or phone within 4 business days after the clinic has received the results. If you do not hear from us within 7 days, please contact the clinic through [employer] or phone. If you have a critical or abnormal lab result, we will notify you by phone as soon as possible.  Submit refill requests through [employer] or call your pharmacy and they will forward the refill request to us. Please allow 3 business days for your refill to be completed.          Additional Information About Your Visit        MyChart Information     [employer] gives you secure access to your electronic health record. If you see a primary care provider, you can also send messages to your care team and make appointments. If you have questions, please call your primary care clinic.  If you do not have a primary care provider, please call [phone] and they will assist you.        Care EveryWhere ID     This is your Care EveryWhere ID. This could be used by other organizations to access your [place] medical records  [id number]        Your Vitals Were     Pulse Temperature Breastfeeding? BMI (Body Mass Index)          93 98.2  F (36.8  C)  (Oral) No 25.18 kg/m2         Blood Pressure from Last 3 Encounters:   06/29/18 130/81   06/26/18 120/74   05/09/18 132/78    Weight from Last 3 Encounters:   06/29/18 70.8 kg (156 lb)   06/26/18 70.8 kg (156 lb)   05/09/18 71.8 kg (158 lb 4.8 oz)              We Performed the Following     CBC with platelets differential     Comprehensive metabolic panel     Osteocalcin     Parathyroid Hormone Intact     Protein electrophoresis     T4 FREE     TSH     Vitamin D Deficiency          Today's Medication Changes          These changes are accurate as of 6/29/18 11:59 PM.  If you have any questions, ask your nurse or doctor.               These medicines have changed or have updated prescriptions.        Dose/Directions    furosemide 20 MG tablet   Commonly known as:  LASIX   This may have changed:    - how much to take  - when to take this  - additional instructions   Used for:  Lower extremity edema        Take 20-40 mg every day for edema   Quantity:  180 tablet   Refills:  1                Primary Care Provider Office Phone # Fax #    Susan Rachele Haase, APRN -[phone]       [address] [facility] [address]        Equal Access to Services     VIANEY COTTON : Hadii haley Sarkar, walefty frost, qacortez zhao, sebastien dave . So [facility] [phone].    ATENCIÓN: Si habla español, tiene a denson disposición servicios gratuitos de asistencia lingüística. Llame al [phone].    We comply with applicable federal civil rights laws and [place] laws. We do not discriminate on the basis of race, color, national origin, age, disability, sex, sexual orientation, or gender identity.            Thank you!     Thank you for choosing [facility]  for your care. Our goal is always to provide you with excellent care. Hearing back from our patients is one way we can continue to improve our services. Please take a few minutes to complete the  written survey that you may receive in the mail after your visit with us. Thank you!             Your Updated Medication List - Protect others around you: Learn how to safely use, store and throw away your medicines at www.disposemymeds.org.          This list is accurate as of 6/29/18 11:59 PM.  Always use your most recent med list.                   Brand Name Dispense Instructions for use Diagnosis    ACE/ARB/ARNI NOT PRESCRIBED (INTENTIONAL)      Please choose reason not prescribed, below    Coronary artery disease involving native coronary artery of native heart without angina pectoris       ascorbic acid 500 MG tablet    VITAMIN C     Take by mouth 2 times daily        ASPIRIN NOT PRESCRIBED    INTENTIONAL    0 each    Please choose reason not prescribed, below    Coronary artery disease involving native coronary artery of native heart without angina pectoris       BETA BLOCKER NOT PRESCRIBED (INTENTIONAL)      Beta Blocker not prescribed intentionally due to COPD    Coronary artery disease involving native coronary artery of native heart without angina pectoris       betaxolol 0.5 % ophthalmic solution    BETOPTIC     INSTILL 1 DROP INTO EACH EYE QD        calcium carbonate 500 MG tablet    OS-THEA 500 mg [place]. Ca    100 tablet    Take 2 tablets by mouth 2 times daily    SOB (shortness of breath), Limb pain       CHLOR-TRIMETON 4 MG tablet   Generic drug:  chlorpheniramine      Take 4 mg by mouth every 6 hours as needed for allergies or rhinitis        clopidogrel 75 MG tablet    PLAVIX    30 tablet    Take 1 tablet (75 mg) by mouth daily    Coronary artery disease involving native coronary artery of native heart without angina pectoris, PAD (peripheral artery disease) (H)       furosemide 20 MG tablet    LASIX    180 tablet    Take 20-40 mg every day for edema    Lower extremity edema       HYDROcodone-acetaminophen 5-325 MG per tablet   Start taking on:  8/31/2018    NORCO    60 tablet    Take 1-2 tablets by  mouth daily as needed for moderate to severe pain Maximum of 2 tablets per day.    Chronic pain syndrome       levothyroxine 150 MCG tablet    SYNTHROID/LEVOTHROID    90 tablet    Take 1 tablet (150 mcg) by mouth every morning    Hypothyroidism, unspecified type       melatonin 3 MG tablet      Take 1-2 tablets (3-6 mg) by mouth nightly as needed for sleep (OTC)        MULTI-VITAMIN PO      Take by mouth daily        STATIN NOT PRESCRIBED (INTENTIONAL)     0 each    1 each See Admin Instructions Statin not prescribed intentionally due to Allergy to statin    Coronary artery disease involving native coronary artery of native heart without angina pectoris, Tobacco abuse       TYLENOL 500 MG tablet   Generic drug:  acetaminophen      Take 500-1,000 mg by mouth as needed for mild pain        vitamin D 1000 units capsule      Take 1,000 Units by mouth daily        vitamin E 400 UNIT capsule      Take by mouth daily

## 2018-06-29 NOTE — PROGRESS NOTES
Name: Kahlil Caputo  Seen at the request of Haase, Susan Rachele for osteoporosis.     HPI:  Kahlil Caputo is a 72 year old female who presents for the evaluation of osteoporosis .    Smoke: history of smoking, quit 8 years ago.  Family History:Yes: MGM with   Menstrual history/Birthing: irregular menses but no long periods of amenorrhea.  Menopausal since her 50's - briefly treated with HRT - didn't tolerate  Fractures:Yes: left wrist fracture 1/2018.    Kidney stones: No  GI Surgery:Yes: small bowel resection 5 years ago  Duration of therapy:  Vitamin D 2000 IU once daily, calcium 3710-9971 per day.   Exercise:Not much  Diet:   Milk: limited, lactose intolerant.  + [place] milk    Cheese: occasionally   Yogurt/Cottage Cheese: yogurt daily   Ice Cream  Ca/Vitamin D:  Yes  Alcohol:  Minimal, no more than 1 beer or wine per day  Eating Disorder: No  Steroid Use:  No oral steroids, has had several steroid injections, last injection one year ago.    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     TOBACCO ABUSE-CONTINUOUS      Unspecified glaucoma(365.9)     removed as a diagnosis     Unspecified hypothyroidism      Past Surgical History:   Procedure Laterality Date     ARTHROPLASTY HIP  11/25/2013    Procedure: ARTHROPLASTY HIP;  Left Total Hip Arthroplasty  ;  Surgeon: Luis Marshall MD;  Location: [facility] OR     C NONSPECIFIC PROCEDURE  1970's    D& C X 2     C  NONSPECIFIC PROCEDURE  1964    PILONIDAL CYSTECTOMY     C NONSPECIFIC PROCEDURE  1959    T&A     C NONSPECIFIC PROCEDURE  1991    BREAST BX & CERVICAL POLYP     HC COLONOSCOPY THRU STOMA, DIAGNOSTIC      2003     LAPAROTOMY EXPLORATORY  8/30/2013    Procedure: LAPAROTOMY EXPLORATORY;  LAPAROTOMY EXPLORATORY, Ileocecal Resection Resection, Removal of Retained Pill Cam;  Surgeon: Mitchell Fraser MD;  Location: [facility] OR     RESECTION ILEOCECAL  8/30/2013    Procedure: RESECTION ILEOCECAL;;  Surgeon: Mitchell Fraser MD;  Location: [facility] OR     Family Hx:  Family History   Problem Relation Age of Onset     Alcohol/Drug Mother      HEART DISEASE Mother      cardiomyopathy     Alcohol/Drug Father      HEART DISEASE Father      CAD -  1st MI mid 50's     Myocardial Infarction Father      X4     Osteoperosis Maternal Grandmother      Cancer Maternal Grandfather      stomach or throat - martini drinker/pipe smoker     Myocardial Infarction Brother      Breast Cancer No family hx of      Cancer - colorectal No family hx of      Thyroid disease:         DM2:          Autoimmune: DM1, SLE, RA, Vitiligo     Social Hx:  Social History     Social History     Marital status:      Spouse name: N/A     Number of children: 2     Years of education: 17     Occupational History     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     Drug use: No     Sexual activity: No     Other Topics Concern      Service No     Blood Transfusions Yes     With second miscarriage in 1972 and after childbirth in 1975     Caffeine Concern Yes     0-1 cup coffee per day     Occupational Exposure Yes     sick building  syndrome     Hobby Hazards No     Sleep Concern Yes     takes melatonin, sleeps 5 hours     Stress Concern Yes     lesion removed from left thigh, pathology questionable, will have re-excision next week     Weight Concern No     Special Diet Yes     low residue, steamed veggies, greek yogurt, low sodium     Back Care Yes     low back     Exercise Yes     Curves 3 days week     Seat Belt Yes     Self-Exams Yes     sporadic     Parent/Sibling W/ Cabg, Mi Or Angioplasty Before 65f 55m? No     Social History Narrative          MEDICATIONS:  has a current medication list which includes the following prescription(s): ACE/ARB NOT PRESCRIBED, INTENTIONAL,, acetaminophen, ascorbic acid, aspirin not prescribed, BETA BLOCKER NOT PRESCRIBED, INTENTIONAL,, betaxolol, calcium carbonate, chlorpheniramine, vitamin d, clopidogrel, furosemide, hydrocodone-acetaminophen, levothyroxine, melatonin, multiple vitamin, STATIN NOT PRESCRIBED, INTENTIONAL,, and vitamin e.    ROS     ROS: 10 point ROS neg other than the symptoms noted above in the HPI.    Physical Exam   VS: /81 (BP Location: Left arm, Patient Position: Chair, Cuff Size: Adult Regular)  Pulse 93  Temp 98.2  F (36.8  C) (Oral)  Wt 70.8 kg (156 lb)  Breastfeeding? No  BMI 25.18 kg/m2  GENERAL: AXOX3, NAD, well dressed, answering questions appropriately, appears stated age.  HEENT: no exophthalmos, no proptosis, no lig lag, no retraction  CV: RRR  LUNGS: CTAB  ABDOMEN: nondistended  EXTREMITIES: no edema, +pulses, no rashes, no lesions  NEUROLOGY: CN grossly intact, no tremors  MSK: grossly intact  SKIN: no rashes, no lesions    LABS:  BMP:  !COMPREHENSIVE Latest Ref Rng & Units 3/30/2018   SODIUM 133 - 144 mmol/L 138   POTASSIUM 3.4 - 5.3 mmol/L 3.8   CHLORIDE 94 - 109 mmol/L 103   BUN 7 - 30 mg/dL 15   Creatinine 0.52 - 1.04 mg/dL 0.62   Glucose 70 - 99 mg/dL 94   ANION GAP 3 - 14 mmol/L 8   CALCIUM 8.5 - 10.1 mg/dL 8.8     TFTs:  !THYROID Latest Ref Rng & Units  "5/24/2017 2/28/2017 3/22/2016   TSH 0.40 - 4.00 mU/L 0.97 3.29 0.40   T4 FREE 0.76 - 1.46 ng/dL        !THYROID Latest Ref Rng & Units 12/16/2015   TSH 0.40 - 4.00 mU/L 0.08 (L)   T4 FREE 0.76 - 1.46 ng/dL 1.66 (H)     LFTs:  !LIPID/HEPATIC Latest Ref Rng & Units 1/24/2018   AST 0 - 45 U/L 22   ALT 0 - 50 U/L 20     PTH:    Vitamin D:  Component    Latest Ref Rng & Units 7/30/2015 12/16/2015 5/24/2017   Vitamin D Deficiency screening    20 - 75 ug/L 31 42 35     BoneTurnOverMarkers:    DEXA:    BONE DENSITOMETRY  4/21/2015     AGE: 69 year old    RISK FACTORS: Post-menopausal, Height loss of 1.5 inches inches, Condition related to bone loss: inflammatory bowel disease  CURRENT TREATMENT: Calcium with Vitamin D    FINDINGS:  Lumbar Spine (L1-L4) T-score: -1.3  Left Femoral Neck T-score: N/A  Right Femoral Neck T-score: -2.3    Lumbar (L1-L4) BMD: 1.036 Previous: 1.063   Total Hip Mean BMD: 0.796 Previous: 0.859    Comparison is made to another DXA performed on the same Lunar Prodigy machine on 07/09/2012 and 11/13/2007.     LATERAL VERTEBRAL ASSESSMENT  Procedure:  Vertebral fracture assessment was performed in the lateral decubitus position using a Lunar Prodigy  densitometer.  Indications for VFA: T-score of -1.0 or worse, age (female>69) and height loss > 1.5\"  Confounding factors for VFA: Arthritis/degenerative disc disease, rib shadows and scapular shadows.  The LVA scan is interpretable from T8 to L3.     VFA Findings: Using the semi-quantitative analysis of [employer] there was evidence of no spinal deformity  VFA Impression: Kahlil Caputo has no vertebral fractures identified on the VFA.          IMPRESSION  Osteopenia (low bone mass)    BONE DENSITOMETRY  5/24/2018       AGE:  72 year old                                   RISK FACTORS:  Post-menopausal, Fracture of wrist     CURRENT TREATMENT:  Calcium with Vitamin D      FINDINGS:               Lumbar Spine (L1-L4) T-score:  -1.4, degenerative changes " present               Right Femoral Neck T-score:  -2.5               Forearm (radius 33%) T-score:  -0.4  The left femur is not acceptable for evaluation due to previous arthroplasty.                             Lumbar (L1-L4) BMD: 1.025                                                           Total Hip Mean BMD: 0.763                                                  Forearm (radius 33%) BM: 0.676        LATERAL VERTEBRAL ASSESSMENT  Procedure:  Vertebral fracture assessment was performed in the lateral decubitus position using a Lunar Prodigy  densitometer.  Indications for VFA: T-score of -1.0 or worse and age (female>69)  Confounding factors for VFA: rib shadows.  The LVA scan is interpretable from T9 to L5.     VFA Findings: Using the semi-quantitative analysis of [employer] there was evidence of no spinal deformity, however can not rule out fracture at T8.   VFA Impression: Kahlil Caputo has no definite vertebral fractures, one possible fracture identified on the VFA.   Further evaluation may be warranted due to equivocal fracture      IMPRESSION  Osteoporosis., Degenerative changes of the lumbar spine which may falsely elevate results. If a vertebral fracture is confirmed, the diagnosis would be severe osteoporosis.        All pertinent notes, labs, and images personally reviewed by me.     A/P  Ms.Annice Caputo is a 72 year old here for the evaluation of:    #1 Osteoporosis/Osteopenia.     Risk factors for low bone density include personal history of fracture or family history, , advanced age, female, dementia, and poor health.  Also smoking, low BMI, Estrogen deficiency, low Ca intake, and alcoholism.  A prior history of vertebral fracture greatly increases the risk of subsequent fractures. A history of other medical diseases impacting on bone may also affect bone health.      The 24-hour urine calcium value, if low (< 50 mg/24 hour), would suggest the presence of vitamin D deficiency due to poor  dietary intake or malabsorption. A low 24-hour urine calcium should be followed by a serum 25-hydroxyvitamin D level to test for possible vitamin D deficiency. The identification of vitamin D deficiency should prompt the search for possible occult malabsorption due sprue. Twenty-four hour urine calcium values over 300 mg should prompt an evaluation for possible causes of hypercalciuria.    Bone turnover markers can also be helpful in determining duration of therapy and compliance.  Osteocalcin is a bone formation marker (serum) and N-telopeptide of collagen cross links (NTx) is found in the urine and is a bone resorption maker.    FRAX is a tool used to assess 10year risk of fracture.  Their FRAX risk is.   Low bone mass (T-score between -1.0 and -2.5 at the femoral neck or spine) and a 10-year probability of a hip fracture ? 3% or a 10-year probability of a major osteoporosis-related fracture ? 20% based on the [place]-adapted WHO algorithm warrens treatment.    The pt was advised to    Maintain an adequate calcium and vitamin D intake and to supplement vitamin D if needed to maintain serum levels of  25 hydroxy D (25 OH D) between 30-60 ng/ml.    Limit alcohol intake to no more than 2 servings per day.    Limit caffeine intake.    Maintain an active lifestyle including weight-bearing exercises for at least 30 mins daily.    Take measures to reduce the risk of falling.    Instructions on Bisphosphonate use and side effects - particularly esophageal adverse events - are carefully reviewed with her. This drug must be taken upon arising for the day on an empty stomach, with a large 6-8 ounce glass of water; she must remain NPO in the upright position for at least 30 minutes afterwards and until after the first food of the day. If esophageal irritation is noted,  the patient was advised to stop the medication and call my office.  Treatment with bisphosphonate therapy will decrease fracture risk 50-70%.   There is risk of  osteonecrosis of the jaw in patients using bisphosphonates is approximately 1/1700-1/100,000, with development most likely related to invasive dental procedures. If an invasive dental procedure was necessary, preferably stop the bisphosphonate approximately 3 months prior to reduce the risk. Let your dentist know that you are on this medication.  The data available at this time suggests that there is probably a small increase risk of atypical (nontraumatic) subtrochanteric fractures of the femur in patients on bisphosphonate therapy compared to those not on it. One large study suggested that for every 100 fractures prevented with bisphosphonate therapy, less than one femur fracture will occur. Other studies suggest one episode per 2,500 patient years. Patient should call with leg pain.    Will obtain the following for further evaluation:    Labs ordered today:   Orders Placed This Encounter   Procedures     XR Thoracic Spine 2 Views     Vitamin D Deficiency     Osteocalcin     Comprehensive metabolic panel     Calcium timed urine with Creat Ratio     Parathyroid Hormone Intact     Protein electrophoresis     TSH     T4 FREE     N telopeptide cross linked urine     Radiology/Consults ordered today: XR THORACIC SPINE 2 VW    More than 50% of the time spent with Ms. Caputo on counseling / coordinating her care.  Total face to face time was 45 minutes.      Follow-up:  To be determined based on test results    Surekha Galicia NP  Endocrinology  [facility]  CC:

## 2018-06-29 NOTE — LETTER
6/29/2018         RE: Kahlil Caputo  [address]  [facility] [address]        Dear Colleague,    Thank you for referring your patient, Kahlil Caputo, to the [facility]. Please see a copy of my visit note below.    Name: Kahlil Caputo  Seen at the request of Haase, Susan Rachele for osteoporosis.     HPI:  Kahlil Caputo is a 72 year old female who presents for the evaluation of osteoporosis .    Smoke: history of smoking, quit 8 years ago.  Family History:Yes: MGM with   Menstrual history/Birthing: irregular menses but no long periods of amenorrhea.  Menopausal since her 50's - briefly treated with HRT - didn't tolerate  Fractures:Yes: left wrist fracture 1/2018.    Kidney stones: No  GI Surgery:Yes: small bowel resection 5 years ago  Duration of therapy:  Vitamin D 2000 IU once daily, calcium 8984-2219 per day.   Exercise:Not much  Diet:   Milk: limited, lactose intolerant.  + [place] milk    Cheese: occasionally   Yogurt/Cottage Cheese: yogurt daily   Ice Cream  Ca/Vitamin D:  Yes  Alcohol:  Minimal, no more than 1 beer or wine per day  Eating Disorder: No  Steroid Use:  No oral steroids, has had several steroid injections, last injection one year ago.    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     TOBACCO ABUSE-CONTINUOUS      Unspecified glaucoma(365.9)     removed as a diagnosis     Unspecified hypothyroidism      Past Surgical  History:   Procedure Laterality Date     ARTHROPLASTY HIP  11/25/2013    Procedure: ARTHROPLASTY HIP;  Left Total Hip Arthroplasty  ;  Surgeon: Luis Marshall MD;  Location: [facility] OR     C NONSPECIFIC PROCEDURE  1970's    D& C X 2     C NONSPECIFIC PROCEDURE  1964    PILONIDAL CYSTECTOMY     C NONSPECIFIC PROCEDURE  1959    T&A     C NONSPECIFIC PROCEDURE  1991    BREAST BX & CERVICAL POLYP     HC COLONOSCOPY THRU STOMA, DIAGNOSTIC      2003     LAPAROTOMY EXPLORATORY  8/30/2013    Procedure: LAPAROTOMY EXPLORATORY;  LAPAROTOMY EXPLORATORY, Ileocecal Resection Resection, Removal of Retained Pill Cam;  Surgeon: Mitchell Fraser MD;  Location: [facility] OR     RESECTION ILEOCECAL  8/30/2013    Procedure: RESECTION ILEOCECAL;;  Surgeon: Mitchell Fraser MD;  Location: [facility] OR     Family Hx:  Family History   Problem Relation Age of Onset     Alcohol/Drug Mother      HEART DISEASE Mother      cardiomyopathy     Alcohol/Drug Father      HEART DISEASE Father      CAD -  1st MI mid 50's     Myocardial Infarction Father      X4     Osteoperosis Maternal Grandmother      Cancer Maternal Grandfather      stomach or throat - martini drinker/pipe smoker     Myocardial Infarction Brother      Breast Cancer No family hx of      Cancer - colorectal No family hx of      Thyroid disease:         DM2:          Autoimmune: DM1, SLE, RA, Vitiligo     Social Hx:  Social History     Social History     Marital status:      Spouse name: N/A     Number of children: 2     Years of education: 17     Occupational History     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     Drug use: No     Sexual activity: No      Other Topics Concern      Service No     Blood Transfusions Yes     With second miscarriage in 1972 and after childbirth in 1975     Caffeine Concern Yes     0-1 cup coffee per day     Occupational Exposure Yes     sick building syndrome     Hobby Hazards No     Sleep Concern Yes     takes melatonin, sleeps 5 hours     Stress Concern Yes     lesion removed from left thigh, pathology questionable, will have re-excision next week     Weight Concern No     Special Diet Yes     low residue, steamed veggies, greek yogurt, low sodium     Back Care Yes     low back     Exercise Yes     Curves 3 days week     Seat Belt Yes     Self-Exams Yes     sporadic     Parent/Sibling W/ Cabg, Mi Or Angioplasty Before 65f 55m? No     Social History Narrative          MEDICATIONS:  has a current medication list which includes the following prescription(s): ACE/ARB NOT PRESCRIBED, INTENTIONAL,, acetaminophen, ascorbic acid, aspirin not prescribed, BETA BLOCKER NOT PRESCRIBED, INTENTIONAL,, betaxolol, calcium carbonate, chlorpheniramine, vitamin d, clopidogrel, furosemide, hydrocodone-acetaminophen, levothyroxine, melatonin, multiple vitamin, STATIN NOT PRESCRIBED, INTENTIONAL,, and vitamin e.    ROS     ROS: 10 point ROS neg other than the symptoms noted above in the HPI.    Physical Exam   VS: /81 (BP Location: Left arm, Patient Position: Chair, Cuff Size: Adult Regular)  Pulse 93  Temp 98.2  F (36.8  C) (Oral)  Wt 70.8 kg (156 lb)  Breastfeeding? No  BMI 25.18 kg/m2  GENERAL: AXOX3, NAD, well dressed, answering questions appropriately, appears stated age.  HEENT: no exophthalmos, no proptosis, no lig lag, no retraction  CV: RRR  LUNGS: CTAB  ABDOMEN: nondistended  EXTREMITIES: no edema, +pulses, no rashes, no lesions  NEUROLOGY: CN grossly intact, no tremors  MSK: grossly intact  SKIN: no rashes, no lesions    LABS:  BMP:  !COMPREHENSIVE Latest Ref Rng & Units 3/30/2018   SODIUM 133 - 144 mmol/L 138   POTASSIUM  "3.4 - 5.3 mmol/L 3.8   CHLORIDE 94 - 109 mmol/L 103   BUN 7 - 30 mg/dL 15   Creatinine 0.52 - 1.04 mg/dL 0.62   Glucose 70 - 99 mg/dL 94   ANION GAP 3 - 14 mmol/L 8   CALCIUM 8.5 - 10.1 mg/dL 8.8     TFTs:  !THYROID Latest Ref Rng & Units 5/24/2017 2/28/2017 3/22/2016   TSH 0.40 - 4.00 mU/L 0.97 3.29 0.40   T4 FREE 0.76 - 1.46 ng/dL        !THYROID Latest Ref Rng & Units 12/16/2015   TSH 0.40 - 4.00 mU/L 0.08 (L)   T4 FREE 0.76 - 1.46 ng/dL 1.66 (H)     LFTs:  !LIPID/HEPATIC Latest Ref Rng & Units 1/24/2018   AST 0 - 45 U/L 22   ALT 0 - 50 U/L 20     PTH:    Vitamin D:  Component    Latest Ref Rng & Units 7/30/2015 12/16/2015 5/24/2017   Vitamin D Deficiency screening    20 - 75 ug/L 31 42 35     BoneTurnOverMarkers:    DEXA:    BONE DENSITOMETRY  4/21/2015     AGE: 69 year old    RISK FACTORS: Post-menopausal, Height loss of 1.5 inches inches, Condition related to bone loss: inflammatory bowel disease  CURRENT TREATMENT: Calcium with Vitamin D    FINDINGS:  Lumbar Spine (L1-L4) T-score: -1.3  Left Femoral Neck T-score: N/A  Right Femoral Neck T-score: -2.3    Lumbar (L1-L4) BMD: 1.036 Previous: 1.063   Total Hip Mean BMD: 0.796 Previous: 0.859    Comparison is made to another DXA performed on the same Lunar Prodigy machine on 07/09/2012 and 11/13/2007.     LATERAL VERTEBRAL ASSESSMENT  Procedure:  Vertebral fracture assessment was performed in the lateral decubitus position using a Lunar Prodigy  densitometer.  Indications for VFA: T-score of -1.0 or worse, age (female>69) and height loss > 1.5\"  Confounding factors for VFA: Arthritis/degenerative disc disease, rib shadows and scapular shadows.  The LVA scan is interpretable from T8 to L3.     VFA Findings: Using the semi-quantitative analysis of Romel there was evidence of no spinal deformity  VFA Impression: Kahlil Caputo has no vertebral fractures identified on the VFA.          IMPRESSION  Osteopenia (low bone mass)    BONE DENSITOMETRY  5/24/2018       AGE: "  72 year old                                   RISK FACTORS:  Post-menopausal, Fracture of wrist     CURRENT TREATMENT:  Calcium with Vitamin D      FINDINGS:               Lumbar Spine (L1-L4) T-score:  -1.4, degenerative changes present               Right Femoral Neck T-score:  -2.5               Forearm (radius 33%) T-score:  -0.4  The left femur is not acceptable for evaluation due to previous arthroplasty.                             Lumbar (L1-L4) BMD: 1.025                                                           Total Hip Mean BMD: 0.763                                                  Forearm (radius 33%) BM: 0.676        LATERAL VERTEBRAL ASSESSMENT  Procedure:  Vertebral fracture assessment was performed in the lateral decubitus position using a Lunar Prodigy  densitometer.  Indications for VFA: T-score of -1.0 or worse and age (female>69)  Confounding factors for VFA: rib shadows.  The LVA scan is interpretable from T9 to L5.     VFA Findings: Using the semi-quantitative analysis of Genant there was evidence of no spinal deformity, however can not rule out fracture at T8.   VFA Impression: Kahlil Caputo has no definite vertebral fractures, one possible fracture identified on the VFA.   Further evaluation may be warranted due to equivocal fracture      IMPRESSION  Osteoporosis., Degenerative changes of the lumbar spine which may falsely elevate results. If a vertebral fracture is confirmed, the diagnosis would be severe osteoporosis.        All pertinent notes, labs, and images personally reviewed by me.     A/P  Ms.Annice Caputo is a 72 year old here for the evaluation of:    #1 Osteoporosis/Osteopenia.     Risk factors for low bone density include personal history of fracture or family history, , advanced age, female, dementia, and poor health.  Also smoking, low BMI, Estrogen deficiency, low Ca intake, and alcoholism.  A prior history of vertebral fracture greatly increases the risk of  subsequent fractures. A history of other medical diseases impacting on bone may also affect bone health.      The 24-hour urine calcium value, if low (< 50 mg/24 hour), would suggest the presence of vitamin D deficiency due to poor dietary intake or malabsorption. A low 24-hour urine calcium should be followed by a serum 25-hydroxyvitamin D level to test for possible vitamin D deficiency. The identification of vitamin D deficiency should prompt the search for possible occult malabsorption due sprue. Twenty-four hour urine calcium values over 300 mg should prompt an evaluation for possible causes of hypercalciuria.    Bone turnover markers can also be helpful in determining duration of therapy and compliance.  Osteocalcin is a bone formation marker (serum) and N-telopeptide of collagen cross links (NTx) is found in the urine and is a bone resorption maker.    FRAX is a tool used to assess 10year risk of fracture.  Their FRAX risk is.   Low bone mass (T-score between -1.0 and -2.5 at the femoral neck or spine) and a 10-year probability of a hip fracture ? 3% or a 10-year probability of a major osteoporosis-related fracture ? 20% based on the [place]-adapted WHO algorithm warrens treatment.    The pt was advised to    Maintain an adequate calcium and vitamin D intake and to supplement vitamin D if needed to maintain serum levels of  25 hydroxy D (25 OH D) between 30-60 ng/ml.    Limit alcohol intake to no more than 2 servings per day.    Limit caffeine intake.    Maintain an active lifestyle including weight-bearing exercises for at least 30 mins daily.    Take measures to reduce the risk of falling.    Instructions on Bisphosphonate use and side effects - particularly esophageal adverse events - are carefully reviewed with her. This drug must be taken upon arising for the day on an empty stomach, with a large 6-8 ounce glass of water; she must remain NPO in the upright position for at least 30 minutes afterwards and until  after the first food of the day. If esophageal irritation is noted,  the patient was advised to stop the medication and call my office.  Treatment with bisphosphonate therapy will decrease fracture risk 50-70%.   There is risk of osteonecrosis of the jaw in patients using bisphosphonates is approximately 1/1700-1/100,000, with development most likely related to invasive dental procedures. If an invasive dental procedure was necessary, preferably stop the bisphosphonate approximately 3 months prior to reduce the risk. Let your dentist know that you are on this medication.  The data available at this time suggests that there is probably a small increase risk of atypical (nontraumatic) subtrochanteric fractures of the femur in patients on bisphosphonate therapy compared to those not on it. One large study suggested that for every 100 fractures prevented with bisphosphonate therapy, less than one femur fracture will occur. Other studies suggest one episode per 2,500 patient years. Patient should call with leg pain.    Will obtain the following for further evaluation:    Labs ordered today:   Orders Placed This Encounter   Procedures     XR Thoracic Spine 2 Views     Vitamin D Deficiency     Osteocalcin     Comprehensive metabolic panel     Calcium timed urine with Creat Ratio     Parathyroid Hormone Intact     Protein electrophoresis     TSH     T4 FREE     N telopeptide cross linked urine     Radiology/Consults ordered today: XR THORACIC SPINE 2 VW    More than 50% of the time spent with Ms. Caputo on counseling / coordinating her care.  Total face to face time was 45 minutes.      Follow-up:  To be determined based on test results    Surekha Galicia NP  Endocrinology  [facility]  CC:       Duy Guillory,  Your CBC (checks for anemia and infection) is normal.  Sincerely,  Susan Haase, CNP Annice,  Your lab results are all normal.  The 24-hour urine calcium is not yet complete, I'll let you know those results when  available.  Dr. Acuña, the endocrinologist I work with, is out of the office until after 7/8.  I'll discuss your case with her when she returns and let you know what her thoughts are and what treatment recommendations she makes.  Let me know if you have any questions.  Surekha Galicia NP  Endocrinology     Again, thank you for allowing me to participate in the care of your patient.        Sincerely,        MALIK Trujillo CNP

## 2018-06-30 LAB
ALBUMIN SERPL-MCNC: 4 G/DL (ref 3.4–5)
ALP SERPL-CCNC: 104 U/L (ref 40–150)
ALT SERPL W P-5'-P-CCNC: 24 U/L (ref 0–50)
ANION GAP SERPL CALCULATED.3IONS-SCNC: 8 MMOL/L (ref 3–14)
AST SERPL W P-5'-P-CCNC: 18 U/L (ref 0–45)
BILIRUB SERPL-MCNC: 0.5 MG/DL (ref 0.2–1.3)
BUN SERPL-MCNC: 13 MG/DL (ref 7–30)
CALCIUM SERPL-MCNC: 9.6 MG/DL (ref 8.5–10.1)
CHLORIDE SERPL-SCNC: 101 MMOL/L (ref 94–109)
CO2 SERPL-SCNC: 28 MMOL/L (ref 20–32)
CREAT SERPL-MCNC: 0.63 MG/DL (ref 0.52–1.04)
GFR SERPL CREATININE-BSD FRML MDRD: >90 ML/MIN/1.7M2
GLUCOSE SERPL-MCNC: 88 MG/DL (ref 70–99)
POTASSIUM SERPL-SCNC: 3.5 MMOL/L (ref 3.4–5.3)
PROT SERPL-MCNC: 7.1 G/DL (ref 6.8–8.8)
SODIUM SERPL-SCNC: 137 MMOL/L (ref 133–144)
T4 FREE SERPL-MCNC: 1.43 NG/DL (ref 0.76–1.46)
TSH SERPL DL<=0.005 MIU/L-ACNC: 1.2 MU/L (ref 0.4–4)

## 2018-07-02 ENCOUNTER — MYC MEDICAL ADVICE (OUTPATIENT)
Dept: FAMILY MEDICINE | Facility: CLINIC | Age: 73
End: 2018-07-02

## 2018-07-02 LAB — DEPRECATED CALCIDIOL+CALCIFEROL SERPL-MC: 31 UG/L (ref 20–75)

## 2018-07-02 NOTE — TELEPHONE ENCOUNTER
PCP please see MyChart message regarding recent Thyroid lab results.    Noreen COOLEY RN, BSN, PHN  [place] [facility] RN

## 2018-07-03 LAB
ALBUMIN SERPL ELPH-MCNC: 4.3 G/DL (ref 3.7–5.1)
ALPHA1 GLOB SERPL ELPH-MCNC: 0.3 G/DL (ref 0.2–0.4)
ALPHA2 GLOB SERPL ELPH-MCNC: 0.7 G/DL (ref 0.5–0.9)
B-GLOBULIN SERPL ELPH-MCNC: 0.7 G/DL (ref 0.6–1)
GAMMA GLOB SERPL ELPH-MCNC: 0.7 G/DL (ref 0.7–1.6)
M PROTEIN SERPL ELPH-MCNC: 0 G/DL
OSTEOCALCIN SERPL-MCNC: 15 NG/ML (ref 11–50)
PROT PATTERN SERPL ELPH-IMP: NORMAL

## 2018-07-04 PROCEDURE — 82340 ASSAY OF CALCIUM IN URINE: CPT | Performed by: CLINICAL NURSE SPECIALIST

## 2018-07-04 PROCEDURE — 82523 COLLAGEN CROSSLINKS: CPT | Mod: 90 | Performed by: CLINICAL NURSE SPECIALIST

## 2018-07-04 PROCEDURE — 99000 SPECIMEN HANDLING OFFICE-LAB: CPT | Performed by: CLINICAL NURSE SPECIALIST

## 2018-07-05 DIAGNOSIS — E03.9 HYPOTHYROIDISM, UNSPECIFIED TYPE: ICD-10-CM

## 2018-07-05 DIAGNOSIS — M80.00XD AGE-RELATED OSTEOPOROSIS WITH CURRENT PATHOLOGICAL FRACTURE WITH ROUTINE HEALING: ICD-10-CM

## 2018-07-05 DIAGNOSIS — M94.9 DISORDER OF BONE AND CARTILAGE: ICD-10-CM

## 2018-07-05 DIAGNOSIS — M89.9 DISORDER OF BONE AND CARTILAGE: ICD-10-CM

## 2018-07-05 LAB
CALCIUM 24H UR-MRATE: 0.09 G/24 H (ref 0.1–0.3)
CALCIUM UR-MCNC: 5.5 MG/DL
CALCIUM/CREAT UR: 0.14 G/G CR

## 2018-07-05 NOTE — PROGRESS NOTES
Kahlil,  Your lab results are all normal.  The 24-hour urine calcium is not yet complete, I'll let you know those results when available.  Dr. Acuña, the endocrinologist I work with, is out of the office until after 7/8.  I'll discuss your case with her when she returns and let you know what her thoughts are and what treatment recommendations she makes.  Let me know if you have any questions.  Surekha Galicia NP  Endocrinology

## 2018-07-06 LAB
COLLAGEN NTX/CREAT UR-SRTO: 30
CREAT UR-MCNC: 39 MG/DL

## 2018-07-20 ENCOUNTER — HOSPITAL ENCOUNTER (OUTPATIENT)
Dept: GENERAL RADIOLOGY | Facility: CLINIC | Age: 73
Discharge: HOME | End: 2018-07-20
Attending: CLINICAL NURSE SPECIALIST | Admitting: CLINICAL NURSE SPECIALIST
Payer: COMMERCIAL

## 2018-07-20 DIAGNOSIS — M89.9 DISORDER OF BONE AND CARTILAGE: ICD-10-CM

## 2018-07-20 DIAGNOSIS — M80.00XD AGE-RELATED OSTEOPOROSIS WITH CURRENT PATHOLOGICAL FRACTURE WITH ROUTINE HEALING: ICD-10-CM

## 2018-07-20 DIAGNOSIS — M94.9 DISORDER OF BONE AND CARTILAGE: ICD-10-CM

## 2018-07-20 PROCEDURE — 72070 X-RAY EXAM THORAC SPINE 2VWS: CPT

## 2018-07-21 RX ORDER — ALENDRONATE SODIUM 70 MG/1
TABLET ORAL
Qty: 12 TABLET | Refills: 1 | Status: SHIPPED | OUTPATIENT
Start: 2018-07-21 | End: 2018-12-26

## 2018-07-21 NOTE — PROGRESS NOTES
Annice,  Your urine calcium was a little low.  It's not a problem as long as your vitamin D and blood calcium levels are normal - your last levels were normal.  The other N-telopeptide is obtained as a baseline to be used later on for comparison.  It is a marker of your rate of bone turnover.  Here's a copy of the results for your records.  Please schedule a follow up appointment with me about 3 months after starting the Foxamax.  Let me know if you have any questions.  Surekha Galicia NP  Endocrinology

## 2018-07-21 NOTE — PROGRESS NOTES
Please call  Kahlil,  The xray did not show any spinal fracture.  I recommend treating your osteoporosis with a medication called Fosamax.  You take on tablet weekly - with a glass of water and remain upright for one hour after taking the tablet.  Please schedule a follow up visit with me in 3 months.  Let me know if you have any questions.  Surekha Galicia NP  Endocrinology

## 2018-08-02 ENCOUNTER — TRANSFERRED RECORDS (OUTPATIENT)
Dept: HEALTH INFORMATION MANAGEMENT | Facility: CLINIC | Age: 73
End: 2018-08-02

## 2018-08-03 DIAGNOSIS — I73.9 PAD (PERIPHERAL ARTERY DISEASE) (H): ICD-10-CM

## 2018-08-03 DIAGNOSIS — I25.10 CORONARY ARTERY DISEASE INVOLVING NATIVE CORONARY ARTERY OF NATIVE HEART WITHOUT ANGINA PECTORIS: ICD-10-CM

## 2018-08-03 RX ORDER — CLOPIDOGREL BISULFATE 75 MG/1
75 TABLET ORAL DAILY
Qty: 90 TABLET | Refills: 3 | Status: SHIPPED | OUTPATIENT
Start: 2018-08-03 | End: 2019-04-29

## 2018-09-05 DIAGNOSIS — R60.0 LOWER EXTREMITY EDEMA: ICD-10-CM

## 2018-09-06 NOTE — TELEPHONE ENCOUNTER
"Requested Prescriptions   Pending Prescriptions Disp Refills     furosemide (LASIX) 20 MG tablet [Pharmacy Med Name: Furosemide Oral Tablet 20 MG]    Last Written Prescription Date:  2/23/18  Last Fill Quantity: 180 tablet,  # refills: 1   Last office visit: 6/26/2018 with prescribing provider:  Haase   Future Office Visit:   Next 5 appointments (look out 90 days)     Sep 25, 2018  1:00 PM CDT   SHORT with Susan Rachele Haase, APRN CNP   [address] [address]   [phone]            Oct 24, 2018  2:30 PM CDT   Return Visit with MALIK Trujillo CNP   [address] [address]   [phone]                  180 tablet 0     Sig: TAKE 1-2TABS BY MOUTH DAILY.    Diuretics (Including Combos) Protocol Passed    9/5/2018  4:22 PM       Passed - Blood pressure under 140/90 in past 12 months    BP Readings from Last 3 Encounters:   06/29/18 130/81   06/26/18 120/74   05/09/18 132/78                Passed - Recent (12 mo) or future (30 days) visit within the authorizing provider's specialty    Patient had office visit in the last 12 months or has a visit in the next 30 days with authorizing provider or within the authorizing provider's specialty.  See \"Patient Info\" tab in inbasket, or \"Choose Columns\" in Meds & Orders section of the refill encounter.           Passed - Patient is age 18 or older       Passed - No active pregancy on record       Passed - Normal serum creatinine on file in past 12 months    Recent Labs   Lab Test  06/29/18   1344   CR  0.63             Passed - Normal serum potassium on file in past 12 months    Recent Labs   Lab Test  06/29/18   1344   POTASSIUM  3.5                   Passed - Normal serum sodium on file in past 12 months    Recent Labs   Lab Test  06/29/18   1344   NA  137             Passed - No " positive pregnancy test in past 12 months

## 2018-09-07 RX ORDER — FUROSEMIDE 20 MG
TABLET ORAL
Qty: 180 TABLET | Refills: 0 | Status: SHIPPED | OUTPATIENT
Start: 2018-09-07 | End: 2018-12-05

## 2018-09-20 ENCOUNTER — TRANSFERRED RECORDS (OUTPATIENT)
Dept: HEALTH INFORMATION MANAGEMENT | Facility: CLINIC | Age: 73
End: 2018-09-20

## 2018-09-25 ENCOUNTER — OFFICE VISIT (OUTPATIENT)
Dept: FAMILY MEDICINE | Facility: CLINIC | Age: 73
End: 2018-09-25
Payer: COMMERCIAL

## 2018-09-25 VITALS
HEART RATE: 94 BPM | DIASTOLIC BLOOD PRESSURE: 70 MMHG | OXYGEN SATURATION: 97 % | SYSTOLIC BLOOD PRESSURE: 120 MMHG | TEMPERATURE: 97.7 F | WEIGHT: 153 LBS | RESPIRATION RATE: 14 BRPM | BODY MASS INDEX: 24.69 KG/M2

## 2018-09-25 DIAGNOSIS — Z71.85 IMMUNIZATION COUNSELING: ICD-10-CM

## 2018-09-25 DIAGNOSIS — M80.00XD AGE-RELATED OSTEOPOROSIS WITH CURRENT PATHOLOGICAL FRACTURE WITH ROUTINE HEALING: ICD-10-CM

## 2018-09-25 DIAGNOSIS — E03.9 HYPOTHYROIDISM, UNSPECIFIED TYPE: ICD-10-CM

## 2018-09-25 DIAGNOSIS — M46.1 SACROILIITIS (H): ICD-10-CM

## 2018-09-25 DIAGNOSIS — J44.1 OBSTRUCTIVE CHRONIC BRONCHITIS WITH EXACERBATION (H): ICD-10-CM

## 2018-09-25 DIAGNOSIS — G89.4 CHRONIC PAIN SYNDROME: Primary | ICD-10-CM

## 2018-09-25 PROCEDURE — 99214 OFFICE O/P EST MOD 30 MIN: CPT | Performed by: NURSE PRACTITIONER

## 2018-09-25 RX ORDER — HYDROCODONE BITARTRATE AND ACETAMINOPHEN 5; 325 MG/1; MG/1
1-2 TABLET ORAL DAILY PRN
Qty: 60 TABLET | Refills: 0 | Status: SHIPPED | OUTPATIENT
Start: 2018-10-01 | End: 2018-09-25

## 2018-09-25 RX ORDER — HYDROCODONE BITARTRATE AND ACETAMINOPHEN 5; 325 MG/1; MG/1
1-2 TABLET ORAL DAILY PRN
Qty: 60 TABLET | Refills: 0 | Status: SHIPPED | OUTPATIENT
Start: 2018-12-01 | End: 2018-12-17

## 2018-09-25 RX ORDER — HYDROCODONE BITARTRATE AND ACETAMINOPHEN 5; 325 MG/1; MG/1
1-2 TABLET ORAL DAILY PRN
Qty: 60 TABLET | Refills: 0 | Status: SHIPPED | OUTPATIENT
Start: 2018-11-01 | End: 2018-09-25

## 2018-09-25 NOTE — MR AVS SNAPSHOT
After Visit Summary   9/25/2018    Kahlil Caputo    MRN: [id number]           Patient Information     Date Of Birth          1945        Visit Information        Provider Department      9/25/2018 1:00 PM Haase, Susan Rachele, APRN CNP [facility]        Today's Diagnoses     Hypothyroidism, unspecified type    -  1    Immunization counseling        Age-related osteoporosis with current pathological fracture with routine healing        Chronic pain syndrome           Follow-ups after your visit        Follow-up notes from your care team     Return in about 3 months (around 12/25/2018) for Routine Visit.      Your next 10 appointments already scheduled     Oct 24, 2018  2:30 PM CDT   Return Visit with MALIK Trujillo CNP   [facility] ([facility]    [address]  [facility] [address] [phone]              Future tests that were ordered for you today     Open Future Orders        Priority Expected Expires Ordered    Vitamin D Deficiency Routine  1/25/2019 9/25/2018    TSH Routine  1/25/2019 9/25/2018    Comprehensive metabolic panel Routine  1/25/2019 9/25/2018    Varicella Zoster Virus Antibody IgG Routine  1/25/2019 9/25/2018            Who to contact     If you have questions or need follow up information about today's clinic visit or your schedule please contact [facility] directly at [phone].  Normal or non-critical lab and imaging results will be communicated to you by MyChart, letter or phone within 4 business days after the clinic has received the results. If you do not hear from us within 7 days, please contact the clinic through MyChart or phone. If you have a critical or abnormal lab result, we will notify you by phone as soon as possible.  Submit refill requests through [employer] or call your pharmacy and they will forward the refill request to us. Please allow 3 business days for your  refill to be completed.          Additional Information About Your Visit        [employer] Information     [employer] gives you secure access to your electronic health record. If you see a primary care provider, you can also send messages to your care team and make appointments. If you have questions, please call your primary care clinic.  If you do not have a primary care provider, please call [phone] and they will assist you.        Care EveryWhere ID     This is your Care EveryWhere ID. This could be used by other organizations to access your [place] medical records  [id number]        Your Vitals Were     Pulse Temperature Respirations Pulse Oximetry BMI (Body Mass Index)       94 97.7  F (36.5  C) (Oral) 14 97% 24.69 kg/m2        Blood Pressure from Last 3 Encounters:   09/25/18 120/70   06/29/18 130/81   06/26/18 120/74    Weight from Last 3 Encounters:   09/25/18 153 lb (69.4 kg)   06/29/18 156 lb (70.8 kg)   06/26/18 156 lb (70.8 kg)                 Today's Medication Changes          These changes are accurate as of 9/25/18  1:30 PM.  If you have any questions, ask your nurse or doctor.               Start taking these medicines.        Dose/Directions    HYDROcodone-acetaminophen 5-325 MG per tablet   Commonly known as:  NORCO   Used for:  Chronic pain syndrome   Started by:  Haase, Susan Rachele, APRN CNP        Dose:  1-2 tablet   Start taking on:  12/1/2018   Take 1-2 tablets by mouth daily as needed for moderate to severe pain Maximum of 2 tablets per day.   Quantity:  60 tablet   Refills:  0            Where to get your medicines      Some of these will need a paper prescription and others can be bought over the counter.  Ask your nurse if you have questions.     Bring a paper prescription for each of these medications     HYDROcodone-acetaminophen 5-325 MG per tablet               Information about OPIOIDS     PRESCRIPTION OPIOIDS: WHAT YOU NEED TO KNOW   We gave you an opioid (narcotic) pain  medicine. It is important to manage your pain, but opioids are not always the best choice. You should first try all the other options your care team gave you. Take this medicine for as short a time (and as few doses) as possible.    Some activities can increase your pain, such as bandage changes or therapy sessions. It may help to take your pain medicine 30 to 60 minutes before these activities. Reduce your stress by getting enough sleep, working on hobbies you enjoy and practicing relaxation or meditation. Talk to your care team about ways to manage your pain beyond prescription opioids.    These medicines have risks:    DO NOT drive when on new or higher doses of pain medicine. These medicines can affect your alertness and reaction times, and you could be arrested for driving under the influence (DUI). If you need to use opioids long-term, talk to your care team about driving.    DO NOT operate heavy machinery    DO NOT do any other dangerous activities while taking these medicines.    DO NOT drink any alcohol while taking these medicines.     If the opioid prescribed includes acetaminophen, DO NOT take with any other medicines that contain acetaminophen. Read all labels carefully. Look for the word  acetaminophen  or  Tylenol.  Ask your pharmacist if you have questions or are unsure.    You can get addicted to pain medicines, especially if you have a history of addiction (chemical, alcohol or substance dependence). Talk to your care team about ways to reduce this risk.    All opioids tend to cause constipation. Drink plenty of water and eat foods that have a lot of fiber, such as fruits, vegetables, prune juice, apple juice and high-fiber cereal. Take a laxative (Miralax, milk of magnesia, Colace, Senna) if you don t move your bowels at least every other day. Other side effects include upset stomach, sleepiness, dizziness, throwing up, tolerance (needing more of the medicine to have the same effect), physical  dependence and slowed breathing.    Store your pills in a secure place, locked if possible. We will not replace any lost or stolen medicine. If you don t finish your medicine, please throw away (dispose) as directed by your pharmacist. The [place] Pollution Control Agency has more information about safe disposal: https://[email or web address].[email or web address].us/living-green/managing-unwanted-medications         Primary Care Provider Office Phone # Fax #    Susan Rachele Haase, APRN -[phone] [address] [facility] [address] [address]        Equal Access to Services     [facility] : Hadii aad ku hadasho Soomaali, waaxda luqadaha, qaybta kaalmada adeegyada, sebastien dave . So [facility] [phone].    ATENCIÓN: Si habla español, tiene a denson disposición servicios gratuitos de asistencia lingüística. Llame al [phone].    We comply with applicable federal civil rights laws and [place] laws. We do not discriminate on the basis of race, color, national origin, age, disability, sex, sexual orientation, or gender identity.            Thank you!     Thank you for choosing [facility]  for your care. Our goal is always to provide you with excellent care. Hearing back from our patients is one way we can continue to improve our services. Please take a few minutes to complete the written survey that you may receive in the mail after your visit with us. Thank you!             Your Updated Medication List - Protect others around you: Learn how to safely use, store and throw away your medicines at www.disposemymeds.org.          This list is accurate as of 9/25/18  1:30 PM.  Always use your most recent med list.                   Brand Name Dispense Instructions for use Diagnosis    ACE/ARB/ARNI NOT PRESCRIBED (INTENTIONAL)      Please choose reason not prescribed, below    Coronary artery disease involving native coronary artery of native heart without angina pectoris        alendronate 70 MG tablet    FOSAMAX    12 tablet    Take 1 tablet (70 mg) by mouth with 8oz water every 7 days 30 minutes before breakfast and remain upright during this time.    Age-related osteoporosis with current pathological fracture with routine healing       ascorbic acid 500 MG tablet    VITAMIN C     Take by mouth 2 times daily        ASPIRIN NOT PRESCRIBED    INTENTIONAL    0 each    Please choose reason not prescribed, below    Coronary artery disease involving native coronary artery of native heart without angina pectoris       BETA BLOCKER NOT PRESCRIBED (INTENTIONAL)      Beta Blocker not prescribed intentionally due to COPD    Coronary artery disease involving native coronary artery of native heart without angina pectoris       betaxolol 0.5 % ophthalmic solution    BETOPTIC     INSTILL 1 DROP INTO EACH EYE QD        calcium carbonate 500 mg {elemental} 500 MG tablet    OS-THEA    100 tablet    Take 2 tablets by mouth 2 times daily    SOB (shortness of breath), Limb pain       CHLOR-TRIMETON 4 MG tablet   Generic drug:  chlorpheniramine      Take 4 mg by mouth every 6 hours as needed for allergies or rhinitis        clopidogrel 75 MG tablet    PLAVIX    90 tablet    Take 1 tablet (75 mg) by mouth daily    Coronary artery disease involving native coronary artery of native heart without angina pectoris, PAD (peripheral artery disease) (H)       furosemide 20 MG tablet    LASIX    180 tablet    TAKE 1-2TABS BY MOUTH DAILY.    Lower extremity edema       HYDROcodone-acetaminophen 5-325 MG per tablet   Start taking on:  12/1/2018    NORCO    60 tablet    Take 1-2 tablets by mouth daily as needed for moderate to severe pain Maximum of 2 tablets per day.    Chronic pain syndrome       levothyroxine 150 MCG tablet    SYNTHROID/LEVOTHROID    90 tablet    Take 1 tablet (150 mcg) by mouth every morning    Hypothyroidism, unspecified type       melatonin 3 MG tablet      Take 1-2 tablets (3-6 mg) by mouth nightly  as needed for sleep (OTC)        MULTI-VITAMIN PO      Take by mouth daily        STATIN NOT PRESCRIBED (INTENTIONAL)     0 each    1 each See Admin Instructions Statin not prescribed intentionally due to Allergy to statin    Coronary artery disease involving native coronary artery of native heart without angina pectoris, Tobacco abuse       TYLENOL 500 MG tablet   Generic drug:  acetaminophen      Take 500-1,000 mg by mouth as needed for mild pain        vitamin D 1000 units capsule      Take 2,000 Units by mouth daily        vitamin E 400 UNIT capsule      Take by mouth daily

## 2018-09-25 NOTE — PROGRESS NOTES
SUBJECTIVE:   Kahlil Caputo is a 73 year old female who presents to clinic today for the following health issues:    Chronic Pain Follow-Up     Analgesia/pain control:       Recent changes:  same      Overall control: Tolerable with discomfort  Activity level/function:      Daily activities:  Able to do moderate activities    Work:  not applicable  Adverse effects:  No  Adherance    Taking medication as directed?  Yes    Participating in other treatments: yes  Risk Factors:    Sleep:  Fair    Mood/anxiety:  controlled    Recent family or social stressors:  none noted    Other aggravating factors: none  PHQ-9 SCORE 11/8/2016 1/31/2017 1/24/2018   Total Score 2 3 0     JOSELINE-7 SCORE 11/16/2015 11/8/2016 1/24/2018   Total Score 0 0 0     Encounter-Level CSA - 10/16/2015:          Controlled Substance Agreement - Scan on 10/19/2015  9:47 AM : CONTROLLED SUBSTANCE AGREEMENT 10-16-15 (below)                Amount of exercise or physical activity: 4-5 days/week for an average of 45-60 minutes    Problems taking medications regularly: No    Medication side effects: none    Diet: regular (no restrictions)    Attending class at [facility] 3 days per week, walks 15-20 min prior to class, walks 1/2 mile at least 4 days per week.     Has chronic low back and hip pain.      Left wrist fracture last year, continues to have pain, had a steroid injection yesterday.  Osteoporosis:  Taking FOSAMAX as prescribed, unsure if increased muscle/bone pain is caused by the medication.     Chronic airway obstruction; denies coughing at this time, is due for annual chest CT next month  Problem list and histories reviewed & adjusted, as indicated.  Additional history: as documented    Patient Active Problem List   Diagnosis     Hypothyroidism     Chronic airway obstruction (H)     Disorder of bone and cartilage     Small bowel obstruction     Stricture of small intestine     S/P Left total hip arthroplasty 11/25/13     Osteoarthritis of hip      Ileitis (ileocecal resection 8/2013)     Lumbago     Tricuspid regurgitation     Palpitations     PVC's (premature ventricular contractions)     Family history of premature CAD     Chronic pain     Pulmonary nodules     ACP (advance care planning)     Coronary artery disease involving native coronary artery of native heart without angina pectoris     Closed fracture of distal end of left radius with routine healing, unspecified fracture morphology, subsequent encounter     Age-related osteoporosis with current pathological fracture with routine healing     Statin intolerance     PAD (peripheral artery disease) (H)     Aspirin sensitivity     Past Surgical History:   Procedure Laterality Date     ARTHROPLASTY HIP  11/25/2013    Procedure: ARTHROPLASTY HIP;  Left Total Hip Arthroplasty  ;  Surgeon: Luis Marshall MD;  Location: [facility] OR     C NONSPECIFIC PROCEDURE  1970's    D& C X 2     C NONSPECIFIC PROCEDURE  1964    PILONIDAL CYSTECTOMY     C NONSPECIFIC PROCEDURE  1959    T&A     C NONSPECIFIC PROCEDURE  1991    BREAST BX & CERVICAL POLYP     HC COLONOSCOPY THRU STOMA, DIAGNOSTIC      2003     LAPAROTOMY EXPLORATORY  8/30/2013    Procedure: LAPAROTOMY EXPLORATORY;  LAPAROTOMY EXPLORATORY, Ileocecal Resection Resection, Removal of Retained Pill Cam;  Surgeon: Mitchell Fraser MD;  Location: [facility] OR     RESECTION ILEOCECAL  8/30/2013    Procedure: RESECTION ILEOCECAL;;  Surgeon: Mitchell Fraser MD;  Location: [facility] OR       Social History   Substance Use Topics     Smoking status: Former Smoker     Packs/day: 1.00     Years: 50.00     Types: Cigarettes     Quit date: 6/7/2010     Smokeless tobacco: Never Used      Comment: Quit June 7, 2010     Alcohol use 0.0 oz/week     0 Standard drinks or equivalent per week      Comment: 1 glass wine occas.     Family History   Problem Relation Age of Onset     Alcohol/Drug Mother      HEART DISEASE Mother      cardiomyopathy     Alcohol/Drug Father      HEART DISEASE  Father      CAD -  1st MI mid 50's     Myocardial Infarction Father      X4     Osteoporosis Maternal Grandmother      Cancer Maternal Grandfather      stomach or throat - martini drinker/pipe smoker     Myocardial Infarction Brother      Breast Cancer No family hx of      Cancer - colorectal No family hx of          Current Outpatient Prescriptions   Medication Sig Dispense Refill     ACE/ARB NOT PRESCRIBED, INTENTIONAL, Please choose reason not prescribed, below       acetaminophen (TYLENOL) 500 MG tablet Take 500-1,000 mg by mouth as needed for mild pain       alendronate (FOSAMAX) 70 MG tablet Take 1 tablet (70 mg) by mouth with 8oz water every 7 days 30 minutes before breakfast and remain upright during this time. 12 tablet 1     ascorbic acid (VITAMIN C) 500 MG tablet Take by mouth 2 times daily       ASPIRIN NOT PRESCRIBED (INTENTIONAL) Please choose reason not prescribed, below 0 each 0     BETA BLOCKER NOT PRESCRIBED, INTENTIONAL, Beta Blocker not prescribed intentionally due to COPD  0     betaxolol (BETOPTIC) 0.5 % ophthalmic solution INSTILL 1 DROP INTO EACH EYE QD  3     calcium carbonate 500 MG tablet Take 2 tablets by mouth 2 times daily  100 tablet 3     chlorpheniramine (CHLOR-TRIMETON) 4 MG tablet Take 4 mg by mouth every 6 hours as needed for allergies or rhinitis       Cholecalciferol (VITAMIN D) 1000 UNITS capsule Take 2,000 Units by mouth daily        clopidogrel (PLAVIX) 75 MG tablet Take 1 tablet (75 mg) by mouth daily 90 tablet 3     furosemide (LASIX) 20 MG tablet TAKE 1-2TABS BY MOUTH DAILY. 180 tablet 0     HYDROcodone-acetaminophen (NORCO) 5-325 MG per tablet Take 1-2 tablets by mouth daily as needed for moderate to severe pain Maximum of 2 tablets per day. 60 tablet 0     levothyroxine (SYNTHROID/LEVOTHROID) 150 MCG tablet Take 1 tablet (150 mcg) by mouth every morning 90 tablet 3     melatonin 3 MG tablet Take 1-2 tablets (3-6 mg) by mouth nightly as needed for sleep (OTC)        "Multiple Vitamin (MULTI-VITAMIN PO) Take by mouth daily        STATIN NOT PRESCRIBED, INTENTIONAL, 1 each See Admin Instructions Statin not prescribed intentionally due to Allergy to statin 0 each 0     vitamin E 400 UNIT capsule Take by mouth daily       Allergies   Allergen Reactions     Latex Difficulty breathing     sensativity - cough, eyes water     Nsaids Shortness Of Breath and Other (See Comments)     bronchospams     Aspirin Difficulty breathing     tight cough     Codeine GI Disturbance     upset GI     Pravastatin      Myalgias     Simvastatin      Myalgias     Tramadol      Heart burn,  Felt \"clammy\", unable to pass gas, unable to urinate and had severe nausea     BP Readings from Last 3 Encounters:   09/25/18 120/70   06/29/18 130/81   06/26/18 120/74    Wt Readings from Last 3 Encounters:   09/25/18 153 lb (69.4 kg)   06/29/18 156 lb (70.8 kg)   06/26/18 156 lb (70.8 kg)      Reviewed and updated as needed this visit by clinical staff       Reviewed and updated as needed this visit by Provider         ROS:  CONSTITUTIONAL: NEGATIVE for fever, chills, change in weight  ENT/MOUTH: NEGATIVE for ear, mouth and throat problems  RESP: NEGATIVE for significant cough or SOB  CV: NEGATIVE for chest pain, palpitations or peripheral edema  MUSCULOSKELETAL:see HPI  NEURO: NEGATIVE for weakness, dizziness or paresthesias  PSYCHIATRIC: NEGATIVE for changes in mood or affect    OBJECTIVE:     /70 (BP Location: Left arm, Patient Position: Chair, Cuff Size: Adult Regular)  Pulse 94  Temp 97.7  F (36.5  C) (Oral)  Resp 14  Wt 153 lb (69.4 kg)  SpO2 97%  BMI 24.69 kg/m2  Body mass index is 24.69 kg/(m^2).   GENERAL: healthy, alert and no distress  HENT: ear canals and TM's normal, nose and mouth without ulcers or lesions  NECK: no adenopathy, no asymmetry, masses, or scars and thyroid normal to palpation  RESP: lungs clear to auscultation - no rales, rhonchi or wheezes  CV: regular rate and rhythm, normal S1 " S2, no S3 or S4, no murmur, click or rub, no peripheral edema  MS: no gross musculoskeletal defects noted, no edema  PSYCH: mentation appears normal, affect normal/bright    ASSESSMENT:   See below    PLAN:   Kahlil was seen today for recheck medication.    Diagnoses and all orders for this visit:    Chronic pain syndrome  -     Discontinue: HYDROcodone-acetaminophen (NORCO) 5-325 MG per tablet; Take 1-2 tablets by mouth daily as needed for moderate to severe pain Maximum of 2 tablets per day.  -     Discontinue: HYDROcodone-acetaminophen (NORCO) 5-325 MG per tablet; Take 1-2 tablets by mouth daily as needed for moderate to severe pain Maximum of 2 tablets per day.  -     HYDROcodone-acetaminophen (NORCO) 5-325 MG per tablet; Take 1-2 tablets by mouth daily as needed for moderate to severe pain Maximum of 2 tablets per day.    Sacroiliitis (H)    Age-related osteoporosis with current pathological fracture with routine healing: follow up with endocrinology on 10/24  -     Vitamin D Deficiency; Future    Obstructive chronic bronchitis with exacerbation (H): asymptomatic at this time,declines spirometry.      Hypothyroidism, unspecified type  -     TSH; Future  -     Comprehensive metabolic panel; Future    Immunization counseling; would like to have this completed to see if she needs the singles vaccine.  -     Varicella Zoster Virus Antibody IgG; Future    FUTURE APPOINTMENTS:       - Follow-up visit in 3 months, sooner as needed.     Susan Haase, APRN [facility]

## 2018-09-27 ENCOUNTER — MYC MEDICAL ADVICE (OUTPATIENT)
Dept: FAMILY MEDICINE | Facility: CLINIC | Age: 73
End: 2018-09-27

## 2018-09-27 ENCOUNTER — TRANSFERRED RECORDS (OUTPATIENT)
Dept: HEALTH INFORMATION MANAGEMENT | Facility: CLINIC | Age: 73
End: 2018-09-27

## 2018-09-28 NOTE — TELEPHONE ENCOUNTER
Responded to the Pt. PCP: Please see below when you return.     Pilar Bernal RN -- [facility]

## 2018-10-02 NOTE — TELEPHONE ENCOUNTER
SH, see pt FYI message via [employer], no action needed  Reina Pena RN, BSN  Message handled by Nurse Triage.

## 2018-10-04 ENCOUNTER — TRANSFERRED RECORDS (OUTPATIENT)
Dept: HEALTH INFORMATION MANAGEMENT | Facility: CLINIC | Age: 73
End: 2018-10-04

## 2018-10-17 DIAGNOSIS — E03.9 HYPOTHYROIDISM, UNSPECIFIED TYPE: Primary | ICD-10-CM

## 2018-10-17 DIAGNOSIS — E03.9 HYPOTHYROIDISM, UNSPECIFIED TYPE: ICD-10-CM

## 2018-10-17 DIAGNOSIS — Z71.85 IMMUNIZATION COUNSELING: ICD-10-CM

## 2018-10-17 DIAGNOSIS — M80.00XD AGE-RELATED OSTEOPOROSIS WITH CURRENT PATHOLOGICAL FRACTURE WITH ROUTINE HEALING: ICD-10-CM

## 2018-10-17 LAB
ALBUMIN SERPL-MCNC: 3.5 G/DL (ref 3.4–5)
ALP SERPL-CCNC: 90 U/L (ref 40–150)
ALT SERPL W P-5'-P-CCNC: 22 U/L (ref 0–50)
ANION GAP SERPL CALCULATED.3IONS-SCNC: 6 MMOL/L (ref 3–14)
AST SERPL W P-5'-P-CCNC: 16 U/L (ref 0–45)
BILIRUB SERPL-MCNC: 0.3 MG/DL (ref 0.2–1.3)
BUN SERPL-MCNC: 12 MG/DL (ref 7–30)
CALCIUM SERPL-MCNC: 8.7 MG/DL (ref 8.5–10.1)
CHLORIDE SERPL-SCNC: 98 MMOL/L (ref 94–109)
CO2 SERPL-SCNC: 30 MMOL/L (ref 20–32)
CREAT SERPL-MCNC: 0.62 MG/DL (ref 0.52–1.04)
DEPRECATED CALCIDIOL+CALCIFEROL SERPL-MC: 30 UG/L (ref 20–75)
GFR SERPL CREATININE-BSD FRML MDRD: >90 ML/MIN/1.7M2
GLUCOSE SERPL-MCNC: 78 MG/DL (ref 70–99)
POTASSIUM SERPL-SCNC: 3.3 MMOL/L (ref 3.4–5.3)
PROT SERPL-MCNC: 6.7 G/DL (ref 6.8–8.8)
SODIUM SERPL-SCNC: 134 MMOL/L (ref 133–144)
TSH SERPL DL<=0.005 MIU/L-ACNC: 3.18 MU/L (ref 0.4–4)

## 2018-10-17 PROCEDURE — 84443 ASSAY THYROID STIM HORMONE: CPT | Performed by: NURSE PRACTITIONER

## 2018-10-17 PROCEDURE — 80053 COMPREHEN METABOLIC PANEL: CPT | Performed by: NURSE PRACTITIONER

## 2018-10-17 PROCEDURE — 36415 COLL VENOUS BLD VENIPUNCTURE: CPT | Performed by: NURSE PRACTITIONER

## 2018-10-17 PROCEDURE — 82306 VITAMIN D 25 HYDROXY: CPT | Performed by: NURSE PRACTITIONER

## 2018-10-17 PROCEDURE — 86787 VARICELLA-ZOSTER ANTIBODY: CPT | Performed by: NURSE PRACTITIONER

## 2018-10-17 RX ORDER — LEVOTHYROXINE SODIUM 150 UG/1
150 TABLET ORAL EVERY MORNING
Qty: 90 TABLET | Refills: 3 | Status: CANCELLED | OUTPATIENT
Start: 2018-10-17

## 2018-10-18 LAB — VZV IGG SER QL IA: 3.6 AI (ref 0–0.8)

## 2018-10-18 RX ORDER — LEVOTHYROXINE SODIUM 150 UG/1
150 TABLET ORAL EVERY MORNING
Qty: 90 TABLET | Refills: 3 | COMMUNITY
Start: 2018-10-18 | End: 2018-12-11

## 2018-10-18 NOTE — TELEPHONE ENCOUNTER
Can you please call Kahlil and ask her where she would like her levothyroxine filled and if she still wants the brand name only?  Thanks,  Susan Haase, CNP

## 2018-10-18 NOTE — TELEPHONE ENCOUNTER
Pt calls, does not need thyroid refill, will f/u when needs refills, no longer needs [facility]IL[facility] using different brand and no issues  Reina Pena, RN, BSN  Message handled by Nurse Triage.

## 2018-10-18 NOTE — PROGRESS NOTES
Duy Guillory,  Your varicella zoster result is positive, you are immune to varicella.  Sincerely,  Susan Haase, CNP

## 2018-10-18 NOTE — PROGRESS NOTES
Duy Guillory,  Your lab results are as below:  1)  TSH (thyroid level) 3.18 which is normal (range 0.4-4), continue on your dose of levothyroxine, a refill has been placed.  2)  Vitamin D level is normal at 30.  Continue on your daily dose of vitamin D.  3)  Glucose is normal at 78 (normal fasting is <100).  4)  Potassium is slightly low at 3.3, increase your intake of potassium rich foods.    If you have any questions do not hesitate to call the clinic to discuss the results with me further.     Sincerely,    Susan Haase, CNP

## 2018-10-24 ENCOUNTER — ALLIED HEALTH/NURSE VISIT (OUTPATIENT)
Dept: NURSING | Facility: CLINIC | Age: 73
End: 2018-10-24
Payer: COMMERCIAL

## 2018-10-24 ENCOUNTER — OFFICE VISIT (OUTPATIENT)
Dept: ENDOCRINOLOGY | Facility: CLINIC | Age: 73
End: 2018-10-24
Payer: COMMERCIAL

## 2018-10-24 VITALS
SYSTOLIC BLOOD PRESSURE: 110 MMHG | TEMPERATURE: 98.5 F | HEART RATE: 98 BPM | OXYGEN SATURATION: 97 % | DIASTOLIC BLOOD PRESSURE: 70 MMHG

## 2018-10-24 DIAGNOSIS — M89.9 DISORDER OF BONE AND CARTILAGE: ICD-10-CM

## 2018-10-24 DIAGNOSIS — M80.00XD AGE-RELATED OSTEOPOROSIS WITH CURRENT PATHOLOGICAL FRACTURE WITH ROUTINE HEALING: Primary | ICD-10-CM

## 2018-10-24 DIAGNOSIS — M94.9 DISORDER OF BONE AND CARTILAGE: ICD-10-CM

## 2018-10-24 DIAGNOSIS — Z23 NEED FOR PROPHYLACTIC VACCINATION AND INOCULATION AGAINST INFLUENZA: Primary | ICD-10-CM

## 2018-10-24 PROCEDURE — 90662 IIV NO PRSV INCREASED AG IM: CPT

## 2018-10-24 PROCEDURE — 99214 OFFICE O/P EST MOD 30 MIN: CPT | Performed by: CLINICAL NURSE SPECIALIST

## 2018-10-24 PROCEDURE — G0008 ADMIN INFLUENZA VIRUS VAC: HCPCS

## 2018-10-24 NOTE — MR AVS SNAPSHOT
After Visit Summary   10/24/2018    Kahlil Caputo    MRN: [id number]           Patient Information     Date Of Birth          1945        Visit Information        Provider Department      10/24/2018 10:00 AM OMER NORTON/LPN [facility]        Today's Diagnoses     Need for prophylactic vaccination and inoculation against influenza    -  1       Follow-ups after your visit        Your next 10 appointments already scheduled     Oct 24, 2018  2:30 PM CDT   Return Visit with MALIK Trujillo CNP   [facility] ([address] [address] [phone]            Dec 17, 2018  1:15 PM CST   SHORT with Susan Rachele Haase, APRN CNP   [facility] ([address] [address] [phone]              Who to contact     If you have questions or need follow up information about today's clinic visit or your schedule please contact [facility] directly at [phone].  Normal or non-critical lab and imaging results will be communicated to you by CARD[employer]hart, letter or phone within 4 business days after the clinic has received the results. If you do not hear from us within 7 days, please contact the clinic through [employer] or phone. If you have a critical or abnormal lab result, we will notify you by phone as soon as possible.  Submit refill requests through [employer] or call your pharmacy and they will forward the refill request to us. Please allow 3 business days for your refill to be completed.          Additional Information About Your Visit        CARDFREEhart Information     [employer] gives you secure access to your electronic health record. If you see a primary care provider, you can also send messages to your care team and make appointments. If you have questions, please call your primary care clinic.  If you do not have a  primary care provider, please call [phone] and they will assist you.        Care EveryWhere ID     This is your Care EveryWhere ID. This could be used by other organizations to access your [place] medical records  [id number]         Blood Pressure from Last 3 Encounters:   09/25/18 120/70   06/29/18 130/81   06/26/18 120/74    Weight from Last 3 Encounters:   09/25/18 153 lb (69.4 kg)   06/29/18 156 lb (70.8 kg)   06/26/18 156 lb (70.8 kg)              We Performed the Following     FLU VACCINE, INCREASED ANTIGEN, PRESV FREE, AGE 65+ [83252]     Vaccine Administration, Initial [85907]        Primary Care Provider Office Phone # Fax #    Vero Traore Haase, MALIK [phone] [address] [facility] [address]        Equal Access to Services     VIANEY COTTON : Hadii aad ku hadasho Soomaali, waaxda luqadaha, qaybta kaalmada adeegyada, waxay idiin hayaan cristy mclaughlinaranaun dave . So [facility] [phone].    ATENCIÓN: Si habla español, tiene a denson disposición servicios gratuitos de asistencia lingüística. Llame al [phone].    We comply with applicable federal civil rights laws and [place] laws. We do not discriminate on the basis of race, color, national origin, age, disability, sex, sexual orientation, or gender identity.            Thank you!     Thank you for choosing [facility]  for your care. Our goal is always to provide you with excellent care. Hearing back from our patients is one way we can continue to improve our services. Please take a few minutes to complete the written survey that you may receive in the mail after your visit with us. Thank you!             Your Updated Medication List - Protect others around you: Learn how to safely use, store and throw away your medicines at www.disposemymeds.org.          This list is accurate as of 10/24/18 10:11 AM.  Always use your most recent med list.                   Brand Name Dispense Instructions for use  Diagnosis    ACE/ARB/ARNI NOT PRESCRIBED (INTENTIONAL)      Please choose reason not prescribed, below    Coronary artery disease involving native coronary artery of native heart without angina pectoris       alendronate 70 MG tablet    FOSAMAX    12 tablet    Take 1 tablet (70 mg) by mouth with 8oz water every 7 days 30 minutes before breakfast and remain upright during this time.    Age-related osteoporosis with current pathological fracture with routine healing       ascorbic acid 500 MG tablet    VITAMIN C     Take by mouth 2 times daily        ASPIRIN NOT PRESCRIBED    INTENTIONAL    0 each    Please choose reason not prescribed, below    Coronary artery disease involving native coronary artery of native heart without angina pectoris       BETA BLOCKER NOT PRESCRIBED (INTENTIONAL)      Beta Blocker not prescribed intentionally due to COPD    Coronary artery disease involving native coronary artery of native heart without angina pectoris       betaxolol 0.5 % ophthalmic solution    BETOPTIC     INSTILL 1 DROP INTO EACH EYE QD        calcium carbonate 500 mg (elemental) 500 MG tablet    OS-THEA    100 tablet    Take 2 tablets by mouth 2 times daily    SOB (shortness of breath), Limb pain       CHLOR-TRIMETON 4 MG tablet   Generic drug:  chlorpheniramine      Take 4 mg by mouth every 6 hours as needed for allergies or rhinitis        clopidogrel 75 MG tablet    PLAVIX    90 tablet    Take 1 tablet (75 mg) by mouth daily    Coronary artery disease involving native coronary artery of native heart without angina pectoris, PAD (peripheral artery disease) (H)       furosemide 20 MG tablet    LASIX    180 tablet    TAKE 1-2TABS BY MOUTH DAILY.    Lower extremity edema       HYDROcodone-acetaminophen 5-325 MG per tablet   Start taking on:  12/1/2018    NORCO    60 tablet    Take 1-2 tablets by mouth daily as needed for moderate to severe pain Maximum of 2 tablets per day.    Chronic pain syndrome       levothyroxine 150  MCG tablet    SYNTHROID/LEVOTHROID    90 tablet    Take 1 tablet (150 mcg) by mouth every morning    Hypothyroidism, unspecified type       melatonin 3 MG tablet      Take 1-2 tablets (3-6 mg) by mouth nightly as needed for sleep (OTC)        MULTI-VITAMIN PO      Take by mouth daily        STATIN NOT PRESCRIBED (INTENTIONAL)     0 each    1 each See Admin Instructions Statin not prescribed intentionally due to Allergy to statin    Coronary artery disease involving native coronary artery of native heart without angina pectoris, Tobacco abuse       TYLENOL 500 MG tablet   Generic drug:  acetaminophen      Take 500-1,000 mg by mouth as needed for mild pain        vitamin D 1000 units capsule      Take 2,000 Units by mouth daily        vitamin E 400 UNIT capsule      Take by mouth daily

## 2018-10-24 NOTE — MR AVS SNAPSHOT
After Visit Summary   10/24/2018    Kahlil Caputo    MRN: [id number]           Patient Information     Date Of Birth          1945        Visit Information        Provider Department      10/24/2018 2:30 PM Surekha Galicia APRN CNP [facility]        Today's Diagnoses     Age-related osteoporosis with current pathological fracture with routine healing    -  1    Disorder of bone and cartilage           Follow-ups after your visit        Follow-up notes from your care team     Return in about 1 year (around 10/24/2019).      Your next 10 appointments already scheduled     Dec 17, 2018  1:15 PM CST   SHORT with Susan Rachele Haase, APRN CNP   [facility]    [id number] [place] [address] [phone]            Jul 25, 2019 11:00 AM CDT   Return Visit with MALIK Trujillo CNP   [facility]    [address] [place] [address] [phone]              Future tests that were ordered for you today     Open Future Orders        Priority Expected Expires Ordered    N telopeptide cross linked urine Routine  10/24/2019 10/24/2018    Osteocalcin Routine  10/24/2019 10/24/2018            Who to contact     If you have questions or need follow up information about today's clinic visit or your schedule please contact [facility] directly at [phone].  Normal or non-critical lab and imaging results will be communicated to you by MyChart, letter or phone within 4 business days after the clinic has received the results. If you do not hear from us within 7 days, please contact the clinic through MyChart or phone. If you have a critical or abnormal lab result, we will notify you by phone as soon as possible.  Submit refill requests through [employer] or call your pharmacy and they will forward the refill request to us. Please  allow 3 business days for your refill to be completed.          Additional Information About Your Visit        MyChart Information     [employer] gives you secure access to your electronic health record. If you see a primary care provider, you can also send messages to your care team and make appointments. If you have questions, please call your primary care clinic.  If you do not have a primary care provider, please call [phone] and they will assist you.        Care EveryWhere ID     This is your Care EveryWhere ID. This could be used by other organizations to access your [place] medical records  [id number]        Your Vitals Were     Pulse Temperature Pulse Oximetry Breastfeeding?          98 98.5  F (36.9  C) (Oral) 97% No         Blood Pressure from Last 3 Encounters:   10/24/18 110/70   09/25/18 120/70   06/29/18 130/81    Weight from Last 3 Encounters:   09/25/18 69.4 kg (153 lb)   06/29/18 70.8 kg (156 lb)   06/26/18 70.8 kg (156 lb)               Primary Care Provider Office Phone # Fax #    Vero Rachele Haase, APRN [phone]       [address] [facility] [address]        Equal Access to Services     VIANEY COTTON AH: Hadii haley ku hadasho Soomaali, waaxda luqadaha, qaybta kaalmada adeegyada, sebastien liu. So [facility] [phone].    ATENCIÓN: Si habla español, tiene a denson disposición servicios gratuitos de asistencia lingüística. Llame al [phone].    We comply with applicable federal civil rights laws and [place] laws. We do not discriminate on the basis of race, color, national origin, age, disability, sex, sexual orientation, or gender identity.            Thank you!     Thank you for choosing [facility]  for your care. Our goal is always to provide you with excellent care. Hearing back from our patients is one way we can continue to improve our services. Please take a few minutes to complete the written survey that you may  receive in the mail after your visit with us. Thank you!             Your Updated Medication List - Protect others around you: Learn how to safely use, store and throw away your medicines at www.disposemymeds.org.          This list is accurate as of 10/24/18 11:59 PM.  Always use your most recent med list.                   Brand Name Dispense Instructions for use Diagnosis    ACE/ARB/ARNI NOT PRESCRIBED (INTENTIONAL)      Please choose reason not prescribed, below    Coronary artery disease involving native coronary artery of native heart without angina pectoris       alendronate 70 MG tablet    FOSAMAX    12 tablet    Take 1 tablet (70 mg) by mouth with 8oz water every 7 days 30 minutes before breakfast and remain upright during this time.    Age-related osteoporosis with current pathological fracture with routine healing       ascorbic acid 500 MG tablet    VITAMIN C     Take by mouth 2 times daily        ASPIRIN NOT PRESCRIBED    INTENTIONAL    0 each    Please choose reason not prescribed, below    Coronary artery disease involving native coronary artery of native heart without angina pectoris       BETA BLOCKER NOT PRESCRIBED (INTENTIONAL)      Beta Blocker not prescribed intentionally due to COPD    Coronary artery disease involving native coronary artery of native heart without angina pectoris       betaxolol 0.5 % ophthalmic solution    BETOPTIC     INSTILL 1 DROP INTO EACH EYE QD        calcium carbonate 500 mg (elemental) 500 MG tablet    OS-THEA    100 tablet    Take 2 tablets by mouth 2 times daily    SOB (shortness of breath), Limb pain       CHLOR-TRIMETON 4 MG tablet   Generic drug:  chlorpheniramine      Take 4 mg by mouth every 6 hours as needed for allergies or rhinitis        clopidogrel 75 MG tablet    PLAVIX    90 tablet    Take 1 tablet (75 mg) by mouth daily    Coronary artery disease involving native coronary artery of native heart without angina pectoris, PAD (peripheral artery disease) (H)        furosemide 20 MG tablet    LASIX    180 tablet    TAKE 1-2TABS BY MOUTH DAILY.    Lower extremity edema       HYDROcodone-acetaminophen 5-325 MG per tablet   Start taking on:  12/1/2018    NORCO    60 tablet    Take 1-2 tablets by mouth daily as needed for moderate to severe pain Maximum of 2 tablets per day.    Chronic pain syndrome       levothyroxine 150 MCG tablet    SYNTHROID/LEVOTHROID    90 tablet    Take 1 tablet (150 mcg) by mouth every morning    Hypothyroidism, unspecified type       melatonin 3 MG tablet      Take 1-2 tablets (3-6 mg) by mouth nightly as needed for sleep (OTC)        MULTI-VITAMIN PO      Take by mouth daily        STATIN NOT PRESCRIBED (INTENTIONAL)     0 each    1 each See Admin Instructions Statin not prescribed intentionally due to Allergy to statin    Coronary artery disease involving native coronary artery of native heart without angina pectoris, Tobacco abuse       TYLENOL 500 MG tablet   Generic drug:  acetaminophen      Take 500-1,000 mg by mouth as needed for mild pain        vitamin D 1000 units capsule      Take 2,000 Units by mouth daily        vitamin E 400 UNIT capsule      Take by mouth daily

## 2018-10-24 NOTE — PROGRESS NOTES
Name: Kahlil Caputo  F/u for osteoporosis (Last seen 6/29/2018).     HPI:  Kahlil Caputo is a 73 year old female who presents for the evaluation of osteoporosis.  Lab work up for secondary causes of osteoporosis unremarkable.  Started Fosamax 70 mg weekly approximately 3 months ago.  Is tolerating the Fosamax well.    Smoke: history of smoking, quit 8 years ago.  Family History:Yes: MGM with   Menstrual history/Birthing: irregular menses but no long periods of amenorrhea.  Menopausal since her 50's - briefly treated with HRT - didn't tolerate  Fractures:Yes: left wrist fracture 1/2018.    Kidney stones: No  GI Surgery:Yes: small bowel resection 5 years ago  Duration of therapy:  Vitamin D 2000 IU once daily, calcium 8443-7502 per day.   Exercise:Not much  Diet:   Milk: limited, lactose intolerant.  + Bogart milk    Cheese: occasionally   Yogurt/Cottage Cheese: yogurt ravinder   Ice Cream  Ca/Vitamin D:  Graciela  Alcohol:  Minimal, no more than 1 beer or wine per day  Eating Disorder: No  Steroid Use:  No oral steroids, has had several steroid injections, last injection one year ago.    Is a retired nurse.  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     TOBACCO ABUSE-CONTINUOUS      Unspecified glaucoma(365.9)     removed as a diagnosis     Unspecified hypothyroidism      Past Surgical History:   Procedure Laterality Date     ARTHROPLASTY HIP  11/25/2013    Procedure:  ARTHROPLASTY HIP;  Left Total Hip Arthroplasty  ;  Surgeon: Luis Marshall MD;  Location: [facility] OR     C NONSPECIFIC PROCEDURE  1970's    D& C X 2     C NONSPECIFIC PROCEDURE  1964    PILONIDAL CYSTECTOMY     C NONSPECIFIC PROCEDURE  1959    T&A     C NONSPECIFIC PROCEDURE  1991    BREAST BX & CERVICAL POLYP     HC COLONOSCOPY THRU STOMA, DIAGNOSTIC      2003     LAPAROTOMY EXPLORATORY  8/30/2013    Procedure: LAPAROTOMY EXPLORATORY;  LAPAROTOMY EXPLORATORY, Ileocecal Resection Resection, Removal of Retained Pill Cam;  Surgeon: Mitchell Fraser MD;  Location: [facility] OR     RESECTION ILEOCECAL  8/30/2013    Procedure: RESECTION ILEOCECAL;;  Surgeon: Mitchell Fraser MD;  Location: [facility] OR     Family Hx:  Family History   Problem Relation Age of Onset     Alcohol/Drug Mother      HEART DISEASE Mother      cardiomyopathy     Alcohol/Drug Father      HEART DISEASE Father      CAD -  1st MI mid 50's     Myocardial Infarction Father      X4     Osteoporosis Maternal Grandmother      Cancer Maternal Grandfather      stomach or throat - martini drinker/pipe smoker     Myocardial Infarction Brother      Breast Cancer No family hx of      Cancer - colorectal No family hx of      Thyroid disease:         DM2:          Autoimmune: DM1, SLE, RA, Vitiligo     Social Hx:  Social History     Social History     Marital status:      Spouse name: N/A     Number of children: 2     Years of education: 17     Occupational History     RN [employer]     retired 2009      Retired     Social History Main Topics     Smoking status: Former Smoker     Packs/day: 1.00     Years: 50.00     Types: Cigarettes     Quit date: 6/7/2010     Smokeless tobacco: Never Used      Comment: Quit June 7, 2010     Alcohol use 0.0 oz/week     0 Standard drinks or equivalent per week      Comment: 1 glass wine occas.     Drug use: No     Sexual activity: No     Other Topics Concern      Service No     Blood Transfusions Yes     With  second miscarriage in 1972 and after childbirth in 1975     Caffeine Concern Yes     0-1 cup coffee per day     Occupational Exposure Yes     sick building syndrome     Hobby Hazards No     Sleep Concern Yes     takes melatonin, sleeps 5 hours     Stress Concern Yes     lesion removed from left thigh, pathology questionable, will have re-excision next week     Weight Concern No     Special Diet Yes     low residue, steamed veggies, greek yogurt, low sodium     Back Care Yes     low back     Exercise Yes     Curves 3 days week     Seat Belt Yes     Self-Exams Yes     sporadic     Parent/Sibling W/ Cabg, Mi Or Angioplasty Before 65f 55m? No     Social History Narrative          MEDICATIONS:  has a current medication list which includes the following prescription(s): ACE/ARB NOT PRESCRIBED, INTENTIONAL,, acetaminophen, alendronate, ascorbic acid, aspirin not prescribed, BETA BLOCKER NOT PRESCRIBED, INTENTIONAL,, betaxolol, calcium carbonate 500 mg (elemental), chlorpheniramine, vitamin d, clopidogrel, furosemide, hydrocodone-acetaminophen, levothyroxine, melatonin, multiple vitamin, STATIN NOT PRESCRIBED, INTENTIONAL,, and vitamin e.    ROS     ROS: 10 point ROS neg other than the symptoms noted above in the HPI.    Physical Exam   VS: /70 (BP Location: Left arm, Patient Position: Chair, Cuff Size: Adult Regular)  Pulse 98  Temp 98.5  F (36.9  C) (Oral)  SpO2 97%  Breastfeeding? No  GENERAL: AXOX3, NAD, well dressed, answering questions appropriately, appears stated age.  HEENT: no exophthalmos, no proptosis, no lig lag, no retraction  CV: RRR  LUNGS: Normal respiratory effort.  EXTREMITIES: no edema  NEUROLOGY: CN grossly intact, no tremors  MSK: grossly intact    LABS:  BMP:  !COMPREHENSIVE Latest Ref Rng & Units 10/17/2018   SODIUM 133 - 144 mmol/L 134   POTASSIUM 3.4 - 5.3 mmol/L 3.3 (L)   CHLORIDE 94 - 109 mmol/L 98   BUN 7 - 30 mg/dL 12   Creatinine 0.52 - 1.04 mg/dL 0.62   Glucose 70 - 99 mg/dL 78    ANION GAP 3 - 14 mmol/L 6   CALCIUM 8.5 - 10.1 mg/dL 8.7   ALBUMIN 3.4 - 5.0 g/dL 3.5   PROTEIN, TOTAL 6.8 - 8.8 g/dL 6.7 (L)     TFTs:  !THYROID Latest Ref Rng & Units 10/17/2018   TSH 0.40 - 4.00 mU/L 3.18     LFTs:  !LIPID/HEPATIC Latest Ref Rng & Units 10/17/2018   AST 0 - 45 U/L 16   ALT 0 - 50 U/L 22     PTH:  Component      Latest Ref Rng & Units 6/29/2018   Parathyroid Hormone Intact      18 - 80 pg/mL 32     Vitamin D:  Component      Latest Ref Rng & Units 10/17/2018   Vitamin D Deficiency screening      20 - 75 ug/L 30     BoneTurnOverMarkers:  Component      Latest Ref Rng & Units 6/29/2018 7/4/2018   N-Telopeptide X-Link Urine      not reported  30   Creatinine Ur/Vol      mg/dL  39   Osteocalcin      11 - 50 ng/mL 15      Other:  Component      Latest Ref Rng & Units 6/29/2018   Albumin Fraction      3.7 - 5.1 g/dL 4.3   Alpha 1 Fraction      0.2 - 0.4 g/dL 0.3   Alpha 2 Fraction      0.5 - 0.9 g/dL 0.7   Beta Fraction      0.6 - 1.0 g/dL 0.7   Gamma Fraction      0.7 - 1.6 g/dL 0.7   Monoclonal Peak      0.0 g/dL 0.0   ELP Interpretation:       Essentially normal electrophoretic pattern. No monoclonal protein seen.      Component      Latest Ref Rng & Units 7/4/2018   Calcium Urine mg/dL      mg/dL 5.5   Calcium Urine g/24 h      0.10 - 0.30 g/24 h 0.09 (L)   Calcium Urine g/g Cr      g/g Cr 0.14   N-Telopeptide X-Link Urine      not reported 30   Creatinine Ur/Vol      mg/dL 39     DEXA:    BONE DENSITOMETRY  4/21/2015     AGE: 69 year old    RISK FACTORS: Post-menopausal, Height loss of 1.5 inches inches, Condition related to bone loss: inflammatory bowel disease  CURRENT TREATMENT: Calcium with Vitamin D    FINDINGS:  Lumbar Spine (L1-L4) T-score: -1.3  Left Femoral Neck T-score: N/A  Right Femoral Neck T-score: -2.3    Lumbar (L1-L4) BMD: 1.036 Previous: 1.063   Total Hip Mean BMD: 0.796 Previous: 0.859    Comparison is made to another DXA performed on the same Lunar Prodigy machine on  "07/09/2012 and 11/13/2007.     LATERAL VERTEBRAL ASSESSMENT  Procedure:  Vertebral fracture assessment was performed in the lateral decubitus position using a Voice [employer] Prodigy  densitometer.  Indications for VFA: T-score of -1.0 or worse, age (female>69) and height loss > 1.5\"  Confounding factors for VFA: Arthritis/degenerative disc disease, rib shadows and scapular shadows.  The LVA scan is interpretable from T8 to L3.     VFA Findings: Using the semi-quantitative analysis of Romel there was evidence of no spinal deformity  VFA Impression: Kahlil Caputo has no vertebral fractures identified on the VFA.          IMPRESSION  Osteopenia (low bone mass)    BONE DENSITOMETRY  5/24/2018       AGE:  72 year old                                   RISK FACTORS:  Post-menopausal, Fracture of wrist     CURRENT TREATMENT:  Calcium with Vitamin D      FINDINGS:               Lumbar Spine (L1-L4) T-score:  -1.4, degenerative changes present               Right Femoral Neck T-score:  -2.5               Forearm (radius 33%) T-score:  -0.4  The left femur is not acceptable for evaluation due to previous arthroplasty.                             Lumbar (L1-L4) BMD: 1.025                                                           Total Hip Mean BMD: 0.763                                                  Forearm (radius 33%) BM: 0.676        LATERAL VERTEBRAL ASSESSMENT  Procedure:  Vertebral fracture assessment was performed in the lateral decubitus position using a Voice [employer] Prodigy  densitometer.  Indications for VFA: T-score of -1.0 or worse and age (female>69)  Confounding factors for VFA: rib shadows.  The LVA scan is interpretable from T9 to L5.     VFA Findings: Using the semi-quantitative analysis of Romel there was evidence of no spinal deformity, however can not rule out fracture at T8.   VFA Impression: Kahlil Caputo has no definite vertebral fractures, one possible fracture identified on the VFA.   Further evaluation may be " warranted due to equivocal fracture      IMPRESSION  Osteoporosis., Degenerative changes of the lumbar spine which may falsely elevate results. If a vertebral fracture is confirmed, the diagnosis would be severe osteoporosis.        All pertinent notes, labs, and images personally reviewed by me.     A/P  Ms.Annice Caputo is a 73 year old here for the evaluation of:    #1 Osteoporosis/Osteopenia.     Started Fosamax 70 mg weekly approximately 7/2018.  Tolerating Fosamax well.  Continue Fosamax.  Continue Vitamin D 2000 units per day and calcium 500-600 mg bid.  Plan to repeat bone turnover markers in one year.  Repeat Dexa scan in two years.    Risk factors for low bone density include personal history of fracture or family history, , advanced age, female, dementia, and poor health.  Also smoking, low BMI, Estrogen deficiency, low Ca intake, and alcoholism.  A prior history of vertebral fracture greatly increases the risk of subsequent fractures. A history of other medical diseases impacting on bone may also affect bone health.      The 24-hour urine calcium value, if low (< 50 mg/24 hour), would suggest the presence of vitamin D deficiency due to poor dietary intake or malabsorption. A low 24-hour urine calcium should be followed by a serum 25-hydroxyvitamin D level to test for possible vitamin D deficiency. The identification of vitamin D deficiency should prompt the search for possible occult malabsorption due sprue. Twenty-four hour urine calcium values over 300 mg should prompt an evaluation for possible causes of hypercalciuria.    Bone turnover markers can also be helpful in determining duration of therapy and compliance.  Osteocalcin is a bone formation marker (serum) and N-telopeptide of collagen cross links (NTx) is found in the urine and is a bone resorption maker.    FRAX is a tool used to assess 10year risk of fracture.  Their FRAX risk is.   Low bone mass (T-score between -1.0 and -2.5 at the  femoral neck or spine) and a 10-year probability of a hip fracture ? 3% or a 10-year probability of a major osteoporosis-related fracture ? 20% based on the [place]-adapted WHO algorithm warrens treatment.    The pt was advised to    Maintain an adequate calcium and vitamin D intake and to supplement vitamin D if needed to maintain serum levels of  25 hydroxy D (25 OH D) between 30-60 ng/ml.    Limit alcohol intake to no more than 2 servings per day.    Limit caffeine intake.    Maintain an active lifestyle including weight-bearing exercises for at least 30 mins daily.    Take measures to reduce the risk of falling.    Instructions on Bisphosphonate use and side effects - particularly esophageal adverse events - are carefully reviewed with her. This drug must be taken upon arising for the day on an empty stomach, with a large 6-8 ounce glass of water; she must remain NPO in the upright position for at least 30 minutes afterwards and until after the first food of the day. If esophageal irritation is noted,  the patient was advised to stop the medication and call my office.  Treatment with bisphosphonate therapy will decrease fracture risk 50-70%.   There is risk of osteonecrosis of the jaw in patients using bisphosphonates is approximately 1/1700-1/100,000, with development most likely related to invasive dental procedures. If an invasive dental procedure was necessary, preferably stop the bisphosphonate approximately 3 months prior to reduce the risk. Let your dentist know that you are on this medication.  The data available at this time suggests that there is probably a small increase risk of atypical (nontraumatic) subtrochanteric fractures of the femur in patients on bisphosphonate therapy compared to those not on it. One large study suggested that for every 100 fractures prevented with bisphosphonate therapy, less than one femur fracture will occur. Other studies suggest one episode per 2,500 patient years. Patient  should call with leg pain.      Labs ordered today:   No orders of the defined types were placed in this encounter.    Radiology/Consults ordered today: None    More than 50% of the time spent with Ms. Caputo on counseling / coordinating her care.  Total face to face time was 20 minutes.      Follow-up:  One year    Surekha Galicia NP  Endocrinology  [facility]  CC:

## 2018-10-24 NOTE — LETTER
10/24/2018         RE: Kahlil Caputo  [address]  [facility] [address]        Dear Colleague,    Thank you for referring your patient, Kahlil Caputo, to the [facility]. Please see a copy of my visit note below.    Name: Kahlil Captuo  F/u for osteoporosis (Last seen 6/29/2018).     HPI:  Kahlil Caputo is a 73 year old female who presents for the evaluation of osteoporosis.  Lab work up for secondary causes of osteoporosis unremarkable.  Started Fosamax 70 mg weekly approximately 3 months ago.  Is tolerating the Fosamax well.    Smoke: history of smoking, quit 8 years ago.  Family History:Yes: MGM with   Menstrual history/Birthing: irregular menses but no long periods of amenorrhea.  Menopausal since her 50's - briefly treated with HRT - didn't tolerate  Fractures:Yes: left wrist fracture 1/2018.    Kidney stones: No  GI Surgery:Yes: small bowel resection 5 years ago  Duration of therapy:  Vitamin D 2000 IU once daily, calcium 3089-8342 per day.   Exercise:Not much  Diet:   Milk: limited, lactose intolerant.  + Maud milk    Cheese: occasionally   Yogurt/Cottage Cheese: yogurt ravinder   Ice Cream  Ca/Vitamin D:  Graciela  Alcohol:  Minimal, no more than 1 beer or wine per day  Eating Disorder: No  Steroid Use:  No oral steroids, has had several steroid injections, last injection one year ago.    Is a retired nurse.  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     TOBACCO ABUSE-CONTINUOUS      Unspecified glaucoma(365.9)     removed as a diagnosis     Unspecified hypothyroidism      Past Surgical History:   Procedure Laterality Date     ARTHROPLASTY HIP  11/25/2013    Procedure: ARTHROPLASTY HIP;  Left Total Hip Arthroplasty  ;  Surgeon: Luis Marshall MD;  Location: [facility] OR     C NONSPECIFIC PROCEDURE  1970's    D& C X 2     C NONSPECIFIC PROCEDURE  1964    PILONIDAL CYSTECTOMY     C NONSPECIFIC PROCEDURE  1959    T&A     C NONSPECIFIC PROCEDURE  1991    BREAST BX & CERVICAL POLYP     HC COLONOSCOPY THRU STOMA, DIAGNOSTIC      2003     LAPAROTOMY EXPLORATORY  8/30/2013    Procedure: LAPAROTOMY EXPLORATORY;  LAPAROTOMY EXPLORATORY, Ileocecal Resection Resection, Removal of Retained Pill Cam;  Surgeon: Mitchell Fraser MD;  Location: [facility] OR     RESECTION ILEOCECAL  8/30/2013    Procedure: RESECTION ILEOCECAL;;  Surgeon: Mitchell Fraser MD;  Location: [facility] OR     Family Hx:  Family History   Problem Relation Age of Onset     Alcohol/Drug Mother      HEART DISEASE Mother      cardiomyopathy     Alcohol/Drug Father      HEART DISEASE Father      CAD -  1st MI mid 50's     Myocardial Infarction Father      X4     Osteoporosis Maternal Grandmother      Cancer Maternal Grandfather      stomach or throat - martini drinker/pipe smoker     Myocardial Infarction Brother      Breast Cancer No family hx of      Cancer - colorectal No family hx of      Thyroid disease:         DM2:          Autoimmune: DM1, SLE, RA, Vitiligo     Social Hx:  Social History     Social History     Marital status:      Spouse name: N/A     Number of children: 2     Years of education: 17     Occupational History     RN [employer]     retired 2009      Retired     Social History Main Topics     Smoking status: Former Smoker     Packs/day: 1.00     Years: 50.00     Types: Cigarettes     Quit date: 6/7/2010     Smokeless tobacco: Never Used      Comment: Quit June  7, 2010     Alcohol use 0.0 oz/week     0 Standard drinks or equivalent per week      Comment: 1 glass wine occas.     Drug use: No     Sexual activity: No     Other Topics Concern      Service No     Blood Transfusions Yes     With second miscarriage in 1972 and after childbirth in 1975     Caffeine Concern Yes     0-1 cup coffee per day     Occupational Exposure Yes     sick building syndrome     Hobby Hazards No     Sleep Concern Yes     takes melatonin, sleeps 5 hours     Stress Concern Yes     lesion removed from left thigh, pathology questionable, will have re-excision next week     Weight Concern No     Special Diet Yes     low residue, steamed veggies, greek yogurt, low sodium     Back Care Yes     low back     Exercise Yes     Curves 3 days week     Seat Belt Yes     Self-Exams Yes     sporadic     Parent/Sibling W/ Cabg, Mi Or Angioplasty Before 65f 55m? No     Social History Narrative          MEDICATIONS:  has a current medication list which includes the following prescription(s): ACE/ARB NOT PRESCRIBED, INTENTIONAL,, acetaminophen, alendronate, ascorbic acid, aspirin not prescribed, BETA BLOCKER NOT PRESCRIBED, INTENTIONAL,, betaxolol, calcium carbonate 500 mg (elemental), chlorpheniramine, vitamin d, clopidogrel, furosemide, hydrocodone-acetaminophen, levothyroxine, melatonin, multiple vitamin, STATIN NOT PRESCRIBED, INTENTIONAL,, and vitamin e.    ROS     ROS: 10 point ROS neg other than the symptoms noted above in the HPI.    Physical Exam   VS: /70 (BP Location: Left arm, Patient Position: Chair, Cuff Size: Adult Regular)  Pulse 98  Temp 98.5  F (36.9  C) (Oral)  SpO2 97%  Breastfeeding? No  GENERAL: AXOX3, NAD, well dressed, answering questions appropriately, appears stated age.  HEENT: no exophthalmos, no proptosis, no lig lag, no retraction  CV: RRR  LUNGS: Normal respiratory effort.  EXTREMITIES: no edema  NEUROLOGY: CN grossly intact, no tremors  MSK: grossly  intact    LABS:  BMP:  !COMPREHENSIVE Latest Ref Rng & Units 10/17/2018   SODIUM 133 - 144 mmol/L 134   POTASSIUM 3.4 - 5.3 mmol/L 3.3 (L)   CHLORIDE 94 - 109 mmol/L 98   BUN 7 - 30 mg/dL 12   Creatinine 0.52 - 1.04 mg/dL 0.62   Glucose 70 - 99 mg/dL 78   ANION GAP 3 - 14 mmol/L 6   CALCIUM 8.5 - 10.1 mg/dL 8.7   ALBUMIN 3.4 - 5.0 g/dL 3.5   PROTEIN, TOTAL 6.8 - 8.8 g/dL 6.7 (L)     TFTs:  !THYROID Latest Ref Rng & Units 10/17/2018   TSH 0.40 - 4.00 mU/L 3.18     LFTs:  !LIPID/HEPATIC Latest Ref Rng & Units 10/17/2018   AST 0 - 45 U/L 16   ALT 0 - 50 U/L 22     PTH:  Component      Latest Ref Rng & Units 6/29/2018   Parathyroid Hormone Intact      18 - 80 pg/mL 32     Vitamin D:  Component      Latest Ref Rng & Units 10/17/2018   Vitamin D Deficiency screening      20 - 75 ug/L 30     BoneTurnOverMarkers:  Component      Latest Ref Rng & Units 6/29/2018 7/4/2018   N-Telopeptide X-Link Urine      not reported  30   Creatinine Ur/Vol      mg/dL  39   Osteocalcin      11 - 50 ng/mL 15      Other:  Component      Latest Ref Rng & Units 6/29/2018   Albumin Fraction      3.7 - 5.1 g/dL 4.3   Alpha 1 Fraction      0.2 - 0.4 g/dL 0.3   Alpha 2 Fraction      0.5 - 0.9 g/dL 0.7   Beta Fraction      0.6 - 1.0 g/dL 0.7   Gamma Fraction      0.7 - 1.6 g/dL 0.7   Monoclonal Peak      0.0 g/dL 0.0   ELP Interpretation:       Essentially normal electrophoretic pattern. No monoclonal protein seen.      Component      Latest Ref Rng & Units 7/4/2018   Calcium Urine mg/dL      mg/dL 5.5   Calcium Urine g/24 h      0.10 - 0.30 g/24 h 0.09 (L)   Calcium Urine g/g Cr      g/g Cr 0.14   N-Telopeptide X-Link Urine      not reported 30   Creatinine Ur/Vol      mg/dL 39     DEXA:    BONE DENSITOMETRY  4/21/2015     AGE: 69 year old    RISK FACTORS: Post-menopausal, Height loss of 1.5 inches inches, Condition related to bone loss: inflammatory bowel disease  CURRENT TREATMENT: Calcium with Vitamin D    FINDINGS:  Lumbar Spine (L1-L4) T-score:  "-1.3  Left Femoral Neck T-score: N/A  Right Femoral Neck T-score: -2.3    Lumbar (L1-L4) BMD: 1.036 Previous: 1.063   Total Hip Mean BMD: 0.796 Previous: 0.859    Comparison is made to another DXA performed on the same Lunar Prodigy machine on 07/09/2012 and 11/13/2007.     LATERAL VERTEBRAL ASSESSMENT  Procedure:  Vertebral fracture assessment was performed in the lateral decubitus position using a Lunar Prodigy  densitometer.  Indications for VFA: T-score of -1.0 or worse, age (female>69) and height loss > 1.5\"  Confounding factors for VFA: Arthritis/degenerative disc disease, rib shadows and scapular shadows.  The LVA scan is interpretable from T8 to L3.     VFA Findings: Using the semi-quantitative analysis of Romel there was evidence of no spinal deformity  VFA Impression: Kahlil Caputo has no vertebral fractures identified on the VFA.          IMPRESSION  Osteopenia (low bone mass)    BONE DENSITOMETRY  5/24/2018       AGE:  72 year old                                   RISK FACTORS:  Post-menopausal, Fracture of wrist     CURRENT TREATMENT:  Calcium with Vitamin D      FINDINGS:               Lumbar Spine (L1-L4) T-score:  -1.4, degenerative changes present               Right Femoral Neck T-score:  -2.5               Forearm (radius 33%) T-score:  -0.4  The left femur is not acceptable for evaluation due to previous arthroplasty.                             Lumbar (L1-L4) BMD: 1.025                                                           Total Hip Mean BMD: 0.763                                                  Forearm (radius 33%) BM: 0.676        LATERAL VERTEBRAL ASSESSMENT  Procedure:  Vertebral fracture assessment was performed in the lateral decubitus position using a Lunar Prodigy  densitometer.  Indications for VFA: T-score of -1.0 or worse and age (female>69)  Confounding factors for VFA: rib shadows.  The LVA scan is interpretable from T9 to L5.     VFA Findings: Using the semi-quantitative " analysis of Genant there was evidence of no spinal deformity, however can not rule out fracture at T8.   VFA Impression: Kahlil Caputo has no definite vertebral fractures, one possible fracture identified on the VFA.   Further evaluation may be warranted due to equivocal fracture      IMPRESSION  Osteoporosis., Degenerative changes of the lumbar spine which may falsely elevate results. If a vertebral fracture is confirmed, the diagnosis would be severe osteoporosis.        All pertinent notes, labs, and images personally reviewed by me.     A/P  Ms.Annice Caputo is a 73 year old here for the evaluation of:    #1 Osteoporosis/Osteopenia.     Started Fosamax 70 mg weekly approximately 7/2018.  Tolerating Fosamax well.  Continue Fosamax.  Continue Vitamin D 2000 units per day and calcium 500-600 mg bid.  Plan to repeat bone turnover markers in one year.  Repeat Dexa scan in two years.    Risk factors for low bone density include personal history of fracture or family history, , advanced age, female, dementia, and poor health.  Also smoking, low BMI, Estrogen deficiency, low Ca intake, and alcoholism.  A prior history of vertebral fracture greatly increases the risk of subsequent fractures. A history of other medical diseases impacting on bone may also affect bone health.      The 24-hour urine calcium value, if low (< 50 mg/24 hour), would suggest the presence of vitamin D deficiency due to poor dietary intake or malabsorption. A low 24-hour urine calcium should be followed by a serum 25-hydroxyvitamin D level to test for possible vitamin D deficiency. The identification of vitamin D deficiency should prompt the search for possible occult malabsorption due sprue. Twenty-four hour urine calcium values over 300 mg should prompt an evaluation for possible causes of hypercalciuria.    Bone turnover markers can also be helpful in determining duration of therapy and compliance.  Osteocalcin is a bone formation  marker (serum) and N-telopeptide of collagen cross links (NTx) is found in the urine and is a bone resorption maker.    FRAX is a tool used to assess 10year risk of fracture.  Their FRAX risk is.   Low bone mass (T-score between -1.0 and -2.5 at the femoral neck or spine) and a 10-year probability of a hip fracture ? 3% or a 10-year probability of a major osteoporosis-related fracture ? 20% based on the [place]-adapted WHO algorithm warrens treatment.    The pt was advised to    Maintain an adequate calcium and vitamin D intake and to supplement vitamin D if needed to maintain serum levels of  25 hydroxy D (25 OH D) between 30-60 ng/ml.    Limit alcohol intake to no more than 2 servings per day.    Limit caffeine intake.    Maintain an active lifestyle including weight-bearing exercises for at least 30 mins daily.    Take measures to reduce the risk of falling.    Instructions on Bisphosphonate use and side effects - particularly esophageal adverse events - are carefully reviewed with her. This drug must be taken upon arising for the day on an empty stomach, with a large 6-8 ounce glass of water; she must remain NPO in the upright position for at least 30 minutes afterwards and until after the first food of the day. If esophageal irritation is noted,  the patient was advised to stop the medication and call my office.  Treatment with bisphosphonate therapy will decrease fracture risk 50-70%.   There is risk of osteonecrosis of the jaw in patients using bisphosphonates is approximately 1/1700-1/100,000, with development most likely related to invasive dental procedures. If an invasive dental procedure was necessary, preferably stop the bisphosphonate approximately 3 months prior to reduce the risk. Let your dentist know that you are on this medication.  The data available at this time suggests that there is probably a small increase risk of atypical (nontraumatic) subtrochanteric fractures of the femur in patients on  bisphosphonate therapy compared to those not on it. One large study suggested that for every 100 fractures prevented with bisphosphonate therapy, less than one femur fracture will occur. Other studies suggest one episode per 2,500 patient years. Patient should call with leg pain.      Labs ordered today:   No orders of the defined types were placed in this encounter.    Radiology/Consults ordered today: None    More than 50% of the time spent with Ms. Caputo on counseling / coordinating her care.  Total face to face time was 20 minutes.      Follow-up:  One year    Surekha Galicia NP  Endocrinology  [facility]  CC:       Again, thank you for allowing me to participate in the care of your patient.        Sincerely,        MALIK Trujillo CNP

## 2018-11-15 ENCOUNTER — TELEPHONE (OUTPATIENT)
Dept: FAMILY MEDICINE | Facility: CLINIC | Age: 73
End: 2018-11-15

## 2018-11-15 ENCOUNTER — TRANSFERRED RECORDS (OUTPATIENT)
Dept: HEALTH INFORMATION MANAGEMENT | Facility: CLINIC | Age: 73
End: 2018-11-15

## 2018-11-15 DIAGNOSIS — R91.8 PULMONARY NODULES: Primary | ICD-10-CM

## 2018-11-15 NOTE — TELEPHONE ENCOUNTER
Pt calls, due for annual low dose CT scan, wonders if SH can order or if needs visit, routed, inform pt when final, may LM at [phone] (home), previous CT says order IMG 1310? Cannot locate     Last office visit: 9/25/2018 with prescribing provider:     Future Office Visit:   Next 5 appointments (look out 90 days)     Dec 17, 2018  1:15 PM CST   SHORT with Susan Rachele Haase, APRN [facility] ([facility])    [address] [place] [id number] [phone]                 Reina Pena RN, BSN  Message handled by Nurse Triage.

## 2018-11-23 ENCOUNTER — HOSPITAL ENCOUNTER (OUTPATIENT)
Dept: CT IMAGING | Facility: CLINIC | Age: 73
Discharge: HOME | End: 2018-11-23
Attending: NURSE PRACTITIONER | Admitting: NURSE PRACTITIONER
Payer: COMMERCIAL

## 2018-11-23 DIAGNOSIS — R91.8 PULMONARY NODULES: ICD-10-CM

## 2018-11-23 PROCEDURE — G0297 LDCT FOR LUNG CA SCREEN: HCPCS

## 2018-11-25 NOTE — PROGRESS NOTES
Duy Guillory,  Your chest CT scan does not show any changes, results are the same as last year.    Sincerely,     KAR Pham

## 2018-12-05 DIAGNOSIS — R60.0 LOWER EXTREMITY EDEMA: ICD-10-CM

## 2018-12-05 DIAGNOSIS — E03.9 HYPOTHYROIDISM, UNSPECIFIED TYPE: ICD-10-CM

## 2018-12-06 RX ORDER — LEVOTHYROXINE SODIUM 150 UG/1
TABLET ORAL
Qty: 90 TABLET | Refills: 1 | OUTPATIENT
Start: 2018-12-06

## 2018-12-06 NOTE — TELEPHONE ENCOUNTER
Routing refill request to provider for review/approval because:  Labs out of range:    Hannah KumariRN BSN  [facility]  [phone]

## 2018-12-06 NOTE — TELEPHONE ENCOUNTER
"Requested Prescriptions   Pending Prescriptions Disp Refills     levothyroxine (SYNTHROID/LEVOTHROID) 150 MCG tablet [Pharmacy Med Name: Levothyroxine Sodium Oral Tablet 150 MCG] 90 tablet 1    Last Written Prescription Date:  10/18/18  Last Fill Quantity: 90,  # refills: 3   Last Office Visit: 9/25/2018 Haase       Return in about 3 months (around 12/25/2018) for Routine Visit.   Future Office Visit:    Next 5 appointments (look out 90 days)     Dec 17, 2018  1:15 PM CST   SHORT with Susan Rachele Haase, APRN CNP   [facility] ([facility]    [address] [place] [id number] [phone]                  Sig: TAKE 1 TABLET EVERY MORNING    Thyroid Protocol Passed    12/5/2018  6:51 PM       Passed - Patient is 12 years or older       Passed - Recent (12 mo) or future (30 days) visit within the authorizing provider's specialty    Patient had office visit in the last 12 months or has a visit in the next 30 days with authorizing provider or within the authorizing provider's specialty.  See \"Patient Info\" tab in inbasket, or \"Choose Columns\" in Meds & Orders section of the refill encounter.             Passed - Normal TSH on file in past 12 months    Recent Labs   Lab Test  10/17/18   0934   TSH  3.18             Passed - No active pregnancy on record    If patient is pregnant or has had a positive pregnancy test, please check TSH.         Passed - No positive pregnancy test in past 12 months    If patient is pregnant or has had a positive pregnancy test, please check TSH.     _________________________________________________________________________________________________________________________       furosemide (LASIX) 20 MG tablet [Pharmacy Med Name: Furosemide Oral Tablet 20 MG] 180 tablet 0    Last Written Prescription Date:  9/7/18  Last Fill Quantity: 180,  # refills: 0   Last Office Visit: 9/25/2018   Future Office Visit:    Next 5 appointments (look " "out 90 days)     Dec 17, 2018  1:15 PM CST   SHORT with Susan Rachele Haase, APRN CNP   [facility]    [address] [place] [id number] [phone]                  Sig: TAKE ONE OR TWO TABLETS BY MOUTH DAILY    Diuretics (Including Combos) Protocol Failed    12/5/2018  6:51 PM       Failed - Normal serum potassium on file in past 12 months    Recent Labs   Lab Test  10/17/18   0934   POTASSIUM  3.3*                   Passed - Blood pressure under 140/90 in past 12 months    BP Readings from Last 3 Encounters:   10/24/18 110/70   09/25/18 120/70   06/29/18 130/81                Passed - Recent (12 mo) or future (30 days) visit within the authorizing provider's specialty    Patient had office visit in the last 12 months or has a visit in the next 30 days with authorizing provider or within the authorizing provider's specialty.  See \"Patient Info\" tab in inbasket, or \"Choose Columns\" in Meds & Orders section of the refill encounter.             Passed - Patient is age 18 or older       Passed - No active pregancy on record       Passed - Normal serum creatinine on file in past 12 months    Recent Labs   Lab Test  10/17/18   0934   CR  0.62             Passed - Normal serum sodium on file in past 12 months    Recent Labs   Lab Test  10/17/18   0934   NA  134             Passed - No positive pregnancy test in past 12 months          "

## 2018-12-07 RX ORDER — FUROSEMIDE 20 MG
TABLET ORAL
Qty: 180 TABLET | Refills: 0 | Status: SHIPPED | OUTPATIENT
Start: 2018-12-07 | End: 2019-03-05

## 2018-12-11 RX ORDER — LEVOTHYROXINE SODIUM 150 UG/1
150 TABLET ORAL EVERY MORNING
Qty: 90 TABLET | Refills: 2 | Status: SHIPPED | OUTPATIENT
Start: 2018-12-11 | End: 2019-08-09

## 2018-12-11 NOTE — TELEPHONE ENCOUNTER
Patient calling regarding thyroid refill.  Labs normal below.  Refill not sent and was just put on med list as historical.  Refill sent.  Mera Motta RN    Viewed by Kahlil Caputo on 10/17/2018  9:05 PM   Written by Haase, Susan Rachele, APRN CNP on 10/17/2018  8:41 PM   Duy Guillory,   Your lab results are as below:   1)  TSH (thyroid level) 3.18 which is normal (range 0.4-4), continue on your dose of levothyroxine, a refill has been placed.   2)  Vitamin D level is normal at 30.  Continue on your daily dose of vitamin D.   3)  Glucose is normal at 78 (normal fasting is <100).   4)  Potassium is slightly low at 3.3, increase your intake of potassium rich foods.     If you have any questions do not hesitate to call the clinic to discuss the results with me further.     Sincerely,     Susan Haase, KAR

## 2018-12-17 ENCOUNTER — OFFICE VISIT (OUTPATIENT)
Dept: FAMILY MEDICINE | Facility: CLINIC | Age: 73
End: 2018-12-17
Payer: COMMERCIAL

## 2018-12-17 VITALS
HEART RATE: 96 BPM | SYSTOLIC BLOOD PRESSURE: 120 MMHG | DIASTOLIC BLOOD PRESSURE: 76 MMHG | OXYGEN SATURATION: 98 % | BODY MASS INDEX: 24.86 KG/M2 | WEIGHT: 154 LBS | RESPIRATION RATE: 14 BRPM | TEMPERATURE: 98.1 F

## 2018-12-17 DIAGNOSIS — G89.4 CHRONIC PAIN SYNDROME: ICD-10-CM

## 2018-12-17 PROCEDURE — 99213 OFFICE O/P EST LOW 20 MIN: CPT | Performed by: NURSE PRACTITIONER

## 2018-12-17 RX ORDER — HYDROCODONE BITARTRATE AND ACETAMINOPHEN 5; 325 MG/1; MG/1
1-2 TABLET ORAL DAILY PRN
Qty: 60 TABLET | Refills: 0 | Status: SHIPPED | OUTPATIENT
Start: 2018-12-31 | End: 2018-12-17

## 2018-12-17 RX ORDER — HYDROCODONE BITARTRATE AND ACETAMINOPHEN 5; 325 MG/1; MG/1
1-2 TABLET ORAL DAILY PRN
Qty: 60 TABLET | Refills: 0 | Status: SHIPPED | OUTPATIENT
Start: 2019-02-28 | End: 2019-03-15

## 2018-12-17 RX ORDER — HYDROCODONE BITARTRATE AND ACETAMINOPHEN 5; 325 MG/1; MG/1
1-2 TABLET ORAL DAILY PRN
Qty: 60 TABLET | Refills: 0 | Status: SHIPPED | OUTPATIENT
Start: 2019-12-30 | End: 2018-12-17

## 2018-12-17 RX ORDER — HYDROCODONE BITARTRATE AND ACETAMINOPHEN 5; 325 MG/1; MG/1
1-2 TABLET ORAL DAILY PRN
Qty: 60 TABLET | Refills: 0 | Status: SHIPPED | OUTPATIENT
Start: 2019-01-30 | End: 2018-12-17

## 2018-12-17 NOTE — LETTER
Opioid / Opioid Plus Controlled Substance Agreement    I understand that my care provider has prescribed an opioid (narcotic) controlled substance to help manage my condition(s). I am taking this medicine to help me function or work. I know this is strong medicine, and that it can cause serious side effects. Opioid medicine can be sedating, addicting and may cause a dependency on the drug. They can affect my ability to drive or think, and cause depression. They need to be taken exactly as prescribed. Combining opioids with certain medicines or chemicals (such as cocaine, sedatives and tranquilizers, sleeping pills, meth) can be dangerous or even fatal. Also, if I stop opioids suddenly, I may have severe withdrawal symptoms. Last, I understand that opioids do not work for all types of pain nor for all patients. If not helpful, I may be asked to stop them.  The risks, benefits, and side effects of these medicine(s) were explained to me. I agree that:    1. I will take part in other treatments as advised by my care team. This may be psychiatry or counseling, physical therapy, behavioral therapy, group treatment or a referral to a pain clinic. I will reduce or stop my medicine when my care team tells me to do so.  2. I will take my medicines as prescribed. I will not change the dose or schedule unless my care team tells me to. There will be no refills if I  run out early.   I may be contactedwithout warning and asked to complete a urine drug test or pill count at any time.   3. I will keep all my appointments, and understand this is part of the monitoring of opioids. My care team may require an office visit for EVERY opioid/controlled substance refill. If I miss appointments or don t follow instructions, my care team may stop my medicine.  4. I will not ask other providers to prescribe controlled substances, and I will not accept controlled substances from other people. If I need another prescribed controlled  substance for a new reason, I will tell my care team within 1 business day.  5. I will use one pharmacy to fill all of my controlled substance prescriptions, and it is up to me to make sure that I do not run out of my medicines on weekends or holidays. If my care team is willing to refill my opioid prescription without a visit, I must request refills only during office hours, refills may take up to 3 days to process, and it may take up to 5 to 7 days for my medicine to be mailed and ready at my pharmacy. Prescriptions will not be mailed anywhere except my pharmacy.    6. I am responsible for my prescriptions. If the medicine/prescription is lost or stolen, it will not be replaced. I also agree not to share controlled substance medicines with anyone.  7. I agree to not use ANY illegal or recreational drugs. This includes marijuana, cocaine, bath salts or other drugs. I agree not to use alcohol unless my care team says I may.          I agree to give urine samples whenever asked. If I don t give a urine sample, the care team may stop my medicine.    8. If I enroll in the [place] Medical Marijuana program, I will tell my care team. I will also sign an agreement to share my medical records with my care team.   9. I will bring in my list of medicines (or my medicine bottles) each time I come to the clinic.   10. I will tell my care team right away if I become pregnant or have a new medical problem treated outside of my regular clinic.  11. I understand that this medicine can affect my thinking and judgment. It may be unsafe for me to drive, use machinery and do dangerous tasks. I will not do any of these things until I know how the medicine affects me. If my dose changes, I will wait to see how it affects me. I will contact my care team if I have concerns about medicine side effects.    I understand that if I do not follow any of the conditions above, my prescriptions or treatment may be stopped.      I agree that my  provider, clinic care team, and pharmacy may work with any city, state or federal law enforcement agency that investigates the misuse, sale, or other diversion of my controlled medicine. I will allow my provider to discuss my care with or share a copy of this agreement with any other treating provider, pharmacy or emergency room where I receive care. I agree to give up (waive) any right of privacy or confidentiality with respect to these consents.     I have read this agreement and have asked questions about anything I did not understand.      ____________________________________________________    ____________  ________  Patient signature                                                         Date      Time    ____________________________________________________     ____________  ________  Witness                                                           Date      Time    ____________________________________________________  Provider signature

## 2018-12-17 NOTE — PROGRESS NOTES
SUBJECTIVE:   Kahlil Caputo is a 73 year old female who presents to clinic today for the following health issues:    Chronic Pain Follow-Up     Analgesia/pain control:       Recent changes:  worse      Overall control: Tolerable with discomfort  Activity level/function:      Daily activities:  Able to do moderate activities    Work:  Not applicable  Adverse effects:  No  Adherance    Taking medication as directed?  Yes    Participating in other treatments: yes  Risk Factors:    Sleep:  Fair    Mood/anxiety:  controlled    Recent family or social stressors:  none noted    Other aggravating factors: none  PHQ-9 SCORE 11/8/2016 1/31/2017 1/24/2018   PHQ-9 Total Score 2 3 0     JOSELINE-7 SCORE 11/16/2015 11/8/2016 1/24/2018   Total Score 0 0 0     Encounter-Level CSA - 10/16/2015:    Controlled Substance Agreement - Scan on 10/19/2015  9:47 AM: CONTROLLED SUBSTANCE AGREEMENT 10-16-15 (below)       Patient-Level CSA:    There are no patient-level csa.         Amount of exercise or physical activity: 4 days a week    Problems taking medications regularly: No    Medication side effects: none    Diet: regular (no restrictions)    Woke up with bilateral hip (left greater then right), low back, and bilateral wrist pain today. She states it is worse than normal. Today she rates her pain 6/10. Took Tylenol and walked at the [facility] in hopes to relieve symptoms, but didn't work. Activity usually relieves pain. She refuses to take hydrocodone during the day, only takes prior to bed.     Cough: Irregular. Probable to weather change.     Problem list and histories reviewed & adjusted, as indicated.  Additional history: as documented    Patient Active Problem List   Diagnosis     Hypothyroidism     Chronic airway obstruction (H)     Disorder of bone and cartilage     Small bowel obstruction (H)     Stricture of small intestine (H)     S/P Left total hip arthroplasty 11/25/13     Osteoarthritis of hip     Ileitis (ileocecal resection  2013)     Lumbago     Tricuspid regurgitation     Palpitations     PVC's (premature ventricular contractions)     Family history of premature CAD     Chronic pain     Pulmonary nodules     ACP (advance care planning)     Coronary artery disease involving native coronary artery of native heart without angina pectoris     Closed fracture of distal end of left radius with routine healing, unspecified fracture morphology, subsequent encounter     Age-related osteoporosis with current pathological fracture with routine healing     Statin intolerance     PAD (peripheral artery disease) (H)     Aspirin sensitivity     Sacroiliitis (H)     Past Surgical History:   Procedure Laterality Date     ARTHROPLASTY HIP  2013    Procedure: ARTHROPLASTY HIP;  Left Total Hip Arthroplasty  ;  Surgeon: Luis Marshall MD;  Location: [facility] OR     C NONSPECIFIC PROCEDURE      D& C X 2     C NONSPECIFIC PROCEDURE      PILONIDAL CYSTECTOMY     C NONSPECIFIC PROCEDURE      T&A     C NONSPECIFIC PROCEDURE      BREAST BX & CERVICAL POLYP     HC COLONOSCOPY THRU STOMA, DIAGNOSTIC           LAPAROTOMY EXPLORATORY  2013    Procedure: LAPAROTOMY EXPLORATORY;  LAPAROTOMY EXPLORATORY, Ileocecal Resection Resection, Removal of Retained Pill Cam;  Surgeon: Mitchell Fraser MD;  Location: [facility] OR     RESECTION ILEOCECAL  2013    Procedure: RESECTION ILEOCECAL;;  Surgeon: Mitchell Fraser MD;  Location: [facility] OR       Social History     Tobacco Use     Smoking status: Former Smoker     Packs/day: 1.00     Years: 50.00     Pack years: 50.00     Types: Cigarettes     Last attempt to quit: 2010     Years since quittin.5     Smokeless tobacco: Never Used     Tobacco comment: Quit 2010   Substance Use Topics     Alcohol use: Yes     Alcohol/week: 0.0 oz     Comment: 1 glass wine occas.     Family History   Problem Relation Age of Onset     Alcohol/Drug Mother      Heart Disease Mother          cardiomyopathy     Alcohol/Drug Father      Heart Disease Father         CAD -  1st MI mid 50's     Myocardial Infarction Father         X4     Osteoporosis Maternal Grandmother      Cancer Maternal Grandfather         stomach or throat - martini drinker/pipe smoker     Myocardial Infarction Brother      Breast Cancer No family hx of      Cancer - colorectal No family hx of          Current Outpatient Medications   Medication Sig Dispense Refill     ACE/ARB NOT PRESCRIBED, INTENTIONAL, Please choose reason not prescribed, below       acetaminophen (TYLENOL) 500 MG tablet Take 500-1,000 mg by mouth as needed for mild pain       alendronate (FOSAMAX) 70 MG tablet Take 1 tablet (70 mg) by mouth with 8oz water every 7 days 30 minutes before breakfast and remain upright during this time. 12 tablet 1     ascorbic acid (VITAMIN C) 500 MG tablet Take by mouth 2 times daily       ASPIRIN NOT PRESCRIBED (INTENTIONAL) Please choose reason not prescribed, below 0 each 0     BETA BLOCKER NOT PRESCRIBED, INTENTIONAL, Beta Blocker not prescribed intentionally due to COPD  0     betaxolol (BETOPTIC) 0.5 % ophthalmic solution INSTILL 1 DROP INTO EACH EYE QD  3     calcium carbonate 500 MG tablet Take 2 tablets by mouth 2 times daily  100 tablet 3     chlorpheniramine (CHLOR-TRIMETON) 4 MG tablet Take 4 mg by mouth every 6 hours as needed for allergies or rhinitis       Cholecalciferol (VITAMIN D) 1000 UNITS capsule Take 2,000 Units by mouth daily        clopidogrel (PLAVIX) 75 MG tablet Take 1 tablet (75 mg) by mouth daily 90 tablet 3     furosemide (LASIX) 20 MG tablet TAKE ONE OR TWO TABLETS BY MOUTH DAILY  180 tablet 0     HYDROcodone-acetaminophen (NORCO) 5-325 MG per tablet Take 1-2 tablets by mouth daily as needed for moderate to severe pain Maximum of 2 tablets per day. 60 tablet 0     levothyroxine (SYNTHROID/LEVOTHROID) 150 MCG tablet Take 1 tablet (150 mcg) by mouth every morning 90 tablet 2     melatonin 3 MG tablet Take  "1-2 tablets (3-6 mg) by mouth nightly as needed for sleep (OTC)       Multiple Vitamin (MULTI-VITAMIN PO) Take by mouth daily        STATIN NOT PRESCRIBED, INTENTIONAL, 1 each See Admin Instructions Statin not prescribed intentionally due to Allergy to statin 0 each 0     vitamin E 400 UNIT capsule Take by mouth daily       Allergies   Allergen Reactions     Latex Difficulty breathing     sensativity - cough, eyes water     Nsaids Shortness Of Breath and Other (See Comments)     bronchospams     Aspirin Difficulty breathing     tight cough     Codeine GI Disturbance     upset GI     Pravastatin      Myalgias     Simvastatin      Myalgias     Tramadol      Heart burn,  Felt \"clammy\", unable to pass gas, unable to urinate and had severe nausea       Reviewed and updated as needed this visit by clinical staff  Tobacco  Allergies  Meds  Med Hx  Surg Hx  Fam Hx  Soc Hx      Reviewed and updated as needed this visit by Provider         ROS:  Constitutional, HEENT, cardiovascular, pulmonary,  musculoskeletal, neuro, and psych systems are negative, except as otherwise noted.    This document serves as a record of the services and decisions personally performed and made by Susan Haase, CNP. It was created on her behalf by Helene Shen, a trained medical scribe. The creation of this document is based on the provider's statements to the medical scribe.  Helene Shen 1:33 PM December 17, 2018  OBJECTIVE:   /76 (BP Location: Left arm, Patient Position: Chair, Cuff Size: Adult Regular)   Pulse 96   Temp 98.1  F (36.7  C) (Oral)   Resp 14   Wt 69.9 kg (154 lb)   SpO2 98%   BMI 24.86 kg/m    Body mass index is 24.86 kg/m .  GENERAL: healthy, alert and no distress  HENT: ear canals and TM's normal, nose and mouth without ulcers or lesions  NECK: no adenopathy, no asymmetry, masses, or scars and thyroid normal to palpation  RESP: lungs clear to auscultation - no rales, rhonchi or wheezes  CV: regular rate and rhythm, " normal S1 S2, no S3 or S4, no murmur, click or rub, no peripheral edema  MS: no gross musculoskeletal defects noted, no edema  PSYCH: mentation appears normal, affect normal/bright    ASSESSMENT/PLAN:   Kahlil was seen today for recheck medication.    Diagnoses and all orders for this visit:  Kahlil was seen today for recheck medication.    Diagnoses and all orders for this visit:    Chronic pain syndrome: moderate control, will refill below for 3 months  -     Discontinue: HYDROcodone-acetaminophen (NORCO) 5-325 MG tablet; Take 1-2 tablets by mouth daily as needed for moderate to severe pain Maximum of 2 tablets per day.  -     Discontinue: HYDROcodone-acetaminophen (NORCO) 5-325 MG tablet; Take 1-2 tablets by mouth daily as needed for moderate to severe pain Maximum of 2 tablets per day.  -     Discontinue: HYDROcodone-acetaminophen (NORCO) 5-325 MG tablet; Take 1-2 tablets by mouth daily as needed for moderate to severe pain Maximum of 2 tablets per day.  -     HYDROcodone-acetaminophen (NORCO) 5-325 MG tablet; Take 1-2 tablets by mouth daily as needed for moderate to severe pain Maximum of 2 tablets per day.      Follow up visit in 3 months for medication check, sooner as needed.    The information in this document, created by the medical scribe for me, accurately reflects the services I personally performed and the decisions made by me. I have reviewed and approved this document for accuracy prior to leaving the patient care area.  December 17, 2018 1:34 PM  Susan Haase, APRN [facility]

## 2018-12-26 ENCOUNTER — MYC REFILL (OUTPATIENT)
Dept: ENDOCRINOLOGY | Facility: CLINIC | Age: 73
End: 2018-12-26

## 2018-12-26 DIAGNOSIS — M80.00XD AGE-RELATED OSTEOPOROSIS WITH CURRENT PATHOLOGICAL FRACTURE WITH ROUTINE HEALING: ICD-10-CM

## 2018-12-28 RX ORDER — ALENDRONATE SODIUM 70 MG/1
TABLET ORAL
Qty: 12 TABLET | Refills: 1 | Status: SHIPPED | OUTPATIENT
Start: 2018-12-28 | End: 2019-06-10

## 2018-12-31 RX ORDER — ALENDRONATE SODIUM 70 MG/1
TABLET ORAL
Qty: 12 TABLET | Refills: 1 | OUTPATIENT
Start: 2018-12-31

## 2018-12-31 NOTE — TELEPHONE ENCOUNTER
"Requested Prescriptions   Pending Prescriptions Disp Refills     alendronate (FOSAMAX) 70 MG tablet 12 tablet 1    Last Written Prescription Date:  12/28/18  Last Fill Quantity: 12,  # refills: 1   Last office visit: 10/24/2018 with prescribing provider:  yes   Future Office Visit:     Sig: Take 1 tablet (70 mg) by mouth with 8oz water every 7 days 30 minutes before breakfast and remain upright during this time.    Bisphosphonates Passed - 12/26/2018  8:52 PM       Passed - Recent (12 mo) or future (30 days) visit within the authorizing provider's specialty    Patient had office visit in the last 12 months or has a visit in the next 30 days with authorizing provider or within the authorizing provider's specialty.  See \"Patient Info\" tab in inbasket, or \"Choose Columns\" in Meds & Orders section of the refill encounter.             Passed - Dexa on file within past 2 years    Please review last Dexa result.          Passed - Patient is age 18 or older       Passed - Normal serum creatinine on file within past 12 months    Recent Labs   Lab Test 10/17/18  0934   CR 0.62               "

## 2019-02-27 ENCOUNTER — TRANSFERRED RECORDS (OUTPATIENT)
Dept: HEALTH INFORMATION MANAGEMENT | Facility: CLINIC | Age: 74
End: 2019-02-27

## 2019-03-05 DIAGNOSIS — R60.0 LOWER EXTREMITY EDEMA: ICD-10-CM

## 2019-03-06 ENCOUNTER — TRANSFERRED RECORDS (OUTPATIENT)
Dept: HEALTH INFORMATION MANAGEMENT | Facility: CLINIC | Age: 74
End: 2019-03-06

## 2019-03-06 NOTE — TELEPHONE ENCOUNTER
"Requested Prescriptions   Pending Prescriptions Disp Refills     furosemide (LASIX) 20 MG tablet [Pharmacy Med Name: Furosemide Oral Tablet 20 MG]  Last Written Prescription Date:  12/7/2018  Last Fill Quantity: 180 tablet,  # refills: 0   Last office visit: 12/17/2018 with prescribing provider:  Haase   Future Office Visit:     180 tablet 0     Sig: TAKE 1 TO 2 TABLETS BY MOUTH DAILY    Diuretics (Including Combos) Protocol Failed - 3/5/2019  6:12 PM       Failed - Normal serum potassium on file in past 12 months    Recent Labs   Lab Test 10/17/18  0934   POTASSIUM 3.3*                   Passed - Blood pressure under 140/90 in past 12 months    BP Readings from Last 3 Encounters:   12/17/18 120/76   10/24/18 110/70   09/25/18 120/70                Passed - Recent (12 mo) or future (30 days) visit within the authorizing provider's specialty    Patient had office visit in the last 12 months or has a visit in the next 30 days with authorizing provider or within the authorizing provider's specialty.  See \"Patient Info\" tab in inbasket, or \"Choose Columns\" in Meds & Orders section of the refill encounter.             Passed - Medication is active on med list       Passed - Patient is age 18 or older       Passed - No active pregancy on record       Passed - Normal serum creatinine on file in past 12 months    Recent Labs   Lab Test 10/17/18  0934   CR 0.62             Passed - Normal serum sodium on file in past 12 months    Recent Labs   Lab Test 10/17/18  0934                Passed - No positive pregnancy test in past 12 months          "

## 2019-03-07 RX ORDER — FUROSEMIDE 20 MG
TABLET ORAL
Qty: 180 TABLET | Refills: 1 | Status: SHIPPED | OUTPATIENT
Start: 2019-03-07 | End: 2019-08-09

## 2019-03-07 NOTE — TELEPHONE ENCOUNTER
Routing refill request to provider to review approval because:  Do you want K+ recheck?  Reina Pena RN, BSN  Message handled by Nurse Triage.

## 2019-03-14 ENCOUNTER — TRANSFERRED RECORDS (OUTPATIENT)
Dept: HEALTH INFORMATION MANAGEMENT | Facility: CLINIC | Age: 74
End: 2019-03-14

## 2019-03-15 ENCOUNTER — OFFICE VISIT (OUTPATIENT)
Dept: FAMILY MEDICINE | Facility: CLINIC | Age: 74
End: 2019-03-15
Payer: COMMERCIAL

## 2019-03-15 VITALS
WEIGHT: 154 LBS | DIASTOLIC BLOOD PRESSURE: 76 MMHG | RESPIRATION RATE: 18 BRPM | SYSTOLIC BLOOD PRESSURE: 124 MMHG | HEIGHT: 66 IN | TEMPERATURE: 97.4 F | BODY MASS INDEX: 24.75 KG/M2 | HEART RATE: 92 BPM

## 2019-03-15 DIAGNOSIS — S82.832D CLOSED FRACTURE OF DISTAL END OF LEFT FIBULA WITH ROUTINE HEALING, UNSPECIFIED FRACTURE MORPHOLOGY, SUBSEQUENT ENCOUNTER: Primary | ICD-10-CM

## 2019-03-15 DIAGNOSIS — G89.4 CHRONIC PAIN SYNDROME: ICD-10-CM

## 2019-03-15 DIAGNOSIS — I49.3 PVC'S (PREMATURE VENTRICULAR CONTRACTIONS): ICD-10-CM

## 2019-03-15 LAB
ALBUMIN SERPL-MCNC: 3.8 G/DL (ref 3.4–5)
ALP SERPL-CCNC: 88 U/L (ref 40–150)
ALT SERPL W P-5'-P-CCNC: 19 U/L (ref 0–50)
ANION GAP SERPL CALCULATED.3IONS-SCNC: 8 MMOL/L (ref 3–14)
AST SERPL W P-5'-P-CCNC: 14 U/L (ref 0–45)
BASOPHILS # BLD AUTO: 0 10E9/L (ref 0–0.2)
BASOPHILS NFR BLD AUTO: 0.3 %
BILIRUB SERPL-MCNC: 0.7 MG/DL (ref 0.2–1.3)
BUN SERPL-MCNC: 14 MG/DL (ref 7–30)
CALCIUM SERPL-MCNC: 8.8 MG/DL (ref 8.5–10.1)
CHLORIDE SERPL-SCNC: 102 MMOL/L (ref 94–109)
CO2 SERPL-SCNC: 26 MMOL/L (ref 20–32)
CREAT SERPL-MCNC: 0.6 MG/DL (ref 0.52–1.04)
DIFFERENTIAL METHOD BLD: NORMAL
EOSINOPHIL # BLD AUTO: 0.2 10E9/L (ref 0–0.7)
EOSINOPHIL NFR BLD AUTO: 3.1 %
ERYTHROCYTE [DISTWIDTH] IN BLOOD BY AUTOMATED COUNT: 12.8 % (ref 10–15)
GFR SERPL CREATININE-BSD FRML MDRD: 90 ML/MIN/{1.73_M2}
GLUCOSE SERPL-MCNC: 88 MG/DL (ref 70–99)
HCT VFR BLD AUTO: 40.7 % (ref 35–47)
HGB BLD-MCNC: 13.5 G/DL (ref 11.7–15.7)
LYMPHOCYTES # BLD AUTO: 1.4 10E9/L (ref 0.8–5.3)
LYMPHOCYTES NFR BLD AUTO: 22.3 %
MCH RBC QN AUTO: 29.2 PG (ref 26.5–33)
MCHC RBC AUTO-ENTMCNC: 33.2 G/DL (ref 31.5–36.5)
MCV RBC AUTO: 88 FL (ref 78–100)
MONOCYTES # BLD AUTO: 0.6 10E9/L (ref 0–1.3)
MONOCYTES NFR BLD AUTO: 9.8 %
NEUTROPHILS # BLD AUTO: 4.2 10E9/L (ref 1.6–8.3)
NEUTROPHILS NFR BLD AUTO: 64.5 %
PLATELET # BLD AUTO: 344 10E9/L (ref 150–450)
POTASSIUM SERPL-SCNC: 4.3 MMOL/L (ref 3.4–5.3)
PROT SERPL-MCNC: 7.1 G/DL (ref 6.8–8.8)
RBC # BLD AUTO: 4.62 10E12/L (ref 3.8–5.2)
SODIUM SERPL-SCNC: 136 MMOL/L (ref 133–144)
WBC # BLD AUTO: 6.5 10E9/L (ref 4–11)

## 2019-03-15 PROCEDURE — 80053 COMPREHEN METABOLIC PANEL: CPT | Performed by: NURSE PRACTITIONER

## 2019-03-15 PROCEDURE — 99214 OFFICE O/P EST MOD 30 MIN: CPT | Performed by: NURSE PRACTITIONER

## 2019-03-15 PROCEDURE — 36415 COLL VENOUS BLD VENIPUNCTURE: CPT | Performed by: NURSE PRACTITIONER

## 2019-03-15 PROCEDURE — 85025 COMPLETE CBC W/AUTO DIFF WBC: CPT | Performed by: NURSE PRACTITIONER

## 2019-03-15 RX ORDER — HYDROCODONE BITARTRATE AND ACETAMINOPHEN 5; 325 MG/1; MG/1
1-2 TABLET ORAL 2 TIMES DAILY PRN
Qty: 120 TABLET | Refills: 0 | Status: SHIPPED | OUTPATIENT
Start: 2019-03-25 | End: 2019-04-29

## 2019-03-15 RX ORDER — HYDROCODONE BITARTRATE AND ACETAMINOPHEN 5; 325 MG/1; MG/1
1-2 TABLET ORAL 2 TIMES DAILY PRN
Qty: 120 TABLET | Refills: 0 | Status: SHIPPED | OUTPATIENT
Start: 2019-03-15 | End: 2019-03-15

## 2019-03-15 ASSESSMENT — MIFFLIN-ST. JEOR: SCORE: 1215.29

## 2019-03-15 NOTE — PROGRESS NOTES
SUBJECTIVE:   Kahlil Caputo is a 73 year old female who presents to clinic today for the following health issues:      HPI    Problem list and histories reviewed & adjusted, as indicated.  Additional history: as documented    Fibula fracture: On 2/27 Kahlil missed a step in her home and fell fracturing her left distal fibula, she was [facility] at [facility], is currently wearing knee high walking boot, currently minimal weight bearing using a cane. She is taking hydrocodone 3-4 tablets per day.  Rates pain 4 at rest and 7-8 with walking. Denies numbness or tingling.      Chronic pain:  Chronic pain contract, usually takes 2 hydrocodone per day, since fibula fracture has been taking 3-4 tablets per day.  She notes that she had constipation but has been using a laxative that has helped.      Potassium Levels: last potassium level was 3.3 in 10/2018,  reports that due to her low potassium levels she has been coconut water  and eating bananas.     Cough: Patient notes that due to her recent injury, she has not been able to move as much and her cough has become more present. Denies shortness of breath, chest pain or wheezing.     Current Outpatient Medications   Medication Sig Dispense Refill     ACE/ARB NOT PRESCRIBED, INTENTIONAL, Please choose reason not prescribed, below       acetaminophen (TYLENOL) 500 MG tablet Take 500-1,000 mg by mouth as needed for mild pain       alendronate (FOSAMAX) 70 MG tablet TAKE 1 TABLET EVERY 7 DAYS WITH 8OZ OF WATER. TAKE 30MIN BEFORE BREAKFAST AND STAY UPRIGHT DURING THIS TIME. 12 tablet 1     ascorbic acid (VITAMIN C) 500 MG tablet Take by mouth 2 times daily       ASPIRIN NOT PRESCRIBED (INTENTIONAL) Please choose reason not prescribed, below 0 each 0     BETA BLOCKER NOT PRESCRIBED, INTENTIONAL, Beta Blocker not prescribed intentionally due to COPD  0     betaxolol (BETOPTIC) 0.5 % ophthalmic solution INSTILL 1 DROP INTO EACH EYE QD  3     calcium carbonate 500 MG tablet Take 2 tablets  "by mouth 2 times daily  100 tablet 3     chlorpheniramine (CHLOR-TRIMETON) 4 MG tablet Take 4 mg by mouth every 6 hours as needed for allergies or rhinitis       Cholecalciferol (VITAMIN D) 1000 UNITS capsule Take 2,000 Units by mouth daily        clopidogrel (PLAVIX) 75 MG tablet Take 1 tablet (75 mg) by mouth daily 90 tablet 3     furosemide (LASIX) 20 MG tablet TAKE 1 TO 2 TABLETS BY MOUTH DAILY 180 tablet 1     HYDROcodone-acetaminophen (NORCO) 5-325 MG tablet Take 1-2 tablets by mouth daily as needed for moderate to severe pain Maximum of 2 tablets per day. 60 tablet 0     levothyroxine (SYNTHROID/LEVOTHROID) 150 MCG tablet Take 1 tablet (150 mcg) by mouth every morning 90 tablet 2     melatonin 3 MG tablet Take 1-2 tablets (3-6 mg) by mouth nightly as needed for sleep (OTC)       Multiple Vitamin (MULTI-VITAMIN PO) Take by mouth daily        STATIN NOT PRESCRIBED, INTENTIONAL, 1 each See Admin Instructions Statin not prescribed intentionally due to Allergy to statin 0 each 0     vitamin E 400 UNIT capsule Take by mouth daily       Allergies   Allergen Reactions     Latex Difficulty breathing     sensativity - cough, eyes water     Nsaids Shortness Of Breath and Other (See Comments)     bronchospams     Aspirin Difficulty breathing     tight cough     Codeine GI Disturbance     upset GI     Pravastatin      Myalgias     Simvastatin      Myalgias     Tramadol      Heart burn,  Felt \"clammy\", unable to pass gas, unable to urinate and had severe nausea       ROS:  Constitutional, cardiovascular, pulmonary, GI, MS, Neuro, psych systems are negative, except as otherwise noted.    This document serves as a record of the services and decisions personally performed and made by Susan Haase,APRN CNP. It was created on his behalf by Laurent Asif, a trained medical scribe. The creation of this document is based on the provider's statements to the medical scribe.  Laurent Asif March 15, 2019 11:03 AM      OBJECTIVE: " "    /76 (BP Location: Right arm, Patient Position: Chair, Cuff Size: Adult Large)   Pulse 92   Temp 97.4  F (36.3  C) (Oral)   Resp 18   Ht 1.676 m (5' 6\")   Wt 69.9 kg (154 lb)   Breastfeeding? No   BMI 24.86 kg/m    Body mass index is 24.86 kg/m .  GENERAL: healthy, alert   RESP: lungs clear to auscultation - no rales, rhonchi or wheezes  CV: regular rate and rhythm, normal S1 S2, no S3 or S4, no murmur, click or rub, no peripheral edema   MS: left foot in a knee high walking boot, CMS intact to LLE.  PSYCH: mentation appears normal, affect normal/bright    ASSESSMENT/PLAN:   Kahlil was seen today for rule out labor.    Diagnoses and all orders for this visit:    Closed fracture of distal end of left fibula with routine healing, unspecified fracture morphology, subsequent encounter: followed by [facility].  -     CBC with platelets differential    Chronic pain syndrome: due to recent fibula fracture will increase norco to 3-4 tablets per day for the next month, will send a message to me via [email or web address] in 1 month to let me know how her pain is plan is to decrease down next month.   -       HYDROcodone-acetaminophen (NORCO) 5-325 MG tablet; Take 1-2 tablets by mouth 2 times daily as needed for moderate to severe pain Maximum of 4 tablets per day.  -     CBC with platelets differential  -     Comprehensive metabolic panel    PVC's (premature ventricular contractions):well controlled.      FOLLOW-UP: In 3 months, sooner as needed.    The information in this document, created by the medical scribe for me, accurately reflects the services I personally performed and the decisions made by me. I have reviewed and approved this document for accuracy prior to leaving the patient care area.  March 15, 2019 11:03 AM      Susan Haase, APRN [facility]  "

## 2019-03-16 NOTE — RESULT ENCOUNTER NOTE
Duy Guillory,  Your CBC and electrolytes (including potassium)are great!  Sincerely,  Susan Haase, CNP

## 2019-04-25 ENCOUNTER — TRANSFERRED RECORDS (OUTPATIENT)
Dept: HEALTH INFORMATION MANAGEMENT | Facility: CLINIC | Age: 74
End: 2019-04-25

## 2019-04-29 ENCOUNTER — OFFICE VISIT (OUTPATIENT)
Dept: FAMILY MEDICINE | Facility: CLINIC | Age: 74
End: 2019-04-29
Payer: COMMERCIAL

## 2019-04-29 VITALS
SYSTOLIC BLOOD PRESSURE: 120 MMHG | BODY MASS INDEX: 24.86 KG/M2 | TEMPERATURE: 98.8 F | RESPIRATION RATE: 16 BRPM | HEART RATE: 106 BPM | DIASTOLIC BLOOD PRESSURE: 70 MMHG | WEIGHT: 154 LBS | OXYGEN SATURATION: 95 %

## 2019-04-29 DIAGNOSIS — I25.10 CORONARY ARTERY DISEASE INVOLVING NATIVE CORONARY ARTERY OF NATIVE HEART WITHOUT ANGINA PECTORIS: ICD-10-CM

## 2019-04-29 DIAGNOSIS — G89.4 CHRONIC PAIN SYNDROME: Primary | ICD-10-CM

## 2019-04-29 DIAGNOSIS — J02.9 PHARYNGITIS, UNSPECIFIED ETIOLOGY: ICD-10-CM

## 2019-04-29 DIAGNOSIS — J02.0 ACUTE STREPTOCOCCAL PHARYNGITIS: ICD-10-CM

## 2019-04-29 LAB
DEPRECATED S PYO AG THROAT QL EIA: NORMAL
SPECIMEN SOURCE: NORMAL

## 2019-04-29 PROCEDURE — 87880 STREP A ASSAY W/OPTIC: CPT | Performed by: NURSE PRACTITIONER

## 2019-04-29 PROCEDURE — 99214 OFFICE O/P EST MOD 30 MIN: CPT | Performed by: NURSE PRACTITIONER

## 2019-04-29 PROCEDURE — 87081 CULTURE SCREEN ONLY: CPT | Performed by: NURSE PRACTITIONER

## 2019-04-29 RX ORDER — HYDROCODONE BITARTRATE AND ACETAMINOPHEN 5; 325 MG/1; MG/1
1-2 TABLET ORAL EVERY 8 HOURS PRN
Qty: 90 TABLET | Refills: 0 | Status: SHIPPED | OUTPATIENT
Start: 2019-04-29 | End: 2019-07-01

## 2019-04-29 RX ORDER — HYDROCODONE BITARTRATE AND ACETAMINOPHEN 5; 325 MG/1; MG/1
1-2 TABLET ORAL EVERY 8 HOURS PRN
Qty: 90 TABLET | Refills: 0 | Status: SHIPPED | OUTPATIENT
Start: 2019-05-29 | End: 2019-10-04

## 2019-04-29 RX ORDER — HYDROCODONE BITARTRATE AND ACETAMINOPHEN 5; 325 MG/1; MG/1
1-2 TABLET ORAL EVERY 8 HOURS PRN
Qty: 90 TABLET | Refills: 0 | Status: SHIPPED | OUTPATIENT
Start: 2019-06-29 | End: 2019-11-05

## 2019-04-29 NOTE — PROGRESS NOTES
SUBJECTIVE:   Kahlil Caputo is a 73 year old female who presents to clinic today for the following   health issues:    HPI  General Follow Up    Concern: fracture  Problem started: follow up from 3/15/2019  Progression of symptoms: slow healing  Chronic pain:  Left ankle fracture has been healing slowly, was seen by ortho yesterday.  Took boot off several weeks ago, wearing compression stockings at home and using a cane.  Can walk and do the bike  Appt with ortho in 1 month.  Taking hydrocodone 2 at night and 1/2 in the afternoon, taking 2 1/2 to 3 tablets per day with adequate relief of pain.      Pharyngitis:  Started to get sick on 4/27 with sore throat, fever and cough, temp of 100 last night.  Has been taking robitussin DM, vicks and tylenol.  Temp of 100 last night.      Coronary artery disease: due to easy bruising decided to stop taking Plavix,   Additional history: as documented    Reviewed  and updated as needed this visit by clinical staff         Reviewed and updated as needed this visit by Provider         Patient Active Problem List   Diagnosis     Hypothyroidism     Chronic airway obstruction (H)     Disorder of bone and cartilage     Small bowel obstruction (H)     Stricture of small intestine (H)     S/P Left total hip arthroplasty 11/25/13     Osteoarthritis of hip     Ileitis (ileocecal resection 8/2013)     Lumbago     Tricuspid regurgitation     Palpitations     PVC's (premature ventricular contractions)     Family history of premature CAD     Chronic pain     Pulmonary nodules     ACP (advance care planning)     Coronary artery disease involving native coronary artery of native heart without angina pectoris     Closed fracture of distal end of left radius with routine healing, unspecified fracture morphology, subsequent encounter     Age-related osteoporosis with current pathological fracture with routine healing     Statin intolerance     PAD (peripheral artery disease) (H)     Aspirin  sensitivity     Sacroiliitis (H)     Past Surgical History:   Procedure Laterality Date     ARTHROPLASTY HIP  2013    Procedure: ARTHROPLASTY HIP;  Left Total Hip Arthroplasty  ;  Surgeon: Luis Marshall MD;  Location: [facility] OR     C NONSPECIFIC PROCEDURE      D& C X 2     C NONSPECIFIC PROCEDURE      PILONIDAL CYSTECTOMY     C NONSPECIFIC PROCEDURE      T&A     C NONSPECIFIC PROCEDURE      BREAST BX & CERVICAL POLYP     HC COLONOSCOPY THRU STOMA, DIAGNOSTIC           LAPAROTOMY EXPLORATORY  2013    Procedure: LAPAROTOMY EXPLORATORY;  LAPAROTOMY EXPLORATORY, Ileocecal Resection Resection, Removal of Retained Pill Cam;  Surgeon: Mitchell Fraser MD;  Location: [facility] OR     RESECTION ILEOCECAL  2013    Procedure: RESECTION ILEOCECAL;;  Surgeon: Mitchell Fraser MD;  Location: [facility] OR       Social History     Tobacco Use     Smoking status: Former Smoker     Packs/day: 1.00     Years: 50.00     Pack years: 50.00     Types: Cigarettes     Last attempt to quit: 2010     Years since quittin.8     Smokeless tobacco: Never Used     Tobacco comment: Quit 2010   Substance Use Topics     Alcohol use: Yes     Alcohol/week: 0.0 oz     Comment: 1 glass wine occas.     Family History   Problem Relation Age of Onset     Alcohol/Drug Mother      Heart Disease Mother         cardiomyopathy     Alcohol/Drug Father      Heart Disease Father         CAD -  1st MI mid 50's     Myocardial Infarction Father         X4     Osteoporosis Maternal Grandmother      Cancer Maternal Grandfather         stomach or throat - martini drinker/pipe smoker     Myocardial Infarction Brother      Breast Cancer No family hx of      Cancer - colorectal No family hx of          Current Outpatient Medications   Medication Sig Dispense Refill     ACE/ARB NOT PRESCRIBED, INTENTIONAL, Please choose reason not prescribed, below       acetaminophen (TYLENOL) 500 MG tablet Take 500-1,000 mg by mouth as  needed for mild pain       alendronate (FOSAMAX) 70 MG tablet TAKE 1 TABLET EVERY 7 DAYS WITH 8OZ OF WATER. TAKE 30MIN BEFORE BREAKFAST AND STAY UPRIGHT DURING THIS TIME. 12 tablet 1     ascorbic acid (VITAMIN C) 500 MG tablet Take by mouth 2 times daily       ASPIRIN NOT PRESCRIBED (INTENTIONAL) Please choose reason not prescribed, below 0 each 0     BETA BLOCKER NOT PRESCRIBED, INTENTIONAL, Beta Blocker not prescribed intentionally due to COPD  0     betaxolol (BETOPTIC) 0.5 % ophthalmic solution INSTILL 1 DROP INTO EACH EYE QD  3     calcium carbonate 500 MG tablet Take 2 tablets by mouth 2 times daily  100 tablet 3     chlorpheniramine (CHLOR-TRIMETON) 4 MG tablet Take 4 mg by mouth every 6 hours as needed for allergies or rhinitis       Cholecalciferol (VITAMIN D) 1000 UNITS capsule Take 2,000 Units by mouth daily        clopidogrel (PLAVIX) 75 MG tablet Take 1 tablet (75 mg) by mouth daily 90 tablet 3     furosemide (LASIX) 20 MG tablet TAKE 1 TO 2 TABLETS BY MOUTH DAILY 180 tablet 1     HYDROcodone-acetaminophen (NORCO) 5-325 MG tablet Take 1-2 tablets by mouth 2 times daily as needed for moderate to severe pain Maximum of 4 tablets per day. 120 tablet 0     levothyroxine (SYNTHROID/LEVOTHROID) 150 MCG tablet Take 1 tablet (150 mcg) by mouth every morning 90 tablet 2     melatonin 3 MG tablet Take 1-2 tablets (3-6 mg) by mouth nightly as needed for sleep (OTC)       Multiple Vitamin (MULTI-VITAMIN PO) Take by mouth daily        STATIN NOT PRESCRIBED, INTENTIONAL, 1 each See Admin Instructions Statin not prescribed intentionally due to Allergy to statin 0 each 0     vitamin E 400 UNIT capsule Take by mouth daily       Allergies   Allergen Reactions     Latex Difficulty breathing     sensativity - cough, eyes water     Nsaids Shortness Of Breath and Other (See Comments)     bronchospams     Aspirin Difficulty breathing     tight cough     Codeine GI Disturbance     upset GI     Pravastatin      Myalgias      "Simvastatin      Myalgias     Tramadol      Heart burn,  Felt \"clammy\", unable to pass gas, unable to urinate and had severe nausea     BP Readings from Last 3 Encounters:   04/29/19 120/70   03/15/19 124/76   12/17/18 120/76    Wt Readings from Last 3 Encounters:   04/29/19 69.9 kg (154 lb)   03/15/19 69.9 kg (154 lb)   12/17/18 69.9 kg (154 lb)      ROS:  CONSTITUTIONAL: NEGATIVE for fever, chills, change in weight  ENT/MOUTH: see HPI  RESP: NEGATIVE for SOB  CV: NEGATIVE for chest pain, palpitations or peripheral edema  MUSCULOSKELETAL: see HPI  PSYCHIATRIC: NEGATIVE for changes in mood or affect    OBJECTIVE:     /70 (BP Location: Right arm, Patient Position: Chair, Cuff Size: Adult Regular)   Pulse 106   Temp 98.8  F (37.1  C) (Oral)   Resp 16   Wt 69.9 kg (154 lb)   SpO2 95%   BMI 24.86 kg/m    Body mass index is 24.86 kg/m .   GENERAL: healthy, alert and no distress  HENT: ear canals and TM's normal, nose normal posterior pharynx with erythema, no exudate, and mouth without ulcers or lesions  NECK: no adenopathy, no asymmetry, masses, or scars and thyroid normal to palpation  RESP: lungs clear to auscultation - no rales, rhonchi or wheezes, frequent cough during exam  CV: regular rate and rhythm, normal S1 S2, no S3 or S4, no murmur, click or rub, no peripheral edema   MS: walking with slight limp, no gross musculoskeletal defects noted, no edema  PSYCH: mentation appears normal, affect normal/bright  ASSESSMENT:   See below    PLAN:   Diagnoses and all orders for this visit:    Chronic pain syndrome: will decrease to #90 with plan to decrease to #60  -     HYDROcodone-acetaminophen (NORCO) 5-325 MG tablet; Take 1-2 tablets by mouth every 8 hours as needed for severe pain (max of 3 tablets per day)  -     HYDROcodone-acetaminophen (NORCO) 5-325 MG tablet; Take 1-2 tablets by mouth every 8 hours as needed for pain or severe pain Max of 3 tablets per day  -     HYDROcodone-acetaminophen (NORCO) " 5-325 MG tablet; Take 1-2 tablets by mouth every 8 hours as needed for pain or severe pain Max of 3 tablets per day    Pharyngitis, unspecified etiology:rapid strep negative, patient informed, will continue to monitor, will call if symptoms do not improve.    -     Strep, Rapid Screen  -     Beta strep group A culture    Coronary artery disease involving native coronary artery of native heart without angina pectoris: stopped Plavix, is aware of increased risk of CV disease.       Follow up in 3 months with phone visit, sooner as needed.      Susan Haase, APRN [facility]

## 2019-04-30 LAB
BACTERIA SPEC CULT: NORMAL
SPECIMEN SOURCE: NORMAL

## 2019-05-22 ENCOUNTER — TRANSFERRED RECORDS (OUTPATIENT)
Dept: HEALTH INFORMATION MANAGEMENT | Facility: CLINIC | Age: 74
End: 2019-05-22

## 2019-06-10 DIAGNOSIS — M80.00XD AGE-RELATED OSTEOPOROSIS WITH CURRENT PATHOLOGICAL FRACTURE WITH ROUTINE HEALING: ICD-10-CM

## 2019-06-10 NOTE — TELEPHONE ENCOUNTER
"Requested Prescriptions   Pending Prescriptions Disp Refills     alendronate (FOSAMAX) 70 MG tablet [Pharmacy Med Name: Alendronate Sodium Oral Tablet 70 MG]  Last Written Prescription Date:  12/28/2018  Last Fill Quantity: 12 tablet,  # refills: 1   Last office visit: 10/24/2018 with prescribing provider:  Javi     Future Office Visit:   Next 5 appointments (look out 90 days)    Jul 25, 2019 11:00 AM CDT  Return Visit with MALIK Trujillo CNP  [facility] ([facility]) [address] [facility] [address]  [phone]          12 tablet 0     Sig: TAKE 1 TABLET BY MOUTH ONCE WEEKLY, TAKE 30 MIN PRIOR TO 1ST FOOD/DRINK/MED - AVOID LYING DOWN FOR 30 MIN.       Bisphosphonates Passed - 6/10/2019 11:22 AM        Passed - Recent (12 mo) or future (30 days) visit within the authorizing provider's specialty     Patient had office visit in the last 12 months or has a visit in the next 30 days with authorizing provider or within the authorizing provider's specialty.  See \"Patient Info\" tab in inbasket, or \"Choose Columns\" in Meds & Orders section of the refill encounter.              Passed - Dexa on file within past 2 years     Please review last Dexa result.           Passed - Medication is active on med list        Passed - Patient is age 18 or older        Passed - Normal serum creatinine on file within past 12 months     Recent Labs   Lab Test 03/15/19  1121   CR 0.60               "

## 2019-06-12 RX ORDER — ALENDRONATE SODIUM 70 MG/1
TABLET ORAL
Qty: 12 TABLET | Refills: 2 | Status: SHIPPED | OUTPATIENT
Start: 2019-06-12 | End: 2019-07-25

## 2019-06-12 NOTE — TELEPHONE ENCOUNTER
Fosamax  Prescription [place] per [facility] [place].    Ria Hammonds, RN, BSN

## 2019-07-01 DIAGNOSIS — G89.4 CHRONIC PAIN SYNDROME: ICD-10-CM

## 2019-07-01 RX ORDER — HYDROCODONE BITARTRATE AND ACETAMINOPHEN 5; 325 MG/1; MG/1
1 TABLET ORAL EVERY 8 HOURS PRN
Qty: 90 TABLET | Refills: 0 | Status: SHIPPED | OUTPATIENT
Start: 2019-07-01 | End: 2019-07-31

## 2019-07-01 NOTE — TELEPHONE ENCOUNTER
Pt unable to fill rx given 6-29-19.  New law as of July 1 - new rx's for controlled meds written prior to July can not be filled after July 1    Rx t'd up    Sweetie Brunson RN, BS  Clinical Nurse Triage.

## 2019-07-09 ENCOUNTER — OFFICE VISIT (OUTPATIENT)
Dept: FAMILY MEDICINE | Facility: CLINIC | Age: 74
End: 2019-07-09
Payer: COMMERCIAL

## 2019-07-09 VITALS
HEART RATE: 84 BPM | DIASTOLIC BLOOD PRESSURE: 74 MMHG | TEMPERATURE: 98.1 F | SYSTOLIC BLOOD PRESSURE: 128 MMHG | RESPIRATION RATE: 16 BRPM

## 2019-07-09 DIAGNOSIS — A49.9 BACTERIAL INFECTION: ICD-10-CM

## 2019-07-09 DIAGNOSIS — L30.9 DERMATITIS: Primary | ICD-10-CM

## 2019-07-09 PROCEDURE — 99213 OFFICE O/P EST LOW 20 MIN: CPT | Performed by: FAMILY MEDICINE

## 2019-07-09 PROCEDURE — 87070 CULTURE OTHR SPECIMN AEROBIC: CPT | Performed by: FAMILY MEDICINE

## 2019-07-09 RX ORDER — CEPHALEXIN 500 MG/1
500 CAPSULE ORAL 2 TIMES DAILY
Qty: 14 CAPSULE | Refills: 0 | Status: SHIPPED | OUTPATIENT
Start: 2019-07-09 | End: 2019-08-19

## 2019-07-09 RX ORDER — METHYLPREDNISOLONE 4 MG
TABLET, DOSE PACK ORAL
Qty: 21 TABLET | Refills: 0 | Status: SHIPPED | OUTPATIENT
Start: 2019-07-09 | End: 2019-08-14

## 2019-07-09 NOTE — PROGRESS NOTES
Subjective     Kahlil Caputo is a 73 year old female who presents to clinic today for the following health issues:    HPI   Concern - sore on back erythematous skin with pruritis and tenderness but no fluctuance. Has had recurrent dermatitis history , required oral steroids in the past   Onset: x 5-6 days    Description:   Sore on lower back    Intensity: moderate    Progression of Symptoms:  worsening    Accompanying Signs & Symptoms:  Itching, painful    Previous history of similar problem:   none    Precipitating factors:   Worsened by: bacitracin    Alleviating factors:  Improved by: hydrocortisone    Therapies Tried and outcome: hydrocortisone cream-relief with itching, bactiracin-sx worsened    Past Medical History:   Diagnosis Date     Aspirin sensitive asthma 5/9/2018     Chronic airway obstruction, not elsewhere classified      Chronic pain     hip and left shoulder     Coronary artery disease involving native coronary artery of native heart without angina pectoris 11/16/2016     Crohn's disease (H) 9/2013    with stricture in small intestine     Family history of premature CAD      Migraine, unspecified, without mention of intractable migraine without mention of status migrainosus      PVC's (premature ventricular contractions)      Stricture of small intestine (H) 9/2013     TOBACCO ABUSE-CONTINUOUS      Unspecified glaucoma(365.9)     removed as a diagnosis     Unspecified hypothyroidism        Past Surgical History:   Procedure Laterality Date     ARTHROPLASTY HIP  11/25/2013    Procedure: ARTHROPLASTY HIP;  Left Total Hip Arthroplasty  ;  Surgeon: Luis Marshall MD;  Location: [facility] OR     C NONSPECIFIC PROCEDURE  1970's    D& C X 2     C NONSPECIFIC PROCEDURE  1964    PILONIDAL CYSTECTOMY     C NONSPECIFIC PROCEDURE  1959    T&A     C NONSPECIFIC PROCEDURE  1991    BREAST BX & CERVICAL POLYP     HC COLONOSCOPY THRU STOMA, DIAGNOSTIC      2003     LAPAROTOMY EXPLORATORY  8/30/2013    Procedure:  LAPAROTOMY EXPLORATORY;  LAPAROTOMY EXPLORATORY, Ileocecal Resection Resection, Removal of Retained Pill Cam;  Surgeon: Mitchell Fraser MD;  Location: [facility] OR     RESECTION ILEOCECAL  2013    Procedure: RESECTION ILEOCECAL;;  Surgeon: Mitchell Fraser MD;  Location: [facility] OR     /74 (BP Location: Right arm, Patient Position: Chair, Cuff Size: Adult Regular)   Pulse 84   Temp 98.1  F (36.7  C) (Oral)   Resp 16     Family History   Problem Relation Age of Onset     Alcohol/Drug Mother      Heart Disease Mother         cardiomyopathy     Alcohol/Drug Father      Heart Disease Father         CAD -  1st MI mid 50's     Myocardial Infarction Father         X4     Osteoporosis Maternal Grandmother      Cancer Maternal Grandfather         stomach or throat - martini drinker/pipe smoker     Myocardial Infarction Brother      Breast Cancer No family hx of      Cancer - colorectal No family hx of        Social History     Tobacco Use     Smoking status: Former Smoker     Packs/day: 1.00     Years: 50.00     Pack years: 50.00     Types: Cigarettes     Last attempt to quit: 2010     Years since quittin.0     Smokeless tobacco: Never Used     Tobacco comment: Quit 2010   Substance Use Topics     Alcohol use: Yes     Alcohol/week: 0.0 oz     Comment: 1 glass wine occas.     She is no longer a smoker  No contact issues, the rash is bilateral though predominently right sided with 6 days history,There is no regional adenopathy        Reviewed and updated as needed this visit by Provider         Review of Systems   ROS COMP: Constitutional, HEENT, cardiovascular, pulmonary, gi and gu systems are negative, except as otherwise noted.      Objective    /74 (BP Location: Right arm, Patient Position: Chair, Cuff Size: Adult Regular)   Pulse 84   Resp 16   There is no height or weight on file to calculate BMI.  Physical Exam   tms normal, thyroid normal, lunges clear, s1s2, no adenopathy, no edema,      Non purulant but a bacterial culture of her skin was done over the lesion that was apparently dry         (L30.9) Dermatitis  (primary encounter diagnosis)  Comment: apparent contact patch, she has allergies   Plan: cephALEXin (KEFLEX) 500 MG capsule,         methylPREDNISolone (MEDROL DOSEPAK) 4 MG tablet        therapy pack, Wound Culture Aerobic Bacterial        Treat for contact and bacterial     (A49.9) Bacterial infection  Comment: unproven  Plan: Wound Culture Aerobic Bacterial        Pending.

## 2019-07-11 LAB
BACTERIA SPEC CULT: NORMAL
Lab: NORMAL
SPECIMEN SOURCE: NORMAL

## 2019-07-15 ENCOUNTER — TRANSFERRED RECORDS (OUTPATIENT)
Dept: HEALTH INFORMATION MANAGEMENT | Facility: CLINIC | Age: 74
End: 2019-07-15

## 2019-07-25 ENCOUNTER — OFFICE VISIT (OUTPATIENT)
Dept: ENDOCRINOLOGY | Facility: CLINIC | Age: 74
End: 2019-07-25
Payer: COMMERCIAL

## 2019-07-25 VITALS
BODY MASS INDEX: 24.69 KG/M2 | WEIGHT: 153 LBS | DIASTOLIC BLOOD PRESSURE: 75 MMHG | HEART RATE: 86 BPM | SYSTOLIC BLOOD PRESSURE: 138 MMHG | TEMPERATURE: 98.3 F | RESPIRATION RATE: 14 BRPM

## 2019-07-25 DIAGNOSIS — M89.9 DISORDER OF BONE AND CARTILAGE: ICD-10-CM

## 2019-07-25 DIAGNOSIS — M89.9 DISORDER OF BONE AND CARTILAGE: Primary | ICD-10-CM

## 2019-07-25 DIAGNOSIS — M80.00XD AGE-RELATED OSTEOPOROSIS WITH CURRENT PATHOLOGICAL FRACTURE WITH ROUTINE HEALING: ICD-10-CM

## 2019-07-25 DIAGNOSIS — M94.9 DISORDER OF BONE AND CARTILAGE: Primary | ICD-10-CM

## 2019-07-25 DIAGNOSIS — M94.9 DISORDER OF BONE AND CARTILAGE: ICD-10-CM

## 2019-07-25 PROCEDURE — 82523 COLLAGEN CROSSLINKS: CPT | Mod: 90 | Performed by: CLINICAL NURSE SPECIALIST

## 2019-07-25 PROCEDURE — 36415 COLL VENOUS BLD VENIPUNCTURE: CPT | Performed by: CLINICAL NURSE SPECIALIST

## 2019-07-25 PROCEDURE — 99000 SPECIMEN HANDLING OFFICE-LAB: CPT | Performed by: CLINICAL NURSE SPECIALIST

## 2019-07-25 PROCEDURE — 99214 OFFICE O/P EST MOD 30 MIN: CPT | Performed by: CLINICAL NURSE SPECIALIST

## 2019-07-25 PROCEDURE — 83937 ASSAY OF OSTEOCALCIN: CPT | Mod: 90 | Performed by: CLINICAL NURSE SPECIALIST

## 2019-07-25 RX ORDER — ALENDRONATE SODIUM 70 MG/1
TABLET ORAL
Qty: 12 TABLET | Refills: 3 | Status: SHIPPED | OUTPATIENT
Start: 2019-07-25 | End: 2020-08-20

## 2019-07-25 NOTE — PATIENT INSTRUCTIONS
Continue Fosamax once weekly.    You can all and schedule a repeat bone density 5/24/2020 or later - I already placed the order.      Phone number to schedule: [phone].    Follow up in one year

## 2019-07-25 NOTE — LETTER
7/25/2019         RE: Kahlil Caputo  [address]  [facility] [id number]        Dear Colleague,    Thank you for referring your patient, Kahlil Caputo, to the [facility] [place] [facility]. Please see a copy of my visit note below.    Name: Kahlil Caputo  F/u for osteoporosis (Last seen 10/24/2018).     HPI:  Kahlil Caputo is a 73 year old female who presents for the management of osteoporosis.  Lab work up for secondary causes of osteoporosis unremarkable.  Started Fosamax 70 mg weekly approximately 7/2018.  Is tolerating the Fosamax well.  Since last seen, she fractured her left ankle 2/2019 (distal fibula) - she missed the last step in her home and twisted the ankle.  She states the orthopedist told her that if she didn't have osteoporosis, she probably would not have fractured the ankle.      Smoke: history of smoking, quit 8 years ago.  Family History:Yes: MGM with   Menstrual history/Birthing: irregular menses but no long periods of amenorrhea.  Menopausal since her 50's - briefly treated with HRT - didn't tolerate  Fractures:Yes: left wrist fracture 1/2018.    Kidney stones: No  GI Surgery:Yes: small bowel resection 5 years ago  Duration of therapy:  Vitamin D 2000 IU once daily, calcium 1393-5169 per day.   Exercise:Not much  Diet:   Milk: limited, lactose intolerant.  + Canyon milk or lactose-free milk   Cheese: occasionally   Yogurt/Cottage Cheese: yogurt daily   Ice Cream  Ca/Vitamin D:  Graciela  Alcohol:  Minimal, no more than 1 beer or wine per day  Eating Disorder: No  Steroid Use:  No consistent or regular oral steroid use, has had several steroid injections, most recently hip injection 7/2019.    Is a retired nurse.  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     TOBACCO ABUSE-CONTINUOUS      Unspecified glaucoma(365.9)     removed as a diagnosis     Unspecified hypothyroidism      Past Surgical History:   Procedure Laterality Date     ARTHROPLASTY HIP  11/25/2013    Procedure: ARTHROPLASTY HIP;  Left Total Hip Arthroplasty  ;  Surgeon: Luis Marshall MD;  Location: [facility] OR     C NONSPECIFIC PROCEDURE  1970's    D& C X 2     C NONSPECIFIC PROCEDURE  1964    PILONIDAL CYSTECTOMY     C NONSPECIFIC PROCEDURE  1959    T&A     C NONSPECIFIC PROCEDURE  1991    BREAST BX & CERVICAL POLYP     HC COLONOSCOPY THRU STOMA, DIAGNOSTIC      2003     LAPAROTOMY EXPLORATORY  8/30/2013    Procedure: LAPAROTOMY EXPLORATORY;  LAPAROTOMY EXPLORATORY, Ileocecal Resection Resection, Removal of Retained Pill Cam;  Surgeon: Mitchell Fraser MD;  Location: [facility] OR     RESECTION ILEOCECAL  8/30/2013    Procedure: RESECTION ILEOCECAL;;  Surgeon: Mitchell Fraser MD;  Location: [facility] OR     Family Hx:  Family History   Problem Relation Age of Onset     Alcohol/Drug Mother      Heart Disease Mother         cardiomyopathy     Alcohol/Drug Father      Heart Disease Father         CAD -  1st MI mid 50's     Myocardial Infarction Father         X4     Osteoporosis Maternal Grandmother      Cancer Maternal Grandfather         stomach or throat - martini drinker/pipe smoker     Myocardial Infarction Brother      Breast Cancer No family hx of      Cancer - colorectal No family hx of      Thyroid disease:         DM2:          Autoimmune: DM1, SLE, RA, Vitiligo     Social Hx:  Social History     Socioeconomic History     Marital status:      Spouse name: Not on file      Number of children: 2     Years of education: 17     Highest education level: Not on file   Occupational History     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      Employer: RETIRED   Social Needs     Financial resource strain: Not on file     Food insecurity:     Worry: Not on file     Inability: Not on file     Transportation needs:     Medical: Not on file     Non-medical: Not on file   Tobacco Use     Smoking status: Former Smoker     Packs/day: 1.00     Years: 50.00     Pack years: 50.00     Types: Cigarettes     Last attempt to quit: 2010     Years since quittin.1     Smokeless tobacco: Never Used     Tobacco comment: Quit 2010   Substance and Sexual Activity     Alcohol use: Yes     Alcohol/week: 0.0 oz     Comment: 1 glass wine occas.     Drug use: No     Sexual activity: Never   Lifestyle     Physical activity:     Days per week: Not on file     Minutes per session: Not on file     Stress: Not on file   Relationships     Social connections:     Talks on phone: Not on file     Gets together: Not on file     Attends Anabaptism service: Not on file     Active member of club or organization: Not on file     Attends meetings of clubs or organizations: Not on file     Relationship status: Not on file     Intimate partner violence:     Fear of current or ex partner: Not on file     Emotionally abused: Not on file     Physically abused: Not on file     Forced sexual activity: Not on file   Other Topics Concern      Service No     Blood Transfusions Yes     Comment: With second miscarriage in  and after childbirth in      Caffeine Concern Yes     Comment: 0-1 cup coffee per day     Occupational Exposure Yes     Comment: sick building syndrome     Hobby Hazards No     Sleep Concern Yes     Comment: takes melatonin, sleeps 5 hours     Stress Concern Yes     Comment: lesion removed from left thigh, pathology questionable, will have re-excision next week      Weight Concern No     Special Diet Yes     Comment: low residue, steamed veggies, greek yogurt, low sodium     Back Care Yes     Comment: low back     Exercise Yes     Comment: Curves 3 days week     Bike Helmet Not Asked     Seat Belt Yes     Self-Exams Yes     Comment: sporadic     Parent/sibling w/ CABG, MI or angioplasty before 65F 55M? No   Social History Narrative     Not on file          MEDICATIONS:  has a current medication list which includes the following prescription(s): ace/arb/arni not prescribed, acetaminophen, alendronate, vitamin c, aspirin not prescribed, beta blocker not prescribed, betaxolol, calcium carbonate, chlorpheniramine, vitamin d, furosemide, hydrocodone-acetaminophen, hydrocodone-acetaminophen, hydrocodone-acetaminophen, levothyroxine, melatonin, methylprednisolone, multiple vitamin, statin not prescribed, and vitamin e.    ROS     ROS: 10 point ROS neg other than the symptoms noted above in the HPI.    Physical Exam   VS: /75 (BP Location: Right arm, Patient Position: Chair, Cuff Size: Adult Regular)   Pulse 86   Temp 98.3  F (36.8  C) (Oral)   Resp 14   Wt 69.4 kg (153 lb)   Breastfeeding? No   BMI 24.69 kg/m     GENERAL: AXOX3, NAD, well dressed, answering questions appropriately, appears stated age.  HEENT: no exophthalmos, no proptosis, no lig lag, no retraction  CV: RRR  LUNGS: Normal respiratory effort.  EXTREMITIES: no edema  NEUROLOGY: CN grossly intact, no tremors  MSK: grossly intact    LABS:  BMP:  !COMPREHENSIVE Latest Ref Rng & Units 10/17/2018   SODIUM 133 - 144 mmol/L 134   POTASSIUM 3.4 - 5.3 mmol/L 3.3 (L)   CHLORIDE 94 - 109 mmol/L 98   BUN 7 - 30 mg/dL 12   Creatinine 0.52 - 1.04 mg/dL 0.62   Glucose 70 - 99 mg/dL 78   ANION GAP 3 - 14 mmol/L 6   CALCIUM 8.5 - 10.1 mg/dL 8.7   ALBUMIN 3.4 - 5.0 g/dL 3.5   PROTEIN, TOTAL 6.8 - 8.8 g/dL 6.7 (L)     TFTs:  !THYROID Latest Ref Rng & Units 10/17/2018   TSH 0.40 - 4.00 mU/L 3.18     LFTs:  !LIPID/HEPATIC Latest  Ref Rng & Units 10/17/2018   AST 0 - 45 U/L 16   ALT 0 - 50 U/L 22     PTH:  Component      Latest Ref Rng & Units 6/29/2018   Parathyroid Hormone Intact      18 - 80 pg/mL 32     Vitamin D:  Component      Latest Ref Rng & Units 10/17/2018   Vitamin D Deficiency screening      20 - 75 ug/L 30     BoneTurnOverMarkers:  Component      Latest Ref Rng & Units 6/29/2018 7/4/2018   N-Telopeptide X-Link Urine      not reported  30   Creatinine Ur/Vol      mg/dL  39   Osteocalcin      11 - 50 ng/mL 15      Other:  Component      Latest Ref Rng & Units 6/29/2018   Albumin Fraction      3.7 - 5.1 g/dL 4.3   Alpha 1 Fraction      0.2 - 0.4 g/dL 0.3   Alpha 2 Fraction      0.5 - 0.9 g/dL 0.7   Beta Fraction      0.6 - 1.0 g/dL 0.7   Gamma Fraction      0.7 - 1.6 g/dL 0.7   Monoclonal Peak      0.0 g/dL 0.0   ELP Interpretation:       Essentially normal electrophoretic pattern. No monoclonal protein seen.      Component      Latest Ref Rng & Units 7/4/2018   Calcium Urine mg/dL      mg/dL 5.5   Calcium Urine g/24 h      0.10 - 0.30 g/24 h 0.09 (L)   Calcium Urine g/g Cr      g/g Cr 0.14   N-Telopeptide X-Link Urine      not reported 30   Creatinine Ur/Vol      mg/dL 39     DEXA:    BONE DENSITOMETRY  4/21/2015     AGE: 69 year old    RISK FACTORS: Post-menopausal, Height loss of 1.5 inches inches, Condition related to bone loss: inflammatory bowel disease  CURRENT TREATMENT: Calcium with Vitamin D    FINDINGS:  Lumbar Spine (L1-L4) T-score: -1.3  Left Femoral Neck T-score: N/A  Right Femoral Neck T-score: -2.3    Lumbar (L1-L4) BMD: 1.036 Previous: 1.063   Total Hip Mean BMD: 0.796 Previous: 0.859    Comparison is made to another DXA performed on the same Lunar Prodigy machine on 07/09/2012 and 11/13/2007.     LATERAL VERTEBRAL ASSESSMENT  Procedure:  Vertebral fracture assessment was performed in the lateral decubitus position using a Lunar Prodigy  densitometer.  Indications for VFA: T-score of -1.0 or worse, age (female>69)  "and height loss > 1.5\"  Confounding factors for VFA: Arthritis/degenerative disc disease, rib shadows and scapular shadows.  The LVA scan is interpretable from T8 to L3.     VFA Findings: Using the semi-quantitative analysis of [employer] there was evidence of no spinal deformity  VFA Impression: Kahlil Caputo has no vertebral fractures identified on the VFA.          IMPRESSION  Osteopenia (low bone mass)    BONE DENSITOMETRY  5/24/2018       AGE:  72 year old                                   RISK FACTORS:  Post-menopausal, Fracture of wrist     CURRENT TREATMENT:  Calcium with Vitamin D      FINDINGS:               Lumbar Spine (L1-L4) T-score:  -1.4, degenerative changes present               Right Femoral Neck T-score:  -2.5               Forearm (radius 33%) T-score:  -0.4  The left femur is not acceptable for evaluation due to previous arthroplasty.                             Lumbar (L1-L4) BMD: 1.025                                                           Total Hip Mean BMD: 0.763                                                  Forearm (radius 33%) BM: 0.676        LATERAL VERTEBRAL ASSESSMENT  Procedure:  Vertebral fracture assessment was performed in the lateral decubitus position using a Lunar Prodigy  densitometer.  Indications for VFA: T-score of -1.0 or worse and age (female>69)  Confounding factors for VFA: rib shadows.  The LVA scan is interpretable from T9 to L5.     VFA Findings: Using the semi-quantitative analysis of Genant there was evidence of no spinal deformity, however can not rule out fracture at T8.   VFA Impression: Kahlil Caputo has no definite vertebral fractures, one possible fracture identified on the VFA.   Further evaluation may be warranted due to equivocal fracture      IMPRESSION  Osteoporosis., Degenerative changes of the lumbar spine which may falsely elevate results. If a vertebral fracture is confirmed, the diagnosis would be severe osteoporosis.        All pertinent notes, " labs, and images personally reviewed by me.     A/P  Ms.Annice Caputo is a 73 year old here for the evaluation of:    #1 Osteoporosis/Osteopenia.     Started Fosamax 70 mg weekly approximately 7/2018.  Tolerating Fosamax well.  Continue Fosamax.  Continue Vitamin D 2000 units per day and calcium 500-600 mg bid.  Plan to repeat bone turnover markers.  Repeat Dexa scan 5/2020.    Risk factors for low bone density include personal history of fracture or family history, , advanced age, female, dementia, and poor health.  Also smoking, low BMI, Estrogen deficiency, low Ca intake, and alcoholism.  A prior history of vertebral fracture greatly increases the risk of subsequent fractures. A history of other medical diseases impacting on bone may also affect bone health.      The 24-hour urine calcium value, if low (< 50 mg/24 hour), would suggest the presence of vitamin D deficiency due to poor dietary intake or malabsorption. A low 24-hour urine calcium should be followed by a serum 25-hydroxyvitamin D level to test for possible vitamin D deficiency. The identification of vitamin D deficiency should prompt the search for possible occult malabsorption due sprue. Twenty-four hour urine calcium values over 300 mg should prompt an evaluation for possible causes of hypercalciuria.    Bone turnover markers can also be helpful in determining duration of therapy and compliance.  Osteocalcin is a bone formation marker (serum) and N-telopeptide of collagen cross links (NTx) is found in the urine and is a bone resorption maker.    FRAX is a tool used to assess 10year risk of fracture.  Their FRAX risk is.   Low bone mass (T-score between -1.0 and -2.5 at the femoral neck or spine) and a 10-year probability of a hip fracture ? 3% or a 10-year probability of a major osteoporosis-related fracture ? 20% based on the [place]-adapted WHO algorithm warrens treatment.    The pt was advised to    Maintain an adequate calcium and vitamin D  intake and to supplement vitamin D if needed to maintain serum levels of  25 hydroxy D (25 OH D) between 30-60 ng/ml.    Limit alcohol intake to no more than 2 servings per day.    Limit caffeine intake.    Maintain an active lifestyle including weight-bearing exercises for at least 30 mins daily.    Take measures to reduce the risk of falling.    Instructions on Bisphosphonate use and side effects - particularly esophageal adverse events - are carefully reviewed with her. This drug must be taken upon arising for the day on an empty stomach, with a large 6-8 ounce glass of water; she must remain NPO in the upright position for at least 30 minutes afterwards and until after the first food of the day. If esophageal irritation is noted,  the patient was advised to stop the medication and call my office.  Treatment with bisphosphonate therapy will decrease fracture risk 50-70%.   There is risk of osteonecrosis of the jaw in patients using bisphosphonates is approximately 1/1700-1/100,000, with development most likely related to invasive dental procedures. If an invasive dental procedure was necessary, preferably stop the bisphosphonate approximately 3 months prior to reduce the risk. Let your dentist know that you are on this medication.  The data available at this time suggests that there is probably a small increase risk of atypical (nontraumatic) subtrochanteric fractures of the femur in patients on bisphosphonate therapy compared to those not on it. One large study suggested that for every 100 fractures prevented with bisphosphonate therapy, less than one femur fracture will occur. Other studies suggest one episode per 2,500 patient years. Patient should call with leg pain.      Labs ordered today:   No orders of the defined types were placed in this encounter.    Radiology/Consults ordered today: None    More than 50% of the time spent with Ms. Caputo on counseling / coordinating her care.  Total face to face time  was 20 minutes.      Follow-up:  One year    Surekha Galicia NP  Endocrinology  [facility]  CC:       Again, thank you for allowing me to participate in the care of your patient.        Sincerely,        MALIK Trujillo CNP

## 2019-07-25 NOTE — PROGRESS NOTES
Name: Kahlil Caputo  F/u for osteoporosis (Last seen 10/24/2018).     HPI:  Kahlil Caputo is a 73 year old female who presents for the management of osteoporosis.  Lab work up for secondary causes of osteoporosis unremarkable.  Started Fosamax 70 mg weekly approximately 7/2018.  Is tolerating the Fosamax well.  Since last seen, she fractured her left ankle 2/2019 (distal fibula) - she missed the last step in her home and twisted the ankle.  She states the orthopedist told her that if she didn't have osteoporosis, she probably would not have fractured the ankle.      Smoke: history of smoking, quit 8 years ago.  Family History:Yes: MGM with   Menstrual history/Birthing: irregular menses but no long periods of amenorrhea.  Menopausal since her 50's - briefly treated with HRT - didn't tolerate  Fractures:Yes: left wrist fracture 1/2018.    Kidney stones: No  GI Surgery:Yes: small bowel resection 5 years ago  Duration of therapy:  Vitamin D 2000 IU once daily, calcium 7739-2990 per day.   Exercise:Not much  Diet:   Milk: limited, lactose intolerant.  + Roosevelt milk or lactose-free milk   Cheese: occasionally   Yogurt/Cottage Cheese: yogurt daily   Ice Cream  Ca/Vitamin D:  Graciela  Alcohol:  Minimal, no more than 1 beer or wine per day  Eating Disorder: No  Steroid Use:  No consistent or regular oral steroid use, has had several steroid injections, most recently hip injection 7/2019.    Is a retired nurse.  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     TOBACCO ABUSE-CONTINUOUS      Unspecified glaucoma(365.9)     removed as a diagnosis     Unspecified hypothyroidism      Past Surgical History:   Procedure Laterality Date     ARTHROPLASTY HIP  11/25/2013    Procedure: ARTHROPLASTY HIP;  Left Total Hip Arthroplasty  ;  Surgeon: Luis Marshall MD;  Location: [facility] OR     C NONSPECIFIC PROCEDURE  1970's    D& C X 2     C NONSPECIFIC PROCEDURE  1964    PILONIDAL CYSTECTOMY     C NONSPECIFIC PROCEDURE  1959    T&A     C NONSPECIFIC PROCEDURE  1991    BREAST BX & CERVICAL POLYP     HC COLONOSCOPY THRU STOMA, DIAGNOSTIC      2003     LAPAROTOMY EXPLORATORY  8/30/2013    Procedure: LAPAROTOMY EXPLORATORY;  LAPAROTOMY EXPLORATORY, Ileocecal Resection Resection, Removal of Retained Pill Cam;  Surgeon: Mitchell Fraser MD;  Location: [facility] OR     RESECTION ILEOCECAL  8/30/2013    Procedure: RESECTION ILEOCECAL;;  Surgeon: Mitchell Fraser MD;  Location: [facility] OR     Family Hx:  Family History   Problem Relation Age of Onset     Alcohol/Drug Mother      Heart Disease Mother         cardiomyopathy     Alcohol/Drug Father      Heart Disease Father         CAD -  1st MI mid 50's     Myocardial Infarction Father         X4     Osteoporosis Maternal Grandmother      Cancer Maternal Grandfather         stomach or throat - martini drinker/pipe smoker     Myocardial Infarction Brother      Breast Cancer No family hx of      Cancer - colorectal No family hx of      Thyroid disease:         DM2:          Autoimmune: DM1, SLE, RA, Vitiligo     Social Hx:  Social History     Socioeconomic History     Marital status:      Spouse name: Not on file     Number of children: 2     Years of education: 17     Highest education level: Not on file   Occupational History     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 2009     Employer: RETIRED   Social Needs     Financial  resource strain: Not on file     Food insecurity:     Worry: Not on file     Inability: Not on file     Transportation needs:     Medical: Not on file     Non-medical: Not on file   Tobacco Use     Smoking status: Former Smoker     Packs/day: 1.00     Years: 50.00     Pack years: 50.00     Types: Cigarettes     Last attempt to quit: 2010     Years since quittin.1     Smokeless tobacco: Never Used     Tobacco comment: Quit 2010   Substance and Sexual Activity     Alcohol use: Yes     Alcohol/week: 0.0 oz     Comment: 1 glass wine occas.     Drug use: No     Sexual activity: Never   Lifestyle     Physical activity:     Days per week: Not on file     Minutes per session: Not on file     Stress: Not on file   Relationships     Social connections:     Talks on phone: Not on file     Gets together: Not on file     Attends Jain service: Not on file     Active member of club or organization: Not on file     Attends meetings of clubs or organizations: Not on file     Relationship status: Not on file     Intimate partner violence:     Fear of current or ex partner: Not on file     Emotionally abused: Not on file     Physically abused: Not on file     Forced sexual activity: Not on file   Other Topics Concern      Service No     Blood Transfusions Yes     Comment: With second miscarriage in  and after childbirth in      Caffeine Concern Yes     Comment: 0-1 cup coffee per day     Occupational Exposure Yes     Comment: sick building syndrome     Hobby Hazards No     Sleep Concern Yes     Comment: takes melatonin, sleeps 5 hours     Stress Concern Yes     Comment: lesion removed from left thigh, pathology questionable, will have re-excision next week     Weight Concern No     Special Diet Yes     Comment: low residue, steamed veggies, greek yogurt, low sodium     Back Care Yes     Comment: low back     Exercise Yes     Comment: Curves 3 days week     Bike Helmet Not Asked     Seat Belt Yes      Self-Exams Yes     Comment: sporadic     Parent/sibling w/ CABG, MI or angioplasty before 65F 55M? No   Social History Narrative     Not on file          MEDICATIONS:  has a current medication list which includes the following prescription(s): ace/arb/arni not prescribed, acetaminophen, alendronate, vitamin c, aspirin not prescribed, beta blocker not prescribed, betaxolol, calcium carbonate, chlorpheniramine, vitamin d, furosemide, hydrocodone-acetaminophen, hydrocodone-acetaminophen, hydrocodone-acetaminophen, levothyroxine, melatonin, methylprednisolone, multiple vitamin, statin not prescribed, and vitamin e.    ROS     ROS: 10 point ROS neg other than the symptoms noted above in the HPI.    Physical Exam   VS: /75 (BP Location: Right arm, Patient Position: Chair, Cuff Size: Adult Regular)   Pulse 86   Temp 98.3  F (36.8  C) (Oral)   Resp 14   Wt 69.4 kg (153 lb)   Breastfeeding? No   BMI 24.69 kg/m    GENERAL: AXOX3, NAD, well dressed, answering questions appropriately, appears stated age.  HEENT: no exophthalmos, no proptosis, no lig lag, no retraction  CV: RRR  LUNGS: Normal respiratory effort.  EXTREMITIES: no edema  NEUROLOGY: CN grossly intact, no tremors  MSK: grossly intact    LABS:  BMP:  !COMPREHENSIVE Latest Ref Rng & Units 10/17/2018   SODIUM 133 - 144 mmol/L 134   POTASSIUM 3.4 - 5.3 mmol/L 3.3 (L)   CHLORIDE 94 - 109 mmol/L 98   BUN 7 - 30 mg/dL 12   Creatinine 0.52 - 1.04 mg/dL 0.62   Glucose 70 - 99 mg/dL 78   ANION GAP 3 - 14 mmol/L 6   CALCIUM 8.5 - 10.1 mg/dL 8.7   ALBUMIN 3.4 - 5.0 g/dL 3.5   PROTEIN, TOTAL 6.8 - 8.8 g/dL 6.7 (L)     TFTs:  !THYROID Latest Ref Rng & Units 10/17/2018   TSH 0.40 - 4.00 mU/L 3.18     LFTs:  !LIPID/HEPATIC Latest Ref Rng & Units 10/17/2018   AST 0 - 45 U/L 16   ALT 0 - 50 U/L 22     PTH:  Component      Latest Ref Rng & Units 6/29/2018   Parathyroid Hormone Intact      18 - 80 pg/mL 32     Vitamin D:  Component      Latest Ref Rng & Units 10/17/2018  "  Vitamin D Deficiency screening      20 - 75 ug/L 30     BoneTurnOverMarkers:  Component      Latest Ref Rng & Units 6/29/2018 7/4/2018   N-Telopeptide X-Link Urine      not reported  30   Creatinine Ur/Vol      mg/dL  39   Osteocalcin      11 - 50 ng/mL 15      Other:  Component      Latest Ref Rng & Units 6/29/2018   Albumin Fraction      3.7 - 5.1 g/dL 4.3   Alpha 1 Fraction      0.2 - 0.4 g/dL 0.3   Alpha 2 Fraction      0.5 - 0.9 g/dL 0.7   Beta Fraction      0.6 - 1.0 g/dL 0.7   Gamma Fraction      0.7 - 1.6 g/dL 0.7   Monoclonal Peak      0.0 g/dL 0.0   ELP Interpretation:       Essentially normal electrophoretic pattern. No monoclonal protein seen.      Component      Latest Ref Rng & Units 7/4/2018   Calcium Urine mg/dL      mg/dL 5.5   Calcium Urine g/24 h      0.10 - 0.30 g/24 h 0.09 (L)   Calcium Urine g/g Cr      g/g Cr 0.14   N-Telopeptide X-Link Urine      not reported 30   Creatinine Ur/Vol      mg/dL 39     DEXA:    BONE DENSITOMETRY  4/21/2015     AGE: 69 year old    RISK FACTORS: Post-menopausal, Height loss of 1.5 inches inches, Condition related to bone loss: inflammatory bowel disease  CURRENT TREATMENT: Calcium with Vitamin D    FINDINGS:  Lumbar Spine (L1-L4) T-score: -1.3  Left Femoral Neck T-score: N/A  Right Femoral Neck T-score: -2.3    Lumbar (L1-L4) BMD: 1.036 Previous: 1.063   Total Hip Mean BMD: 0.796 Previous: 0.859    Comparison is made to another DXA performed on the same Lunar Prodigy machine on 07/09/2012 and 11/13/2007.     LATERAL VERTEBRAL ASSESSMENT  Procedure:  Vertebral fracture assessment was performed in the lateral decubitus position using a Lunar Prodigy  densitometer.  Indications for VFA: T-score of -1.0 or worse, age (female>69) and height loss > 1.5\"  Confounding factors for VFA: Arthritis/degenerative disc disease, rib shadows and scapular shadows.  The LVA scan is interpretable from T8 to L3.     VFA Findings: Using the semi-quantitative analysis of Romel llamas " was evidence of no spinal deformity  VFA Impression: Kahlil Caputo has no vertebral fractures identified on the VFA.          IMPRESSION  Osteopenia (low bone mass)    BONE DENSITOMETRY  5/24/2018       AGE:  72 year old                                   RISK FACTORS:  Post-menopausal, Fracture of wrist     CURRENT TREATMENT:  Calcium with Vitamin D      FINDINGS:               Lumbar Spine (L1-L4) T-score:  -1.4, degenerative changes present               Right Femoral Neck T-score:  -2.5               Forearm (radius 33%) T-score:  -0.4  The left femur is not acceptable for evaluation due to previous arthroplasty.                             Lumbar (L1-L4) BMD: 1.025                                                           Total Hip Mean BMD: 0.763                                                  Forearm (radius 33%) BM: 0.676        LATERAL VERTEBRAL ASSESSMENT  Procedure:  Vertebral fracture assessment was performed in the lateral decubitus position using a Lunar Prodigy  densitometer.  Indications for VFA: T-score of -1.0 or worse and age (female>69)  Confounding factors for VFA: rib shadows.  The LVA scan is interpretable from T9 to L5.     VFA Findings: Using the semi-quantitative analysis of Romel there was evidence of no spinal deformity, however can not rule out fracture at T8.   VFA Impression: Kahlil Caputo has no definite vertebral fractures, one possible fracture identified on the VFA.   Further evaluation may be warranted due to equivocal fracture      IMPRESSION  Osteoporosis., Degenerative changes of the lumbar spine which may falsely elevate results. If a vertebral fracture is confirmed, the diagnosis would be severe osteoporosis.        All pertinent notes, labs, and images personally reviewed by me.     A/P  Ms.Annice Caputo is a 73 year old here for the evaluation of:    #1 Osteoporosis/Osteopenia.     Started Fosamax 70 mg weekly approximately 7/2018.  Tolerating Fosamax well.  Continue  Fosamax.  Continue Vitamin D 2000 units per day and calcium 500-600 mg bid.  Plan to repeat bone turnover markers.  Repeat Dexa scan 5/2020.    Risk factors for low bone density include personal history of fracture or family history, , advanced age, female, dementia, and poor health.  Also smoking, low BMI, Estrogen deficiency, low Ca intake, and alcoholism.  A prior history of vertebral fracture greatly increases the risk of subsequent fractures. A history of other medical diseases impacting on bone may also affect bone health.      The 24-hour urine calcium value, if low (< 50 mg/24 hour), would suggest the presence of vitamin D deficiency due to poor dietary intake or malabsorption. A low 24-hour urine calcium should be followed by a serum 25-hydroxyvitamin D level to test for possible vitamin D deficiency. The identification of vitamin D deficiency should prompt the search for possible occult malabsorption due sprue. Twenty-four hour urine calcium values over 300 mg should prompt an evaluation for possible causes of hypercalciuria.    Bone turnover markers can also be helpful in determining duration of therapy and compliance.  Osteocalcin is a bone formation marker (serum) and N-telopeptide of collagen cross links (NTx) is found in the urine and is a bone resorption maker.    FRAX is a tool used to assess 10year risk of fracture.  Their FRAX risk is.   Low bone mass (T-score between -1.0 and -2.5 at the femoral neck or spine) and a 10-year probability of a hip fracture ? 3% or a 10-year probability of a major osteoporosis-related fracture ? 20% based on the [place]-adapted WHO algorithm warrens treatment.    The pt was advised to    Maintain an adequate calcium and vitamin D intake and to supplement vitamin D if needed to maintain serum levels of  25 hydroxy D (25 OH D) between 30-60 ng/ml.    Limit alcohol intake to no more than 2 servings per day.    Limit caffeine intake.    Maintain an active lifestyle  including weight-bearing exercises for at least 30 mins daily.    Take measures to reduce the risk of falling.    Instructions on Bisphosphonate use and side effects - particularly esophageal adverse events - are carefully reviewed with her. This drug must be taken upon arising for the day on an empty stomach, with a large 6-8 ounce glass of water; she must remain NPO in the upright position for at least 30 minutes afterwards and until after the first food of the day. If esophageal irritation is noted,  the patient was advised to stop the medication and call my office.  Treatment with bisphosphonate therapy will decrease fracture risk 50-70%.   There is risk of osteonecrosis of the jaw in patients using bisphosphonates is approximately 1/1700-1/100,000, with development most likely related to invasive dental procedures. If an invasive dental procedure was necessary, preferably stop the bisphosphonate approximately 3 months prior to reduce the risk. Let your dentist know that you are on this medication.  The data available at this time suggests that there is probably a small increase risk of atypical (nontraumatic) subtrochanteric fractures of the femur in patients on bisphosphonate therapy compared to those not on it. One large study suggested that for every 100 fractures prevented with bisphosphonate therapy, less than one femur fracture will occur. Other studies suggest one episode per 2,500 patient years. Patient should call with leg pain.      Labs ordered today:   No orders of the defined types were placed in this encounter.    Radiology/Consults ordered today: None    More than 50% of the time spent with Ms. Caputo on counseling / coordinating her care.  Total face to face time was 20 minutes.      Follow-up:  One year    Surekha Galicia NP  [facility]  CC:

## 2019-07-26 LAB
COLLAGEN NTX/CREAT UR-SRTO: NORMAL BCE/MM
CREAT UR-MCNC: 25 MG/DL
OSTEOCALCIN SERPL-MCNC: 5 NG/ML (ref 11–50)

## 2019-07-27 ENCOUNTER — MYC MEDICAL ADVICE (OUTPATIENT)
Dept: FAMILY MEDICINE | Facility: CLINIC | Age: 74
End: 2019-07-27

## 2019-07-27 DIAGNOSIS — G89.4 CHRONIC PAIN SYNDROME: ICD-10-CM

## 2019-07-29 RX ORDER — HYDROCODONE BITARTRATE AND ACETAMINOPHEN 5; 325 MG/1; MG/1
1-2 TABLET ORAL EVERY 8 HOURS PRN
Qty: 90 TABLET | Refills: 0 | Status: CANCELLED | OUTPATIENT
Start: 2019-07-29

## 2019-07-29 NOTE — TELEPHONE ENCOUNTER
Haase, Susan Rachele APRN CNP   Please review my chart message - updates and question regarding pain medication refill - pended    checked - no concerns noted     I have asked patient to schedule an upcoming appt. as per notes you would like to see her every 3 months   Would you like a phone visit now?     Sofy Dial, Registered Nurse   [facility]

## 2019-07-29 NOTE — RESULT ENCOUNTER NOTE
Annice,  Your bone turnover markers are both suppressed indicating the Fosamax is working well.  Here's a copy of the results for your records.  Sruekha Galicia NP  Endocrinology

## 2019-07-30 NOTE — TELEPHONE ENCOUNTER
You can schedule Annice for a phone visit with me in one of the short slots.  Thanks,  Susan Haase, CNP

## 2019-07-31 ENCOUNTER — VIRTUAL VISIT (OUTPATIENT)
Dept: FAMILY MEDICINE | Facility: CLINIC | Age: 74
End: 2019-07-31
Payer: COMMERCIAL

## 2019-07-31 DIAGNOSIS — L98.9 SKIN LESION: ICD-10-CM

## 2019-07-31 DIAGNOSIS — G89.4 CHRONIC PAIN SYNDROME: Primary | ICD-10-CM

## 2019-07-31 PROCEDURE — 99441 ZZC PHYSICIAN TELEPHONE EVALUATION 5-10 MIN: CPT | Performed by: NURSE PRACTITIONER

## 2019-07-31 RX ORDER — HYDROCODONE BITARTRATE AND ACETAMINOPHEN 5; 325 MG/1; MG/1
1 TABLET ORAL EVERY 8 HOURS PRN
Qty: 90 TABLET | Refills: 0 | Status: SHIPPED | OUTPATIENT
Start: 2019-07-31 | End: 2019-08-28

## 2019-07-31 NOTE — PROGRESS NOTES
"Kahlil Caputo is a 73 year old female who is being evaluated via a billable telephone visit.      The patient has been notified of following:     \"This telephone visit will be conducted via a call between you and your physician/provider. We have found that certain health care needs can be provided without the need for a physical exam.  This service lets us provide the care you need with a short phone conversation.  If a prescription is necessary we can send it directly to your pharmacy.  If lab work is needed we can place an order for that and you can then stop by our lab to have the test done at a later time.    If during the course of the call the physician/provider feels a telephone visit is not appropriate, you will not be charged for this service.\"     Consent has been obtained for this service by 1 care team member: yes. See the scanned image in the medical record.    Kahlil Caputo complains of    Chief Complaint   Patient presents with     Refill Request       I have reviewed and updated the patient's Past Medical History, Social History, Family History and Medication List.    ALLERGIES  Latex; Nsaids; Aspirin; Codeine; Pravastatin; Simvastatin; and Tramadol    Casi Yue, CMA     Additional provider notes:   Chronic pain:  Lower back pain:  Taking norco 2 tablets at  bedtime and 1 during the day.  Continues to have right bursa pain, had an in injection completed which has helped with pain on some days.     Sore on the back:   Was seen on 7/9 for sore on her back, took keflex and steroid without resolution of lesion.  Has an appt set up on 8/19, will complete a biopsy at that time.   HGB:  14.2 at last visit.   Appointment   Assessment/Plan:    Kahlil was seen today for refill request.    Diagnoses and all orders for this visit:    Chronic pain syndrome:  Refill norco as below, no change in dosage.  -     HYDROcodone-acetaminophen (NORCO) 5-325 MG tablet; Take 1 tablet by mouth every 8 hours as needed for " severe pain (max of 3 tablets per day)    Skin lesion: unchanged, will return for skin biopsy        I have reviewed the note as documented above.  This accurately captures the substance of my conversation with the patient.  Kahlil Duran    Total time of call between patient and provider was 7minutes   Susan Haase, KAR

## 2019-08-08 ENCOUNTER — NURSE TRIAGE (OUTPATIENT)
Dept: FAMILY MEDICINE | Facility: CLINIC | Age: 74
End: 2019-08-08

## 2019-08-08 ENCOUNTER — OFFICE VISIT (OUTPATIENT)
Dept: FAMILY MEDICINE | Facility: CLINIC | Age: 74
End: 2019-08-08
Payer: COMMERCIAL

## 2019-08-08 ENCOUNTER — TELEPHONE (OUTPATIENT)
Dept: FAMILY MEDICINE | Facility: CLINIC | Age: 74
End: 2019-08-08

## 2019-08-08 VITALS
WEIGHT: 151.8 LBS | SYSTOLIC BLOOD PRESSURE: 132 MMHG | HEART RATE: 106 BPM | BODY MASS INDEX: 25.29 KG/M2 | OXYGEN SATURATION: 95 % | TEMPERATURE: 98.1 F | HEIGHT: 65 IN | RESPIRATION RATE: 18 BRPM | DIASTOLIC BLOOD PRESSURE: 66 MMHG

## 2019-08-08 DIAGNOSIS — R21 RASH: Primary | ICD-10-CM

## 2019-08-08 DIAGNOSIS — E03.9 HYPOTHYROIDISM, UNSPECIFIED TYPE: ICD-10-CM

## 2019-08-08 DIAGNOSIS — R60.0 LOWER EXTREMITY EDEMA: ICD-10-CM

## 2019-08-08 PROCEDURE — 87529 HSV DNA AMP PROBE: CPT | Performed by: NURSE PRACTITIONER

## 2019-08-08 PROCEDURE — 99213 OFFICE O/P EST LOW 20 MIN: CPT | Performed by: NURSE PRACTITIONER

## 2019-08-08 PROCEDURE — 87529 HSV DNA AMP PROBE: CPT | Mod: 59 | Performed by: NURSE PRACTITIONER

## 2019-08-08 RX ORDER — SILVER SULFADIAZINE 10 MG/G
CREAM TOPICAL 2 TIMES DAILY
Qty: 25 G | Refills: 0 | COMMUNITY
Start: 2019-08-08 | End: 2019-11-05

## 2019-08-08 ASSESSMENT — ASTHMA QUESTIONNAIRES

## 2019-08-08 ASSESSMENT — ANXIETY QUESTIONNAIRES
1. FEELING NERVOUS, ANXIOUS, OR ON EDGE: NOT AT ALL
IF YOU CHECKED OFF ANY PROBLEMS ON THIS QUESTIONNAIRE, HOW DIFFICULT HAVE THESE PROBLEMS MADE IT FOR YOU TO DO YOUR WORK, TAKE CARE OF THINGS AT HOME, OR GET ALONG WITH OTHER PEOPLE: NOT DIFFICULT AT ALL
5. BEING SO RESTLESS THAT IT IS HARD TO SIT STILL: NOT AT ALL
2. NOT BEING ABLE TO STOP OR CONTROL WORRYING: NOT AT ALL
7. FEELING AFRAID AS IF SOMETHING AWFUL MIGHT HAPPEN: NOT AT ALL
3. WORRYING TOO MUCH ABOUT DIFFERENT THINGS: NOT AT ALL

## 2019-08-08 ASSESSMENT — PATIENT HEALTH QUESTIONNAIRE - PHQ9
SUM OF ALL RESPONSES TO PHQ QUESTIONS 1-9: 0
5. POOR APPETITE OR OVEREATING: NOT AT ALL

## 2019-08-08 ASSESSMENT — MIFFLIN-ST. JEOR: SCORE: 1184.44

## 2019-08-08 NOTE — TELEPHONE ENCOUNTER
Patient will come in to see pcp today at 6:00     Sofy Dial, Registered Nurse   [facility]

## 2019-08-08 NOTE — PROGRESS NOTES
Subjective     Kahlil Caputo is a 74 year old female who presents to clinic today for the following health issues:    HPI   Rash  Onset: 3 days ago    Description:   Location: lower back   Character: red  Itching (Pruritis): YES    Progression of Symptoms:  worse    Accompanying Signs & Symptoms:  Fever: no   Body aches or joint pain: YES history of lower back pain  Sore throat symptoms: no   Recent cold symptoms: no     History:   Previous similar rash: No    Precipitating factors:   Exposure to similar rash: no   New exposures: lido cane patch  Recent travel: no     Alleviating factors:  Started 3 days ago, adjacent to previous area, very itchy, using hydrocortisone   Has been using a lidocaine patch in the area for chronic lower back pain.    Therapies Tried and outcome: was treated in July for a skin lesion in an adjacent area.     Patient Active Problem List   Diagnosis     Hypothyroidism     Chronic airway obstruction (H)     Disorder of bone and cartilage     Small bowel obstruction (H)     Stricture of small intestine (H)     S/P Left total hip arthroplasty 11/25/13     Osteoarthritis of hip     Ileitis (ileocecal resection 8/2013)     Lumbago     Palpitations     PVC's (premature ventricular contractions)     Family history of premature CAD     Chronic pain     Pulmonary nodules     ACP (advance care planning)     Coronary artery disease involving native coronary artery of native heart without angina pectoris     Closed fracture of distal end of left radius with routine healing, unspecified fracture morphology, subsequent encounter     Age-related osteoporosis with current pathological fracture with routine healing     Statin intolerance     PAD (peripheral artery disease) (H)     Aspirin sensitivity     Sacroiliitis (H)     Past Surgical History:   Procedure Laterality Date     ARTHROPLASTY HIP  11/25/2013    Procedure: ARTHROPLASTY HIP;  Left Total Hip Arthroplasty  ;  Surgeon: Luis Marshall MD;   Location: [facility] OR     C NONSPECIFIC PROCEDURE      D& C X 2     C NONSPECIFIC PROCEDURE      PILONIDAL CYSTECTOMY     C NONSPECIFIC PROCEDURE      T&A     C NONSPECIFIC PROCEDURE      BREAST BX & CERVICAL POLYP     HC COLONOSCOPY THRU STOMA, DIAGNOSTIC           LAPAROTOMY EXPLORATORY  2013    Procedure: LAPAROTOMY EXPLORATORY;  LAPAROTOMY EXPLORATORY, Ileocecal Resection Resection, Removal of Retained Pill Cam;  Surgeon: Mitchell Fraser MD;  Location: [facility] OR     RESECTION ILEOCECAL  2013    Procedure: RESECTION ILEOCECAL;;  Surgeon: Mitchell Fraser MD;  Location: [facility] OR       Social History     Tobacco Use     Smoking status: Former Smoker     Packs/day: 1.00     Years: 50.00     Pack years: 50.00     Types: Cigarettes     Last attempt to quit: 2010     Years since quittin.1     Smokeless tobacco: Never Used     Tobacco comment: Quit 2010   Substance Use Topics     Alcohol use: Yes     Alcohol/week: 0.0 oz     Comment: 1 glass wine occas.     Family History   Problem Relation Age of Onset     Alcohol/Drug Mother      Heart Disease Mother         cardiomyopathy     Alcohol/Drug Father      Heart Disease Father         CAD -  1st MI mid 50's     Myocardial Infarction Father         X4     Osteoporosis Maternal Grandmother      Cancer Maternal Grandfather         stomach or throat - martini drinker/pipe smoker     Myocardial Infarction Brother      Breast Cancer No family hx of      Cancer - colorectal No family hx of          Current Outpatient Medications   Medication Sig Dispense Refill     ACE/ARB NOT PRESCRIBED, INTENTIONAL, Please choose reason not prescribed, below       acetaminophen (TYLENOL) 500 MG tablet Take 500-1,000 mg by mouth as needed for mild pain       alendronate (FOSAMAX) 70 MG tablet TAKE 1 TABLET BY MOUTH ONCE WEEKLY, TAKE 30 MIN PRIOR TO 1ST FOOD/DRINK/MED - AVOID LYING DOWN FOR 30 MIN. 12 tablet 3     ascorbic acid (VITAMIN C) 500 MG tablet  Take by mouth 2 times daily       ASPIRIN NOT PRESCRIBED (INTENTIONAL) Please choose reason not prescribed, below 0 each 0     BETA BLOCKER NOT PRESCRIBED, INTENTIONAL, Beta Blocker not prescribed intentionally due to COPD  0     betaxolol (BETOPTIC) 0.5 % ophthalmic solution INSTILL 1 DROP INTO EACH EYE QD  3     calcium carbonate 500 MG tablet Take 2 tablets by mouth 2 times daily  100 tablet 3     chlorpheniramine (CHLOR-TRIMETON) 4 MG tablet Take 4 mg by mouth every 6 hours as needed for allergies or rhinitis       Cholecalciferol (VITAMIN D) 1000 UNITS capsule Take 2,000 Units by mouth daily        furosemide (LASIX) 20 MG tablet TAKE 1 TO 2 TABLETS BY MOUTH DAILY 180 tablet 1     HYDROcodone-acetaminophen (NORCO) 5-325 MG tablet Take 1 tablet by mouth every 8 hours as needed for severe pain (max of 3 tablets per day) 90 tablet 0     HYDROcodone-acetaminophen (NORCO) 5-325 MG tablet Take 1-2 tablets by mouth every 8 hours as needed for pain or severe pain Max of 3 tablets per day 90 tablet 0     HYDROcodone-acetaminophen (NORCO) 5-325 MG tablet Take 1-2 tablets by mouth every 8 hours as needed for pain or severe pain Max of 3 tablets per day 90 tablet 0     levothyroxine (SYNTHROID/LEVOTHROID) 150 MCG tablet Take 1 tablet (150 mcg) by mouth every morning 90 tablet 2     melatonin 3 MG tablet Take 1-2 tablets (3-6 mg) by mouth nightly as needed for sleep (OTC)       methylPREDNISolone (MEDROL DOSEPAK) 4 MG tablet therapy pack Follow Package Directions 21 tablet 0     Multiple Vitamin (MULTI-VITAMIN PO) Take by mouth daily        STATIN NOT PRESCRIBED, INTENTIONAL, 1 each See Admin Instructions Statin not prescribed intentionally due to Allergy to statin 0 each 0     vitamin E 400 UNIT capsule Take by mouth daily       BP Readings from Last 3 Encounters:   08/08/19 132/66   07/25/19 138/75   07/09/19 128/74    Wt Readings from Last 3 Encounters:   08/08/19 68.9 kg (151 lb 12.8 oz)   07/25/19 69.4 kg (153 lb)  "  04/29/19 69.9 kg (154 lb)          Reviewed and updated as needed this visit by Provider         Review of Systems   ROS COMP: CONSTITUTIONAL: NEGATIVE for fever, chills, change in weight  INTEGUMENTARY/SKIN: see HPI      Objective    Pulse 106   Temp 98.1  F (36.7  C) (Oral)   Resp 18   Ht 1.651 m (5' 5\")   Wt 68.9 kg (151 lb 12.8 oz)   SpO2 95%   BMI 25.26 kg/m    Body mass index is 25.26 kg/m .  Physical Exam   GENERAL: healthy, alert and no distress  SKIN: lower back with a 7.5 cm x 5 cm area of erythema with numerous grouped vesicles, light yellow drainage on dressing, slightly tender to touch.  Rash crosses spinal column.          Kahlil was seen today for derm problem.    Diagnoses and all orders for this visit:    Rash:   At first appears to be herpes zoster but rash crosses the midline, possibly HSV (viral culture obtained).  More likely rash is due to a chemical reaction/burn to the lidocaine patch since the rash is in the exact location where the lidocaine patch is usually applied. Applied thin layer of silvadene cream which felt cooling.  Instructed to clean area daily and apply silvadene twice a day for the next 3-5 days.    -     HSV 1 and 2 DNA by PCR      BMI:   Estimated body mass index is 25.26 kg/m  as calculated from the following:    Height as of this encounter: 1.651 m (5' 5\").    Weight as of this encounter: 68.9 kg (151 lb 12.8 oz).           Return in about 1 day (around 8/9/2019).    Susan Haase, APRN [facility]      Addendum 8/10/2019:  Reports rash is the same, less itchy, has not spread.  Susan Haase, CNP      "

## 2019-08-08 NOTE — TELEPHONE ENCOUNTER
"Pt calls, see 7/9/19 appointment with Bryan Lynne MD for rash issue, negative culture, resolved, now returned with \"vengeance\", onset past couple days, itchy, see below, also using lidocaine patches, wonder if rxn, discussed at length, recommend appointment, does not have cell phone to do media/E visit, will call in am for same day apt, declines UC appointment today, will send SH FYI (just in case you want to see pt later afternoon in 15 minute spot?)      Next 5 appointments (look out 90 days)    Aug 19, 2019  2:05 PM CDT  Office Visit with Susan Rachele Haase, MALIK CNP, CR EXAM ROOM 10  [facility] [address] [place] [id number] [phone]          Additional Information    Negative: Possible contact with poison ivy or oak    Negative: Insect bite(s) suspected    Negative: Athlete's Foot suspected (i.e., itchy rash between the toes)    Negative: Jock Itch suspected (i.e., itchy rash on inner thighs near genital area)    Negative: Wound infection suspected (i.e., pain, spreading redness, or pus; in a cut, puncture, scrape or sutured wound)    Negative: Rash of external female genital area (vulva)    Negative: Rash of penis or scrotum    Negative: Small spot, skin growth, or mole    Negative: Fever and localized purple or blood-colored spots or dots that are not from injury or friction    Negative: Fever and localized rash is very painful    Negative: Patient sounds very sick or weak to the triager    Negative: Looks like a boil, infected sore, deep ulcer, or other infected rash (spreading redness, pus)    Painful rash with multiple small blisters grouped together (i.e., dermatomal distribution or 'band' or 'stripe')    Answer Assessment - Initial Assessment Questions  1. APPEARANCE of RASH: \"Describe the rash.\"       Very red, part of area hard and itchy, weepy and irritated  2. LOCATION: \"Where is the rash located?\"       Lower back, " "near sacrum   3. NUMBER: \"How many spots are there?\"       Solid area, 2x3\"  4. SIZE: \"How big are the spots?\" (Inches, centimeters or compare to size of a coin)       2 x 3 \"  5. ONSET: \"When did the rash start?\"       1.5 days ago  6. ITCHING: \"Does the rash itch?\" If so, ask: \"How bad is the itch?\"  (Scale 1-10; or mild, moderate, severe)      Moderate and severe  7. PAIN: \"Does the rash hurt?\" If so, ask: \"How bad is the pain?\"  (Scale 1-10; or mild, moderate, severe)      Not painful but irritated  8. OTHER SYMPTOMS: \"Do you have any other symptoms?\" (e.g., fever)      Clear to light yellow, not pussy  9. PREGNANCY: \"Is there any chance you are pregnant?\" \"When was your last menstrual period?\"      No, n/a    Protocols used: RASH OR REDNESS - [id number]    Reina Pena RN, BSN  Message handled by Nurse Triage.    "

## 2019-08-09 RX ORDER — LEVOTHYROXINE SODIUM 150 UG/1
150 TABLET ORAL EVERY MORNING
Qty: 90 TABLET | Refills: 2 | Status: SHIPPED | OUTPATIENT
Start: 2019-08-09 | End: 2020-05-06

## 2019-08-09 RX ORDER — FUROSEMIDE 20 MG
TABLET ORAL
Qty: 180 TABLET | Refills: 1 | Status: SHIPPED | OUTPATIENT
Start: 2019-08-09 | End: 2020-02-06

## 2019-08-09 ASSESSMENT — ASTHMA QUESTIONNAIRES: ACT_TOTALSCORE: 25

## 2019-08-11 LAB
HSV1 DNA SPEC QL NAA+PROBE: NEGATIVE
HSV2 DNA SPEC QL NAA+PROBE: NEGATIVE
SPECIMEN SOURCE: NORMAL

## 2019-08-12 ENCOUNTER — TELEPHONE (OUTPATIENT)
Dept: FAMILY MEDICINE | Facility: CLINIC | Age: 74
End: 2019-08-12

## 2019-08-12 DIAGNOSIS — L30.9 DERMATITIS: ICD-10-CM

## 2019-08-12 NOTE — TELEPHONE ENCOUNTER
"Kahlil Caputo is a 74 year old female who calls with  follow up to 8/8/19 visit.  Vero requested pt call if rash not improving.    Using silvadene cream BID and viral culture negative.   \"It is no better. It seems like it could be a little bit bigger little bigger. Itching and seepage maybe a little less.\"   Please advise.   [phone] (home) OK detailed message on . [facility] pharmacy.   Duke Marie, RN    "

## 2019-08-12 NOTE — RESULT ENCOUNTER NOTE
Duy Guillory,  Your test for the herpes virus is negative.  Please let me know if you have any questions.  Sincerely,     Susan Haase, CNP

## 2019-08-13 NOTE — TELEPHONE ENCOUNTER
Patient recalling.    Can this be sent to a provider on PCP's team?? Patient would like an answer today.    Thanks    Miladys Marshall

## 2019-08-13 NOTE — TELEPHONE ENCOUNTER
Covering providers      please review message below from Duke JOHN, patient returning call before pcp able to respond and hoping for an answer today     Thank you   Sofy Dial, Registered Nurse   [facility]

## 2019-08-13 NOTE — TELEPHONE ENCOUNTER
"Pt calls, prefers to wait for SH tomorrow, stable, routed, , please confirm:    ~blisters improved, only one area in middle draining small amount of seepage, clear  ~still using silvadene twice daily, keeping it covered with guaze and cloth tape, confirm if ok to open to air at night or keep covered 24/7, still use silvadene  \"apply silvadene twice a day for the next 3-5 days\"  ~has appointment next week, can f/u than if needed  ~affected area about the same size  ~wants to defer HC on rash today (see note below) and wait for  to advise    Next 5 appointments (look out 90 days)    Aug 19, 2019  2:05 PM CDT  Office Visit with Susan Rachele Haase, APRN CNP, CR EXAM ROOM 10  [facility]) [address] [place] [id number] [phone]        Routed to , pt thought rash would be resolving by now, please confirm plan, route to inform pt, may LMOVM on cell,  thanks  Reina Pena, RN, BSN  Message handled by Nurse Triage.    "

## 2019-08-13 NOTE — TELEPHONE ENCOUNTER
Message left for patient to return call to clinic and ask to speak to available triage nurse     Sofy Dial, Registered Nurse   [facility]

## 2019-08-14 RX ORDER — METHYLPREDNISOLONE 4 MG
TABLET, DOSE PACK ORAL
Qty: 21 TABLET | Refills: 0 | Status: SHIPPED | OUTPATIENT
Start: 2019-08-14 | End: 2019-08-19

## 2019-08-14 NOTE — TELEPHONE ENCOUNTER
Rash has not increased in size, continues to be red, only slight drainage.  Stop silvadene, hydrocortisone topical, start medrol dose packet. Will follow up with me on 8/19.  Susan Haase, CNP

## 2019-08-19 ENCOUNTER — OFFICE VISIT (OUTPATIENT)
Dept: FAMILY MEDICINE | Facility: CLINIC | Age: 74
End: 2019-08-19
Payer: COMMERCIAL

## 2019-08-19 VITALS
OXYGEN SATURATION: 96 % | SYSTOLIC BLOOD PRESSURE: 114 MMHG | BODY MASS INDEX: 25.33 KG/M2 | WEIGHT: 152 LBS | DIASTOLIC BLOOD PRESSURE: 64 MMHG | HEART RATE: 108 BPM | HEIGHT: 65 IN | TEMPERATURE: 98.6 F | RESPIRATION RATE: 20 BRPM

## 2019-08-19 DIAGNOSIS — L98.9 SKIN LESION: Primary | ICD-10-CM

## 2019-08-19 DIAGNOSIS — R21 RASH AND NONSPECIFIC SKIN ERUPTION: ICD-10-CM

## 2019-08-19 PROCEDURE — 99207 ZZC DROP WITH A PROCEDURE: CPT | Mod: 25 | Performed by: NURSE PRACTITIONER

## 2019-08-19 PROCEDURE — 11106 INCAL BX SKN SINGLE LES: CPT | Performed by: NURSE PRACTITIONER

## 2019-08-19 PROCEDURE — 88305 TISSUE EXAM BY PATHOLOGIST: CPT | Performed by: NURSE PRACTITIONER

## 2019-08-19 ASSESSMENT — MIFFLIN-ST. JEOR: SCORE: 1185.35

## 2019-08-19 NOTE — PROGRESS NOTES
Subjective     Kahlil Caputo is a 74 year old female who presents to clinic today for the following health issues:    HPI   Concern - skin biopsy on back    Skin lesion on lower back that has been present for the past 6 weeks, started after a bacterial infection was present in that same area.  Denies pain or redness.    Rash: seen on 8/8/2019 for a rash along the lower back likely caused from a lidocaine rash, redness and itching improved with steroid dose pack and hydrocortisone cream.       Patient Active Problem List   Diagnosis     Hypothyroidism     Disorder of bone and cartilage     Small bowel obstruction (H)     Stricture of small intestine (H)     S/P Left total hip arthroplasty 11/25/13     Osteoarthritis of hip     Ileitis (ileocecal resection 8/2013)     Lumbago     Palpitations     PVC's (premature ventricular contractions)     Family history of premature CAD     Chronic pain     Pulmonary nodules     ACP (advance care planning)     Coronary artery disease involving native coronary artery of native heart without angina pectoris     Closed fracture of distal end of left radius with routine healing, unspecified fracture morphology, subsequent encounter     Age-related osteoporosis with current pathological fracture with routine healing     Statin intolerance     PAD (peripheral artery disease) (H)     Aspirin sensitivity     Sacroiliitis (H)     Past Surgical History:   Procedure Laterality Date     ARTHROPLASTY HIP  11/25/2013    Procedure: ARTHROPLASTY HIP;  Left Total Hip Arthroplasty  ;  Surgeon: Luis Marshall MD;  Location: [facility] OR     C NONSPECIFIC PROCEDURE  1970's    D& C X 2     C NONSPECIFIC PROCEDURE  1964    PILONIDAL CYSTECTOMY     C NONSPECIFIC PROCEDURE  1959    T&A     C NONSPECIFIC PROCEDURE  1991    BREAST BX & CERVICAL POLYP     HC COLONOSCOPY THRU STOMA, DIAGNOSTIC      2003     LAPAROTOMY EXPLORATORY  8/30/2013    Procedure: LAPAROTOMY EXPLORATORY;  LAPAROTOMY EXPLORATORY,  "Ileocecal Resection Resection, Removal of Retained Pill Cam;  Surgeon: Mitchell Fraser MD;  Location: [facility] OR     RESECTION ILEOCECAL  2013    Procedure: RESECTION ILEOCECAL;;  Surgeon: Mitchell Fraser MD;  Location: [facility] OR       Social History     Tobacco Use     Smoking status: Former Smoker     Packs/day: 1.00     Years: 50.00     Pack years: 50.00     Types: Cigarettes     Last attempt to quit: 2010     Years since quittin.2     Smokeless tobacco: Never Used     Tobacco comment: Quit 2010   Substance Use Topics     Alcohol use: Yes     Alcohol/week: 0.0 oz     Comment: 1 glass wine occas.     Family History   Problem Relation Age of Onset     Alcohol/Drug Mother      Heart Disease Mother         cardiomyopathy     Alcohol/Drug Father      Heart Disease Father         CAD -  1st MI mid 50's     Myocardial Infarction Father         X4     Osteoporosis Maternal Grandmother      Cancer Maternal Grandfather         stomach or throat - martini drinker/pipe smoker     Myocardial Infarction Brother      Breast Cancer No family hx of      Cancer - colorectal No family hx of          BP Readings from Last 3 Encounters:   19 114/64   19 132/66   19 138/75    Wt Readings from Last 3 Encounters:   19 68.9 kg (152 lb)   19 68.9 kg (151 lb 12.8 oz)   19 69.4 kg (153 lb)                    Reviewed and updated as needed this visit by Provider         Review of Systems   ROS COMP: CONSTITUTIONAL: NEGATIVE for fever, chills, change in weight  INTEGUMENTARY/SKIN: see HPI      Objective    /64 (BP Location: Right arm, Patient Position: Chair, Cuff Size: Adult Regular)   Pulse 108   Temp 98.6  F (37  C) (Oral)   Resp 20   Ht 1.651 m (5' 5\")   Wt 68.9 kg (152 lb)   SpO2 96%   BMI 25.29 kg/m    Body mass index is 25.29 kg/m .  Physical Exam   GENERAL: healthy, alert and no distress  SKIN: right lower back with a 5 mm hyperpigmented nodule, non tender to touch.  " "    Lower back with 5 x 3 cm area of light erythema, no drainage or vesicles, appears scaly.       .  Assessment & Plan   Assessment      Plan  1. Skin lesion: right lower back.  - Surgical pathology exam  - HC INCISIONAL BIOPSY OF SKIN, FIRST LESION  Skin Biopsy Procedure Note: lower back    Consent: Affirmation of informed consent was signed and scanned into the medical record. Risks, benefits and alternatives were discussed. Patient's questions were elicited and answered.     Technique:   Skin prep Betadine  Anesthesia 0.5 cc 1% lidocaine, with epi   Biopsy size 0.5 cm  Biopsy taken via (shave, punch, incisional) shave  Suture  none  Hemostasis  monopolar cautery      EBL:    0  Complications:  No  Tolerance:   Pt tolerated procedure well and was in stable condition.   Pathology sent Yes    Instructions:    Pt should keep dressing in place for 24 hours, then may change and apply antibiotic ointment and simple bandage. May shower after 24 hours.  Pt was instructed to call if bleeding, severe pain or foul smell.   Follow up only if unimproved.    2. Rash and nonspecific skin eruption: improved, may stop hydrocortisone ointment, apply general moisturizing lotion.  - DERMATOLOGY REFERRAL    BMI:   Estimated body mass index is 25.29 kg/m  as calculated from the following:    Height as of this encounter: 1.651 m (5' 5\").    Weight as of this encounter: 68.9 kg (152 lb).     Follow up in 3 months, sooner as needed.   Susan Haase, APRN [facility]        "

## 2019-08-19 NOTE — PROGRESS NOTES
"Future Appointments   Date Time Provider Department Center   8/19/2019  2:05 PM Haase, Susan Rachele, APRN CNP [facility]     Appointment Notes for this encounter:   Skin Biopsy-3mo f/u    Health Maintenance Due   Topic Date Due     SPIROMETRY  1945     URINE DRUG SCREEN  1945     ANNUAL REVIEW OF HM ORDERS  1945     ASTHMA ACTION PLAN  1945     ZOSTER IMMUNIZATION (1 of 2) 08/07/1994     MEDICARE ANNUAL WELLNESS VISIT  11/24/2010     JOSELINE ASSESSMENT  01/24/2019       Pre-visit planning reviewed items:   Reviewed HWBP for upcoming orders in Health Maintenance., Reviewed [facility]BP for due questionnaires. and BestPractice Alerts.    Patient preferred phone number: [phone]   Patient contacted. did not contact patient - visit today     Actions completed:   Confirmed that the visit type and provider(s) are appropriate for the pt's reason for visit.  Assigned any additional questionnaires.  Marked \"Pre-visit Planning Complete\" via Schedule activity.  My Chart Active      Sofy Dial, Registered Nurse   [facility]       "

## 2019-08-19 NOTE — TELEPHONE ENCOUNTER
Message left for patient to return call to clinic and ask to speak to available triage nurse     Sofy Dial, Registered Nurse   [facility]

## 2019-08-19 NOTE — LETTER
My Asthma Action Plan  Name: Kahlil Caputo   YOB: 1945  Date: 8/19/2019   My doctor: Susan Haase, APRN CNP   My clinic: [facility]        My Control Medicine:   My Rescue Medicine:    My Asthma Severity:   Avoid your asthma triggers:                GREEN ZONE   Good Control    I feel good    No cough or wheeze    Can work, sleep and play without asthma symptoms       Take your asthma control medicine every day.     1. If exercise triggers your asthma, take your rescue medication    15 minutes before exercise or sports, and    During exercise if you have asthma symptoms  2. Spacer to use with inhaler: If you have a spacer, make sure to use it with your inhaler             YELLOW ZONE Getting Worse  I have ANY of these:    I do not feel good    Cough or wheeze    Chest feels tight    Wake up at night   1. Keep taking your Green Zone medications  2. Start taking your rescue medicine:    every 20 minutes for up to 1 hour. Then every 4 hours for 24-48 hours.  3. If you stay in the Yellow Zone for more than 12-24 hours, contact your doctor.  4. If you do not return to the Green Zone in 12-24 hours or you get worse, start taking your oral steroid medicine if prescribed by your provider.           RED ZONE Medical Alert - Get Help  I have ANY of these:    I feel awful    Medicine is not helping    Breathing getting harder    Trouble walking or talking    Nose opens wide to breathe       1. Take your rescue medicine NOW  2. If your provider has prescribed an oral steroid medicine, start taking it NOW  3. Call your doctor NOW  4. If you are still in the Red Zone after 20 minutes and you have not reached your doctor:    Take your rescue medicine again and    Call 911 or go to the emergency room right away    See your regular doctor within 2 weeks of an Emergency Room or Urgent Care visit for follow-up treatment.          Annual Reminders:  Meet with Asthma Educator,  Flu Shot in the Fall,  consider Pneumonia Vaccination for patients with asthma (aged 19 and older).    [facility]  [address]  [id number] [place], [place] - [address] [address] [facility] REQUEST[facility]                      Asthma Triggers  How To Control Things That Make Your Asthma Worse    Triggers are things that make your asthma worse.  Look at the list below to help you find your triggers and what you can do about them.  You can help prevent asthma flare-ups by staying away from your triggers.      Trigger                                                          What you can do   Cigarette Smoke  Tobacco smoke can make asthma worse. Do not allow smoking in your home, car or around you.  Be sure no one smokes at a child s day care or school.  If you smoke, ask your health care provider for ways to help you quit.  Ask family members to quit too.  Ask your health care provider for a referral to Quit Plan to help you quit smoking, or call 5-474-929-PLAN.     Colds, Flu, Bronchitis  These are common triggers of asthma. Wash your hands often.  Don t touch your eyes, nose or mouth.  Get a flu shot every year.     Dust Mites  These are tiny bugs that live in cloth or carpet. They are too small to see. Wash sheets and blankets in hot water every week.   Encase pillows and mattress in dust mite proof covers.  Avoid having carpet if you can. If you have carpet, vacuum weekly.   Use a dust mask and HEPA vacuum.   Pollen and Outdoor Mold  Some people are allergic to trees, grass, or weed pollen, or molds. Try to keep your windows closed.  Limit time out doors when pollen count is high.   Ask you health care provider about taking medicine during allergy season.     Animal Dander  Some people are allergic to skin flakes, urine or saliva from pets with fur or feathers. Keep pets with fur or feathers out of your home.    If you can t keep the pet outdoors, then keep the pet out of your  bedroom.  Keep the bedroom door closed.  Keep pets off cloth furniture and away from stuffed toys.     Mice, Rats, and Cockroaches  Some people are allergic to the waste from these pests.   Cover food and garbage.  Clean up spills and food crumbs.  Store grease in the refrigerator.   Keep food out of the bedroom.   Indoor Mold  This can be a trigger if your home has high moisture. Fix leaking faucets, pipes, or other sources of water.   Clean moldy surfaces.  Dehumidify basement if it is damp and smelly.   Smoke, Strong Odors, and Sprays  These can reduce air quality. Stay away from strong odors and sprays, such as perfume, powder, hair spray, paints, smoke incense, paint, cleaning products, candles and new carpet.   Exercise or Sports  Some people with asthma have this trigger. Be active!  Ask your doctor about taking medicine before sports or exercise to prevent symptoms.    Warm up for 5-10 minutes before and after sports or exercise.     Other Triggers of Asthma  Cold air:  Cover your nose and mouth with a scarf.  Sometimes laughing or crying can be a trigger.  Some medicines and food can trigger asthma.

## 2019-08-19 NOTE — TELEPHONE ENCOUNTER
Covering providers      please review message below from Duke JOHN, patient returning call before pcp able to respond and hoping for an answer today     Thank you   Sofy Dial, Registered Nurse   [facility]

## 2019-08-19 NOTE — TELEPHONE ENCOUNTER
Message left for patient to return call to clinic and ask to speak to available triage nurse     Sofy Dial, Registered Nurse   [facility]

## 2019-08-21 LAB — COPATH REPORT: NORMAL

## 2019-08-22 NOTE — RESULT ENCOUNTER NOTE
Duy Guillory,  Good news,  the lesion on your lower back is seborrheic keratosis which is non cancerous skin growth.  Please let me know if you have any questions.  Sincerely,     Susan Haase, CNP

## 2019-08-27 ENCOUNTER — TELEPHONE (OUTPATIENT)
Dept: FAMILY MEDICINE | Facility: CLINIC | Age: 74
End: 2019-08-27

## 2019-08-27 DIAGNOSIS — R21 RASH AND NONSPECIFIC SKIN ERUPTION: Primary | ICD-10-CM

## 2019-08-27 NOTE — TELEPHONE ENCOUNTER
Pt calls, called [facility] for appointment but that provider primarily sees peds, not adults.   Pt prefers and is scheduled 8/29 1:20 [facility] [place], Dr. Sanjuanita Walls.   OK with [facility]?   Referral t'd up.    While insurance does not require referral, we recommended for communication/visit notes, etc.    We will call Annice when referral is approved.   OK, Vero?   Duke Marie RN

## 2019-08-28 ENCOUNTER — TELEPHONE (OUTPATIENT)
Dept: FAMILY MEDICINE | Facility: CLINIC | Age: 74
End: 2019-08-28

## 2019-08-28 DIAGNOSIS — G89.4 CHRONIC PAIN SYNDROME: ICD-10-CM

## 2019-08-28 DIAGNOSIS — M46.1 SACROILIITIS (H): Primary | ICD-10-CM

## 2019-08-28 RX ORDER — HYDROCODONE BITARTRATE AND ACETAMINOPHEN 5; 325 MG/1; MG/1
1 TABLET ORAL EVERY 8 HOURS PRN
Qty: 90 TABLET | Refills: 0 | Status: SHIPPED | OUTPATIENT
Start: 2019-08-30 | End: 2019-08-28

## 2019-08-28 NOTE — TELEPHONE ENCOUNTER
Please let Annice that seeing a dermatologist in [place] is perfect, is the rash on her lower back still present?  Please ask her to call or send a [employer] message next week with an update (derm findings?).  Thanks  Susan Haase, CNP

## 2019-08-28 NOTE — TELEPHONE ENCOUNTER
Phone call to patient     She does still have rash on her lower back, red, itchy/burning   Will f/u with Dermatology tomorrow as scheduled     Advised patient that pcp would like to know how the dermatology visit goes and symptoms next week - patient prefers to call herself instead of writer following up with her     Sofy Dial, Registered Nurse   [facility]

## 2019-08-28 NOTE — TELEPHONE ENCOUNTER
[facility] pharmacist calls for additional diagnosis code.   8/30/19 Rx HYDROcodone-acetaminophen (NORCO) 5-325 MG tablet  Chronic pain syndrome [G89.4]  is not enough. Guidelines changed a month or two ago and [facility] is adhering to new rules.   Duke Marie RN

## 2019-08-28 NOTE — TELEPHONE ENCOUNTER
Patient will be out of medication on 9/3/19 per phone conversation today     Reason For Call:   Chief Complaint   Patient presents with     Refill Request     hydrocodone      Medication Name, Dose and Monthly Quantity:   Hydrocodone with APAP (Vicodin/Norco): Dose 5/325 Schedule 1 tab every 8 hrs (max 3 per day)  Monthly Quantity 90     Diagnosis requiring opiates:   Chronic pain     Problem List Updated:    Yes    Opioid Agreement On File - [facility] PAIN CONTRACT ID# [id number]:  Yes 12/17/2018     Last Urine Drug Screen (at least once every 12 months) Date: not on file     Unexpected Results:   Unsure.    [place]  Data Reviewed (at least once every 3 months) Date: 7/29/19     Unexpected Results:    No.    Last Fill Date: can not verify with [place]  today site not working, last rx written in epic 7/31/19     Last Visit with PCP:   8/19/19    Future Visits with PCP:   No.    Processing:   escribe to pharmacy [employer] [facility]     Sofy Dial RN

## 2019-08-29 RX ORDER — HYDROCODONE BITARTRATE AND ACETAMINOPHEN 5; 325 MG/1; MG/1
1 TABLET ORAL EVERY 8 HOURS PRN
Qty: 90 TABLET | Refills: 0 | Status: SHIPPED | OUTPATIENT
Start: 2019-08-30 | End: 2019-11-05

## 2019-09-06 NOTE — TELEPHONE ENCOUNTER
Pt calling in, saw dermatology  Given clobetasol cream BID for 2 wks  After 1 wk rash is much better, rash now light pink    thinks rash was from bacitracin, or reaction to lidocaine patch  states they don't use it in their clinic anymore because so many people have reacted to it  They are now using aqua fore OTC oint    She will f/u with derm Sept 26, if rash has cleared they plan a patch test with lidocaine patch  Plan to see PCP in Nov FYI to TAVIA Brunson RN, BS  Clinical Nurse Triage.

## 2019-09-26 ENCOUNTER — TRANSFERRED RECORDS (OUTPATIENT)
Dept: HEALTH INFORMATION MANAGEMENT | Facility: CLINIC | Age: 74
End: 2019-09-26

## 2019-10-04 ENCOUNTER — TELEPHONE (OUTPATIENT)
Dept: FAMILY MEDICINE | Facility: CLINIC | Age: 74
End: 2019-10-04

## 2019-10-04 DIAGNOSIS — G89.29 CHRONIC BILATERAL LOW BACK PAIN WITH RIGHT-SIDED SCIATICA: Primary | ICD-10-CM

## 2019-10-04 DIAGNOSIS — M54.41 CHRONIC BILATERAL LOW BACK PAIN WITH RIGHT-SIDED SCIATICA: Primary | ICD-10-CM

## 2019-10-04 NOTE — TELEPHONE ENCOUNTER
Patient wants an order to go to  pain management for an Epidural Pain Injection.    Call when order placed to let patient know she can schedule.

## 2019-10-07 ENCOUNTER — TELEPHONE (OUTPATIENT)
Dept: PALLIATIVE MEDICINE | Facility: CLINIC | Age: 74
End: 2019-10-07

## 2019-10-07 NOTE — TELEPHONE ENCOUNTER
Pre-screening Questions for Radiology Injections:    Injection to be done at which interventional clinic site? [facility]    Instruct patient to arrive as directed prior to the scheduled appointment time:    [place] minutes before      [place]: 30 minutes before; if IV needed 1 hour before     Procedure ordered by Dr. Haase    Procedure ordered? LESI        Transforaminal Cervical JOSE LUIS - Dr. Lena Nobles ONLY    What insurance would patient like us to bill for this procedure? [employer]       Worker's comp or MVA (motor vehicle accident) -Any injection DO NOT SCHEDULE and route to Ericka Roth.      [employer] insurance - For SI joint injections, DO NOT SCHEDULE and route Ericka Roth.       [employer] - Any injection besides hip/shoulder/knee joint DO NOT SCHEDULE and route to Ericka Roth. She will obtain PA and call pt back to schedule procedure or notify pt of denial.       [employer]-Route to [place] for review        **BCBS- ALL need to be routed to [place] for review if a PA is needed**        IF SCHEDULING IN [place] AND NEEDS A PA, IT IS OKAY TO SCHEDULE. [place] HANDLES THEIR OWN PA'S AFTER THE PATIENT IS SCHEDULED. PLEASE SCHEDULE AT LEAST 1 WEEK OUT SO A PA CAN BE OBTAINED.      Any chance of pregnancy? NO   If YES, do NOT schedule and route to RN pool    Is an  needed? No     Patient has a drive home? (mandatory) YES: informed     Is patient taking any blood thinners (plavix, coumadin, jantoven, warfarin, heparin, pradaxa or dabigatran )? No   If hold needed, do NOT schedule, route to RN pool     Is patient taking any aspirin products (includes Excedrin and Fiorinal)? No     If more than 325mg/day do NOT schedule; route to RN pool     For CERVICAL procedures, hold all aspirin products for 6 days.     Tell pt that if aspirin product is not held for 6 days, the procedure WILL BE cancelled.      Does the patient have a bleeding or clotting disorder? No     If YES, okay to schedule AND  route to RN nurse pool    For any patients with platelet count <100, must be forwarded to provider    Is patient diabetic?  No  If YES, have them bring their glucometer.    Does patient have an active infection or treated for one within the past week? No     Is patient currently taking any antibiotics?  No     For patients on chronic, preventative, or prophylactic antibiotics, procedures may be scheduled.     For patients on antibiotics for active or recent infection:antibiotic course must have been completed for 4 days    Is patient currently taking any steroid medications? (i.e. Prednisone, Medrol)  No     For patients on steroid medications, course must have been completed for 4 days    Reviewed with patient:  If you are started on any steroids or antibiotics between now and your appointment, you must contact us because the procedure may need to be cancelled.  Yes    Is patient actively being treated for cancer or immunocompromised? No  If YES, do NOT schedule and route to RN pool     Are you able to get on and off an exam table with minimal or no assistance? Yes  If NO, do NOT schedule and route to RN pool    Are you able to roll over and lay on your stomach with minimal or no assistance? Yes  If NO, do NOT schedule and route to RN pool     Any allergies to contrast dye, iodine, shellfish, or numbing and steroid medications? No  If YES, route to RN pool AND add allergy information to appointment notes    Allergies: Latex; Nsaids; Aspirin; Codeine; Pravastatin; Simvastatin; and Tramadol      Has the patient had a flu shot or any other vaccinations within 7 days before or after the procedure.  No     Does patient have an MRI/CT?  YES: 2017  (SI joint, hip injections, and stellate ganglion blocks do not require an MRI)    Was the MRI done w/in the last 3 years?  Yes    Was MRI done at [facility]? Yes      If not, where was it done? N/A       If MRI was not done at [facility], [facility] or [facility] do NOT schedule and  route to nursing.  If pt has an imaging disc, the injection may be scheduled but pt has to bring disc to appt. If they show up w/out disc the injection cannot be done    Reminders (please tell patient if applicable):       Instructed pt to arrive 30 minutes early for IV start if this is for a cervical procedure, ALL sympathetic (stellate ganglion, hypogastric, or lumbar sympathetic block) and all sedation procedures (RFA, spinal cord stimulation trials).  Not Applicable   -IVs are not routinely placed for Dr. Castaneda cervical cases   -Dr. Weaver: IVs for cervical ESIs and cervical TBDs (not CMBBs/facet inj)      If NPO for sedation, informed patient that it is okay to take medications with sips of water (except if they are to hold blood thinners).  Not Applicable   *DO take blood pressure medication if it is prescribed*      If this is for a cervical JOSE LUIS, informed patient that aspirin needs to be held for 6 days.   Not Applicable      For all patients not having spinal cord stimulator (SCS) trials or radiofrequency ablations (RFAs), informed patient:    IV sedation is not provided for this procedure.  If you feel that an oral anti-anxiety medication is needed, you can discuss this further with your referring provider or primary care provider.  The Pain Clinic provider will discuss specifics of what the procedure includes at your appointment.  Most procedures last 10-20 minutes.  We use numbing medications to help with any discomfort during the procedure.  Not Applicable      Do not schedule procedures requiring IV placement in the first appointment of the day or first appointment after lunch. Do NOT schedule at 0745, 0815 or 1245.       For patients 85 or older we recommend having an adult stay w/ them for the remainder of the day.      Does the patient have any questions?  NO  Adriane Lawson  [facility]

## 2019-10-09 NOTE — PROGRESS NOTES
[facility] - Procedure Note    Date of Service: 10/10/2019  Procedure performed: Right sacroiliac joint injection  Diagnosis: Sacroiliac joint pain  : Michelle Castaneda MD   Anesthesia: none    Indications: Kahlil Caputo is a 74 year old female who is seen at the request of Dr. Haase for a sacroiliac joint injection. The patient describes right sided low back and buttock pain that does not radiate to the legs. Exam shows full strength and sensation in both lower extremities, tenderness of the sacroiliac (SI) joint, and positive FABERE on the right. The patient reports minimal improvement with conservative treatment, including previous injections, PT and medications.    This is a repeat injection.  Previous right SI joint injection done by myself on 11/29/2017 provided good pain relief for a period of 12 months.     MRI was done on 1/9/2017 which showed   FINDINGS: The report is dictated assuming five lumbar-type vertebral  bodies.  Sagittal images demonstrate normal vertebral body height.  Mild degenerative endplate changes at L1-L2, bone marrow signal is  otherwise unremarkable. Tip of the conus medullaris and cauda equina  are unremarkable.      T12-L1:  No disc herniation or stenosis. Facet joints are  unremarkable.     L1-L2:  Broad-based disc bulge lateralizes to the left. Mild central  stenosis. Neural foramina are patent. Facet joints are unremarkable.     L2-L3:  Mild disc bulge and facet hypertrophy. Mild central stenosis.  Neural foramina are patent.     L3-L4:  Mild disc bulge and facet hypertrophy. Mild-to-moderate  central stenosis. Neural foramina are patent.     L4-L5:  Mild disc bulge and facet hypertrophy. Mild-to-moderate  central stenosis. Mild inferior foraminal narrowing bilaterally.     L5-S1:  Advanced degenerative disc disease with mild disc bulge  lateralizing to the left. Mild central stenosis. There is contact  without compression of the descending left S1 nerve  "root. Mild left  foraminal stenosis. Right neural foramen is patent.     Paraspinous soft tissues:  Unremarkable.                                                                    IMPRESSION:    1. At L1-L2, and L2-L3 there is mild central stenosis.  2. At L3-L4, and L4-L5 there is mild-to-moderate central stenosis.  3. Mild inferior foraminal narrowing bilaterally at L4-L5.  4. At L5-S1 there is mild central stenosis. Contact without  compression of the descending left S1 nerve root as a result of  asymmetric disc bulge. Mild left foraminal stenosis.  5. Findings are very similar to the comparison study.      Allergies:      Allergies   Allergen Reactions     Latex Difficulty breathing     sensativity - cough, eyes water     Nsaids Shortness Of Breath and Other (See Comments)     bronchospams     Aspirin Difficulty breathing     tight cough     Codeine GI Disturbance     upset GI     Pravastatin      Myalgias     Simvastatin      Myalgias     Tramadol      Heart burn,  Felt \"clammy\", unable to pass gas, unable to urinate and had severe nausea        Vitals:  /79 (BP Location: Left arm, Cuff Size: Adult Regular)   Pulse 95   SpO2 97%     Options/alternatives, benefits and risks were discussed with the patient including bleeding, infection, tissue trauma, exposure to radiation, reaction to medications including seizure, nerve injury, weakness, and numbness.  Questions were answered to her satisfaction and she agrees to proceed. Voluntary informed consent was obtained and signed.     Procedure:  After getting informed consent, patient was brought into the procedure suite and was placed in a prone position on the procedure table.   A Pause for the Cause was performed.  Patient was prepped and draped in sterile fashion.     After identifying the right SI joint, the C-arm was rotated to a obliquely to obtain the best view of the inferior angle of the joint.  A total of 2 ml of Lidocaine 1%  was used to " anesthetize the skin at a skin entry site coaxial with the fluoroscopy beam at this location.  A 22 gauge 3.5 inch needle was advanced under intermittent fluoroscopy until it was felt to enter the SI joint.    A total of 1 ml of Omnipaque-300 was injected, confirming appropriate position, with spread into the intraarticular space, with no intravascular uptake noted.  9 ml of Omnipaque-300 was wasted. Location was verified in lateral view.    2 ml of 1% lidocaine with 20 mg of dexamethasone was injected.  The needle was removed. Hemostasis was achieved, the area was cleaned, and bandaids were placed when appropriate.  The patient tolerated the procedure well, and was taken to the recovery room.    Images were saved to PACS.    Pre-procedure pain score: 1/10  Post-procedure pain score: 1/10  Following today's procedure, the patient was advised to contact the [facility] for any of the following:   Fever, chills, or night sweats   New onset of pain, numbness, or weakness   Any questions/concerns regarding the procedure    -f/u with the referring provider      Michelle Castaneda MD   [facility]

## 2019-10-10 ENCOUNTER — ANCILLARY PROCEDURE (OUTPATIENT)
Dept: GENERAL RADIOLOGY | Facility: CLINIC | Age: 74
End: 2019-10-10
Attending: ANESTHESIOLOGY
Payer: COMMERCIAL

## 2019-10-10 ENCOUNTER — RADIOLOGY INJECTION OFFICE VISIT (OUTPATIENT)
Dept: PALLIATIVE MEDICINE | Facility: CLINIC | Age: 74
End: 2019-10-10
Payer: COMMERCIAL

## 2019-10-10 VITALS — DIASTOLIC BLOOD PRESSURE: 79 MMHG | SYSTOLIC BLOOD PRESSURE: 123 MMHG | HEART RATE: 95 BPM | OXYGEN SATURATION: 97 %

## 2019-10-10 DIAGNOSIS — M53.3 CHRONIC RIGHT SI JOINT PAIN: ICD-10-CM

## 2019-10-10 DIAGNOSIS — G89.29 CHRONIC BILATERAL LOW BACK PAIN WITH RIGHT-SIDED SCIATICA: ICD-10-CM

## 2019-10-10 DIAGNOSIS — M54.41 CHRONIC BILATERAL LOW BACK PAIN WITH RIGHT-SIDED SCIATICA: ICD-10-CM

## 2019-10-10 DIAGNOSIS — M46.1 SACROILIITIS (H): Primary | ICD-10-CM

## 2019-10-10 DIAGNOSIS — G89.29 CHRONIC RIGHT SI JOINT PAIN: ICD-10-CM

## 2019-10-10 PROCEDURE — 99207 ZZC DROP WITH A PROCEDURE: CPT | Mod: RT | Performed by: ANESTHESIOLOGY

## 2019-10-10 NOTE — PATIENT INSTRUCTIONS
[facility] Procedure Discharge Instructions    Today you saw:   Dr. Michelle Castaneda      Your procedure:  Epidural steroid injection       Medications used:  Lidocaine (anesthetic) Triamcinolone (steroid) Omnipaque (contrast)   Normal Saline            Be cautious when walking as numbness and/or weakness in the legs may occur up to 6-8 hours after the procedure due to effect of the local anesthetic    Do not drive for 6 hours. The effect of the local anesthetic could slow your reflexes.     Avoid strenuous activity for the first 24 hours. You may resume your regular activities after that.     You may shower, however avoid swimming, tub baths or hot tubs for 24 hours following your procedure    You may have a mild to moderate increase in pain for several days following the injection.      You may use ice packs for 10-15 minutes, 3 to 4 times a day at the injection site for comfort    Do not use heat to painful areas for 6 to 8 hours. This will give the local anesthetic time to wear off and prevent you from accidentally burning your skin.    Unless you have been directed to avoid the use of anti-inflammatory medications (NSAIDS-ibuprofen, Aleve, Motrin), you may use these medications or Tylenol for pain control if needed.     With diabetes, check your blood sugar more frequently than usual as your blood sugar may be higher than normal for 10-14 days following a steroid injection. Contact your doctor who manages your diabetes if your blood sugar is higher than usual    Possible side effects of steroids that you may experience include flushing, elevated blood pressure, increased appetite, mild headaches and restlessness.  All of these symptoms will get better with time.    It may take up to 14 days for the steroid medication to start working although you may feel the effect as early as a few days after the procedure.     Follow up with your referring provider in 2-3 weeks      If you experience any of the  following, call the pain center line during work hours at [phone] or on-call physician after hours at [phone]:  -Fever over 100 degree F  -Swelling, bleeding, redness, drainage, warmth at the injection site  -Progressive weakness or numbness in your legs   -Loss of bowel or bladder function  -Unusual headache that is not relieved by Tylenol or your regular headache medication  -Unusual new onset of pain that is not improving

## 2019-10-10 NOTE — NURSING NOTE
Discharge Information    IV Discontiued Time:  NA    Amount of Fluid Infused:  NA    Discharge Criteria = When patient returns to baseline or as per MD order    Consciousness:  Pt is fully awake    Circulation:  BP +/- 20% of pre-procedure level    Respiration:  Patient is able to breathe deeply    O2 Sat:  Patient is able to maintain O2 Sat >92% on room air    Activity:  Moves 4 extremities on command    Ambulation:  Patient is able to stand and walk or stand and pivot into wheelchair    Dressing:  Clean/dry or No Dressing    Notes:   Discharge instructions and AVS given to patient    Patient meets criteria for discharge?  YES    Admitted to PCU?  No    Responsible adult present to accompany patient home?  Yes    Signature/Title:    Hemalatha Byers RN Care Coordinator  [facility]

## 2019-10-29 ENCOUNTER — HEALTH MAINTENANCE LETTER (OUTPATIENT)
Age: 74
End: 2019-10-29

## 2019-11-01 NOTE — PROGRESS NOTES
Pre-Visit Planning     Future Appointments   Date Time Provider Department Center   11/5/2019 10:40 AM Haase, Susan Rachele, APRN CNP [facility]     Appointment Notes for this encounter:   F/U Skin Biopsy   Per chart review patient was referred to derm and clinic has not received records, placed call to dermatology and asked for records to be faxed attention this RN     Questionnaires Reviewed/Assigned  Additional questionnaires assigned     HM due pended   Health Maintenance Due   Topic Date Due     URINE DRUG SCREEN  1945     ANNUAL REVIEW OF HM ORDERS  1945     ZOSTER IMMUNIZATION (1 of 2) 08/07/1994     MEDICARE ANNUAL WELLNESS VISIT  11/24/2010     JOSELINE ASSESSMENT  01/24/2019     INFLUENZA VACCINE (1) 09/01/2019     TSH W/FREE T4 REFLEX  10/17/2019     LUNG CANCER SCREENING ANNUAL  11/23/2019       Patient preferred phone number: [phone]    Unable to reach. Left voicemail. Advised patient to call clinic back at [phone], message was left with time/date and asked patient to call back to reschedule if unable to attend this scheduled visit     Sofy Dial Registered Nurse   [facility]

## 2019-11-03 ASSESSMENT — ANXIETY QUESTIONNAIRES
7. FEELING AFRAID AS IF SOMETHING AWFUL MIGHT HAPPEN: NOT AT ALL
2. NOT BEING ABLE TO STOP OR CONTROL WORRYING: NOT AT ALL
GAD7 TOTAL SCORE: 0
7. FEELING AFRAID AS IF SOMETHING AWFUL MIGHT HAPPEN: NOT AT ALL
GAD7 TOTAL SCORE: 0
3. WORRYING TOO MUCH ABOUT DIFFERENT THINGS: NOT AT ALL
6. BECOMING EASILY ANNOYED OR IRRITABLE: NOT AT ALL
1. FEELING NERVOUS, ANXIOUS, OR ON EDGE: NOT AT ALL
4. TROUBLE RELAXING: NOT AT ALL
5. BEING SO RESTLESS THAT IT IS HARD TO SIT STILL: NOT AT ALL

## 2019-11-04 ASSESSMENT — ANXIETY QUESTIONNAIRES: GAD7 TOTAL SCORE: 0

## 2019-11-05 ENCOUNTER — OFFICE VISIT (OUTPATIENT)
Dept: FAMILY MEDICINE | Facility: CLINIC | Age: 74
End: 2019-11-05
Payer: COMMERCIAL

## 2019-11-05 ENCOUNTER — TELEPHONE (OUTPATIENT)
Dept: FAMILY MEDICINE | Facility: CLINIC | Age: 74
End: 2019-11-05

## 2019-11-05 VITALS
TEMPERATURE: 97.8 F | DIASTOLIC BLOOD PRESSURE: 83 MMHG | HEART RATE: 89 BPM | RESPIRATION RATE: 18 BRPM | SYSTOLIC BLOOD PRESSURE: 132 MMHG | OXYGEN SATURATION: 98 %

## 2019-11-05 DIAGNOSIS — R91.8 PULMONARY NODULES: ICD-10-CM

## 2019-11-05 DIAGNOSIS — G89.29 OTHER CHRONIC PAIN: Primary | ICD-10-CM

## 2019-11-05 DIAGNOSIS — Z87.891 HISTORY OF NICOTINE DEPENDENCE: ICD-10-CM

## 2019-11-05 DIAGNOSIS — I73.9 PAD (PERIPHERAL ARTERY DISEASE) (H): ICD-10-CM

## 2019-11-05 DIAGNOSIS — E03.9 HYPOTHYROIDISM, UNSPECIFIED TYPE: ICD-10-CM

## 2019-11-05 PROCEDURE — 99214 OFFICE O/P EST MOD 30 MIN: CPT | Performed by: NURSE PRACTITIONER

## 2019-11-05 PROCEDURE — 99207 C PAF COMPLETED  NO CHARGE: CPT | Mod: 25 | Performed by: NURSE PRACTITIONER

## 2019-11-05 PROCEDURE — 84443 ASSAY THYROID STIM HORMONE: CPT | Performed by: NURSE PRACTITIONER

## 2019-11-05 PROCEDURE — 36415 COLL VENOUS BLD VENIPUNCTURE: CPT | Performed by: NURSE PRACTITIONER

## 2019-11-05 PROCEDURE — 80053 COMPREHEN METABOLIC PANEL: CPT | Performed by: NURSE PRACTITIONER

## 2019-11-05 RX ORDER — HYDROCODONE BITARTRATE AND ACETAMINOPHEN 5; 325 MG/1; MG/1
1-2 TABLET ORAL EVERY 8 HOURS PRN
Qty: 90 TABLET | Refills: 0 | Status: SHIPPED | OUTPATIENT
Start: 2019-11-05 | End: 2019-12-11

## 2019-11-05 RX ORDER — HYDROCODONE BITARTRATE AND ACETAMINOPHEN 5; 325 MG/1; MG/1
1-2 TABLET ORAL EVERY 8 HOURS PRN
Qty: 90 TABLET | Refills: 0 | Status: SHIPPED | OUTPATIENT
Start: 2019-12-05 | End: 2020-02-11

## 2019-11-05 RX ORDER — CLOBETASOL PROPIONATE 0.5 MG/G
OINTMENT TOPICAL
COMMUNITY
Start: 2019-08-29 | End: 2020-02-06

## 2019-11-05 RX ORDER — INFLUENZA VACCINE, ADJUVANTED 15; 15; 15 UG/.5ML; UG/.5ML; UG/.5ML
INJECTION, SUSPENSION INTRAMUSCULAR
Refills: 0 | COMMUNITY
Start: 2019-10-23 | End: 2021-11-16

## 2019-11-05 RX ORDER — HYDROCODONE BITARTRATE AND ACETAMINOPHEN 5; 325 MG/1; MG/1
1-2 TABLET ORAL EVERY 8 HOURS PRN
Qty: 90 TABLET | Refills: 0 | Status: SHIPPED | OUTPATIENT
Start: 2020-01-05 | End: 2020-02-06

## 2019-11-05 NOTE — TELEPHONE ENCOUNTER
Pt called and said that she called to schedule a CT scan but the order was not there.     Please, put in a low dose CT scan in     Thanks,    Sabrina

## 2019-11-05 NOTE — PROGRESS NOTES
Subjective     Kahlil Caputo is a 74 year old female who presents to clinic today for the following health issues:    HPI   Concern - Skin Biopsy follow up  Onset: done in aug    Description:   Lower back    Progression of Symptoms:  improving    Accompanying Signs & Symptoms:  rash    Therapies Tried and outcome: Went to derm and problem has now cleared up.    Chronic/Recurring Back Pain Follow Up    Where is your back pain located? (Select all that apply) low back bilateral, middle of back bilateral and upper back bilateral    How would you describe your back pain?  cramping, sharp, shooting and stabbing    Where does your back pain spread? the left buttock, the left  thigh and the left  knee    Since your last clinic visit for back pain, how has your pain changed? gradually worsening unable to use lidocaine patch due to allergy    Does your back pain interfere with your job? YES    Since your last visit, have you tried any new treatment? No    Using 2 1/2 to 3 Vicodin per day with 1 ES tylenol per day.  Walking as much as possible.  Going to the [facility].     Patient Active Problem List   Diagnosis     Hypothyroidism     Disorder of bone and cartilage     Small bowel obstruction (H)     Stricture of small intestine (H)     S/P Left total hip arthroplasty 11/25/13     Osteoarthritis of hip     Ileitis (ileocecal resection 8/2013)     Lumbago     Palpitations     PVC's (premature ventricular contractions)     Family history of premature CAD     Chronic pain     Pulmonary nodules     ACP (advance care planning)     Coronary artery disease involving native coronary artery of native heart without angina pectoris     Closed fracture of distal end of left radius with routine healing, unspecified fracture morphology, subsequent encounter     Age-related osteoporosis with current pathological fracture with routine healing     Statin intolerance     PAD (peripheral artery disease) (H)     Aspirin sensitivity     Sacroiliitis  (H)     Past Surgical History:   Procedure Laterality Date     ARTHROPLASTY HIP  2013    Procedure: ARTHROPLASTY HIP;  Left Total Hip Arthroplasty  ;  Surgeon: Luis Marshall MD;  Location: [facility] OR     C NONSPECIFIC PROCEDURE      D& C X 2     C NONSPECIFIC PROCEDURE      PILONIDAL CYSTECTOMY     C NONSPECIFIC PROCEDURE      T&A     C NONSPECIFIC PROCEDURE      BREAST BX & CERVICAL POLYP     HC COLONOSCOPY THRU STOMA, DIAGNOSTIC           LAPAROTOMY EXPLORATORY  2013    Procedure: LAPAROTOMY EXPLORATORY;  LAPAROTOMY EXPLORATORY, Ileocecal Resection Resection, Removal of Retained Pill Cam;  Surgeon: Mitchell Fraser MD;  Location: [facility] OR     RESECTION ILEOCECAL  2013    Procedure: RESECTION ILEOCECAL;;  Surgeon: Mitchell Fraser MD;  Location: [facility] OR       Social History     Tobacco Use     Smoking status: Former Smoker     Packs/day: 1.00     Years: 50.00     Pack years: 50.00     Types: Cigarettes     Last attempt to quit: 2010     Years since quittin.4     Smokeless tobacco: Never Used     Tobacco comment: Quit 2010   Substance Use Topics     Alcohol use: Yes     Alcohol/week: 0.0 standard drinks     Comment: 1 glass wine occas.     Family History   Problem Relation Age of Onset     Alcohol/Drug Mother      Heart Disease Mother         cardiomyopathy     Alcohol/Drug Father      Heart Disease Father         CAD -  1st MI mid 50's     Myocardial Infarction Father         X4     Osteoporosis Maternal Grandmother      Cancer Maternal Grandfather         stomach or throat - martini drinker/pipe smoker     Myocardial Infarction Brother      Breast Cancer No family hx of      Cancer - colorectal No family hx of          Current Outpatient Medications   Medication Sig Dispense Refill     HYDROcodone-acetaminophen (NORCO) 5-325 MG tablet Take 1-2 tablets by mouth every 8 hours as needed for pain or severe pain Max of 3 tablets per day 90 tablet 0     [START ON  12/5/2019] HYDROcodone-acetaminophen (NORCO) 5-325 MG tablet Take 1-2 tablets by mouth every 8 hours as needed for pain or severe pain Max of 3 tablets per day 90 tablet 0     [START ON 1/5/2020] HYDROcodone-acetaminophen (NORCO) 5-325 MG tablet Take 1-2 tablets by mouth every 8 hours as needed for pain or severe pain Max of 3 tablets per day 90 tablet 0     ACE/ARB NOT PRESCRIBED, INTENTIONAL, Please choose reason not prescribed, below       acetaminophen (TYLENOL) 500 MG tablet Take 500-1,000 mg by mouth as needed for mild pain       alendronate (FOSAMAX) 70 MG tablet TAKE 1 TABLET BY MOUTH ONCE WEEKLY, TAKE 30 MIN PRIOR TO 1ST FOOD/DRINK/MED - AVOID LYING DOWN FOR 30 MIN. 12 tablet 3     ascorbic acid (VITAMIN C) 500 MG tablet Take by mouth 2 times daily       ASPIRIN NOT PRESCRIBED (INTENTIONAL) Please choose reason not prescribed, below 0 each 0     BETA BLOCKER NOT PRESCRIBED, INTENTIONAL, Beta Blocker not prescribed intentionally due to COPD  0     betaxolol (BETOPTIC) 0.5 % ophthalmic solution INSTILL 1 DROP INTO EACH EYE QD  3     calcium carbonate 500 MG tablet Take 2 tablets by mouth 2 times daily  100 tablet 3     chlorpheniramine (CHLOR-TRIMETON) 4 MG tablet Take 4 mg by mouth every 6 hours as needed for allergies or rhinitis       Cholecalciferol (VITAMIN D) 1000 UNITS capsule Take 2,000 Units by mouth daily        furosemide (LASIX) 20 MG tablet TAKE 1 TO 2 TABLETS BY MOUTH DAILY 180 tablet 1     levothyroxine (SYNTHROID/LEVOTHROID) 150 MCG tablet Take 1 tablet (150 mcg) by mouth every morning 90 tablet 2     melatonin 3 MG tablet Take 1-2 tablets (3-6 mg) by mouth nightly as needed for sleep (OTC)       Multiple Vitamin (MULTI-VITAMIN PO) Take by mouth daily        silver sulfADIAZINE (SILVADENE) 1 % external cream Apply topically 2 times daily 25 g 0     STATIN NOT PRESCRIBED, INTENTIONAL, 1 each See Admin Instructions Statin not prescribed intentionally due to Allergy to statin 0 each 0     vitamin  E 400 UNIT capsule Take by mouth daily       BP Readings from Last 3 Encounters:   11/05/19 132/83   10/10/19 123/79   08/19/19 114/64    Wt Readings from Last 3 Encounters:   08/19/19 68.9 kg (152 lb)   08/08/19 68.9 kg (151 lb 12.8 oz)   07/25/19 69.4 kg (153 lb)                    Reviewed and updated as needed this visit by Provider         Review of Systems   ROS COMP: CONSTITUTIONAL: NEGATIVE for fever, chills, change in weight  RESP: NEGATIVE for significant cough or SOB  CV: NEGATIVE for chest pain, palpitations or peripheral edema  MUSCULOSKELETAL: see HPI  NEURO: NEGATIVE for weakness, dizziness or paresthesias  PSYCHIATRIC: NEGATIVE for changes in mood or affect      Objective    /83 (BP Location: Right arm, Patient Position: Chair, Cuff Size: Adult Large)   Pulse 89   Temp 97.8  F (36.6  C) (Oral)   Resp 18   SpO2 98%   There is no height or weight on file to calculate BMI.  Physical Exam   GENERAL: healthy, alert and no distress  NECK: no adenopathy, no asymmetry, masses, or scars and thyroid normal to palpation  RESP: lungs clear to auscultation - no rales, rhonchi or wheezes  CV: regular rate and rhythm, normal S1 S2, no S3 or S4, no murmur, click or rub, no peripheral edema   MS: bilateral lower back tenderness, slowly gets up on the exam table.   PSYCH: mentation appears normal, affect normal/bright          Assessment & Plan   Assessment      Plan  1. Other chronic pain: will prescribe norco to take max of 3 tablets per day.  Discussed possibility of referral to pain clinic at next visit.     - HYDROcodone-acetaminophen (NORCO) 5-325 MG tablet; Take 1-2 tablets by mouth every 8 hours as needed for pain or severe pain Max of 3 tablets per day  Dispense: 90 tablet; Refill: 0  - HYDROcodone-acetaminophen (NORCO) 5-325 MG tablet; Take 1-2 tablets by mouth every 8 hours as needed for pain or severe pain Max of 3 tablets per day  Dispense: 90 tablet; Refill: 0  - HYDROcodone-acetaminophen  "(NORCO) 5-325 MG tablet; Take 1-2 tablets by mouth every 8 hours as needed for pain or severe pain Max of 3 tablets per day  Dispense: 90 tablet; Refill: 0    2. Hypothyroidism, unspecified type:  TSH pending.  - TSH WITH FREE T4 REFLEX    3. PAD (peripheral artery disease) (H)  - Comprehensive metabolic panel    4. History of nicotine dependence:  Annual low dose CT pending.   - CT Chest Lung Cancer Scrn Low Dose wo; Future    5. Pulmonary nodules        BMI:   Estimated body mass index is 25.29 kg/m  as calculated from the following:    Height as of 8/19/19: 1.651 m (5' 5\").    Weight as of 8/19/19: 68.9 kg (152 lb).           FUTURE APPOINTMENTS:       - Follow-up visit in 3 months, sooner as needed.     Return in about 3 months (around 2/5/2020).    Susan Haase, APRN [facility]        Answers for HPI/ROS submitted by the patient on 11/3/2019   JOSELINE 7 TOTAL SCORE: 0    "

## 2019-11-06 LAB
ALBUMIN SERPL-MCNC: 3.8 G/DL (ref 3.4–5)
ALP SERPL-CCNC: 93 U/L (ref 40–150)
ALT SERPL W P-5'-P-CCNC: 21 U/L (ref 0–50)
ANION GAP SERPL CALCULATED.3IONS-SCNC: 8 MMOL/L (ref 3–14)
AST SERPL W P-5'-P-CCNC: 14 U/L (ref 0–45)
BILIRUB SERPL-MCNC: 0.5 MG/DL (ref 0.2–1.3)
BUN SERPL-MCNC: 15 MG/DL (ref 7–30)
CALCIUM SERPL-MCNC: 8.5 MG/DL (ref 8.5–10.1)
CHLORIDE SERPL-SCNC: 102 MMOL/L (ref 94–109)
CO2 SERPL-SCNC: 26 MMOL/L (ref 20–32)
CREAT SERPL-MCNC: 0.56 MG/DL (ref 0.52–1.04)
GFR SERPL CREATININE-BSD FRML MDRD: >90 ML/MIN/{1.73_M2}
GLUCOSE SERPL-MCNC: 82 MG/DL (ref 70–99)
POTASSIUM SERPL-SCNC: 4 MMOL/L (ref 3.4–5.3)
PROT SERPL-MCNC: 6.5 G/DL (ref 6.8–8.8)
SODIUM SERPL-SCNC: 136 MMOL/L (ref 133–144)
TSH SERPL DL<=0.005 MIU/L-ACNC: 1.01 MU/L (ref 0.4–4)

## 2019-11-06 NOTE — TELEPHONE ENCOUNTER
Please let Annice know the order was placed later yesterday, sorry for the delay.  Susan Haase, CNP

## 2019-11-07 NOTE — RESULT ENCOUNTER NOTE
Duy Guillory,  Your lab results are as below:  1)  TSH (thyroid level) 1.01 which is normal (range 0.4-4)  2)  Focus on increasing your protein in your diet.  3)  Glucose is normal at 82 (normal fasting is <100).    If you have any questions do not hesitate to call the clinic to discuss the results with me further.     Sincerely,    Susan Haase, CNP

## 2019-11-12 ENCOUNTER — HOSPITAL ENCOUNTER (OUTPATIENT)
Dept: CT IMAGING | Facility: CLINIC | Age: 74
Discharge: HOME | End: 2019-11-12
Attending: NURSE PRACTITIONER | Admitting: NURSE PRACTITIONER
Payer: COMMERCIAL

## 2019-11-12 DIAGNOSIS — Z87.891 HISTORY OF NICOTINE DEPENDENCE: ICD-10-CM

## 2019-11-12 PROCEDURE — G0297 LDCT FOR LUNG CA SCREEN: HCPCS

## 2019-11-14 NOTE — RESULT ENCOUNTER NOTE
Duy Gulilory,  Your chest CT appears normal, again the coronary artery calcium is moderate or severe.  Sincerely,  Susan Haase, CNP

## 2019-11-14 NOTE — RESULT ENCOUNTER NOTE
Duy Guillory,  Your chest CT appears normal, again the coronary artery calcium is moderate or severe.  Sincerely,  Susan Haase, CNP

## 2019-11-25 ENCOUNTER — TELEPHONE (OUTPATIENT)
Dept: FAMILY MEDICINE | Facility: CLINIC | Age: 74
End: 2019-11-25

## 2019-11-25 DIAGNOSIS — M54.16 LUMBAR RADICULOPATHY: Primary | ICD-10-CM

## 2019-11-25 NOTE — TELEPHONE ENCOUNTER
Haase, Susan Rachele APRN CNP     Return call from patient     Lower back pain has worsened   Pain down right leg has worsened   Steroid epidural injection received last week did not help symptoms   Denies bowel/bladder changes/incontinence   Unable to do exercises due to pain, decrease in activity level   Would like MRI order - advised that will send to pcp for review and RN will call back with recommendations. Patient will be giving blood this afternoon would like message left on      Sofy Dial, Registered Nurse   [facility]

## 2019-11-25 NOTE — TELEPHONE ENCOUNTER
Haase, Susan Rachele APRN CNP request for MRI order, message left with patient to call back and discuss worsening symptoms     Message left for patient to return call to this RN PAL - please transfer if available     RN PAL direct number left on VM  Need to discuss worsening symptoms with patient     Routed to pcp for consideration of ordering MRI     Sofy Dial, Registered Nurse   [facility]

## 2019-11-26 NOTE — TELEPHONE ENCOUNTER
Detailed voicemail left for patient - per her request yesterday     My chart message also sent     Sofy Dial Registered Nurse   [facility]

## 2019-11-26 NOTE — TELEPHONE ENCOUNTER
Please let Annice know that an order for an MRI has been placed, I think a repeat is a good idea, the last scan was done in 2017.  Thanks,  Susan Haase, CNP

## 2019-11-29 ENCOUNTER — HOSPITAL ENCOUNTER (OUTPATIENT)
Dept: MRI IMAGING | Facility: CLINIC | Age: 74
Discharge: HOME | End: 2019-11-29
Attending: NURSE PRACTITIONER | Admitting: NURSE PRACTITIONER
Payer: COMMERCIAL

## 2019-11-29 DIAGNOSIS — M54.16 LUMBAR RADICULOPATHY: ICD-10-CM

## 2019-11-29 PROCEDURE — 72148 MRI LUMBAR SPINE W/O DYE: CPT

## 2019-12-04 ENCOUNTER — TELEPHONE (OUTPATIENT)
Dept: FAMILY MEDICINE | Facility: CLINIC | Age: 74
End: 2019-12-04

## 2019-12-04 NOTE — TELEPHONE ENCOUNTER
Patient calling, would like someone to call her back today 12/4/19 in regards to the results of her MRI she had on 11/29/19. Please return call at [phone], ok to leave a detailed message.       Francesca Silverio-Patient Rep

## 2019-12-04 NOTE — RESULT ENCOUNTER NOTE
Here are the results of your MRI that we discussed on the phone, I have placed a referral for you to see Dr. Rodriguez at [facility].  Sincerely,     Susan Haase, CNP

## 2019-12-04 NOTE — TELEPHONE ENCOUNTER
Haase, Susan Rachele APRN CNP     Please review MRI Lumbar Spine 11/29/19 and advise     Thank you,   Sofy Dial, Registered Nurse   [facility]

## 2019-12-05 NOTE — TELEPHONE ENCOUNTER
Spoke with Kahlil, reviewed MRI of lumbar spine in detail, will refer to [facility] to see Dr. Rodriguez for further evaluation, referral placed.   Susan Haase, CNP

## 2019-12-10 DIAGNOSIS — G89.29 OTHER CHRONIC PAIN: ICD-10-CM

## 2019-12-10 NOTE — TELEPHONE ENCOUNTER
Patient needs a refill for HYDROcodone-acetaminophen (NORCO) 5-325 MG tablet, but according to patient's chart we sent 3 Rx's to [facility]. Patient states that [facility] told patient that they can't send 3 months over at a time now and patient will need to call in monthly. Please review and advise.  Hansa Yu   HYDROcodone-acetaminophen (NORCO) 5-325 MG tablet 90 tablet 0 12/5/2019  No   Sig - Route: Take 1-2 tablets by mouth every 8 hours as needed for pain or severe pain Max of 3 tablets per day - Oral   Sent to pharmacy as: HYDROcodone-acetaminophen (NORCO) 5-325 MG tablet   Class: E-Prescribe     Hansa Yu

## 2019-12-11 RX ORDER — HYDROCODONE BITARTRATE AND ACETAMINOPHEN 5; 325 MG/1; MG/1
1-2 TABLET ORAL EVERY 8 HOURS PRN
Qty: 90 TABLET | Refills: 0 | Status: SHIPPED | OUTPATIENT
Start: 2019-12-11 | End: 2020-02-06

## 2019-12-11 NOTE — TELEPHONE ENCOUNTER
Last OV 11/5/19.   updated.  No concerns needed.  Last RF 11/6/19.  Not [facility] med.  Sent to provider.  Please advise.  Mera Motta RN

## 2019-12-15 ENCOUNTER — HEALTH MAINTENANCE LETTER (OUTPATIENT)
Age: 74
End: 2019-12-15

## 2019-12-18 ENCOUNTER — TRANSFERRED RECORDS (OUTPATIENT)
Dept: HEALTH INFORMATION MANAGEMENT | Facility: CLINIC | Age: 74
End: 2019-12-18

## 2019-12-18 ENCOUNTER — ANCILLARY PROCEDURE (OUTPATIENT)
Dept: MAMMOGRAPHY | Facility: CLINIC | Age: 74
End: 2019-12-18
Payer: COMMERCIAL

## 2019-12-18 DIAGNOSIS — Z12.31 VISIT FOR SCREENING MAMMOGRAM: ICD-10-CM

## 2019-12-18 PROCEDURE — 77067 SCR MAMMO BI INCL CAD: CPT | Mod: TC

## 2019-12-18 PROCEDURE — 77063 BREAST TOMOSYNTHESIS BI: CPT | Mod: TC

## 2019-12-19 ENCOUNTER — TELEPHONE (OUTPATIENT)
Dept: PALLIATIVE MEDICINE | Facility: CLINIC | Age: 74
End: 2019-12-19

## 2019-12-19 NOTE — TELEPHONE ENCOUNTER
Order received from ELENITA Jones NP for Lumbar Radiculopathy Right L4-5 vs. L5-S1 JOSE LUIS. Order scanned to telephone encounter, routing to scheduling coordinators to contact patient.     Jessica SONG    [facility]

## 2019-12-19 NOTE — TELEPHONE ENCOUNTER
Pre-screening Questions for Radiology Injections:    Injection to be done at which interventional clinic site? [facility]    Instruct patient to arrive as directed prior to the scheduled appointment time:    [place] minutes before      [place]: 30 minutes before; if IV needed 1 hour before     Procedure ordered by Jyoti Jones    Procedure ordered? LESI      Transforaminal Cervical JOSE LUIS - Dr. Lena Nobles ONLY    What insurance would patient like us to bill for this procedure? [employer]       Worker's comp or MVA (motor vehicle accident) -Any injection DO NOT SCHEDULE and route to Ericka Roth.      [employer] insurance - For SI joint injections, DO NOT SCHEDULE and route Ericka Roth.       [employer] - Any injection besides hip/shoulder/knee joint DO NOT SCHEDULE and route to Ericka Roth. She will obtain PA and call pt back to schedule procedure or notify pt of denial.       [employer]-Route to [place] for review      **BCBS- ALL need to be routed to [place] for review if a PA is needed**      IF SCHEDULING IN [place] AND NEEDS A PA, IT IS OKAY TO SCHEDULE. [place] HANDLES THEIR OWN PA'S AFTER THE PATIENT IS SCHEDULED. PLEASE SCHEDULE AT LEAST 1 WEEK OUT SO A PA CAN BE OBTAINED.    Any chance of pregnancy? NO   If YES, do NOT schedule and route to RN pool    Is an  needed? No     Patient has a drive home? (mandatory) YES: informed     Is patient taking any blood thinners (i.e. plavix, coumadin, jantoven, warfarin, heparin, pradaxa or dabigatran, etc)? No   If hold needed, do NOT schedule, route to RN pool     Is patient taking any aspirin products (includes Excedrin and Fiorinal)? No     If more than 325mg/day do NOT schedule; route to RN pool     For CERVICAL procedures, hold all aspirin products for 6 days.     Tell pt that if aspirin product is not held for 6 days, the procedure WILL BE cancelled.      Does the patient have a bleeding or clotting disorder? No     If YES, okay to schedule  AND route to RN nurse pool    For any patients with platelet count <100, must be forwarded to provider    Is patient diabetic?  No  If YES, instruct them to bring their glucometer.    Does patient have an active infection or treated for one within the past week? No     Is patient currently taking any antibiotics?  No     For patients on chronic, preventative, or prophylactic antibiotics, procedures may be scheduled.     For patients on antibiotics for active or recent infection:antibiotic course must have been completed for 4 days    Is patient currently taking any steroid medications? (i.e. Prednisone, Medrol)  No     For patients on steroid medications, course must have been completed for 4 days    Reviewed with patient:  If you are started on any steroids or antibiotics between now and your appointment, you must contact us because the procedure may need to be cancelled.  Yes    Is patient actively being treated for cancer or immunocompromised? No  If YES, do NOT schedule and route to RN pool     Are you able to get on and off an exam table with minimal or no assistance? Yes  If NO, do NOT schedule and route to RN pool    Are you able to roll over and lay on your stomach with minimal or no assistance? Yes  If NO, do NOT schedule and route to RN pool     Any allergies to contrast dye, iodine, shellfish, or numbing and steroid medications? No  If YES, route to RN pool AND add allergy information to appointment notes    Allergies: Latex; Nsaids; Aspirin; Codeine; Pravastatin; Simvastatin; Tramadol; and Bacitracin      Has the patient had a flu shot or any other vaccinations within 7 days before or after the procedure.  No     Does patient have an MRI/CT?  YES: 2019  Check Procedure Scheduling Grid to see if required.      Was the MRI done within the last 3 years?  Yes    If yes, where was the MRI done i.e.[facility], [facility], [place], [facility] etc?       If no, do not schedule and route to RN pool    If  MRI was not done at [place], [facility] or [facility] do NOT schedule and route to  pool.      If pt has an imaging disc, the injection MAY be scheduled but pt has to bring disc to appt.     If they show up without the disc the injection cannot be done    Reminders (please tell patient if applicable):       Instructed pt to arrive 30 minutes early for IV start if required. (Check Procedure Scheduling Grid)  Not Applicable      If celiac plexus block, informed patient NPO for 6 hours and that it is okay to take medications with sips of water, especially blood pressure medications  Not Applicable         If this is for a cervical procedure, informed patient that aspirin needs to be held for 6 days.   Not Applicable      For all patients not having spinal cord stimulator (SCS) trials or radiofrequency ablations (RFAs), informed patient:    IV sedation is not provided for this procedure.  If you feel that an oral anti-anxiety medication is needed, you can discuss this further with your referring provider or primary care provider.  The Pain Clinic provider will discuss specifics of what the procedure includes at your appointment.  Most procedures last 10-20 minutes.  We use numbing medications to help with any discomfort during the procedure.  Not Applicable      Do not schedule procedures requiring IV placement in the first appointment of the day or first appointment after lunch. Do NOT schedule at 0745, 0815 or 1245.       For patients 85 or older we recommend having an adult stay w/ them for the remainder of the day.       Does the patient have any questions?  AAMIR Lawson  [facility]

## 2019-12-27 NOTE — PROGRESS NOTES
[facility] - Procedure Note    Date of Service: 1/2/2020    Procedure performed: Right L4-5 transforaminal epidural steroid injection with fluoroscopic guidance  Diagnosis: Lumbar spondylosis; Lumbar radiculitis/radiculopathy  : Michelle Castaneda MD   Anesthesia: none    Indications: Kahlil Caputo is a 74 year old female who is seen at the request of Jyoti Jones CNP for lumbar transforaminal epidural steroid injection. The patient describes right sided low back and buttock pain. The patient has been exhibiting symptoms consistent with lumbar intraspinal inflammation and radiculopathy. Symptoms have been persistent, disabling, and intermittently severe. The patient reports minimal improvement with conservative treatment, including previous injections, medications and PT.    S/P Right SI joint injection done on 10/10/2019 by myself that was not helpful in relieving her pain.     Lumbar MRI was done on 11/29/2019 which showed:   FINDINGS: There are 5 lumbar-type vertebral bodies assumed for the  purposes of this dictation. The distal spinal cord and cauda equina  appear normal.      Alignment of the lumbar spine is normal. No loss of vertebral body  height. There are no destructive osseous lesions. Marked degenerative  endplate changes with disc desiccation and loss of intravertebral disc  spaces throughout  the lumbar spine most pronounced at L5-S1 and  L4-L5. STIR hyperintense endplate edema anteriorly at L4-L5 and on the  left at L5-S1.     Marked edema in the right-sided facets at L4-L5 with moderate  surrounding soft tissue edema. There is a synovial cyst projecting  posteriorly from the facets at L4-L5 into the paraspinous soft  tissues.     Level by level as follows:      T12-L1: Mild circumferential disc bulge. No spinal canal or neural  foraminal narrowing.      L1-L2: Circumferential disc bulge with mild endplate osteophytic  spurring. No significant spinal canal  narrowing. Mild right neural  foraminal narrowing. Mild left neural foraminal narrowing.      L2-L3: Circumferential disc bulge with mild endplate osteophytic  spurring. No spinal canal narrowing. Mild right neural foraminal  narrowing. Mild left neural foraminal narrowing.      L3-L4: Circumferential disc bulge with mild uncinate spurring. Mild  bilateral facet hypertrophy. Mild central spinal canal narrowing  particularly in an anterior posterior dimension. Mild right neural  foraminal narrowing. Mild left neural foraminal narrowing.      L4-L5: Circumferential disc bulge with mild endplate osteophytic  spurring. Moderate bilateral facet hypertrophy. Mild central spinal  canal narrowing. Mild right neural foraminal narrowing. Mild left  neural foraminal narrowing.      L5-S1: Moderate circumferential disc bulge with endplate osteophytic  spurring. Mild bilateral facet hypertrophy. No significant central  spinal canal narrowing. Mild right neural foraminal narrowing. Mild  left neural foraminal narrowing.     Paraspinous soft tissues: Unremarkable.                                                                     IMPRESSION:    1. Edema in the right-sided facets at L4-L5 with moderate surrounding  soft tissue edema. There is also a synovial cyst projecting  posteriorly from the right L4-L5 facets into the paraspinous soft  tissues. These findings appear new since previous MR 1/9/2017.  2. Additional multilevel degenerative changes throughout the lumbar  spine as detailed above. Degenerative disc changes appear grossly  similar to previous MR.    Allergies:      Allergies   Allergen Reactions     Latex Difficulty breathing     sensativity - cough, eyes water     Nsaids Shortness Of Breath and Other (See Comments)     bronchospams     Aspirin Difficulty breathing     tight cough     Codeine GI Disturbance     upset GI     Pravastatin      Myalgias     Simvastatin      Myalgias     Tramadol      Heart burn,  [place]  "\"clammy\", unable to pass gas, unable to urinate and had severe nausea     Bacitracin Rash        Vitals:  /81   Pulse 98   SpO2 97%     Review of Systems: The patient denies recent fever, chills, illness, use of antibiotics or anticoagulants. All other 10-point review of systems negative.     Procedure: The procedure and risks were explained, and informed written consent was obtained from the patient. Risks include but are not limited to: infection, bleeding, increased pain, and damage to soft tissue, nerve, muscle, and vasculature structures. After getting informed consent, patient was brought into the procedure suite and was placed in a prone position on the procedure table. A Pause for the Cause was performed. Patient was prepped and draped in sterile fashion.     After identifying the right L4-5 neuroforamen, the C-arm was rotated to a right lateral oblique angle.  A total of 4.5 mL of Lidocaine 1% was used to anesthetize the skin and the needle track at a skin entry site coaxial with the fluoroscopy beam, and overriding the superior aspect of the neuroforamen.  A 22 gauge 5 inch spinal needle was advanced under intermittent fluoroscopy until it entered the foramen superiorly.    The position was then inspected from anteroposterior and lateral views, and the needle adjusted appropriately.  After negative aspiration, a total of 1 mL of Omnipaque-300 was injected using static and continuous fluoroscopy confirming appropriate position, with spread along the nerve root sheath and into the epidural space, with no intravascular or intrathecal uptake. 9 mL of Omnipaque-300 was wasted.    2mL of 1% lidocaine with 20 mg of dexamethasone was injected.  The needle was removed. Hemostasis was achieved, the area was cleaned, and bandaids were placed when appropriate. Images were saved to PACS.    The patient tolerated the procedure well, and was taken to the recovery room, and there was no evidence of procedural " complications. No new sensory or motor deficits were noted following the procedure. The patient was stable and able to ambulate on discharge home. Post-procedure instructions were provided.     Pre-procedure pain score: 6/10 in the back, 6/10 in the leg  Post-procedure pain score: 0/10 in the back, 0/10 in the leg    Assessment/Plan: Kahlil Caputo is a 74 year old female s/p Right L4-5 transforaminal epidural steroid injection today for lumbar spondylosis, radiculitis/radiculopathy.     1. Following today's procedure, the patient was advised to contact the Pain Management Center for any of the following:   Fever, chills, or night sweats   New onset of pain, numbness, or weakness   Any questions/concerns regarding the procedure  If unable to contact the Pain Center, the patient was instructed to go to a local Emergency Room for any complications.   2. The patient will receive a follow-up call in 1 week.  3. The patient should follow-up with the referring provider in 2 weeks for post-procedure evaluation.      ALICIA FUCHS MD   Pain Management & Addiction Medicine

## 2020-01-02 ENCOUNTER — RADIOLOGY INJECTION OFFICE VISIT (OUTPATIENT)
Dept: PALLIATIVE MEDICINE | Facility: CLINIC | Age: 75
End: 2020-01-02
Payer: COMMERCIAL

## 2020-01-02 ENCOUNTER — ANCILLARY PROCEDURE (OUTPATIENT)
Dept: GENERAL RADIOLOGY | Facility: CLINIC | Age: 75
End: 2020-01-02
Attending: ANESTHESIOLOGY
Payer: COMMERCIAL

## 2020-01-02 VITALS — HEART RATE: 98 BPM | SYSTOLIC BLOOD PRESSURE: 115 MMHG | DIASTOLIC BLOOD PRESSURE: 81 MMHG | OXYGEN SATURATION: 97 %

## 2020-01-02 DIAGNOSIS — M54.16 LUMBAR RADICULOPATHY: Primary | ICD-10-CM

## 2020-01-02 DIAGNOSIS — M54.16 LUMBAR RADICULOPATHY: ICD-10-CM

## 2020-01-02 PROCEDURE — 64483 NJX AA&/STRD TFRM EPI L/S 1: CPT | Mod: RT | Performed by: ANESTHESIOLOGY

## 2020-01-02 NOTE — PATIENT INSTRUCTIONS
[facility] Procedure Discharge Instructions    Today you saw:   Dr. Michelle Castaneda     Your procedure:  Epidural steroid injection       Medications used:  Lidocaine (anesthetic)  Dexamethasone (steroid)  Omnipaque (contrast)              Be cautious when walking as numbness and/or weakness in the legs may occur up to 6-8 hours after the procedure due to effect of the local anesthetic    Do not drive for 6 hours. The effect of the local anesthetic could slow your reflexes.     Avoid strenuous activity for the first 24 hours. You may resume your regular activities after that.     You may shower, however avoid swimming, tub baths or hot tubs for 24 hours following your procedure    You may have a mild to moderate increase in pain for several days following the injection.      You may use ice packs for 10-15 minutes, 3 to 4 times a day at the injection site for comfort    Do not use heat to painful areas for 6 to 8 hours. This will give the local anesthetic time to wear off and prevent you from accidentally burning your skin.    Unless you have been directed to avoid the use of anti-inflammatory medications (NSAIDS-ibuprofen, Aleve, Motrin), you may use these medications or Tylenol for pain control if needed.     With diabetes, check your blood sugar more frequently than usual as your blood sugar may be higher than normal for 10-14 days following a steroid injection. Contact your doctor who manages your diabetes if your blood sugar is higher than usual    Possible side effects of steroids that you may experience include flushing, elevated blood pressure, increased appetite, mild headaches and restlessness.  All of these symptoms will get better with time.    It may take up to 14 days for the steroid medication to start working although you may feel the effect as early as a few days after the procedure.     Follow up with your referring provider in 2-3 weeks      If you experience any of the following,  call the pain center line during work hours at [phone] or on-call physician after hours at [phone]:  -Fever over 100 degree F  -Swelling, bleeding, redness, drainage, warmth at the injection site  -Progressive weakness or numbness in your legs   -Loss of bowel or bladder function  -Unusual headache that is not relieved by Tylenol or your regular headache medication  -Unusual new onset of pain that is not improving

## 2020-01-02 NOTE — NURSING NOTE
Pre-procedure Intake    Have you been fasting? N/A    If yes, for how long?     Are you taking a prescribed blood thinner such as coumadin, Plavix, Xarelto?    No    If yes, when did you take your last dose?     Do you take aspirin?  No    If cervical procedure, have you held aspirin for 6 days?   NA    Do you have any allergies to contrast dye, iodine, steroid and/or numbing medications?  NO    Are you currently taking antibiotics or have an active infection?  NO    Have you had a fever/elevated temperature within the past week? NO    Are you currently taking oral steroids? NO    Do you have a ? Yes       Are you pregnant or breastfeeding?  NO    Are the vital signs normal?  Yes

## 2020-01-09 DIAGNOSIS — I25.10 CORONARY ARTERY DISEASE INVOLVING NATIVE CORONARY ARTERY OF NATIVE HEART WITHOUT ANGINA PECTORIS: ICD-10-CM

## 2020-01-09 DIAGNOSIS — G89.29 OTHER CHRONIC PAIN: ICD-10-CM

## 2020-01-09 RX ORDER — HYDROCODONE BITARTRATE AND ACETAMINOPHEN 5; 325 MG/1; MG/1
1-2 TABLET ORAL EVERY 8 HOURS PRN
Qty: 90 TABLET | Refills: 0 | Status: CANCELLED | OUTPATIENT
Start: 2020-01-09

## 2020-01-14 RX ORDER — HYDROCODONE BITARTRATE AND ACETAMINOPHEN 5; 325 MG/1; MG/1
1-2 TABLET ORAL EVERY 8 HOURS PRN
Qty: 90 TABLET | Refills: 0 | Status: CANCELLED | OUTPATIENT
Start: 2020-01-14

## 2020-01-14 NOTE — TELEPHONE ENCOUNTER
Called pt to discuss, reviewed, pt sent 3 months of refills on 11/5/19, informs [facility] will need to reissue this, pt out, routed to [facility] pool, please sign, confirmed with  that pt has not picked up, INFORM pt when sent    Reina Pena RN, BSN  Message handled by Nurse Triage.

## 2020-01-14 NOTE — TELEPHONE ENCOUNTER
RN placed call to [facility] pharmacy -   pharmacy advised that the rx may be to old to fill. RN asked to have this discussed with pharmacist. Female who answered phone stated that she spoke with pharmacy manager who advised they do have January rx on file and they will get ready for patient to  - nothing further is needed from clinic at this time     Patient called and informed - apologized for the confusion     Sofy Dial, Registered Nurse   [facility]

## 2020-01-14 NOTE — TELEPHONE ENCOUNTER
Patient states she needs this fill and is almost out.      HYDROcodone-acetaminophen (NORCO) 5-325 MG tablet 90 tablet 0 1/5/2020  No   Sig - Route: Take 1-2 tablets by mouth every 8 hours as needed for pain or severe pain Max of 3 tablets per day - Oral   Sent to pharmacy as: HYDROcodone-acetaminophen (NORCO) 5-325 MG tablet   Class: E-Prescribe   Earliest Fill Date: 1/5/2020   Order: [id number]       Nurse states duplicate.    Please advise    Miladys Marshall

## 2020-02-05 NOTE — PROGRESS NOTES
"Pre-Visit Planning     Future Appointments   Date Time Provider Department Center   2/6/2020  1:30 PM Haase, Susan Rachele, APRN CNP [facility]       Arrival Time for this Appointment:  1:30 PM     Appointment Notes for this encounter:   Cronic Pain     Questionnaires Reviewed/Assigned  Additional questionnaires assigned        Patient preferred phone number: [phone]    Spoke to patient via phone. Patient does not have additional questions or concerns.        Visit is not preventive.    Health Maintenance Due   Topic Date Due     URINE DRUG SCREEN  1945     ZOSTER IMMUNIZATION (1 of 2) 08/07/1994     MEDICARE ANNUAL WELLNESS VISIT  11/24/2010     FALL RISK ASSESSMENT  12/17/2019     PHQ-2  01/01/2020     ASTHMA CONTROL TEST  02/08/2020     Patient is due for:   due pended   No appointment needed.    [employer]  Patient is active on "[employer].    Questionnaire Review   Advised patient to arrive early in order to complete questionnaires.    Call Summary  \"Thank you for your time today.  If anything comes up before your appointment, please feel free to contact us at [phone].\"    Ilana Brownlee   [facility]       "

## 2020-02-06 ENCOUNTER — OFFICE VISIT (OUTPATIENT)
Dept: FAMILY MEDICINE | Facility: CLINIC | Age: 75
End: 2020-02-06
Payer: COMMERCIAL

## 2020-02-06 VITALS
OXYGEN SATURATION: 98 % | BODY MASS INDEX: 25.79 KG/M2 | RESPIRATION RATE: 20 BRPM | SYSTOLIC BLOOD PRESSURE: 118 MMHG | HEART RATE: 92 BPM | DIASTOLIC BLOOD PRESSURE: 68 MMHG | TEMPERATURE: 98.3 F | WEIGHT: 155 LBS

## 2020-02-06 DIAGNOSIS — R60.0 LOWER EXTREMITY EDEMA: ICD-10-CM

## 2020-02-06 DIAGNOSIS — M46.1 SACROILIITIS (H): ICD-10-CM

## 2020-02-06 DIAGNOSIS — M94.9 DISORDER OF BONE AND CARTILAGE: ICD-10-CM

## 2020-02-06 DIAGNOSIS — M89.9 DISORDER OF BONE AND CARTILAGE: ICD-10-CM

## 2020-02-06 DIAGNOSIS — G89.29 OTHER CHRONIC PAIN: Primary | ICD-10-CM

## 2020-02-06 DIAGNOSIS — I73.9 PAD (PERIPHERAL ARTERY DISEASE) (H): ICD-10-CM

## 2020-02-06 DIAGNOSIS — L98.9 SKIN LESION: ICD-10-CM

## 2020-02-06 LAB
BASOPHILS # BLD AUTO: 0 10E9/L (ref 0–0.2)
BASOPHILS NFR BLD AUTO: 0.5 %
DIFFERENTIAL METHOD BLD: NORMAL
EOSINOPHIL # BLD AUTO: 0.2 10E9/L (ref 0–0.7)
EOSINOPHIL NFR BLD AUTO: 3 %
ERYTHROCYTE [DISTWIDTH] IN BLOOD BY AUTOMATED COUNT: 13.3 % (ref 10–15)
HCT VFR BLD AUTO: 43.1 % (ref 35–47)
HGB BLD-MCNC: 13.8 G/DL (ref 11.7–15.7)
LYMPHOCYTES # BLD AUTO: 1.8 10E9/L (ref 0.8–5.3)
LYMPHOCYTES NFR BLD AUTO: 23.7 %
MCH RBC QN AUTO: 28.4 PG (ref 26.5–33)
MCHC RBC AUTO-ENTMCNC: 32 G/DL (ref 31.5–36.5)
MCV RBC AUTO: 89 FL (ref 78–100)
MONOCYTES # BLD AUTO: 0.8 10E9/L (ref 0–1.3)
MONOCYTES NFR BLD AUTO: 10.7 %
NEUTROPHILS # BLD AUTO: 4.7 10E9/L (ref 1.6–8.3)
NEUTROPHILS NFR BLD AUTO: 62.1 %
PLATELET # BLD AUTO: 421 10E9/L (ref 150–450)
RBC # BLD AUTO: 4.86 10E12/L (ref 3.8–5.2)
WBC # BLD AUTO: 7.6 10E9/L (ref 4–11)

## 2020-02-06 PROCEDURE — 85025 COMPLETE CBC W/AUTO DIFF WBC: CPT | Performed by: NURSE PRACTITIONER

## 2020-02-06 PROCEDURE — 82306 VITAMIN D 25 HYDROXY: CPT | Performed by: NURSE PRACTITIONER

## 2020-02-06 PROCEDURE — 99000 SPECIMEN HANDLING OFFICE-LAB: CPT | Performed by: NURSE PRACTITIONER

## 2020-02-06 PROCEDURE — 80307 DRUG TEST PRSMV CHEM ANLYZR: CPT | Mod: 90 | Performed by: NURSE PRACTITIONER

## 2020-02-06 PROCEDURE — 99214 OFFICE O/P EST MOD 30 MIN: CPT | Performed by: NURSE PRACTITIONER

## 2020-02-06 PROCEDURE — 36415 COLL VENOUS BLD VENIPUNCTURE: CPT | Performed by: NURSE PRACTITIONER

## 2020-02-06 RX ORDER — HYDROCODONE BITARTRATE AND ACETAMINOPHEN 5; 325 MG/1; MG/1
1-2 TABLET ORAL EVERY 8 HOURS PRN
Qty: 90 TABLET | Refills: 0 | Status: SHIPPED | OUTPATIENT
Start: 2020-03-11 | End: 2020-03-11

## 2020-02-06 RX ORDER — FUROSEMIDE 20 MG
TABLET ORAL
Qty: 180 TABLET | Refills: 1 | Status: SHIPPED | OUTPATIENT
Start: 2020-02-06 | End: 2020-08-20

## 2020-02-06 ASSESSMENT — ENCOUNTER SYMPTOMS: BACK PAIN: 1

## 2020-02-06 NOTE — PROGRESS NOTES
"Subjective     Kahlil Caputo is a 74 year old female who presents to clinic today for the following health issues:        History of Present Illness      Asthma:  She presents for follow up of asthma.  She has no cough, no wheezing, and no shortness of breath. She is not using a relief medication. She does not have a controller medication. Patient is aware of the following triggers: unaware of any triggers. The patient has not had a visit to the Emergency Room, Urgent Care or Hospital due to asthma since the last clinic visit.        {HIST REVIEW/ LINKS 2 (Optional):493942}    Reviewed and updated as needed this visit by Provider         Review of Systems   {ROS COMP (Optional):551802}      Objective    There were no vitals taken for this visit.  There is no height or weight on file to calculate BMI.  Physical Exam   {Exam List (Optional):608075}    {Diagnostic Test Results (Optional):882806::\"Diagnostic Test Results:\",\"Labs reviewed in Epic\"}        {PROVIDER CHARTING PREFERENCE:898095}    "

## 2020-02-06 NOTE — PROGRESS NOTES
Subjective     Kahlil Caputo is a 74 year old female who presents to clinic today for the following health issues:  Chronic Pain Follow-Up       Type / Location of Pain: rt bursa and lower back   Analgesia/pain control:       Recent changes:  same      Overall control: Tolerable with discomfort  Activity level/function:      Daily activities:  Able to do light housework, cooking    Work:  not applicable  Adverse effects:  No  Adherence    Taking medication as directed?  Yes    Participating in other treatments: yes, injection   Risk Factors:    Sleep:  Fair    Mood/anxiety:  controlled    Recent family or social stressors:  none noted    Other aggravating factors: prolonged sitting and prolonged standing  PHQ-9 SCORE 1/31/2017 1/24/2018 8/8/2019   PHQ-9 Total Score 3 0 0     JOSELINE-7 SCORE 11/8/2016 1/24/2018 11/3/2019   Total Score - - 0 (minimal anxiety)   Total Score 0 0 0     Encounter-Level CSA - 10/16/2015:    Controlled Substance Agreement - Scan on 10/19/2015  9:47 AM: CONTROLLED SUBSTANCE AGREEMENT 10-16-15     Patient-Level CSA:    There are no patient-level csa.        Had an L4-L5 injection in January, it has reduced lower back pain by about 50%, she no longer has radiation of the pain down the back of her leg.  Has been seen by neurosurgery, is planning to start PT at [facility] again.  Taking Hydrocodone 1/2 tab during the day and 2 at night with moderate pain relief.     Going to the [facility] 3 times per week, modifying exercises.      Rash above the right eye:  Getting more irritated, has been present over 6 months, another scaly area along right side of temple.   Patient Active Problem List   Diagnosis     Hypothyroidism     Disorder of bone and cartilage     Small bowel obstruction (H)     Stricture of small intestine (H)     S/P Left total hip arthroplasty 11/25/13     Osteoarthritis of hip     Ileitis (ileocecal resection 8/2013)     Lumbago     Palpitations     PVC's (premature ventricular contractions)      Family history of premature CAD     Chronic pain     Pulmonary nodules     ACP (advance care planning)     Coronary artery disease involving native coronary artery of native heart without angina pectoris     Closed fracture of distal end of left radius with routine healing, unspecified fracture morphology, subsequent encounter     Age-related osteoporosis with current pathological fracture with routine healing     Statin intolerance     PAD (peripheral artery disease) (H)     Aspirin sensitivity     Sacroiliitis (H)     Past Surgical History:   Procedure Laterality Date     APPENDECTOMY  2013    incidental removal-part of small bowel resection     ARTHROPLASTY HIP  2013    Procedure: ARTHROPLASTY HIP;  Left Total Hip Arthroplasty  ;  Surgeon: Luis Marshall MD;  Location: [facility] OR     BIOPSY  ?     left thigh-squamous cell carcinoma     BREAST SURGERY  ?     left breast bx-negative     C NONSPECIFIC PROCEDURE      D& C X 2     C NONSPECIFIC PROCEDURE      PILONIDAL CYSTECTOMY     C NONSPECIFIC PROCEDURE      T&A     C NONSPECIFIC PROCEDURE      BREAST BX & CERVICAL POLYP     ENT SURGERY  ?     T&A     EYE SURGERY      R/L cataract removal with IOL placement; left cataract...     HC COLONOSCOPY THRU STOMA, DIAGNOSTIC           LAPAROTOMY EXPLORATORY  2013    Procedure: LAPAROTOMY EXPLORATORY;  LAPAROTOMY EXPLORATORY, Ileocecal Resection Resection, Removal of Retained Pill Cam;  Surgeon: Mitchell Fraser MD;  Location: [facility] OR     RESECTION ILEOCECAL  2013    Procedure: RESECTION ILEOCECAL;;  Surgeon: Mitchell Fraser MD;  Location: [facility] OR       Social History     Tobacco Use     Smoking status: Former Smoker     Packs/day: 1.00     Years: 50.00     Pack years: 50.00     Types: Cigarettes     Last attempt to quit: 2010     Years since quittin.6     Smokeless tobacco: Never Used     Tobacco comment: no tobacco use since 2010   Substance Use  Topics     Alcohol use: Yes     Alcohol/week: 0.0 standard drinks     Comment: occasional     Family History   Problem Relation Age of Onset     Alcohol/Drug Mother      Heart Disease Mother         cardiomyopathy     Thyroid Disease Mother      Alcohol/Drug Father      Heart Disease Father         CAD -  1st MI mid 50's     Myocardial Infarction Father         X4     Coronary Artery Disease Father      Osteoporosis Maternal Grandmother      Cancer Maternal Grandfather         stomach or throat - martini drinker/pipe smoker     Myocardial Infarction Brother      Breast Cancer No family hx of      Cancer - colorectal No family hx of          Current Outpatient Medications   Medication Sig Dispense Refill     ACE/ARB NOT PRESCRIBED, INTENTIONAL, Please choose reason not prescribed, below       acetaminophen (TYLENOL) 500 MG tablet Take 500-1,000 mg by mouth as needed for mild pain       alendronate (FOSAMAX) 70 MG tablet TAKE 1 TABLET BY MOUTH ONCE WEEKLY, TAKE 30 MIN PRIOR TO 1ST FOOD/DRINK/MED - AVOID LYING DOWN FOR 30 MIN. 12 tablet 3     ascorbic acid (VITAMIN C) 500 MG tablet Take by mouth 2 times daily       ASPIRIN NOT PRESCRIBED (INTENTIONAL) Please choose reason not prescribed, below 0 each 0     BETA BLOCKER NOT PRESCRIBED, INTENTIONAL, Beta Blocker not prescribed intentionally due to COPD  0     betaxolol (BETOPTIC) 0.5 % ophthalmic solution INSTILL 1 DROP INTO EACH EYE QD  3     calcium carbonate 500 MG tablet Take 2 tablets by mouth 2 times daily  100 tablet 3     chlorpheniramine (CHLOR-TRIMETON) 4 MG tablet Take 4 mg by mouth every 6 hours as needed for allergies or rhinitis       Cholecalciferol (VITAMIN D) 1000 UNITS capsule Take 2,000 Units by mouth daily        clobetasol (TEMOVATE) 0.05 % external ointment        FLUAD 0.5 ML BROOKS ADM 0.5ML IM UTD  0     furosemide (LASIX) 20 MG tablet TAKE 1 TO 2 TABLETS BY MOUTH DAILY 180 tablet 1     HYDROcodone-acetaminophen (NORCO) 5-325 MG tablet Take 1-2  "tablets by mouth every 8 hours as needed for pain or severe pain Max of 3 tablets per day 90 tablet 0     HYDROcodone-acetaminophen (NORCO) 5-325 MG tablet Take 1-2 tablets by mouth every 8 hours as needed for pain or severe pain Max of 3 tablets per day 90 tablet 0     HYDROcodone-acetaminophen (NORCO) 5-325 MG tablet Take 1-2 tablets by mouth every 8 hours as needed for pain or severe pain Max of 3 tablets per day 90 tablet 0     levothyroxine (SYNTHROID/LEVOTHROID) 150 MCG tablet Take 1 tablet (150 mcg) by mouth every morning 90 tablet 2     melatonin 3 MG tablet Take 1-2 tablets (3-6 mg) by mouth nightly as needed for sleep (OTC)       Multiple Vitamin (MULTI-VITAMIN PO) Take by mouth daily        STATIN NOT PRESCRIBED, INTENTIONAL, 1 each See Admin Instructions Statin not prescribed intentionally due to Allergy to statin 0 each 0     vitamin E 400 UNIT capsule Take by mouth daily       Allergies   Allergen Reactions     Latex Difficulty breathing     sensativity - cough, eyes water     Nsaids Shortness Of Breath and Other (See Comments)     bronchospams     Aspirin Difficulty breathing     tight cough     Codeine GI Disturbance     upset GI     Pravastatin      Myalgias     Simvastatin      Myalgias     Tramadol      Heart burn,  Felt \"clammy\", unable to pass gas, unable to urinate and had severe nausea     Bacitracin Rash     BP Readings from Last 3 Encounters:   02/06/20 118/68   01/02/20 115/81   11/05/19 132/83    Wt Readings from Last 3 Encounters:   02/06/20 70.3 kg (155 lb)   08/19/19 68.9 kg (152 lb)   08/08/19 68.9 kg (151 lb 12.8 oz)      Reviewed and updated as needed this visit by Provider         Review of Systems   Musculoskeletal: Positive for back pain.      ROS COMP: CONSTITUTIONAL: NEGATIVE for fever, chills, change in weight  SKIN:  See HPI  ENT/MOUTH: NEGATIVE for ear, mouth and throat problems  RESP: NEGATIVE for significant cough or SOB  CV: NEGATIVE for chest pain, palpitations or " peripheral edema  MUSCULOSKELETAL: see HPI  NEURO: NEGATIVE for weakness, dizziness or paresthesias  PSYCHIATRIC: NEGATIVE for changes in mood or affect      Objective    /68 (BP Location: Right arm, Patient Position: Chair, Cuff Size: Adult Regular)   Pulse 92   Temp 98.3  F (36.8  C) (Oral)   Resp 20   Wt 70.3 kg (155 lb)   SpO2 98%   Breastfeeding No   BMI 25.79 kg/m    Body mass index is 25.79 kg/m .  Physical Exam   GENERAL: healthy, alert and no distress  NECK: no adenopathy, no asymmetry, masses, or scars and thyroid normal to palpation  RESP: lungs clear to auscultation - no rales, rhonchi or wheezes  CV: regular rate and rhythm, normal S1 S2, no S3 or S4, no murmur, click or rub, no peripheral edema  MS: bilateral lower back tenderness, getting up slowly from sitting to standing position.    NEURO: Normal strength and tone, mentation intact and speech normal  SKIN:  Dry scaly area above right eyebrow  PSYCH: mentation appears normal, affect normal/bright        Assessment & Plan   Assessment  Kahlil was seen today for hip right and back pain.    Diagnoses and all orders for this visit:    Other chronic pain reviewed and signed chronic pain contract.    -     Drug  Screen Comprehensive , Urine with Reported Meds ([employer]) (Pain Care Package)  -     HYDROcodone-acetaminophen (NORCO) 5-325 MG tablet; Take 1-2 tablets by mouth every 8 hours as needed for pain or severe pain Max of 3 tablets per day    Sacroiliitis (H):   -     Drug  Screen Comprehensive , Urine with Reported Meds ([employer]) (Pain Care Package)  -     HYDROcodone-acetaminophen (NORCO) 5-325 MG tablet; Take 1-2 tablets by mouth every 8 hours as needed for pain or severe pain Max of 3 tablets per day    Lower extremity edema  -     furosemide (LASIX) 20 MG tablet; TAKE 1 TO 2 TABLETS BY MOUTH DAILY    Disorder of bone and cartilage  -     CBC with platelets differential  -     Vitamin D Deficiency    Skin lesion:  Right upper eye brow  -      DERMATOLOGY REFERRAL    PAD (peripheral artery disease) (H): has been seen by cardiology, has tried statin.      Return in about 3 months (around 5/6/2020) for Physical Exam.    Susan Haase, APRN [facility]

## 2020-02-07 LAB — DEPRECATED CALCIDIOL+CALCIFEROL SERPL-MC: 40 UG/L (ref 20–75)

## 2020-02-07 NOTE — RESULT ENCOUNTER NOTE
Duy Guillory,  Your CBC (checks for anemia and infection) is normal.  Sincerely,    Susan Haase, CNP

## 2020-02-11 ENCOUNTER — TELEPHONE (OUTPATIENT)
Dept: FAMILY MEDICINE | Facility: CLINIC | Age: 75
End: 2020-02-11

## 2020-02-11 DIAGNOSIS — G89.29 OTHER CHRONIC PAIN: ICD-10-CM

## 2020-02-11 RX ORDER — HYDROCODONE BITARTRATE AND ACETAMINOPHEN 5; 325 MG/1; MG/1
1-2 TABLET ORAL EVERY 8 HOURS PRN
Qty: 90 TABLET | Refills: 0 | Status: SHIPPED | OUTPATIENT
Start: 2020-02-11 | End: 2020-04-08

## 2020-02-11 NOTE — TELEPHONE ENCOUNTER
Pt calling about rx for Norco, dated 3-11-20 instead of 2-11-20     Disp Refills Start End TAMERA   HYDROcodone-acetaminophen (NORCO) 5-325 MG tablet 90 tablet 0 3/11/2020  No   Sig - Route: Take 1-2 tablets by mouth every 8 hours as needed for pain or severe pain Max of 3 tablets per day - Oral   Sent to pharmacy as: HYDROcodone-acetaminophen (NORCO) 5-325 MG tablet   Class: E-Prescribe   Earliest Fill Date: 3/11/2020     Would like rx for 2.11.20 sent today

## 2020-02-12 ENCOUNTER — TRANSFERRED RECORDS (OUTPATIENT)
Dept: HEALTH INFORMATION MANAGEMENT | Facility: CLINIC | Age: 75
End: 2020-02-12

## 2020-02-12 LAB — PAIN DRUG SCR UR W RPTD MEDS: NORMAL

## 2020-02-13 NOTE — RESULT ENCOUNTER NOTE
Duy Guillory,  When your urine was checked the hydrocodone did not show up, this was likely due to your urine being quite dilute.  Your urine creatinine was also quite low which can be due to drinking a large amount of water.  We will recheck your urine at a future date.  Sincerely,   Susan Haase, CNP

## 2020-03-11 DIAGNOSIS — M46.1 SACROILIITIS (H): ICD-10-CM

## 2020-03-11 DIAGNOSIS — G89.29 OTHER CHRONIC PAIN: ICD-10-CM

## 2020-03-11 RX ORDER — HYDROCODONE BITARTRATE AND ACETAMINOPHEN 5; 325 MG/1; MG/1
1-2 TABLET ORAL EVERY 8 HOURS PRN
Qty: 90 TABLET | Refills: 0 | Status: SHIPPED | OUTPATIENT
Start: 2020-03-11 | End: 2020-05-06

## 2020-03-11 NOTE — TELEPHONE ENCOUNTER
Norco  Last OV 2/6/20 RTC around 5/6/20  Last RF 2/11/20 #90     current.  Not PSO med.  Sent to provider.  Please advise.  Mera Motta RN

## 2020-04-08 DIAGNOSIS — G89.29 OTHER CHRONIC PAIN: ICD-10-CM

## 2020-04-08 RX ORDER — HYDROCODONE BITARTRATE AND ACETAMINOPHEN 5; 325 MG/1; MG/1
1-2 TABLET ORAL EVERY 8 HOURS PRN
Qty: 90 TABLET | Refills: 0 | Status: SHIPPED | OUTPATIENT
Start: 2020-04-10 | End: 2020-05-06

## 2020-04-08 NOTE — TELEPHONE ENCOUNTER
Norco  Last OV 2/6/20 RTC 3 months  Last RF 3/11/20 #90     current.  Not PSO med.  Sent to provider.  Please advise.  Mera Motta RN

## 2020-04-08 NOTE — TELEPHONE ENCOUNTER
Requested Prescriptions   Pending Prescriptions Disp Refills     HYDROcodone-acetaminophen (NORCO) 5-325 MG tablet 90 tablet 0     Sig: Take 1-2 tablets by mouth every 8 hours as needed for pain or severe pain Max of 3 tablets per day       There is no refill protocol information for this order        Controlled Substance Refill Request for Hydrocodone  Problem List Complete:    No     PROVIDER TO CONSIDER COMPLETION OF PROBLEM LIST AND OVERVIEW/CONTROLLED SUBSTANCE AGREEMENT    Last Written Prescription Date:  03/11/2020  Last Fill Quantity: 90,   # refills: 0    THE MOST RECENT OFFICE VISIT MUST BE WITHIN THE PAST 3 MONTHS. AT LEAST ONE FACE TO FACE VISIT MUST OCCUR EVERY 6 MONTHS. ADDITIONAL VISITS CAN BE VIRTUAL.  (THIS STATEMENT SHOULD BE DELETED.)    Last Office Visit with [facility] primary care provider: Susan Haase    Future Office visit:   Next 5 appointments (look out 90 days)    May 06, 2020  1:40 PM CDT  (Arrive by 1:30 PM)  Annual Wellness Visit with Susan Rachele Haase, APRN CNP  [facility] [address] [place] [id number]  [phone]          Controlled substance agreement:   Encounter-Level CSA - 10/16/2015:    Controlled Substance Agreement - Scan on 10/19/2015  9:47 AM: CONTROLLED SUBSTANCE AGREEMENT 10-16-15     Patient-Level CSA:    Controlled Substance Agreement - Opioid - Scan on 2/7/2020  7:19 AM         Last Urine Drug Screen:   Pain Drug SCR UR W RPTD Meds   Date Value Ref Range Status   02/06/2020 FINAL  Final     Comment:     (Note)  ====================================================================  [facility]RE COMP DRUG ANALYSIS,UR  ====================================================================  Specimen Alert  Note:  Urinary creatinine is low; ability to detect some  drugs may be compromised.  Interpret results  with caution.  ====================================================================  Test                              Result       Flag       Units        Drug Present and Declared for Prescription Verification   Norhydrocodone                 400          EXPECTED   ng/mg creat    Norhydrocodone is an expected metabolite of hydrocodone.  Drug Present not Declared for Prescription Verification   Acetaminophen                  PRESENT      UNEXPECTED               Chlorpheniramine               PRESENT      UNEXPECTED               Dextromethorphan               PRESENT      UNEXPECTED               Dextrorphan/Levorphanol        PRESENT      UNEXPECTED                Dextrorphan is an expected metabolite of dextromethorphan, an    over-the-counter or prescription cough suppressant. Dextrorphan    cannot be distinguished from the scheduled prescription    medication levorphanol by the method used for analysis.   Guaifenesin                    PRESENT      UNEXPECTED                Guaifenesin may be administered as an over-the-counter or    prescription drug; it may also be present as a breakdown product    of methocarbamol.  Drug Absent but Declared for Prescription Verification   Hydrocodone                    Not Detected UNEXPECTED ng/mg creat    Hydrocodone is almost always present in patients taking this drug    consistently. Absence of hydrocodone could be due to lapse of    time since the last dose or unusual pharmacokinetics (rapid    metabolism).  ====================================================================  Test                      Result    Flag   Units      Ref Range        Creatinine              13        L      mg/dL      >=20            ====================================================================  Declared Medications:  The flagging and interpretation on this report are based on the  following declared medications.  Unexpected results may arise from  inaccuracies in the declared medications.  **Note: The testing scope of this panel includes these  medications:  Hydrocodone  ====================================================================  For clinical consultation, please call [phone].  ====================================================================  Analysis performed by [employer] [place] [address]     , No results found for: COMDAT, No results found for: THC13, PCP13, COC13, MAMP13, OPI13, AMP13, BZO13, TCA13, MTD13, BAR13, OXY13, PPX13, BUP13     Processing:  Rx to be electronically transmitted to pharmacy by provider      [email or web address]       checked in past 3 months?  No, route to RN

## 2020-05-06 ENCOUNTER — VIRTUAL VISIT (OUTPATIENT)
Dept: FAMILY MEDICINE | Facility: CLINIC | Age: 75
End: 2020-05-06
Payer: COMMERCIAL

## 2020-05-06 DIAGNOSIS — E03.9 HYPOTHYROIDISM, UNSPECIFIED TYPE: ICD-10-CM

## 2020-05-06 DIAGNOSIS — M46.1 SACROILIITIS (H): ICD-10-CM

## 2020-05-06 DIAGNOSIS — G89.29 OTHER CHRONIC PAIN: Primary | ICD-10-CM

## 2020-05-06 PROCEDURE — 99214 OFFICE O/P EST MOD 30 MIN: CPT | Mod: 95 | Performed by: NURSE PRACTITIONER

## 2020-05-06 RX ORDER — LEVOTHYROXINE SODIUM 150 UG/1
150 TABLET ORAL EVERY MORNING
Qty: 90 TABLET | Refills: 2 | Status: SHIPPED | OUTPATIENT
Start: 2020-05-06 | End: 2021-02-23

## 2020-05-06 RX ORDER — HYDROCODONE BITARTRATE AND ACETAMINOPHEN 5; 325 MG/1; MG/1
1-2 TABLET ORAL EVERY 8 HOURS PRN
Qty: 90 TABLET | Refills: 0 | Status: SHIPPED | OUTPATIENT
Start: 2020-05-11 | End: 2020-06-08

## 2020-05-06 NOTE — PROGRESS NOTES
"Kahlil Caputo is a 74 year old female who is being evaluated via a billable telephone visit.      The patient has been notified of following:     \"This telephone visit will be conducted via a call between you and your physician/provider. We have found that certain health care needs can be provided without the need for a physical exam.  This service lets us provide the care you need with a short phone conversation.  If a prescription is necessary we can send it directly to your pharmacy.  If lab work is needed we can place an order for that and you can then stop by our lab to have the test done at a later time.    Telephone visits are billed at different rates depending on your insurance coverage. During this emergency period, for some insurers they may be billed the same as an in-person visit.  Please reach out to your insurance provider with any questions.    If during the course of the call the physician/provider feels a telephone visit is not appropriate, you will not be charged for this service.\"    Patient has given verbal consent for Telephone visit?  Yes    What phone number would you like to be contacted at? [phone]    How would you like to obtain your AVS? Gerryhart    Subjective     Kahlil Caputo is a 74 year old female who presents to clinic today for the following health issues:    HPI  Chronic Pain Follow-Up    Where in your body do you have pain? Lower back and right hip  How well are you sleeping? Can only sleep about 4 hours  How has your mood been since your last visit? About the same  Have you had a significant life event? Other: can't go to the gym to walk so pain is worse  Other aggravating factors: prolonged sitting and prolonged standing  Taking medication as directed? Yes    PHQ-9 SCORE 1/31/2017 1/24/2018 8/8/2019   PHQ-9 Total Score 3 0 0     JOSELINE-7 SCORE 11/8/2016 1/24/2018 11/3/2019   Total Score - - 0 (minimal anxiety)   Total Score 0 0 0     No flowsheet data found.  Encounter-Level " CSA - 10/16/2015:    Controlled Substance Agreement - Scan on 10/19/2015  9:47 AM: CONTROLLED SUBSTANCE AGREEMENT 10-16-15     Patient-Level CSA:    Controlled Substance Agreement - Opioid - Scan on 2/7/2020  7:19 AM         How many servings of fruits and vegetables do you eat daily?  2-3    On average, how many sweetened beverages do you drink each day (Examples: soda, juice, sweet tea, etc.  Do NOT count diet or artificially sweetened beverages)?   0    How many days per week do you miss taking your medication? 0  Has been going out with a mask, limits time in the grocery store.  Walking on cement has been  Increasing the pain.  Is missing the [employer], is doing some home exercises. Using heat in the morning for pain. Taking 2 tablets of norco at night and 1 tablet in the middle of the day.        Patient Active Problem List   Diagnosis     Hypothyroidism     Disorder of bone and cartilage     Small bowel obstruction (H)     Stricture of small intestine (H)     S/P Left total hip arthroplasty 11/25/13     Osteoarthritis of hip     Ileitis (ileocecal resection 8/2013)     Lumbago     Palpitations     PVC's (premature ventricular contractions)     Family history of premature CAD     Chronic pain     Pulmonary nodules     ACP (advance care planning)     Coronary artery disease involving native coronary artery of native heart without angina pectoris     Closed fracture of distal end of left radius with routine healing, unspecified fracture morphology, subsequent encounter     Age-related osteoporosis with current pathological fracture with routine healing     Statin intolerance     PAD (peripheral artery disease) (H)     Aspirin sensitivity     Sacroiliitis (H)     Past Surgical History:   Procedure Laterality Date     APPENDECTOMY  8/30/2013    incidental removal-part of small bowel resection     ARTHROPLASTY HIP  11/25/2013    Procedure: ARTHROPLASTY HIP;  Left Total Hip Arthroplasty  ;  Surgeon: Luis Marshall,  MD;  Location: [facility] OR     BIOPSY  ?     left thigh-squamous cell carcinoma     BREAST SURGERY  ?     left breast bx-negative     C NONSPECIFIC PROCEDURE      D& C X 2     C NONSPECIFIC PROCEDURE      PILONIDAL CYSTECTOMY     C NONSPECIFIC PROCEDURE      T&A     C NONSPECIFIC PROCEDURE      BREAST BX & CERVICAL POLYP     ENT SURGERY  ?     T&A     EYE SURGERY      R/L cataract removal with IOL placement; left cataract...     HC COLONOSCOPY THRU STOMA, DIAGNOSTIC           LAPAROTOMY EXPLORATORY  2013    Procedure: LAPAROTOMY EXPLORATORY;  LAPAROTOMY EXPLORATORY, Ileocecal Resection Resection, Removal of Retained Pill Cam;  Surgeon: Mitchell Fraser MD;  Location: [facility] OR     RESECTION ILEOCECAL  2013    Procedure: RESECTION ILEOCECAL;;  Surgeon: Mitchell Fraser MD;  Location: [facility] OR       Social History     Tobacco Use     Smoking status: Former Smoker     Packs/day: 1.00     Years: 50.00     Pack years: 50.00     Types: Cigarettes     Last attempt to quit: 2010     Years since quittin.9     Smokeless tobacco: Never Used     Tobacco comment: no tobacco use since 2010   Substance Use Topics     Alcohol use: Yes     Alcohol/week: 0.0 standard drinks     Comment: occasional     Family History   Problem Relation Age of Onset     Alcohol/Drug Mother      Heart Disease Mother         cardiomyopathy     Thyroid Disease Mother      Alcohol/Drug Father      Heart Disease Father         CAD -  1st MI mid 50's     Myocardial Infarction Father         X4     Coronary Artery Disease Father      Osteoporosis Maternal Grandmother      Cancer Maternal Grandfather         stomach or throat - martini drinker/pipe smoker     Myocardial Infarction Brother      Breast Cancer No family hx of      Cancer - colorectal No family hx of          Current Outpatient Medications   Medication Sig Dispense Refill     ACE/ARB NOT PRESCRIBED, INTENTIONAL, Please choose reason not  prescribed, below       acetaminophen (TYLENOL) 500 MG tablet Take 500-1,000 mg by mouth as needed for mild pain       alendronate (FOSAMAX) 70 MG tablet TAKE 1 TABLET BY MOUTH ONCE WEEKLY, TAKE 30 MIN PRIOR TO 1ST FOOD/DRINK/MED - AVOID LYING DOWN FOR 30 MIN. 12 tablet 3     ascorbic acid (VITAMIN C) 500 MG tablet Take by mouth 2 times daily       ASPIRIN NOT PRESCRIBED (INTENTIONAL) Please choose reason not prescribed, below 0 each 0     BETA BLOCKER NOT PRESCRIBED, INTENTIONAL, Beta Blocker not prescribed intentionally due to COPD  0     betaxolol (BETOPTIC) 0.5 % ophthalmic solution INSTILL 1 DROP INTO EACH EYE QD  3     calcium carbonate 500 MG tablet Take 2 tablets by mouth 2 times daily  100 tablet 3     chlorpheniramine (CHLOR-TRIMETON) 4 MG tablet Take 4 mg by mouth every 6 hours as needed for allergies or rhinitis       Cholecalciferol (VITAMIN D) 1000 UNITS capsule Take 2,000 Units by mouth daily        FLUAD 0.5 ML BROOKS ADM 0.5ML IM UTD  0     furosemide (LASIX) 20 MG tablet TAKE 1 TO 2 TABLETS BY MOUTH DAILY 180 tablet 1     HYDROcodone-acetaminophen (NORCO) 5-325 MG tablet Take 1-2 tablets by mouth every 8 hours as needed for pain or severe pain Max of 3 tablets per day 90 tablet 0     HYDROcodone-acetaminophen (NORCO) 5-325 MG tablet Take 1-2 tablets by mouth every 8 hours as needed for pain or severe pain Max of 3 tablets per day 90 tablet 0     levothyroxine (SYNTHROID/LEVOTHROID) 150 MCG tablet Take 1 tablet (150 mcg) by mouth every morning 90 tablet 2     melatonin 3 MG tablet Take 1-2 tablets (3-6 mg) by mouth nightly as needed for sleep (OTC)       Multiple Vitamin (MULTI-VITAMIN PO) Take by mouth daily        naloxone (NARCAN) 4 MG/0.1ML nasal spray Spray 1 spray (4 mg) into one nostril alternating nostrils once as needed for opioid reversal every 2-3 minutes until assistance arrives 0.1 mL 0     STATIN NOT PRESCRIBED, INTENTIONAL, 1 each See Admin Instructions Statin not prescribed  intentionally due to Allergy to statin 0 each 0     vitamin E 400 UNIT capsule Take by mouth daily       BP Readings from Last 3 Encounters:   02/06/20 118/68   01/02/20 115/81   11/05/19 132/83    Wt Readings from Last 3 Encounters:   02/06/20 70.3 kg (155 lb)   08/19/19 68.9 kg (152 lb)   08/08/19 68.9 kg (151 lb 12.8 oz)                    Reviewed and updated as needed this visit by Provider         Review of Systems   ROS COMP: CONSTITUTIONAL: NEGATIVE for fever, chills, change in weight  RESP: NEGATIVE for significant cough or SOB  CV: NEGATIVE for chest pain, palpitations or peripheral edema  MUSCULOSKELETAL: see HPI  PSYCHIATRIC: NEGATIVE for changes in mood or affect       Objective   Reported vitals:   PSYCH: Alert and oriented times 3; coherent speech, normal   rate and volume, able to articulate logical thoughts, able   to abstract reason, no tangential thoughts, no hallucinations   or delusions  RESP: No cough, no audible wheezing, able to talk in full sentences  Remainder of exam unable to be completed due to telephone visits        Assessment/Plan:  Diagnoses and all orders for this visit:    Other chronic pain:  Taking norco as prescribed, max of 3 per day, has not asked for any early refills.  Increase in pain since last visit, has not been able to go to the [facility] due to COVID , increase pain with walking on the cement.  Norco always pain to decrease and allow her to sleep at night as well as get some exercise during the day.  Will discuss decrease in dosage after getting back to [facility] and injection at the pain clinic.  -     HYDROcodone-acetaminophen (NORCO) 5-325 MG tablet; Take 1-2 tablets by mouth every 8 hours as needed for pain or severe pain Max of 3 tablets per day    Sacroiliitis (H)    Hypothyroidism, unspecified type  -     levothyroxine (SYNTHROID/LEVOTHROID) 150 MCG tablet; Take 1 tablet (150 mcg) by mouth every morning    Follow up in 3 months for pain management, clinic visit.        Phone call duration:  15 minutes  Susan Haase,  CNP

## 2020-05-13 ENCOUNTER — TELEPHONE (OUTPATIENT)
Dept: FAMILY MEDICINE | Facility: CLINIC | Age: 75
End: 2020-05-13

## 2020-05-13 NOTE — TELEPHONE ENCOUNTER
Please call to schedule annual medicare wellness screening video visit with primary care provider.     Honorio Mota PA-C on 5/13/2020 at 2:10 PM

## 2020-05-13 NOTE — TELEPHONE ENCOUNTER
RN reviewed chart     Patient is scheduled for annual wellness exam     Next 5 appointments (look out 90 days)    Jul 09, 2020  2:30 PM CDT  (Arrive by 2:05 PM)  Annual Wellness Visit with Susan Rachele Haase, APRN CNP  [facility] [address] [place] [id number] [phone]        No outreach initiated     Sofy Dial Registered Nurse   [facility]

## 2020-05-17 ENCOUNTER — MYC MEDICAL ADVICE (OUTPATIENT)
Dept: ENDOCRINOLOGY | Facility: CLINIC | Age: 75
End: 2020-05-17

## 2020-06-03 ENCOUNTER — ANCILLARY PROCEDURE (OUTPATIENT)
Dept: BONE DENSITY | Facility: CLINIC | Age: 75
End: 2020-06-03
Payer: COMMERCIAL

## 2020-06-03 ENCOUNTER — ANCILLARY PROCEDURE (OUTPATIENT)
Dept: BONE DENSITY | Facility: CLINIC | Age: 75
End: 2020-06-03
Attending: CLINICAL NURSE SPECIALIST
Payer: COMMERCIAL

## 2020-06-03 DIAGNOSIS — M94.9 DISORDER OF BONE AND CARTILAGE: ICD-10-CM

## 2020-06-03 DIAGNOSIS — M89.9 DISORDER OF BONE AND CARTILAGE: ICD-10-CM

## 2020-06-03 DIAGNOSIS — Z78.0 ASYMPTOMATIC MENOPAUSAL STATE: ICD-10-CM

## 2020-06-03 DIAGNOSIS — M80.00XD AGE-RELATED OSTEOPOROSIS WITH CURRENT PATHOLOGICAL FRACTURE WITH ROUTINE HEALING: ICD-10-CM

## 2020-06-03 PROCEDURE — 77085 DXA BONE DENSITY AXL VRT FX: CPT | Performed by: INTERNAL MEDICINE

## 2020-06-03 PROCEDURE — 77081 DXA BONE DENSITY APPENDICULR: CPT | Mod: 59 | Performed by: INTERNAL MEDICINE

## 2020-06-08 DIAGNOSIS — G89.29 OTHER CHRONIC PAIN: ICD-10-CM

## 2020-06-08 RX ORDER — HYDROCODONE BITARTRATE AND ACETAMINOPHEN 5; 325 MG/1; MG/1
1-2 TABLET ORAL EVERY 8 HOURS PRN
Qty: 90 TABLET | Refills: 0 | Status: SHIPPED | OUTPATIENT
Start: 2020-06-10 | End: 2020-07-09

## 2020-06-11 NOTE — RESULT ENCOUNTER NOTE
Annkerry,  Your bone density shows your osteoporosis is stable and has not worsened.  This is reassuring.  Please continue the Fosamax.  I recommend repeating the bone density test in 2 years.  You will need to follow up with endocrinology once per year for updating labs and your Fosamax prescription.  Please let me know if you have questions.  Surekha Galicia NP  Endocrinology

## 2020-07-08 ASSESSMENT — ENCOUNTER SYMPTOMS
PARESTHESIAS: 0
HEMATOCHEZIA: 0
DIARRHEA: 0
WEAKNESS: 0
NERVOUS/ANXIOUS: 0
PALPITATIONS: 0
ABDOMINAL PAIN: 0
CONSTIPATION: 0
EYE PAIN: 0
SORE THROAT: 0
FREQUENCY: 0
JOINT SWELLING: 0
BREAST MASS: 0
DIZZINESS: 0
CHILLS: 0
SHORTNESS OF BREATH: 0
ARTHRALGIAS: 0
HEARTBURN: 0
FEVER: 0
COUGH: 0
DYSURIA: 0
MYALGIAS: 0
HEADACHES: 0
HEMATURIA: 0
NAUSEA: 0

## 2020-07-08 ASSESSMENT — ACTIVITIES OF DAILY LIVING (ADL): CURRENT_FUNCTION: NO ASSISTANCE NEEDED

## 2020-07-08 NOTE — PROGRESS NOTES
Pre-Visit Planning     Future Appointments   Date Time Provider Department Center   7/9/2020  2:30 PM Haase, Susan Rachele, APRN CNP [facility]     Arrival Time for this Appointment:  2:05 PM     Appointment Notes for this encounter:   sh reviewed, wellness exam and chronic pain follow up    Questionnaires Reviewed/Assigned  No additional questionnaires are needed     Health Maintenance Due   Topic Date Due     ZOSTER IMMUNIZATION (1 of 2) 08/07/1994     MEDICARE ANNUAL WELLNESS VISIT  11/24/2010     ASTHMA CONTROL TEST  02/08/2020     Patient preferred phone number: [phone]    Sofy Dial, Registered Nurse   [facility]

## 2020-07-09 ENCOUNTER — OFFICE VISIT (OUTPATIENT)
Dept: FAMILY MEDICINE | Facility: CLINIC | Age: 75
End: 2020-07-09
Payer: COMMERCIAL

## 2020-07-09 VITALS
TEMPERATURE: 98.4 F | SYSTOLIC BLOOD PRESSURE: 118 MMHG | BODY MASS INDEX: 26.33 KG/M2 | RESPIRATION RATE: 16 BRPM | OXYGEN SATURATION: 96 % | HEIGHT: 65 IN | HEART RATE: 103 BPM | WEIGHT: 158 LBS | DIASTOLIC BLOOD PRESSURE: 78 MMHG

## 2020-07-09 DIAGNOSIS — K56.699 STRICTURE OF SMALL INTESTINE (H): ICD-10-CM

## 2020-07-09 DIAGNOSIS — G89.29 OTHER CHRONIC PAIN: ICD-10-CM

## 2020-07-09 DIAGNOSIS — R09.89 CHEST CONGESTION: ICD-10-CM

## 2020-07-09 DIAGNOSIS — Z00.00 ENCOUNTER FOR MEDICARE ANNUAL WELLNESS EXAM: Primary | ICD-10-CM

## 2020-07-09 PROCEDURE — 99397 PER PM REEVAL EST PAT 65+ YR: CPT | Performed by: NURSE PRACTITIONER

## 2020-07-09 RX ORDER — HYDROCODONE BITARTRATE AND ACETAMINOPHEN 5; 325 MG/1; MG/1
1-2 TABLET ORAL EVERY 8 HOURS PRN
Qty: 90 TABLET | Refills: 0 | Status: SHIPPED | OUTPATIENT
Start: 2020-07-09 | End: 2020-08-06

## 2020-07-09 RX ORDER — GUAIFENESIN 600 MG/1
1200 TABLET, EXTENDED RELEASE ORAL 2 TIMES DAILY PRN
COMMUNITY
Start: 2020-07-09 | End: 2023-06-06

## 2020-07-09 ASSESSMENT — ENCOUNTER SYMPTOMS
BREAST MASS: 0
FREQUENCY: 0
EYE PAIN: 0
FEVER: 0
SHORTNESS OF BREATH: 0
HEMATOCHEZIA: 0
HEADACHES: 0
NERVOUS/ANXIOUS: 0
WEAKNESS: 0
PALPITATIONS: 0
NAUSEA: 0
JOINT SWELLING: 0
ABDOMINAL PAIN: 0
DIARRHEA: 0
HEMATURIA: 0
MYALGIAS: 0
DYSURIA: 0
CONSTIPATION: 0
PARESTHESIAS: 0
HEARTBURN: 0
DIZZINESS: 0
COUGH: 0
ARTHRALGIAS: 0
CHILLS: 0
SORE THROAT: 0

## 2020-07-09 ASSESSMENT — MIFFLIN-ST. JEOR: SCORE: 1212.56

## 2020-07-09 ASSESSMENT — ACTIVITIES OF DAILY LIVING (ADL): CURRENT_FUNCTION: NO ASSISTANCE NEEDED

## 2020-07-09 NOTE — PROGRESS NOTES
"SUBJECTIVE:   Kahlil Caputo is a 74 year old female who presents for Preventive Visit.    Are you in the first 12 months of your Medicare coverage?  No    Healthy Habits:     In general, how would you rate your overall health?  Good    Frequency of exercise:  2-3 days/week    Duration of exercise:  30-45 minutes    Do you usually eat at least 4 servings of fruit and vegetables a day, include whole grains    & fiber and avoid regularly eating high fat or \"junk\" foods?  No    Taking medications regularly:  Yes    Barriers to taking medications:  None    Medication side effects:  None    Ability to successfully perform activities of daily living:  No assistance needed    Home Safety:  No safety concerns identified    Hearing Impairment:  No hearing concerns    In the past 6 months, have you been bothered by leaking of urine?  No    In general, how would you rate your overall mental or emotional health?  Good      PHQ-2 Total Score: 0    Additional concerns today:  No    Do you feel safe in your environment? Yes    Chronic lower back pain:  80% of days are good, the other days she takes it easy.  Follows silver sneakers on line.  The [facility] is open again and walks on the track.  PT in Feb and March.      Have you ever done Advance Care Planning? (For example, a Health Directive, POLST, or a discussion with a medical provider or your loved ones about your wishes): No     Fall risk  Fallen 2 or more times in the past year?: No  Any fall with injury in the past year?: No  Cognitive Screening   1) Repeat 3 items (Leader, Season, Table)    2) Clock draw: NORMAL  3) 3 item recall: Recalls 3 objects  Results: 3 items recalled: COGNITIVE IMPAIRMENT LESS LIKELY    Mini-CogTM Copyright ROBERT Ferraro. Licensed by the author for use in [place] [employer]; reprinted with permission (vince@.[facility]). All rights reserved.      Do you have sleep apnea, excessive snoring or daytime drowsiness?: no     1.  Cervical cancer screening:  Last " pap 2009, NIL.  2.  Beast cancer screening:  Last mammogram 12/2019  3.  Colon cancer screening:  Last 2013  4.  DEXA 6/2020, continue     Reviewed and updated as needed this visit by clinical staff         Reviewed and updated as needed this visit by Provider        Social History     Tobacco Use     Smoking status: Former Smoker     Packs/day: 1.00     Years: 50.00     Pack years: 50.00     Types: Cigarettes     Last attempt to quit: 6/7/2010     Years since quitting: 10.0     Smokeless tobacco: Never Used     Tobacco comment: no tobacco use since 6/7/2010   Substance Use Topics     Alcohol use: Yes     Alcohol/week: 0.0 standard drinks     Comment: occasional       Alcohol Use 7/8/2020   Prescreen: >3 drinks/day or >7 drinks/week? No     Current providers sharing in care for this patient include:   Patient Care Team:  Haase, Susan Rachele, APRN CNP as PCP - General (Nurse Practitioner)  Haase, Susan Rachele, APRN CNP as Assigned PCP  Sofy Dial RN as Personal Advocate & Liaison (PAL)    The following health maintenance items are reviewed in Epic and correct as of today:  Health Maintenance   Topic Date Due     ZOSTER IMMUNIZATION (1 of 2) 08/07/1994     MEDICARE ANNUAL WELLNESS VISIT  11/24/2010     ASTHMA CONTROL TEST  07/08/2021 (Originally 2/8/2020)     ASTHMA ACTION PLAN  08/19/2020     INFLUENZA VACCINE (1) 09/01/2020     TSH W/FREE T4 REFLEX  11/05/2020     ANNUAL REVIEW OF HM ORDERS  11/05/2020     LUNG CANCER SCREENING ANNUAL  11/12/2020     LIPID  12/02/2020     URINE DRUG SCREEN  02/06/2021     FALL RISK ASSESSMENT  02/06/2021     ADVANCE CARE PLANNING  06/07/2021     MAMMO SCREENING  12/18/2021     DEXA  06/03/2022     DTAP/TDAP/TD IMMUNIZATION (2 - Td) 08/15/2022     COLORECTAL CANCER SCREENING  07/25/2023     PHQ-2  Completed     PNEUMOCOCCAL IMMUNIZATION 65+ LOW/MEDIUM RISK  Completed     IPV IMMUNIZATION  Aged Out     MENINGITIS IMMUNIZATION  Aged Out     BP Readings from Last 3  Encounters:   07/09/20 118/78   02/06/20 118/68   01/02/20 115/81    Wt Readings from Last 3 Encounters:   07/09/20 71.7 kg (158 lb)   02/06/20 70.3 kg (155 lb)   08/19/19 68.9 kg (152 lb)                  Patient Active Problem List   Diagnosis     Hypothyroidism     Disorder of bone and cartilage     Small bowel obstruction (H)     Stricture of small intestine (H)     S/P Left total hip arthroplasty 11/25/13     Osteoarthritis of hip     Ileitis (ileocecal resection 8/2013)     Lumbago     Palpitations     PVC's (premature ventricular contractions)     Family history of premature CAD     Chronic pain     Pulmonary nodules     ACP (advance care planning)     Coronary artery disease involving native coronary artery of native heart without angina pectoris     Closed fracture of distal end of left radius with routine healing, unspecified fracture morphology, subsequent encounter     Age-related osteoporosis with current pathological fracture with routine healing     Statin intolerance     PAD (peripheral artery disease) (H)     Aspirin sensitivity     Sacroiliitis (H)     Past Surgical History:   Procedure Laterality Date     APPENDECTOMY  8/30/2013    incidental removal-part of small bowel resection     ARTHROPLASTY HIP  11/25/2013    Procedure: ARTHROPLASTY HIP;  Left Total Hip Arthroplasty  ;  Surgeon: Luis Marshall MD;  Location: [facility] OR     BIOPSY  ? 1990    left thigh-squamous cell carcinoma     BREAST SURGERY  ? 1991    left breast bx-negative     C NONSPECIFIC PROCEDURE  1970's    D& C X 2     C NONSPECIFIC PROCEDURE  1964    PILONIDAL CYSTECTOMY     C NONSPECIFIC PROCEDURE  1959    T&A     C NONSPECIFIC PROCEDURE  1991    BREAST BX & CERVICAL POLYP     ENT SURGERY  ? 1960    T&A     EYE SURGERY  2016    R/L cataract removal with IOL placement; left cataract...     HC COLONOSCOPY THRU STOMA, DIAGNOSTIC      2003     LAPAROTOMY EXPLORATORY  8/30/2013    Procedure: LAPAROTOMY EXPLORATORY;  LAPAROTOMY  EXPLORATORY, Ileocecal Resection Resection, Removal of Retained Pill Cam;  Surgeon: Mitchell Fraser MD;  Location: [facility] OR     RESECTION ILEOCECAL  8/30/2013    Procedure: RESECTION ILEOCECAL;;  Surgeon: Mitchell Fraser MD;  Location: [facility] OR       Social History     Tobacco Use     Smoking status: Former Smoker     Packs/day: 1.00     Years: 50.00     Pack years: 50.00     Types: Cigarettes     Last attempt to quit: 6/7/2010     Years since quitting: 10.0     Smokeless tobacco: Never Used     Tobacco comment: no tobacco use since 6/7/2010   Substance Use Topics     Alcohol use: Yes     Alcohol/week: 0.0 standard drinks     Comment: occasional     Family History   Problem Relation Age of Onset     Alcohol/Drug Mother      Heart Disease Mother         cardiomyopathy     Thyroid Disease Mother      Alcohol/Drug Father      Heart Disease Father         CAD -  1st MI mid 50's     Myocardial Infarction Father         X4     Coronary Artery Disease Father      Osteoporosis Maternal Grandmother      Cancer Maternal Grandfather         stomach or throat - martini drinker/pipe smoker     Myocardial Infarction Brother      Breast Cancer No family hx of      Cancer - colorectal No family hx of          Current Outpatient Medications   Medication Sig Dispense Refill     guaiFENesin (MUCINEX) 600 MG 12 hr tablet Take 2 tablets (1,200 mg) by mouth 2 times daily       HYDROcodone-acetaminophen (NORCO) 5-325 MG tablet Take 1-2 tablets by mouth every 8 hours as needed for pain or severe pain Max of 3 tablets per day 90 tablet 0     ACE/ARB NOT PRESCRIBED, INTENTIONAL, Please choose reason not prescribed, below       acetaminophen (TYLENOL) 500 MG tablet Take 500-1,000 mg by mouth as needed for mild pain       alendronate (FOSAMAX) 70 MG tablet TAKE 1 TABLET BY MOUTH ONCE WEEKLY, TAKE 30 MIN PRIOR TO 1ST FOOD/DRINK/MED - AVOID LYING DOWN FOR 30 MIN. 12 tablet 3     ascorbic acid (VITAMIN C) 500 MG tablet Take by mouth 2 times daily  "      ASPIRIN NOT PRESCRIBED (INTENTIONAL) Please choose reason not prescribed, below 0 each 0     BETA BLOCKER NOT PRESCRIBED, INTENTIONAL, Beta Blocker not prescribed intentionally due to COPD  0     betaxolol (BETOPTIC) 0.5 % ophthalmic solution INSTILL 1 DROP INTO EACH EYE QD  3     calcium carbonate 500 MG tablet Take 2 tablets by mouth 2 times daily  100 tablet 3     chlorpheniramine (CHLOR-TRIMETON) 4 MG tablet Take 4 mg by mouth every 6 hours as needed for allergies or rhinitis       Cholecalciferol (VITAMIN D) 1000 UNITS capsule Take 2,000 Units by mouth daily        FLUAD 0.5 ML BROOKS ADM 0.5ML IM UTD  0     furosemide (LASIX) 20 MG tablet TAKE 1 TO 2 TABLETS BY MOUTH DAILY 180 tablet 1     levothyroxine (SYNTHROID/LEVOTHROID) 150 MCG tablet Take 1 tablet (150 mcg) by mouth every morning 90 tablet 2     melatonin 3 MG tablet Take 1-2 tablets (3-6 mg) by mouth nightly as needed for sleep (OTC)       Multiple Vitamin (MULTI-VITAMIN PO) Take by mouth daily        STATIN NOT PRESCRIBED, INTENTIONAL, 1 each See Admin Instructions Statin not prescribed intentionally due to Allergy to statin 0 each 0     vitamin E 400 UNIT capsule Take by mouth daily       Allergies   Allergen Reactions     Latex Difficulty breathing     sensativity - cough, eyes water     Nsaids Shortness Of Breath and Other (See Comments)     bronchospams     Aspirin Difficulty breathing     tight cough     Codeine GI Disturbance     upset GI     Pravastatin      Myalgias     Simvastatin      Myalgias     Tramadol      Heart burn,  Felt \"clammy\", unable to pass gas, unable to urinate and had severe nausea     Bacitracin Rash     Mammogram Screening: Mammogram Screening: Patient over age 50, mutual decision to screen reflected in health maintenance.    Review of Systems   Constitutional: Negative for chills and fever.   HENT: Negative for congestion, ear pain, hearing loss and sore throat.    Eyes: Negative for pain and visual disturbance. " "  Respiratory: Negative for cough and shortness of breath.    Cardiovascular: Negative for chest pain, palpitations and peripheral edema.   Gastrointestinal: Negative for abdominal pain, constipation, diarrhea, heartburn, hematochezia and nausea.   Breasts:  Negative for tenderness, breast mass and discharge.   Genitourinary: Negative for dysuria, frequency, genital sores, hematuria, pelvic pain, urgency, vaginal bleeding and vaginal discharge.   Musculoskeletal: Negative for arthralgias, joint swelling and myalgias.   Skin: Negative for rash.   Neurological: Negative for dizziness, weakness, headaches and paresthesias.   Psychiatric/Behavioral: Negative for mood changes. The patient is not nervous/anxious.          OBJECTIVE:   /78 (BP Location: Right arm, Patient Position: Chair, Cuff Size: Adult Regular)   Pulse 103   Temp 98.4  F (36.9  C) (Oral)   Resp 16   Ht 1.651 m (5' 5\")   Wt 71.7 kg (158 lb)   SpO2 96%   BMI 26.29 kg/m   Estimated body mass index is 25.79 kg/m  as calculated from the following:    Height as of 8/19/19: 1.651 m (5' 5\").    Weight as of 2/6/20: 70.3 kg (155 lb).  Physical Exam  GENERAL APPEARANCE: healthy, alert and no distress  EYES: Eyes grossly normal to inspection,   HENT: ear canals and TM's normal, nose and mouth without ulcers or lesions, oropharynx clear and oral mucous membranes moist  NECK: no adenopathy, no asymmetry, masses, or scars and thyroid normal to palpation  RESP: lungs clear to auscultation - no rales, rhonchi or wheezes  BREAST: normal without masses, or nipple discharge and no palpable axillary masses or adenopathy.Left breast tenderness at 6:00 outer border due to previous rib fracture.  CV: regular rate and rhythm, normal S1 S2, no S3 or S4, no murmur, click or rub, no peripheral edema   ABDOMEN: soft, nontender, no hepatosplenomegaly, no masses and bowel sounds normal  MS: no musculoskeletal defects are noted and gait is age appropriate without " "ataxia  SKIN: numerous nevi and actinic keratosis  NEURO: Normal strength and tone, sensory exam grossly normal, mentation intact and speech normal  PSYCH: mentation appears normal and affect normal/bright    ASSESSMENT / PLAN:   Kahlil was seen today for physical.    Diagnoses and all orders for this visit:    Encounter for Medicare annual wellness exam: no concerns.      Chest congestion: taking mucinex on prn basis.    Other chronic pain: refill of hydrocodone, allows for engagement in ADL's 5 out of 7 days of the week.  -     HYDROcodone-acetaminophen (NORCO) 5-325 MG tablet; Take 1-2 tablets by mouth every 8 hours as needed for pain or severe pain Max of 3 tablets per day        COUNSELING:  Reviewed preventive health counseling, as reflected in patient instructions       Regular exercise       Healthy diet/nutrition       Vision screening       Fall risk prevention       Osteoporosis Prevention/Bone Health    Estimated body mass index is 25.79 kg/m  as calculated from the following:    Height as of 8/19/19: 1.651 m (5' 5\").    Weight as of 2/6/20: 70.3 kg (155 lb).         reports that she quit smoking about 10 years ago. Her smoking use included cigarettes. She has a 50.00 pack-year smoking history. She has never used smokeless tobacco.      Appropriate preventive services were discussed with this patient, including applicable screening as appropriate for cardiovascular disease, diabetes, osteopenia/osteoporosis, and glaucoma.  As appropriate for age/gender, discussed screening for colorectal cancer, prostate cancer, breast cancer, and cervical cancer. Checklist reviewing preventive services available has been given to the patient.    Reviewed patients plan of care and provided an AVS. The Basic Care Plan (routine screening as documented in Health Maintenance) for Kahlil meets the Care Plan requirement. This Care Plan has been established and reviewed with the Patient.  Follow up in 3 months for chronic pain " check, may complete a clinic or phone visit.  Counseling Resources:  ATP IV Guidelines  Pooled Cohorts Equation Calculator  Breast Cancer Risk Calculator  FRAX Risk Assessment  ICSI Preventive Guidelines  Dietary Guidelines for Americans, 2010  USDA's MyPlate  ASA Prophylaxis  Lung CA Screening    Susan Haase, APRN CNP  [facility]    Identified Health Risks:

## 2020-07-09 NOTE — PATIENT INSTRUCTIONS
Patient Education   Personalized Prevention Plan  You are due for the preventive services outlined below.  Your care team is available to assist you in scheduling these services.  If you have already completed any of these items, please share that information with your care team to update in your medical record.  Health Maintenance Due   Topic Date Due     Zoster (Shingles) Vaccine (1 of 2) 08/07/1994     Annual Wellness Visit  11/24/2010

## 2020-08-06 DIAGNOSIS — G89.29 OTHER CHRONIC PAIN: ICD-10-CM

## 2020-08-06 RX ORDER — HYDROCODONE BITARTRATE AND ACETAMINOPHEN 5; 325 MG/1; MG/1
1-2 TABLET ORAL EVERY 8 HOURS PRN
Qty: 90 TABLET | Refills: 0 | Status: SHIPPED | OUTPATIENT
Start: 2020-08-08 | End: 2020-09-08

## 2020-08-06 NOTE — TELEPHONE ENCOUNTER
Medication Question or Refill  Who is calling: Kahlil Caputo - PT   What medication are you calling about (include dose and sig)?: HYDROcodone-acetaminophen (NORCO) 5-325 MG tablet - Take 1-2 tablets by mouth every 8 hours as needed for pain or severe pain Max of 3 tablets per day  Controlled Substance Agreement on file: Yes: 10/19/2015  Who prescribed the medication?: Haase, Susan  Do you need a refill? Yes:   When did you use the medication last? Today  Patient offered an appointment? No  Do you have any questions or concerns?  No  Requested Pharmacy: [id number] [place], [place] - [address] [address]  [phone]  Okay to leave a detailed message?: Yes at Home number on file [phone] (home)

## 2020-08-06 NOTE — TELEPHONE ENCOUNTER
Routing refill request to provider for review/approval because:  Drug not on the [facility] refill protocol     Mera Motta RN

## 2020-08-20 DIAGNOSIS — M80.00XD AGE-RELATED OSTEOPOROSIS WITH CURRENT PATHOLOGICAL FRACTURE WITH ROUTINE HEALING: ICD-10-CM

## 2020-08-20 RX ORDER — ALENDRONATE SODIUM 70 MG/1
TABLET ORAL
Qty: 12 TABLET | Refills: 0 | Status: SHIPPED | OUTPATIENT
Start: 2020-08-20 | End: 2020-11-10

## 2020-08-20 NOTE — TELEPHONE ENCOUNTER
Prescription approved per [facility] Refill Protocol.  Apryl Mathias RN, BSN

## 2020-09-08 DIAGNOSIS — G89.29 OTHER CHRONIC PAIN: ICD-10-CM

## 2020-09-08 RX ORDER — HYDROCODONE BITARTRATE AND ACETAMINOPHEN 5; 325 MG/1; MG/1
1-2 TABLET ORAL EVERY 8 HOURS PRN
Qty: 90 TABLET | Refills: 0 | Status: SHIPPED | OUTPATIENT
Start: 2020-09-08 | End: 2020-10-09

## 2020-09-08 NOTE — TELEPHONE ENCOUNTER
Routing refill request to provider for review/approval because:  Drug not on the [facility] refill protocol     Pilar Bernal RN   [facility] -- Triage Nurse

## 2020-10-09 ENCOUNTER — OFFICE VISIT (OUTPATIENT)
Dept: FAMILY MEDICINE | Facility: CLINIC | Age: 75
End: 2020-10-09
Payer: COMMERCIAL

## 2020-10-09 VITALS
WEIGHT: 161 LBS | HEART RATE: 98 BPM | RESPIRATION RATE: 16 BRPM | SYSTOLIC BLOOD PRESSURE: 124 MMHG | BODY MASS INDEX: 26.79 KG/M2 | DIASTOLIC BLOOD PRESSURE: 80 MMHG | OXYGEN SATURATION: 98 % | TEMPERATURE: 98.6 F

## 2020-10-09 DIAGNOSIS — R60.0 LOWER EXTREMITY EDEMA: ICD-10-CM

## 2020-10-09 DIAGNOSIS — E03.9 HYPOTHYROIDISM, UNSPECIFIED TYPE: ICD-10-CM

## 2020-10-09 DIAGNOSIS — G89.29 OTHER CHRONIC PAIN: Primary | ICD-10-CM

## 2020-10-09 DIAGNOSIS — Z87.891 HISTORY OF NICOTINE USE: ICD-10-CM

## 2020-10-09 DIAGNOSIS — Z23 NEED FOR SHINGLES VACCINE: ICD-10-CM

## 2020-10-09 DIAGNOSIS — E55.9 VITAMIN D DEFICIENCY: ICD-10-CM

## 2020-10-09 DIAGNOSIS — Z23 NEED FOR PROPHYLACTIC VACCINATION AND INOCULATION AGAINST INFLUENZA: ICD-10-CM

## 2020-10-09 DIAGNOSIS — I25.10 CORONARY ARTERY DISEASE INVOLVING NATIVE CORONARY ARTERY OF NATIVE HEART WITHOUT ANGINA PECTORIS: ICD-10-CM

## 2020-10-09 PROBLEM — I47.20 VENTRICULAR TACHYCARDIA (H): Status: ACTIVE | Noted: 2020-10-09

## 2020-10-09 LAB
BASOPHILS # BLD AUTO: 0 10E9/L (ref 0–0.2)
BASOPHILS NFR BLD AUTO: 0.3 %
DIFFERENTIAL METHOD BLD: NORMAL
EOSINOPHIL # BLD AUTO: 0.3 10E9/L (ref 0–0.7)
EOSINOPHIL NFR BLD AUTO: 3.6 %
ERYTHROCYTE [DISTWIDTH] IN BLOOD BY AUTOMATED COUNT: 12.8 % (ref 10–15)
HCT VFR BLD AUTO: 38.7 % (ref 35–47)
HGB BLD-MCNC: 12.8 G/DL (ref 11.7–15.7)
LYMPHOCYTES # BLD AUTO: 2 10E9/L (ref 0.8–5.3)
LYMPHOCYTES NFR BLD AUTO: 25.9 %
MCH RBC QN AUTO: 29 PG (ref 26.5–33)
MCHC RBC AUTO-ENTMCNC: 33.1 G/DL (ref 31.5–36.5)
MCV RBC AUTO: 88 FL (ref 78–100)
MONOCYTES # BLD AUTO: 0.9 10E9/L (ref 0–1.3)
MONOCYTES NFR BLD AUTO: 11.4 %
NEUTROPHILS # BLD AUTO: 4.5 10E9/L (ref 1.6–8.3)
NEUTROPHILS NFR BLD AUTO: 58.8 %
PLATELET # BLD AUTO: 311 10E9/L (ref 150–450)
RBC # BLD AUTO: 4.41 10E12/L (ref 3.8–5.2)
WBC # BLD AUTO: 7.7 10E9/L (ref 4–11)

## 2020-10-09 PROCEDURE — 85025 COMPLETE CBC W/AUTO DIFF WBC: CPT | Performed by: NURSE PRACTITIONER

## 2020-10-09 PROCEDURE — G0008 ADMIN INFLUENZA VIRUS VAC: HCPCS | Performed by: NURSE PRACTITIONER

## 2020-10-09 PROCEDURE — 84443 ASSAY THYROID STIM HORMONE: CPT | Performed by: NURSE PRACTITIONER

## 2020-10-09 PROCEDURE — 80053 COMPREHEN METABOLIC PANEL: CPT | Performed by: NURSE PRACTITIONER

## 2020-10-09 PROCEDURE — 90662 IIV NO PRSV INCREASED AG IM: CPT | Performed by: NURSE PRACTITIONER

## 2020-10-09 PROCEDURE — 99214 OFFICE O/P EST MOD 30 MIN: CPT | Mod: 25 | Performed by: NURSE PRACTITIONER

## 2020-10-09 PROCEDURE — 82306 VITAMIN D 25 HYDROXY: CPT | Performed by: NURSE PRACTITIONER

## 2020-10-09 RX ORDER — FUROSEMIDE 20 MG
20 TABLET ORAL 2 TIMES DAILY
Qty: 180 TABLET | Refills: 1 | Status: SHIPPED | OUTPATIENT
Start: 2020-10-09 | End: 2021-04-29

## 2020-10-09 RX ORDER — HYDROCODONE BITARTRATE AND ACETAMINOPHEN 5; 325 MG/1; MG/1
1-2 TABLET ORAL EVERY 8 HOURS PRN
Qty: 90 TABLET | Refills: 0 | Status: SHIPPED | OUTPATIENT
Start: 2020-10-09 | End: 2020-11-09

## 2020-10-09 NOTE — PROGRESS NOTES
Pre-Visit Planning     Future Appointments   Date Time Provider Department Center   10/9/2020  1:40 PM Haase, Susan Rachele, APRN CNP [facility]     Arrival Time for this Appointment:  1:20 PM     Appointment Notes for this encounter:   sh reviewed Chronic Pain    Questionnaires Reviewed/Assigned  No additional questionnaires are needed    Hospital/ER visits since last pcp appt? NO     Imaging: no concerns noted by RN review   Dexa 6/3/2020 ordered by endo in chart for review     Lab review: no concerns per RN review     MyChart  Patient is active on MyChart.    Specialty Visits - 2/12/2020 [facility]     Health Maintenance Due   Topic Date Due     ZOSTER IMMUNIZATION (1 of 2) 08/07/1994     INFLUENZA VACCINE (1) 09/01/2020     TSH W/FREE T4 REFLEX  11/05/2020     ANNUAL REVIEW OF HM ORDERS  11/05/2020     LUNG CANCER SCREENING ANNUAL  11/12/2020     Patient preferred phone number: [phone]    RN PAL did not contact patient PVP completed same day as visit     Sofy Dial Registered Nurse, PAL (Patient Advocate Liason)   [facility]   [phone]

## 2020-10-09 NOTE — PROGRESS NOTES
Subjective   Future Appointments   Date Time Provider Department Center   10/9/2020  1:40 PM Haase, Susan Rachele, APRN CNP [facility]     Appointment Notes for this encounter:   sh reviewed Chronic Pain    Health Maintenance Due   Topic Date Due     ZOSTER IMMUNIZATION (1 of 2) 08/07/1994     INFLUENZA VACCINE (1) 09/01/2020     TSH W/FREE T4 REFLEX  11/05/2020     ANNUAL REVIEW OF HM ORDERS  11/05/2020     LUNG CANCER SCREENING ANNUAL  11/12/2020     Health Maintenance addressed:  Flu Vaccine    Immunizations Pt will update today    MyChart Status:  Active and Using      Kahlil Caputo is a 75 year old female who presents to clinic today for the following health issues:    History of Present Illness       Back Pain:  She presents for follow up of back pain. Patient's back pain is a chronic problem.  Location of back pain:  Right lower back, left lower back, right buttock and right hip  Description of back pain: gnawing and sharp  Back pain spreads: right buttocks and right knee    Since patient first noticed back pain, pain is: always present, but gets better and worse  Does back pain interfere with her job:  Not applicable      She eats 2-3 servings of fruits and vegetables daily.She consumes 0 sweetened beverage(s) daily.She exercises with enough effort to increase her heart rate 20 to 29 minutes per day.  She exercises with enough effort to increase her heart rate 3 or less days per week.   She is taking medications regularly.     Has been doing some walking outside.  Doing some online exercises.    Right lower back pain has been increasing especially at night.  Taking hydrocodone which does allow her participate in ADL's.     Osteoporosis:  Taking fosamax as prescribed, will need follow up with endocrinology, last seen in 7/2019.  Dexa completed in 6/2020.    Lung cancer screening: has annual screening completed, last completed 11/12/2019            Review of Systems   CONSTITUTIONAL: NEGATIVE for fever, chills,  change in weight  RESP: NEGATIVE for significant cough or SOB  CV: NEGATIVE for chest pain, palpitations or peripheral edema  MUSCULOSKELETAL: see HPI  PSYCHIATRIC: NEGATIVE for changes in mood or affect      Objective    /80 (BP Location: Right arm, Patient Position: Chair, Cuff Size: Adult Regular)   Pulse 98   Temp 98.6  F (37  C) (Oral)   Resp 16   Wt 73 kg (161 lb)   SpO2 98%   BMI 26.79 kg/m    Body mass index is 26.79 kg/m .  Physical Exam   GENERAL: healthy, alert and no distress  RESP: lungs clear to auscultation - no rales, rhonchi or wheezes  CV: regular rate and rhythm, normal S1 S2, no S3 or S4, no murmur, click or rub, no peripheral edema  MS: very slowly gets up on the exam table,no gross musculoskeletal defects noted, no edema  PSYCH: mentation appears normal, affect normal/bright            Assessment & Plan     Other chronic pain:  Back pain is moderately controlled with norco, takes 1 at bedtime due to increased pain at night, tries to take 1 during the day.  With this medication she is able to go about her daily activities.  Encouraged continued regular exercise even during the winter months.  Checked , last refill 9/8/2020, has not been getting narcotics/prescriptions from any other provider.  Last urine drug screen 2/2019, pain contract last reviewed/signed 2/2020.    - HYDROcodone-acetaminophen (NORCO) 5-325 MG tablet  Dispense: 90 tablet; Refill: 0    Hypothyroidism, unspecified type: taking levothyroxine 150 mcg, due for annual TSH check.  - REVIEW OF HEALTH MAINTENANCE PROTOCOL ORDERS  - TSH with free T4 reflex    Vitamin D deficiency  - Vitamin D Deficiency    Coronary artery disease involving native coronary artery of native heart without angina pectoris  - CBC with platelets differential  - Comprehensive metabolic panel    Lower extremity edema: refill below, edema well controlled.  - furosemide (LASIX) 20 MG tablet  Dispense: 180 tablet; Refill: 1      Need for  prophylactic vaccination and inoculation against influenza  - FLUZONE HIGH DOSE 65+  [95241]  - Vaccine Administration, Initial [25974]    History of nicotine use: has annual CT scan completed, would like to again have this completed.  Due after 11/12/2019  - CT Chest Lung Cancer Scrn Low Dose wo    Return in about 3 months (around 1/9/2021) for Routine Visit.    Susan Haase, APRN [facility]

## 2020-10-10 LAB
ALBUMIN SERPL-MCNC: 3.6 G/DL (ref 3.4–5)
ALP SERPL-CCNC: 85 U/L (ref 40–150)
ALT SERPL W P-5'-P-CCNC: 25 U/L (ref 0–50)
ANION GAP SERPL CALCULATED.3IONS-SCNC: 10 MMOL/L (ref 3–14)
AST SERPL W P-5'-P-CCNC: 17 U/L (ref 0–45)
BILIRUB SERPL-MCNC: 0.4 MG/DL (ref 0.2–1.3)
BUN SERPL-MCNC: 11 MG/DL (ref 7–30)
CALCIUM SERPL-MCNC: 8.5 MG/DL (ref 8.5–10.1)
CHLORIDE SERPL-SCNC: 102 MMOL/L (ref 94–109)
CO2 SERPL-SCNC: 22 MMOL/L (ref 20–32)
CREAT SERPL-MCNC: 0.52 MG/DL (ref 0.52–1.04)
GFR SERPL CREATININE-BSD FRML MDRD: >90 ML/MIN/{1.73_M2}
GLUCOSE SERPL-MCNC: 76 MG/DL (ref 70–99)
POTASSIUM SERPL-SCNC: 4.1 MMOL/L (ref 3.4–5.3)
PROT SERPL-MCNC: 6.7 G/DL (ref 6.8–8.8)
SODIUM SERPL-SCNC: 134 MMOL/L (ref 133–144)
TSH SERPL DL<=0.005 MIU/L-ACNC: 0.89 MU/L (ref 0.4–4)

## 2020-10-11 LAB — DEPRECATED CALCIDIOL+CALCIFEROL SERPL-MC: 33 UG/L (ref 20–75)

## 2020-10-11 NOTE — RESULT ENCOUNTER NOTE
Duy Guillory,  Your vitamin D level is normal at 33, I would increase your dose of vitamin D to 3,000 international unit(s) every day (I think you are taking 2000 international unit(s) per day?).  Most people feel the best if the level is around 50.  Take care,  Susan Haase,  CNP

## 2020-10-11 NOTE — RESULT ENCOUNTER NOTE
Duy Guillory,  Your lab results are as below:  1)  TSH (thyroid level) 0.89 which is normal (range 0.4-4)  2)  Glucose is normal at 76 (normal fasting is <100).      If you have any questions do not hesitate to call the clinic to discuss the results with me further.     Sincerely,    Susan Haase, CNP

## 2020-10-18 ENCOUNTER — TELEPHONE (OUTPATIENT)
Dept: FAMILY MEDICINE | Facility: CLINIC | Age: 75
End: 2020-10-18

## 2020-10-19 NOTE — TELEPHONE ENCOUNTER
Handicap parking form completed and given to TC.  Please call Kahlil and see if she wants to  or have it mailed.  Thanks,  Susan Haase, CNP

## 2020-10-26 ENCOUNTER — TELEPHONE (OUTPATIENT)
Dept: FAMILY MEDICINE | Facility: CLINIC | Age: 75
End: 2020-10-26

## 2020-10-26 NOTE — TELEPHONE ENCOUNTER
Pt misplaced previous form and dropped another form at the  for Susan Haase to fill out. Placed in Susan Haase's basket.     Francesca Silverio-Patient Rep

## 2020-10-26 NOTE — TELEPHONE ENCOUNTER
Received 1 page form for Application for Disability Paking Certificate for Susan Haase to complete. Form in the in-basket at 's desk.

## 2020-11-02 ENCOUNTER — HOSPITAL ENCOUNTER (OUTPATIENT)
Dept: CT IMAGING | Facility: CLINIC | Age: 75
Discharge: HOME | End: 2020-11-02
Attending: NURSE PRACTITIONER | Admitting: NURSE PRACTITIONER
Payer: COMMERCIAL

## 2020-11-02 DIAGNOSIS — Z87.891 HISTORY OF NICOTINE USE: ICD-10-CM

## 2020-11-02 PROCEDURE — G0297 LDCT FOR LUNG CA SCREEN: HCPCS

## 2020-11-03 NOTE — RESULT ENCOUNTER NOTE
Duy Guillory,  Your CT scan is stable from last year, no new changes.  I would recommend you have another CT in 1 year.  Sincerely,     Susan Haase, CNP

## 2020-11-05 ENCOUNTER — TELEPHONE (OUTPATIENT)
Dept: FAMILY MEDICINE | Facility: CLINIC | Age: 75
End: 2020-11-05

## 2020-11-05 NOTE — TELEPHONE ENCOUNTER
Patient is aware of the finding of coronary artery calcium, moderate or severe, does not tolerate and will not take statins or aspirin.  BP is under good control.   Susan Haase, CNP

## 2020-11-05 NOTE — TELEPHONE ENCOUNTER
Haase, Susan Rachele APRN CNP     RN received call from [facility]     Calling to ensure that you review the CT scan from  11/2/2020 -  Concerns below     Additional findings: Moderate-to-severe coronary artery calcifications. (this was also listed on last years 11/12/2019 per RN review)     Sofy Dial, Registered Nurse, PAL (Patient Advocate Liason)   [facility]   [phone]

## 2020-11-09 DIAGNOSIS — M80.00XD AGE-RELATED OSTEOPOROSIS WITH CURRENT PATHOLOGICAL FRACTURE WITH ROUTINE HEALING: ICD-10-CM

## 2020-11-10 RX ORDER — ALENDRONATE SODIUM 70 MG/1
TABLET ORAL
Qty: 12 TABLET | Refills: 0 | Status: SHIPPED | OUTPATIENT
Start: 2020-11-10 | End: 2021-02-01

## 2020-11-10 NOTE — TELEPHONE ENCOUNTER
Prescription approved per [facility] Refill Protocol.    Mera Motta RN

## 2020-11-11 ENCOUNTER — VIRTUAL VISIT (OUTPATIENT)
Dept: ENDOCRINOLOGY | Facility: CLINIC | Age: 75
End: 2020-11-11
Payer: COMMERCIAL

## 2020-11-11 DIAGNOSIS — M81.0 AGE-RELATED OSTEOPOROSIS WITHOUT CURRENT PATHOLOGICAL FRACTURE: ICD-10-CM

## 2020-11-11 DIAGNOSIS — M80.00XD AGE-RELATED OSTEOPOROSIS WITH CURRENT PATHOLOGICAL FRACTURE WITH ROUTINE HEALING: Primary | ICD-10-CM

## 2020-11-11 PROCEDURE — 99214 OFFICE O/P EST MOD 30 MIN: CPT | Mod: 95 | Performed by: INTERNAL MEDICINE

## 2020-11-11 NOTE — PROGRESS NOTES
"Kahlil Caputo is a 75 year old female who is being evaluated via a billable telephone visit.      The patient has been notified of following:     \"This telephone visit will be conducted via a call between you and your physician/provider. We have found that certain health care needs can be provided without the need for a physical exam.  This service lets us provide the care you need with a short phone conversation.  If a prescription is necessary we can send it directly to your pharmacy.  If lab work is needed we can place an order for that and you can then stop by our lab to have the test done at a later time.    Telephone visits are billed at different rates depending on your insurance coverage. During this emergency period, for some insurers they may be billed the same as an in-person visit.  Please reach out to your insurance provider with any questions.    If during the course of the call the physician/provider feels a telephone visit is not appropriate, you will not be charged for this service.\"    Patient has given verbal consent for Telephone visit?  Yes    What phone number would you like to be contacted at? [phone]    How would you like to obtain your AVS? Alison    CC: Osteoporosis    HPI:   Patient presents for management of osteoporosis.   Previously seen by Surekha Galicia CNP:  \"Lab work up for secondary causes of osteoporosis unremarkable.  Started Fosamax 70 mg weekly approximately 7/2018.  Is tolerating the Fosamax well.  Since last seen, she fractured her left ankle 2/2019 (distal fibula) - she missed the last step in her home and twisted the ankle.  She states the orthopedist told her that if she didn't have osteoporosis, she probably would not have fractured the ankle.       Smoke: history of smoking, quit 8 years ago.  Family History:Yes: MGM with   Menstrual history/Birthing: irregular menses but no long periods of amenorrhea.  Menopausal since her 50's - briefly treated with HRT - didn't " "tolerate  Fractures:Yes: left wrist fracture 1/2018.    Kidney stones: No  GI Surgery:Yes: small bowel resection 5 years ago  Duration of therapy:  Vitamin D 2000 IU once daily, calcium 9029-4616 per day.   Exercise:Not much  Diet:               Milk: limited, lactose intolerant.  + [place] milk or lactose-free milk               Cheese: occasionally               Yogurt/Cottage Cheese: yogurt daily               Ice Cream  Ca/Vitamin D:  Yes  Alcohol:  Minimal, no more than 1 beer or wine per day  Eating Disorder: No  Steroid Use:  No consistent or regular oral steroid use, has had several steroid injections, most recently hip injection 7/2019.    Is a retired nurse.\"    She has been on levothyroxine since ~age 39.   No other fractures aside from noted above.   She has a cane from when she had a hip replacement but does not need to use it unless she is having back pain.   She was getting regular exercise until her [employer] closed. Now afraid to return due to COVID.   She does occasionally follow an on line work out.     Uses almond milk once a day.   Taking 500 mg of calcium TID, and 3000 international unit(s) D3 (recently increased from 2000 international unit(s)).   She has heartburn two days after her fosamax and it is controlled with TUMS.     ROS: 10 point ROS neg other than the symptoms noted above in the HPI.    PMH:   Patient Active Problem List   Diagnosis     Hypothyroidism     Disorder of bone and cartilage     Small bowel obstruction (H)     Stricture of small intestine (H)     S/P Left total hip arthroplasty 11/25/13     Osteoarthritis of hip     Ileitis (ileocecal resection 8/2013)     Lumbago     Palpitations     PVC's (premature ventricular contractions)     Family history of premature CAD     Chronic pain     Pulmonary nodules     ACP (advance care planning)     Coronary artery disease involving native coronary artery of native heart without angina pectoris     Closed fracture of distal end of left " radius with routine healing, unspecified fracture morphology, subsequent encounter     Age-related osteoporosis with current pathological fracture with routine healing     Statin intolerance     PAD (peripheral artery disease) (H)     Aspirin sensitivity     Sacroiliitis (H)     Ventricular tachycardia (H)     Meds:  Current Outpatient Medications   Medication     ACE/ARB NOT PRESCRIBED, INTENTIONAL,     acetaminophen (TYLENOL) 500 MG tablet     alendronate (FOSAMAX) 70 MG tablet     ascorbic acid (VITAMIN C) 500 MG tablet     ASPIRIN NOT PRESCRIBED (INTENTIONAL)     BETA BLOCKER NOT PRESCRIBED, INTENTIONAL,     betaxolol (BETOPTIC) 0.5 % ophthalmic solution     calcium carbonate 500 MG tablet     chlorpheniramine (CHLOR-TRIMETON) 4 MG tablet     Cholecalciferol (VITAMIN D) 1000 UNITS capsule     FLUAD 0.5 ML BROOKS     furosemide (LASIX) 20 MG tablet     guaiFENesin (MUCINEX) 600 MG 12 hr tablet     HYDROcodone-acetaminophen (NORCO) 5-325 MG tablet     levothyroxine (SYNTHROID/LEVOTHROID) 150 MCG tablet     melatonin 3 MG tablet     Multiple Vitamin (MULTI-VITAMIN PO)     STATIN NOT PRESCRIBED, INTENTIONAL,     vitamin E 400 UNIT capsule     No current facility-administered medications for this visit.      FHX:   Maternal grandmother had osteoporosis.   Mother had hypothyroidism.     SHX:  Former smoker.   Former RN.     Exam:   Gen: In NAD.     A/P:   Osteoporosis - Risk factors include age, post menopause, FHX, former smoker, lactose intolerance.   She has been on fosamax since 7/2018.   Risks/benefits of continued use reviewed.   DEXA from 6/2020 showed stable BMD at the spine and hip.   She is having some heartburn after taking her fosamax so we did discuss reclast as an option.   -Recommend doing home exercise videos.   -No change to medication.   -Labs and DEXA in 1 year.     Due to the COVID 19 pandemic this visit was a telephone/video visit in order to help prevent spread of infection in this high risk  patient and the general population. The patient gave verbal consent for the visit today.    Start time 1529  Stop time 1552  Total time 23  This visit would have been billed as 91010 as an E & M code     Eliecer Amador MD on 11/11/2020 at 3:52 PM

## 2020-11-11 NOTE — LETTER
"    11/11/2020         RE: Kahlil Caputo  [address]  [facility] [address]        Dear Colleague,    Thank you for referring your patient, Kahlil Caputo, to the [facility]. Please see a copy of my visit note below.    Kahlil Caputo is a 75 year old female who is being evaluated via a billable telephone visit.      The patient has been notified of following:     \"This telephone visit will be conducted via a call between you and your physician/provider. We have found that certain health care needs can be provided without the need for a physical exam.  This service lets us provide the care you need with a short phone conversation.  If a prescription is necessary we can send it directly to your pharmacy.  If lab work is needed we can place an order for that and you can then stop by our lab to have the test done at a later time.    Telephone visits are billed at different rates depending on your insurance coverage. During this emergency period, for some insurers they may be billed the same as an in-person visit.  Please reach out to your insurance provider with any questions.    If during the course of the call the physician/provider feels a telephone visit is not appropriate, you will not be charged for this service.\"    Patient has given verbal consent for Telephone visit?  Yes    What phone number would you like to be contacted at? [phone]    How would you like to obtain your AVS? [email or web address]    CC: Osteoporosis    HPI:   Patient presents for management of osteoporosis.   Previously seen by Surekha Galicia CNP:  \"Lab work up for secondary causes of osteoporosis unremarkable.  Started Fosamax 70 mg weekly approximately 7/2018.  Is tolerating the Fosamax well.  Since last seen, she fractured her left ankle 2/2019 (distal fibula) - she missed the last step in her home and twisted the ankle.  She states the orthopedist told her that if she didn't have osteoporosis, she probably would not " "have fractured the ankle.       Smoke: history of smoking, quit 8 years ago.  Family History:Yes: MGM with   Menstrual history/Birthing: irregular menses but no long periods of amenorrhea.  Menopausal since her 50's - briefly treated with HRT - didn't tolerate  Fractures:Yes: left wrist fracture 1/2018.    Kidney stones: No  GI Surgery:Yes: small bowel resection 5 years ago  Duration of therapy:  Vitamin D 2000 IU once daily, calcium 5476-9146 per day.   Exercise:Not much  Diet:               Milk: limited, lactose intolerant.  + [place] milk or lactose-free milk               Cheese: occasionally               Yogurt/Cottage Cheese: yogurt daily               Ice Cream  Ca/Vitamin D:  Yes  Alcohol:  Minimal, no more than 1 beer or wine per day  Eating Disorder: No  Steroid Use:  No consistent or regular oral steroid use, has had several steroid injections, most recently hip injection 7/2019.    Is a retired nurse.\"    She has been on levothyroxine since ~age 39.   No other fractures aside from noted above.   She has a cane from when she had a hip replacement but does not need to use it unless she is having back pain.   She was getting regular exercise until her [employer] closed. Now afraid to return due to COVID.   She does occasionally follow an on line work out.     Uses almond milk once a day.   Taking 500 mg of calcium TID, and 3000 international unit(s) D3 (recently increased from 2000 international unit(s)).   She has heartburn two days after her fosamax and it is controlled with TUMS.     ROS: 10 point ROS neg other than the symptoms noted above in the HPI.    PMH:   Patient Active Problem List   Diagnosis     Hypothyroidism     Disorder of bone and cartilage     Small bowel obstruction (H)     Stricture of small intestine (H)     S/P Left total hip arthroplasty 11/25/13     Osteoarthritis of hip     Ileitis (ileocecal resection 8/2013)     Lumbago     Palpitations     PVC's (premature ventricular contractions) "     Family history of premature CAD     Chronic pain     Pulmonary nodules     ACP (advance care planning)     Coronary artery disease involving native coronary artery of native heart without angina pectoris     Closed fracture of distal end of left radius with routine healing, unspecified fracture morphology, subsequent encounter     Age-related osteoporosis with current pathological fracture with routine healing     Statin intolerance     PAD (peripheral artery disease) (H)     Aspirin sensitivity     Sacroiliitis (H)     Ventricular tachycardia (H)     Meds:  Current Outpatient Medications   Medication     ACE/ARB NOT PRESCRIBED, INTENTIONAL,     acetaminophen (TYLENOL) 500 MG tablet     alendronate (FOSAMAX) 70 MG tablet     ascorbic acid (VITAMIN C) 500 MG tablet     ASPIRIN NOT PRESCRIBED (INTENTIONAL)     BETA BLOCKER NOT PRESCRIBED, INTENTIONAL,     betaxolol (BETOPTIC) 0.5 % ophthalmic solution     calcium carbonate 500 MG tablet     chlorpheniramine (CHLOR-TRIMETON) 4 MG tablet     Cholecalciferol (VITAMIN D) 1000 UNITS capsule     FLUAD 0.5 ML BROOKS     furosemide (LASIX) 20 MG tablet     guaiFENesin (MUCINEX) 600 MG 12 hr tablet     HYDROcodone-acetaminophen (NORCO) 5-325 MG tablet     levothyroxine (SYNTHROID/LEVOTHROID) 150 MCG tablet     melatonin 3 MG tablet     Multiple Vitamin (MULTI-VITAMIN PO)     STATIN NOT PRESCRIBED, INTENTIONAL,     vitamin E 400 UNIT capsule     No current facility-administered medications for this visit.      FHX:   Maternal grandmother had osteoporosis.   Mother had hypothyroidism.     SHX:  Former smoker.   Former RN.     Exam:   Gen: In NAD.     A/P:   Osteoporosis - Risk factors include age, post menopause, FHX, former smoker, lactose intolerance.   She has been on fosamax since 7/2018.   Risks/benefits of continued use reviewed.   DEXA from 6/2020 showed stable BMD at the spine and hip.   She is having some heartburn after taking her fosamax so we did discuss reclast as  an option.   -Recommend doing home exercise videos.   -No change to medication.   -Labs and DEXA in 1 year.     Due to the COVID 19 pandemic this visit was a telephone/video visit in order to help prevent spread of infection in this high risk patient and the general population. The patient gave verbal consent for the visit today.    Start time 1529  Stop time 1552  Total time 23  This visit would have been billed as 87566 as an E & M code     Eliecer Amador MD on 11/11/2020 at 3:52 PM              Again, thank you for allowing me to participate in the care of your patient.        Sincerely,        Eliecer Amador MD

## 2020-12-08 ENCOUNTER — TELEPHONE (OUTPATIENT)
Dept: FAMILY MEDICINE | Facility: CLINIC | Age: 75
End: 2020-12-08

## 2020-12-08 DIAGNOSIS — G89.29 OTHER CHRONIC PAIN: ICD-10-CM

## 2020-12-08 RX ORDER — HYDROCODONE BITARTRATE AND ACETAMINOPHEN 5; 325 MG/1; MG/1
1-2 TABLET ORAL EVERY 8 HOURS PRN
Qty: 90 TABLET | Refills: 0 | Status: SHIPPED | OUTPATIENT
Start: 2020-12-09 | End: 2021-01-11

## 2020-12-08 NOTE — TELEPHONE ENCOUNTER
Medication Question or Refill    Who is calling: Patient    What medication are you calling about (include dose and sig)?: HYDROcodone-acetaminophen (NORCO) 5-325 MG tablet    Sig - Route: Take 1-2 tablets by mouth every 8 hours as needed for pain or severe pain Max of 3 tablets per day - Oral    Controlled Substance Agreement on file: Yes:     Who prescribed the medication?: Susan Haase    Do you need a refill? Yes:     When did you use the medication last? N/A    Patient offered an appointment? No    Do you have any questions or concerns?  No    Requested Pharmacy: [facility] in [facility] to leave a detailed message?: No at Home number on file [phone] (home)    Francesca Silverio-Patient Rep

## 2021-01-07 ENCOUNTER — TELEPHONE (OUTPATIENT)
Dept: FAMILY MEDICINE | Facility: CLINIC | Age: 76
End: 2021-01-07

## 2021-01-07 DIAGNOSIS — G89.29 OTHER CHRONIC PAIN: ICD-10-CM

## 2021-01-07 NOTE — TELEPHONE ENCOUNTER
Reason for Call:  Medication or medication refill:    Do you use a [facility]?  Name of the pharmacy and phone number for the current request[facility]    Name of the medication requested: hydrocodone    Other request: none    Can we leave a detailed message on this number? YES    Phone number patient can be reached at: Home number on file [phone] (home)    Best Time: any    Call taken on 1/7/2021 at 4:07 PM by Kylie [place]

## 2021-01-11 RX ORDER — HYDROCODONE BITARTRATE AND ACETAMINOPHEN 5; 325 MG/1; MG/1
1-2 TABLET ORAL EVERY 8 HOURS PRN
Qty: 90 TABLET | Refills: 0 | Status: SHIPPED | OUTPATIENT
Start: 2021-01-11 | End: 2021-02-09

## 2021-01-11 NOTE — TELEPHONE ENCOUNTER
Request from 1/7/21.  Routed incorrectly and did not get routed to provider.     Routing refill request to provider for review/approval because:  Drug not on the [facility] refill protocol

## 2021-01-12 NOTE — PROGRESS NOTES
Pre-Visit Planning     Future Appointments   Date Time Provider Department Center   2021  1:30 PM Haase, Susan Rachele, APRN CNP [facility]     Arrival Time for this Appointment:  1:15 PM     Appointment Notes for this encounter:   sh reviewed 3 month pain follow up,     Questionnaires Reviewed/Assigned  No additional questionnaires are needed    Hospital/ER visits since last pcp appt? NO     Imagin/2/20 low dose lung CT - shows   Significant Incidental Finding(s):  Category S: Yes. coronary  artery calcium moderate or severe      Lab review:  no concerns noted by RN     Specialty Visits -   2020 virtual visit Endo, f/u with labs and visit recommended one year (future orders in Live Calendars)     [employer]  Patient is active on [employer].     Patient preferred phone number: [phone]      Spoke to patient via phone. Are there any additional questions or concerns you'd like to review with your provider during your visit?  Patient unable to give any one concern to discuss besides when can she receive her covid vaccine       Meds  Is there anything on your medication list that needs to be updated? No    Current Outpatient Medications   Medication     ACE/ARB NOT PRESCRIBED, INTENTIONAL,     acetaminophen (TYLENOL) 500 MG tablet     alendronate (FOSAMAX) 70 MG tablet     ascorbic acid (VITAMIN C) 500 MG tablet     ASPIRIN NOT PRESCRIBED (INTENTIONAL)     BETA BLOCKER NOT PRESCRIBED, INTENTIONAL,     betaxolol (BETOPTIC) 0.5 % ophthalmic solution     calcium carbonate 500 MG tablet     chlorpheniramine (CHLOR-TRIMETON) 4 MG tablet     Cholecalciferol (VITAMIN D) 1000 UNITS capsule     FLUAD 0.5 ML BROOKS     furosemide (LASIX) 20 MG tablet     guaiFENesin (MUCINEX) 600 MG 12 hr tablet     HYDROcodone-acetaminophen (NORCO) 5-325 MG tablet     levothyroxine (SYNTHROID/LEVOTHROID) 150 MCG tablet     melatonin 3 MG tablet     Multiple Vitamin (MULTI-VITAMIN PO)     STATIN NOT PRESCRIBED, INTENTIONAL,     vitamin E 400  "UNIT capsule     No current facility-administered medications for this visit.      Which pharmacy do you prefer to use for medications during this visit if needed? [facility]     Do you need refills on any of your medications? No     Health Maintenance Due   Topic Date Due     HEPATITIS C SCREENING  08/07/1962     ZOSTER IMMUNIZATION (1 of 2) 08/07/1994     LIPID  12/02/2020     URINE DRUG SCREEN  02/06/2021         Call Summary  \"Thank you for your time today.  If anything comes up before your appointment, please feel free to contact us at [phone].\"    RN direct number given to patient     Sofy Dial, Registered Nurse, PAL (Patient Advocate Liason)   [facility]   [phone]         "

## 2021-01-13 ENCOUNTER — VIRTUAL VISIT (OUTPATIENT)
Dept: FAMILY MEDICINE | Facility: CLINIC | Age: 76
End: 2021-01-13
Payer: COMMERCIAL

## 2021-01-13 DIAGNOSIS — G89.29 OTHER CHRONIC PAIN: Primary | ICD-10-CM

## 2021-01-13 DIAGNOSIS — Z91.89 LACK OF MOTIVATION: ICD-10-CM

## 2021-01-13 DIAGNOSIS — M46.1 SACROILIITIS (H): ICD-10-CM

## 2021-01-13 PROCEDURE — 99213 OFFICE O/P EST LOW 20 MIN: CPT | Mod: 95 | Performed by: NURSE PRACTITIONER

## 2021-01-13 NOTE — PROGRESS NOTES
"Kahlil is a 75 year old who is being evaluated via a billable telephone visit.      What phone number would you like to be contacted at? [phone]  How would you like to obtain your AVS? Alison  Assessment & Plan     Other chronic pain Back pain is moderately controlled with norco, takes 1 at bedtime due to increased pain at night, tries to take 1 during the day.  With this medication she is able to go about her daily activities.  Encouraged continued regular exercise even during the winter months.  Checked , last refill 1/11/2021, has not been getting narcotics/prescriptions from any other provider.  Last urine drug screen 2/2029, pain contract last reviewed/signed 2/2020.      Sacroiliitis (H): see above    Lack of motivation: discuss returning to the [facility] for walking several times per week just to get out more, increase social interaction, issue of prolonged social isolation, discussed possibility of adding sertraline 25 mg?  She will contemplate. Agreed that this is a very difficult time.. who would have thought this would last a year?   BMI:   Estimated body mass index is 26.79 kg/m  as calculated from the following:    Height as of 7/9/20: 1.651 m (5' 5\").    Weight as of 10/9/20: 73 kg (161 lb).     Return in about 3 months (around 4/13/2021) for Routine Visit, chronic pain.    Susan Haase, APRN CNP  [facility]    Subjective     History of Present Illness       Back Pain:  She presents for follow up of back pain. Patient's back pain is a chronic problem.  Location of back pain:  Right lower back, left lower back and right hip  Description of back pain: burning, dull ache and gnawing  Back pain spreads: right buttocks    Since patient first noticed back pain, pain is: always present, but gets better and worse  Does back pain interfere with her job:  Not applicable      She eats 2-3 servings of fruits and vegetables daily.She consumes 0 sweetened beverage(s) daily.She exercises " with enough effort to increase her heart rate 10 to 19 minutes per day.  She exercises with enough effort to increase her heart rate 3 or less days per week.   She is taking medications regularly.     Feeling down:  Due to COVID, lack of contact with others, political situation, has lost motivation, feels increase in pain/deconditioned.  Is thinking of going back to the [facility], lacks motivation.      Review of Systems   CONSTITUTIONAL: NEGATIVE for fever, chills, change in weight  RESP: NEGATIVE for significant cough or SOB  CV: NEGATIVE for chest pain, palpitations or peripheral edema  MUSCULOSKELETAL: see HPI  PSYCHIATRIC: see HPI      Objective       Vitals:  No vitals were obtained today due to virtual visit.    Physical Exam     PSYCH: Alert and oriented times 3; coherent speech, normal   rate and volume, able to articulate logical thoughts, able   to abstract reason, no tangential thoughts, no hallucinations   or delusions  Her affect is normal  RESP: No cough, no audible wheezing, able to talk in full sentences  Remainder of exam unable to be completed due to telephone visits          Phone call duration: 25 minutes

## 2021-01-29 NOTE — TELEPHONE ENCOUNTER
11/25/19    Pt stated that her pain has worsened. Pt is wanting a MRI ordered for a lumbar spine .    Pt can be contacted at [phone] ok to [facility]   COVID Monoclonal Antibody

## 2021-02-09 DIAGNOSIS — G89.29 OTHER CHRONIC PAIN: ICD-10-CM

## 2021-02-09 RX ORDER — HYDROCODONE BITARTRATE AND ACETAMINOPHEN 5; 325 MG/1; MG/1
1-2 TABLET ORAL EVERY 8 HOURS PRN
Qty: 90 TABLET | Refills: 0 | Status: SHIPPED | OUTPATIENT
Start: 2021-02-10 | End: 2021-03-11

## 2021-02-09 NOTE — TELEPHONE ENCOUNTER
Covering providers please review in absence of pcp       Routing refill request to provider for review/approval because:  Drug not on the [facility] refill protocol       Patient calling requesting refill of hydrocodone - last filled 1/11/21   Patient will be out of medication tomorrow   Routing refill request to pcp/covering provider for refill consideration - patient would like to be advised when completed     Patient asks about covid vaccines, patient does fit criteria however at this time appears that schedule is on hold. RN advised to follow my chart closely and gave scheduling number to Covid line       Sofy Dial Registered Nurse, PAL (Patient Advocate Liason)   [facility]   [phone]

## 2021-02-24 ENCOUNTER — IMMUNIZATION (OUTPATIENT)
Dept: NURSING | Facility: CLINIC | Age: 76
End: 2021-02-24
Payer: COMMERCIAL

## 2021-02-24 PROCEDURE — 0001A PR COVID VAC PFIZER DIL RECON 30 MCG/0.3 ML IM: CPT

## 2021-02-24 PROCEDURE — 91300 PR COVID VAC PFIZER DIL RECON 30 MCG/0.3 ML IM: CPT

## 2021-02-26 ENCOUNTER — MYC MEDICAL ADVICE (OUTPATIENT)
Dept: FAMILY MEDICINE | Facility: CLINIC | Age: 76
End: 2021-02-26

## 2021-02-26 NOTE — TELEPHONE ENCOUNTER
Questionnaires sent to patient    Beatrice Montoya/[facility]

## 2021-02-27 NOTE — TELEPHONE ENCOUNTER
[facility]... Pending Prescriptions:                       Disp   Refills    HYDROcodone-acetaminophen (NORCO) 5-325 M*90 tab*0            Sig: Take 1-2 tablets by mouth every 8 hours as needed           for pain or severe pain Max of 3 tablets per day    Patient is also wondering if she could get a referral to be seen in the pain clinic please advise.      Jayleen Ordaz

## 2021-03-11 DIAGNOSIS — K13.0 ANGULAR CHEILITIS: Primary | ICD-10-CM

## 2021-03-11 DIAGNOSIS — G89.29 OTHER CHRONIC PAIN: ICD-10-CM

## 2021-03-11 RX ORDER — NYSTATIN 100000 U/G
CREAM TOPICAL 2 TIMES DAILY
Qty: 30 G | Refills: 1 | Status: SHIPPED | OUTPATIENT
Start: 2021-03-11 | End: 2022-01-27

## 2021-03-11 RX ORDER — HYDROCODONE BITARTRATE AND ACETAMINOPHEN 5; 325 MG/1; MG/1
1-2 TABLET ORAL EVERY 8 HOURS PRN
Qty: 90 TABLET | Refills: 0 | Status: SHIPPED | OUTPATIENT
Start: 2021-03-11 | End: 2021-04-09

## 2021-03-11 NOTE — TELEPHONE ENCOUNTER
Haase, Susan Rachele APRN CNP      1. Patient asking for a refill of hydrocodone - pended, last visit virtual 1/13/2021. Upcoming visit F2F on 4/15/2021     2. Requesting prescription for nystatin to use on corners of mouth.   Patient states that you had advised her to use this however pharmacy will not give unless she has an rx.   Currently patient has been using antibacterial soap, aquaphor and hydrocortisone 1% which is not really helping     Sofy Dial, Registered Nurse, PAL (Patient Advocate Liason)   [facility]   [phone]

## 2021-03-17 ENCOUNTER — IMMUNIZATION (OUTPATIENT)
Dept: NURSING | Facility: CLINIC | Age: 76
End: 2021-03-17
Attending: INTERNAL MEDICINE
Payer: COMMERCIAL

## 2021-03-17 PROCEDURE — 91300 PR COVID VAC PFIZER DIL RECON 30 MCG/0.3 ML IM: CPT

## 2021-03-17 PROCEDURE — 0002A PR COVID VAC PFIZER DIL RECON 30 MCG/0.3 ML IM: CPT

## 2021-04-09 DIAGNOSIS — G89.29 OTHER CHRONIC PAIN: ICD-10-CM

## 2021-04-09 RX ORDER — HYDROCODONE BITARTRATE AND ACETAMINOPHEN 5; 325 MG/1; MG/1
1-2 TABLET ORAL EVERY 8 HOURS PRN
Qty: 90 TABLET | Refills: 0 | Status: SHIPPED | OUTPATIENT
Start: 2021-04-09 | End: 2021-05-07

## 2021-04-09 NOTE — TELEPHONE ENCOUNTER
Routing refill request to provider for review/approval because:  Drug not on the [facility] refill protocol   Drug interaction/allergy warning    Mera Motta RN

## 2021-04-13 ENCOUNTER — TRANSFERRED RECORDS (OUTPATIENT)
Dept: HEALTH INFORMATION MANAGEMENT | Facility: CLINIC | Age: 76
End: 2021-04-13

## 2021-04-26 DIAGNOSIS — M80.00XD AGE-RELATED OSTEOPOROSIS WITH CURRENT PATHOLOGICAL FRACTURE WITH ROUTINE HEALING: ICD-10-CM

## 2021-04-27 RX ORDER — ALENDRONATE SODIUM 70 MG/1
TABLET ORAL
Qty: 12 TABLET | Refills: 0 | Status: SHIPPED | OUTPATIENT
Start: 2021-04-27 | End: 2021-07-22

## 2021-04-27 NOTE — TELEPHONE ENCOUNTER
Prescription approved per [facility] Refill Protocol.    Charles MITCHELL RN....4/27/2021 8:37 AM

## 2021-04-29 ENCOUNTER — OFFICE VISIT (OUTPATIENT)
Dept: FAMILY MEDICINE | Facility: CLINIC | Age: 76
End: 2021-04-29
Payer: COMMERCIAL

## 2021-04-29 VITALS
HEART RATE: 96 BPM | DIASTOLIC BLOOD PRESSURE: 68 MMHG | RESPIRATION RATE: 14 BRPM | BODY MASS INDEX: 27.79 KG/M2 | WEIGHT: 167 LBS | SYSTOLIC BLOOD PRESSURE: 130 MMHG | TEMPERATURE: 98.8 F | OXYGEN SATURATION: 95 %

## 2021-04-29 DIAGNOSIS — M54.50 BILATERAL LOW BACK PAIN WITHOUT SCIATICA, UNSPECIFIED CHRONICITY: ICD-10-CM

## 2021-04-29 DIAGNOSIS — I73.9 PAD (PERIPHERAL ARTERY DISEASE) (H): ICD-10-CM

## 2021-04-29 DIAGNOSIS — M80.00XD AGE-RELATED OSTEOPOROSIS WITH CURRENT PATHOLOGICAL FRACTURE WITH ROUTINE HEALING: ICD-10-CM

## 2021-04-29 DIAGNOSIS — G89.29 OTHER CHRONIC PAIN: Primary | ICD-10-CM

## 2021-04-29 DIAGNOSIS — R60.0 LOWER EXTREMITY EDEMA: ICD-10-CM

## 2021-04-29 DIAGNOSIS — Z11.59 NEED FOR HEPATITIS C SCREENING TEST: ICD-10-CM

## 2021-04-29 LAB
ALBUMIN SERPL-MCNC: 3.5 G/DL (ref 3.4–5)
ALP SERPL-CCNC: 98 U/L (ref 40–150)
ALT SERPL W P-5'-P-CCNC: 24 U/L (ref 0–50)
ANION GAP SERPL CALCULATED.3IONS-SCNC: 5 MMOL/L (ref 3–14)
AST SERPL W P-5'-P-CCNC: 15 U/L (ref 0–45)
BILIRUB SERPL-MCNC: 0.4 MG/DL (ref 0.2–1.3)
BUN SERPL-MCNC: 13 MG/DL (ref 7–30)
CALCIUM SERPL-MCNC: 8.3 MG/DL (ref 8.5–10.1)
CHLORIDE SERPL-SCNC: 102 MMOL/L (ref 94–109)
CO2 SERPL-SCNC: 28 MMOL/L (ref 20–32)
CREAT SERPL-MCNC: 0.68 MG/DL (ref 0.52–1.04)
GFR SERPL CREATININE-BSD FRML MDRD: 85 ML/MIN/{1.73_M2}
GLUCOSE SERPL-MCNC: 107 MG/DL (ref 70–99)
MAGNESIUM SERPL-MCNC: 2.3 MG/DL (ref 1.6–2.3)
PHOSPHATE SERPL-MCNC: 3.7 MG/DL (ref 2.5–4.5)
POTASSIUM SERPL-SCNC: 4 MMOL/L (ref 3.4–5.3)
PROT SERPL-MCNC: 6.8 G/DL (ref 6.8–8.8)
PTH-INTACT SERPL-MCNC: 113 PG/ML (ref 18–80)
SODIUM SERPL-SCNC: 135 MMOL/L (ref 133–144)

## 2021-04-29 PROCEDURE — 99214 OFFICE O/P EST MOD 30 MIN: CPT | Performed by: NURSE PRACTITIONER

## 2021-04-29 PROCEDURE — 84080 ASSAY ALKALINE PHOSPHATASES: CPT | Mod: 90 | Performed by: NURSE PRACTITIONER

## 2021-04-29 PROCEDURE — 83735 ASSAY OF MAGNESIUM: CPT | Performed by: NURSE PRACTITIONER

## 2021-04-29 PROCEDURE — 80307 DRUG TEST PRSMV CHEM ANLYZR: CPT | Performed by: NURSE PRACTITIONER

## 2021-04-29 PROCEDURE — 36415 COLL VENOUS BLD VENIPUNCTURE: CPT | Performed by: NURSE PRACTITIONER

## 2021-04-29 PROCEDURE — 84100 ASSAY OF PHOSPHORUS: CPT | Performed by: NURSE PRACTITIONER

## 2021-04-29 PROCEDURE — 99000 SPECIMEN HANDLING OFFICE-LAB: CPT | Performed by: NURSE PRACTITIONER

## 2021-04-29 PROCEDURE — 82306 VITAMIN D 25 HYDROXY: CPT | Performed by: NURSE PRACTITIONER

## 2021-04-29 PROCEDURE — 86803 HEPATITIS C AB TEST: CPT | Performed by: NURSE PRACTITIONER

## 2021-04-29 PROCEDURE — 80053 COMPREHEN METABOLIC PANEL: CPT | Performed by: NURSE PRACTITIONER

## 2021-04-29 PROCEDURE — 83970 ASSAY OF PARATHORMONE: CPT | Performed by: NURSE PRACTITIONER

## 2021-04-29 RX ORDER — FUROSEMIDE 20 MG
20 TABLET ORAL 2 TIMES DAILY
Qty: 180 TABLET | Refills: 1 | Status: SHIPPED | OUTPATIENT
Start: 2021-04-29 | End: 2021-11-16

## 2021-04-29 NOTE — LETTER
[facility]  -- Controlled Medication Agreement    4/29/2021   Kahlil Caputo   1945   [id number]       I understand that my provider is prescribing controlled medication (i.e., opioids, tranquilizers, barbiturates) to assist me in managing my chronic pain that has not responded to other treatments.  These medications are intended to decrease pain in order to improve function and/or ability to work.  The risks, benefits, and side effects or these medications have been explained to me and I agree to the following conditions for this type of treatment.    1. I will participate in other treatments (i.e., physical therapy, behavioral therapy, groups,) that my provider recommends.  I will be ready to taper or discontinue medications as other reasonable and effective treatments become available.  I understand I may be expected to see a health psychologist and a physical therapist at the discretion of my physician for ongoing functional assessment.  I will follow through with any recommendations made at this visit.    2. I will take my medications exactly as prescribed and will not change the medication dosage or schedule without my provider s approval.  Refills will not be given if I  run out early .  3. I will keep all regular appointments at the clinic (this includes nurse appointments and appointments with PT or behavioral medicine).  If I have three or more missed or cancelled appointments my medications may be discontinued.  4. I will not request or accept prescriptions for controlled substances from other physicians or individuals for my chronic pain condition.  If I develop another condition that requires the prescription of a controlled medication or if I am hospitalized for any reason, I will inform the clinic within one business day of receiving any treatment or medications.  5. I will designate one pharmacy where all of my prescriptions will be filled.  6. I will bring in the containers of all  medications prescribed each time I see my provider or a nurse even if there is no medication remaining.  This must be the original container from the pharmacy.  7. Refills of controlled medications will be made only during regular office hours, during a scheduled appointment with my provider or nurse.   8. I am responsible for my prescriptions.  If the medication is lost or stolen, I understand it will not be replaced.  9. I agree to abstain from all illegal and recreational drugs and will provide urine or blood specimens to monitor my compliance.  10. I will notify my nurse or provider immediately if I become pregnant.  11. I understand that controlled medications can affect my thinking and judgment and may interfere with my ability to drive.  I will not drive if I have this concern and will not drive if any dosage adjustments are made until my body has adjusted.        I understand that if I violate any of the above conditions my prescription medications and/or treatment may be terminated.  If the violation includes obtaining any controlled substances from other healthcare providers or individuals a report may be made to my physician, pharmacy, and other authorities including the police.    I have read this agreement and it has been explained to me.  I fully understand the consequences of violating this agreement.        _________________                             ___________                _________________       Patient Signature                                Date                              Witness      _________________  Susan Haase, APRN CNP

## 2021-04-29 NOTE — PROGRESS NOTES
"    Assessment & Plan     Other chronic pain:  Reviewed and signed controlled substance agreement, [place]ed [place] prescription monitoring, last prescription filled on 4/9/2021, no prescriptions filled by other providers.   - Drug Confirmation Panel Urine with Creat (Care Map)  - PAIN MANAGEMENT REFERRAL; Future    Bilateral low back pain without sciatica, unspecified chronicity: will refer to pain management for steroid injection.   - Comprehensive metabolic panel (BMP + Alb, Alk Phos, ALT, AST, Total. Bili, TP)  - PAIN MANAGEMENT REFERRAL; Future    Need for hepatitis C screening test  - Hepatitis C Screen Reflex to HCV RNA Quant and Genotype    Lower extremity edema:  Refill below   - furosemide (LASIX) 20 MG tablet; Take 1 tablet (20 mg) by mouth 2 times daily    Age-related osteoporosis with current pathological fracture with routine healing:  Ordered by endo.  - Magnesium  - Parathyroid Hormone Intact  - Phosphorus  - 25 Hydroxyvitamin D2 and D3  - Bone specific alk phosphatase  BMI:   Estimated body mass index is 27.79 kg/m  as calculated from the following:    Height as of 7/9/20: 1.651 m (5' 5\").    Weight as of this encounter: 75.8 kg (167 lb).     Follow up in 3 months, video/phone visit.     Susan Haase, APRN CNP  [facility]    Paulina Guillory is a 75 year old who presents for the following health issues     History of Present Illness       Back Pain:  She presents for follow up of back pain. Patient's back pain is a chronic problem.  Location of back pain:  Right lower back, left lower back, right buttock and right hip  Description of back pain: dull ache and gnawing  Back pain spreads: right buttocks and right knee    Since patient first noticed back pain, pain is: always present, but gets better and worse  Does back pain interfere with her job:  Not applicable      She eats 2-3 servings of fruits and vegetables daily.She consumes 0 sweetened beverage(s) daily.She exercises " with enough effort to increase her heart rate 10 to 19 minutes per day.  She exercises with enough effort to increase her heart rate 4 days per week.   She is taking medications regularly.   Taking norco 2 at night and 0.5 tab during the day.    Continues to have lower back pain that at times radiates into the buttock. Denies BLE numbness, tingling, weakenss or bowel/bladder dysfunction. Is taking norco 10 mg at bedtime and 2.5 mg during the day, this allows her to complete ADL's, tasks around the house.  Has not gone back to the [facility] for swimming due to COVID.      Review of Systems   CONSTITUTIONAL: NEGATIVE for fever, chills, change in weight  ENT/MOUTH: NEGATIVE for ear, mouth and throat problems  RESP: NEGATIVE for significant cough or SOB  CV: NEGATIVE for chest pain, palpitations or peripheral edema  MUSCULOSKELETAL: see HPI  PSYCHIATRIC: NEGATIVE for changes in mood or affect      Objective    /68 (BP Location: Right arm, Patient Position: Chair, Cuff Size: Adult Regular)   Pulse 96   Temp 98.8  F (37.1  C) (Oral)   Resp 14   Wt 75.8 kg (167 lb)   SpO2 95%   BMI 27.79 kg/m    Body mass index is 27.79 kg/m .  Physical Exam   GENERAL: healthy, alert and no distress  NECK: no adenopathy, no asymmetry, masses, or scars and thyroid normal to palpation  RESP: lungs clear to auscultation - no rales, rhonchi or wheezes  CV: regular rate and rhythm, normal S1 S2, no S3 or S4, no murmur, click or rub, no peripheral edema   MS: no gross musculoskeletal defects noted, no edema, slowly goes from sitting to standing.  Walking with normal gait  PSYCH: mentation appears normal, affect normal/bright

## 2021-04-30 ENCOUNTER — TELEPHONE (OUTPATIENT)
Dept: PALLIATIVE MEDICINE | Facility: CLINIC | Age: 76
End: 2021-04-30

## 2021-04-30 LAB
DEPRECATED CALCIDIOL+CALCIFEROL SERPL-MC: <47 UG/L (ref 20–75)
HCV AB SERPL QL IA: NONREACTIVE
VITAMIN D2 SERPL-MCNC: <5 UG/L
VITAMIN D3 SERPL-MCNC: 42 UG/L

## 2021-04-30 NOTE — RESULT ENCOUNTER NOTE
Duy Guillory,  Your glucose is normal at 107 since you were not fasting.  Calcium is slightly low at 8.3, try to increase calcium in your diet.  Sincerely,  Susan Haase, CNP

## 2021-04-30 NOTE — TELEPHONE ENCOUNTER
Screening Questions for Radiology Injections:    Injection to be done at which interventional clinic site? [facility]    If [facility] location, tell patient that this procedure requires a COVID-19 lab test be done within 4 days of the procedure. Would you still like to move forward with scheduling the procedure?  Not Applicable   If YES, let patient know that someone will call them to schedule the COVID-19 test and that they will only receive a call back if the result is positive. Route to nursing to enter order.     Instruct patient to arrive as directed prior to the scheduled appointment time:    [place] minutes before      [place]: 30 minutes before; if IV needed 1 hour before     Procedure ordered by Haase    Procedure ordered? lumbar epidural injection      Transforaminal Cervical JOSE LUIS - no pain provider currently performing    As a reminder, receiving steroids can decrease your body's ability to fight infection.   Would you still like to move forward with scheduling the injection?  Yes    What insurance would patient like us to bill for this procedure? [employer]      Worker's comp or MVA (motor vehicle accident) -Any injection DO NOT SCHEDULE and route to Ericka Roth.      [employer] insurance - For SI joint injections, DO NOT SCHEDULE and route Ericka Roth.       ALL BCBS, [employer] and [employer]-Route to Ericka for review DO NOT SCHEDULE      IF SCHEDULING IN [place] AND NEEDS A PA, IT IS OKAY TO SCHEDULE. [place] HANDLES THEIR OWN PA'S AFTER THE PATIENT IS SCHEDULED. PLEASE SCHEDULE AT LEAST 1 WEEK OUT SO A PA CAN BE OBTAINED.    Any chance of pregnancy? NO   If YES, do NOT schedule and route to RN [place]    Is an  needed? No     Patient has a drive home? (mandatory) YES: INFORMED    Is patient taking any blood thinners (i.e. plavix, coumadin, jantoven, warfarin, heparin, pradaxa or dabigatran, etc)? No   If hold needed, do NOT schedule, route to RN pool     Is  patient taking any aspirin products (includes Excedrin and Fiorinal)? No     If more than 325mg/day, OK to schedule; Instruct pt to decrease to less than 325 mg for 7 days AND route to RN pool    For CERVICAL procedures, hold all aspirin products for 6 days.     Tell pt that if aspirin product is not held for 6 days, the procedure WILL BE cancelled.      Does the patient have a bleeding or clotting disorder? No     If YES, okay to schedule AND route to RN nurse pool    For any patients with platelet count <100, must be forwarded to provider    Any allergies to contrast dye, iodine, shellfish, or numbing and steroid medications? No    If YES, route to RN pool AND add allergy information to appointment notes    Allergies: Latex, Nsaids, Aspirin, Codeine, Pravastatin, Simvastatin, Tramadol, Bacitracin, and Neosporin     Is patient diabetic?  No  If YES, instruct them to bring their glucometer.    Does patient have an active infection or treated for one within the past week? No     Is patient currently taking any antibiotics?  No     For patients on chronic, preventative, or prophylactic antibiotics, procedures may be scheduled.     For patients on antibiotics for active or recent infection:antibiotic course must have been completed for 4 days    Is patient currently taking any steroid medications? (i.e. Prednisone, Medrol)  No     For patients on steroid medications, course must have been completed for 4 days    Is patient actively being treated for cancer or immunocompromised? No  If YES, do NOT schedule and route to RN pool     Are you able to get on and off an exam table with minimal or no assistance? Yes  If NO, do NOT schedule and route to RN pool    Are you able to roll over and lay on your stomach with minimal or no assistance? Yes  If NO, do NOT schedule and route to RN pool     Has the patient had a flu shot or any other vaccinations within 7 days before or after the procedure.  No     Have you recently had a  COVID vaccine or have plans to get it in the near future? No    If yes, explain that for the vaccine to work best they need to:       wait 1 week before and 1 week after getting Vaccine #1    wait 1 week before and 2 weeks after getting Vaccine #2    If patient has concerns about the timing, send to RN pool     Does patient have an MRI/CT?  YES: 2019  Check Procedure Scheduling Grid to see if required.      Was the MRI done within the last 3 years?  Yes    If yes, where was the MRI done i.e.[facility], [facility], [facility], [facility] etc? [facility]      If no, do not schedule and route to RN pool    If MRI was not done at [facility], [facility] or [facility] do NOT schedule and route to RN pool.      If pt has an imaging disc, the injection MAY be scheduled but pt has to bring disc to appt.     If they show up without the disc the injection cannot be done    Procedure Specific Instructions:      If celiac plexus block, informed patient NPO for 6 hours and that it is okay to take medications with sips of water, especially blood pressure medications  Not Applicable         If this is for a cervical procedure, informed patient that aspirin needs to be held for 6 days.   Not Applicable      If IV needed:    Do not schedule procedures requiring IV placement in the first appointment of the day or first appointment after lunch. Do NOT schedule at 0745, 0815 or 1245.     Instructed pt to arrive 30 minutes early for IV start if required. (Check Procedure Scheduling Grid)  Not Applicable    Reminders:      If you are started on any steroids or antibiotics between now and your appointment, you must contact us because the procedure may need to be cancelled.  Yes      For all procedures except radiofrequency ablations (RFAs) and spinal cord stimulator (SCS) trials, informed patient:    IV sedation is not provided for this procedure.  If you feel that an oral anti-anxiety medication is needed, you can discuss this further with  your referring provider or primary care provider.  The Pain Clinic provider will discuss specifics of what the procedure includes at your appointment.  Most procedures last 10-20 minutes.  We use numbing medications to help with any discomfort during the procedure.  Not Applicable      For patients 85 or older we recommend having an adult stay w/ them for the remainder of the day.       Does the patient have any questions?  NO  Jessica Hicks  [facility]

## 2021-04-30 NOTE — RESULT ENCOUNTER NOTE
Duy Guillory,  Your screening test for hepatitis C is negative (nonreactive).  Sincerely  Susan Haase, CNP

## 2021-05-01 LAB — ALP BONE SERPL-MCNC: 9.5 UG/L

## 2021-05-02 NOTE — RESULT ENCOUNTER NOTE
Duy Guillory,  Your urine drug screen indicates that you are taking the hydrocodone.  The results are only marked as elevated since they would not be present in a person that was not prescribed narcotics.    Sincerely,     Susan Haase, CNP

## 2021-05-03 DIAGNOSIS — M79.609 LIMB PAIN: ICD-10-CM

## 2021-05-03 DIAGNOSIS — K90.49 MALABSORPTION DUE TO INTOLERANCE, NOT ELSEWHERE CLASSIFIED: ICD-10-CM

## 2021-05-03 DIAGNOSIS — R06.02 SOB (SHORTNESS OF BREATH): ICD-10-CM

## 2021-05-03 DIAGNOSIS — M81.0 AGE-RELATED OSTEOPOROSIS WITHOUT CURRENT PATHOLOGICAL FRACTURE: ICD-10-CM

## 2021-05-03 DIAGNOSIS — M80.00XD AGE-RELATED OSTEOPOROSIS WITH CURRENT PATHOLOGICAL FRACTURE WITH ROUTINE HEALING: Primary | ICD-10-CM

## 2021-05-03 LAB
CREAT UR-MCNC: 52 MG/DL
DHC UR CFM-MCNC: 175 NG/ML
DHC/CREAT UR: 337 NG/MG{CREAT}
HYDROCODONE UR CFM-MCNC: 183 NG/ML
HYDROCODONE/CREAT UR: 352 NG/MG{CREAT}
HYDROMORPHONE UR CFM-MCNC: 147 NG/ML
HYDROMORPHONE/CREAT UR: 283 NG/MG{CREAT}
RPT COMMENT: ABNORMAL

## 2021-05-03 RX ORDER — CALCIUM CARBONATE 500(1250)
2 TABLET ORAL 3 TIMES DAILY
Qty: 100 TABLET | Refills: 3 | COMMUNITY
Start: 2021-05-03

## 2021-05-04 NOTE — PROGRESS NOTES
[facility] - Procedure Note    Date of Service: 5/6/2021  Procedure performed: RIGHT sacroiliac joint injection  Diagnosis: Sacroiliac joint pain  : Michelle Castaneda MD & Venancio Chen MD (pain fellow)   Anesthesia: none    Indications: Kahlil Caputo is a 75 year old female who is seen at the request of Susan Haase, CNP for a sacroiliac joint injection. The patient describes right sided low back and buttock pain. Exam shows full strength and sensation in both lower extremities, tenderness of the sacroiliac (SI) joint, and positive FABERE on the RIGHT. The patient reports minimal improvement with conservative treatment, including previous injections, PT and medications.    This is a repeat injection. S/P Right SI joint injection done on 10/10/2019.   Previous RIGHT L4-5 ILESI done by myself on 1/2/2020    Lumbar MRI was done on 11/29/2019 which showed:   FINDINGS: There are 5 lumbar-type vertebral bodies assumed for the  purposes of this dictation. The distal spinal cord and cauda equina  appear normal.      Alignment of the lumbar spine is normal. No loss of vertebral body  height. There are no destructive osseous lesions. Marked degenerative  endplate changes with disc desiccation and loss of intravertebral disc  spaces throughout  the lumbar spine most pronounced at L5-S1 and  L4-L5. STIR hyperintense endplate edema anteriorly at L4-L5 and on the  left at L5-S1.     Marked edema in the right-sided facets at L4-L5 with moderate  surrounding soft tissue edema. There is a synovial cyst projecting  posteriorly from the facets at L4-L5 into the paraspinous soft  tissues.     Level by level as follows:      T12-L1: Mild circumferential disc bulge. No spinal canal or neural  foraminal narrowing.      L1-L2: Circumferential disc bulge with mild endplate osteophytic  spurring. No significant spinal canal narrowing. Mild right neural  foraminal narrowing. Mild left neural foraminal  "narrowing.      L2-L3: Circumferential disc bulge with mild endplate osteophytic  spurring. No spinal canal narrowing. Mild right neural foraminal  narrowing. Mild left neural foraminal narrowing.      L3-L4: Circumferential disc bulge with mild uncinate spurring. Mild  bilateral facet hypertrophy. Mild central spinal canal narrowing  particularly in an anterior posterior dimension. Mild right neural  foraminal narrowing. Mild left neural foraminal narrowing.      L4-L5: Circumferential disc bulge with mild endplate osteophytic  spurring. Moderate bilateral facet hypertrophy. Mild central spinal  canal narrowing. Mild right neural foraminal narrowing. Mild left  neural foraminal narrowing.      L5-S1: Moderate circumferential disc bulge with endplate osteophytic  spurring. Mild bilateral facet hypertrophy. No significant central  spinal canal narrowing. Mild right neural foraminal narrowing. Mild  left neural foraminal narrowing.     Paraspinous soft tissues: Unremarkable.                                                                     IMPRESSION:    1. Edema in the right-sided facets at L4-L5 with moderate surrounding  soft tissue edema. There is also a synovial cyst projecting  posteriorly from the right L4-L5 facets into the paraspinous soft  tissues. These findings appear new since previous MR 1/9/2017.  2. Additional multilevel degenerative changes throughout the lumbar  spine as detailed above. Degenerative disc changes appear grossly  similar to previous MR.      Allergies:      Allergies   Allergen Reactions     Latex Difficulty breathing     sensativity - cough, eyes water     Nsaids Shortness Of Breath and Other (See Comments)     bronchospams     Aspirin Difficulty breathing     tight cough     Codeine GI Disturbance     upset GI     Pravastatin      Myalgias     Simvastatin      Myalgias     Tramadol      Heart burn,  Felt \"clammy\", unable to pass gas, unable to urinate and had severe nausea     " Bacitracin Rash     Neosporin Blisters, Itching and Rash        Vitals:  BP (!) 148/89   Pulse 98   SpO2 97%     Options/alternatives, benefits and risks were discussed with the patient including bleeding, infection, tissue trauma, exposure to radiation, reaction to medications including seizure, nerve injury, weakness, and numbness.  Questions were answered to her satisfaction and she agrees to proceed. Voluntary informed consent was obtained and signed.     Procedure:  After getting informed consent, patient was brought into the procedure suite and was placed in a prone position on the procedure table.   A Pause for the Cause was performed.  Patient was prepped and draped in sterile fashion.     After identifying the right SI joint, the C-arm was rotated to a obliquely to obtain the best view of the inferior angle of the joint.  A total of 2 ml of Lidocaine 1%  was used to anesthetize the skin at a skin entry site coaxial with the fluoroscopy beam at this location.  A 22 gauge 3.5 inch needle was advanced under intermittent fluoroscopy until it was felt to enter the SI joint.    A total of 1 ml of Omnipaque-300 was injected, confirming appropriate position, with spread into the intraarticular space, with no intravascular uptake noted.  9 ml of Omnipaque-300 was wasted. Location was verified in lateral view.    1 ml of 1% lidocaine, 1 ml of 0.5% bupivacaine with 40 mg of kenalog was injected.  The needle was removed. Hemostasis was achieved, the area was cleaned, and bandaids were placed when appropriate.  The patient tolerated the procedure well, and was taken to the recovery room.    Images were saved to PACS.    Pre-procedure pain score: 2/10  Post-procedure pain score: 0/10  Following today's procedure, the patient was advised to contact the Pain Management Center for any of the following:   Fever, chills, or night sweats   New onset of pain, numbness, or weakness   Any questions/concerns regarding the  procedure    -f/u with the referring provider      ALICIA FUCHS MD   Pain Management & Addiction Medicine

## 2021-05-06 ENCOUNTER — RADIOLOGY INJECTION OFFICE VISIT (OUTPATIENT)
Dept: PALLIATIVE MEDICINE | Facility: CLINIC | Age: 76
End: 2021-05-06
Payer: COMMERCIAL

## 2021-05-06 VITALS — SYSTOLIC BLOOD PRESSURE: 157 MMHG | DIASTOLIC BLOOD PRESSURE: 96 MMHG | HEART RATE: 102 BPM | OXYGEN SATURATION: 96 %

## 2021-05-06 DIAGNOSIS — M54.50 BILATERAL LOW BACK PAIN WITHOUT SCIATICA, UNSPECIFIED CHRONICITY: ICD-10-CM

## 2021-05-06 DIAGNOSIS — M46.1 SACROILIITIS (H): Primary | ICD-10-CM

## 2021-05-06 DIAGNOSIS — G89.29 OTHER CHRONIC PAIN: ICD-10-CM

## 2021-05-06 PROCEDURE — 27096 INJECT SACROILIAC JOINT: CPT | Mod: RT | Performed by: ANESTHESIOLOGY

## 2021-05-06 NOTE — NURSING NOTE
Discharge Information    IV Discontiued Time:  NA    Amount of Fluid Infused:  NA    Discharge Criteria = When patient returns to baseline or as per MD order    Consciousness:  Pt is fully awake    Circulation:  BP +/- 20% of pre-procedure level    Respiration:  Patient is able to breathe deeply    O2 Sat:  Patient is able to maintain O2 Sat >92% on room air    Activity:  Moves 4 extremities on command    Ambulation:  Patient is able to stand and walk or stand and pivot into wheelchair    Dressing:  Clean/dry or No Dressing    Notes:   Discharge instructions and AVS given to patient    Patient meets criteria for discharge?  YES    Admitted to PCU?  No    Responsible adult present to accompany patient home?  Yes    Signature/Title:    Bree Gonzales RN  RN Care Coordinator  [facility]

## 2021-05-06 NOTE — PATIENT INSTRUCTIONS
[facility] Procedure Discharge Instructions    Today you saw:   Dr. Michelle Castaneda     Your procedure: Sacro-iliac joint injection       Medications used:  Lidocaine (anesthetic)  Bupivacaine (anesthetic) Kenalog (steroid)  Omnipaque (contrast)          Be cautious when walking as numbness and/or weakness in the legs may occur up to 6-8 hours after the procedure due to effect of the local anesthetic    Do not drive for 6 hours. The effect of the local anesthetic could slow your reflexes.     Avoid strenuous activity for the first 24 hours. You may resume your regular activities after that.     You may shower, however avoid swimming, tub baths or hot tubs for 24 hours following your procedure    You may have a mild to moderate increase in pain for several days following the injection.      You may use ice packs for 10-15 minutes, 3 to 4 times a day at the injection site for comfort    Do not use heat to painful areas for 6 to 8 hours. This will give the local anesthetic time to wear off and prevent you from accidentally burning your skin.    Unless you have been directed to avoid the use of anti-inflammatory medications (NSAIDS-ibuprofen, Aleve, Motrin), you may use these medications or Tylenol for pain control if needed.     With diabetes, check your blood sugar more frequently than usual as your blood sugar may be higher than normal for 10-14 days following a steroid injection. Contact your doctor who manages your diabetes if your blood sugar is higher than usual    Possible side effects of steroids that you may experience include flushing, elevated blood pressure, increased appetite, mild headaches and restlessness.  All of these symptoms will get better with time.    It may take up to 14 days for the steroid medication to start working although you may feel the effect as early as a few days after the procedure.     Follow up with your referring provider in 2-3 weeks      If you experience any of  the following, call the pain center line during work hours at [phone] or on-call physician after hours at [phone]:  -Fever over 100 degree F  -Swelling, bleeding, redness, drainage, warmth at the injection site  -Progressive weakness or numbness in your legs   -Unusual new onset of pain that is not improving

## 2021-05-07 DIAGNOSIS — G89.29 OTHER CHRONIC PAIN: ICD-10-CM

## 2021-05-07 DIAGNOSIS — K90.49: Primary | ICD-10-CM

## 2021-05-07 DIAGNOSIS — K90.49 MALABSORPTION DUE TO INTOLERANCE, NOT ELSEWHERE CLASSIFIED: ICD-10-CM

## 2021-05-07 DIAGNOSIS — K90.49 MALABSORPTION DUE TO INTOLERANCE, NOT ELSEWHERE CLASSIFIED: Primary | ICD-10-CM

## 2021-05-07 LAB — IGA SERPL-MCNC: 171 MG/DL (ref 84–499)

## 2021-05-07 PROCEDURE — 82784 ASSAY IGA/IGD/IGG/IGM EACH: CPT | Performed by: INTERNAL MEDICINE

## 2021-05-07 PROCEDURE — 36415 COLL VENOUS BLD VENIPUNCTURE: CPT | Performed by: INTERNAL MEDICINE

## 2021-05-07 RX ORDER — HYDROCODONE BITARTRATE AND ACETAMINOPHEN 5; 325 MG/1; MG/1
1-2 TABLET ORAL EVERY 8 HOURS PRN
Qty: 90 TABLET | Refills: 0 | Status: SHIPPED | OUTPATIENT
Start: 2021-05-09 | End: 2021-06-09

## 2021-05-07 NOTE — TELEPHONE ENCOUNTER
Pt called in requesting refill of Norco. She is not out until Monday.     Routing refill request to provider for review/approval because:  Drug not on the [facility] refill protocol     Pilar Bernal RN   [facility] -- Triage Nurse

## 2021-06-03 ENCOUNTER — ANCILLARY PROCEDURE (OUTPATIENT)
Dept: BONE DENSITY | Facility: CLINIC | Age: 76
End: 2021-06-03
Payer: COMMERCIAL

## 2021-06-03 ENCOUNTER — ANCILLARY PROCEDURE (OUTPATIENT)
Dept: BONE DENSITY | Facility: CLINIC | Age: 76
End: 2021-06-03
Attending: INTERNAL MEDICINE
Payer: COMMERCIAL

## 2021-06-03 DIAGNOSIS — M80.00XD AGE-RELATED OSTEOPOROSIS WITH CURRENT PATHOLOGICAL FRACTURE WITH ROUTINE HEALING: ICD-10-CM

## 2021-06-03 DIAGNOSIS — M81.0 AGE-RELATED OSTEOPOROSIS WITHOUT CURRENT PATHOLOGICAL FRACTURE: ICD-10-CM

## 2021-06-03 DIAGNOSIS — Z78.0 ASYMPTOMATIC MENOPAUSAL STATE: ICD-10-CM

## 2021-06-03 PROCEDURE — 77081 DXA BONE DENSITY APPENDICULR: CPT | Performed by: INTERNAL MEDICINE

## 2021-06-03 PROCEDURE — 77085 DXA BONE DENSITY AXL VRT FX: CPT | Performed by: INTERNAL MEDICINE

## 2021-06-04 PROBLEM — S52.502D CLOSED FRACTURE OF DISTAL END OF LEFT RADIUS WITH ROUTINE HEALING, UNSPECIFIED FRACTURE MORPHOLOGY, SUBSEQUENT ENCOUNTER: Status: RESOLVED | Noted: 2018-02-07 | Resolved: 2021-06-04

## 2021-06-09 DIAGNOSIS — M80.00XD AGE-RELATED OSTEOPOROSIS WITH CURRENT PATHOLOGICAL FRACTURE WITH ROUTINE HEALING: ICD-10-CM

## 2021-06-09 DIAGNOSIS — K90.49 MALABSORPTION DUE TO INTOLERANCE, NOT ELSEWHERE CLASSIFIED: ICD-10-CM

## 2021-06-09 DIAGNOSIS — Z13.220 SCREENING FOR HYPERLIPIDEMIA: Primary | ICD-10-CM

## 2021-06-09 DIAGNOSIS — G89.29 OTHER CHRONIC PAIN: ICD-10-CM

## 2021-06-09 LAB
ALBUMIN SERPL-MCNC: 3.5 G/DL (ref 3.4–5)
CALCIUM SERPL-MCNC: 8.6 MG/DL (ref 8.5–10.1)
CHOLEST SERPL-MCNC: 216 MG/DL
CREAT SERPL-MCNC: 0.75 MG/DL (ref 0.52–1.04)
GFR SERPL CREATININE-BSD FRML MDRD: 77 ML/MIN/{1.73_M2}
HDLC SERPL-MCNC: 71 MG/DL
LDLC SERPL CALC-MCNC: 96 MG/DL
MAGNESIUM SERPL-MCNC: 2.1 MG/DL (ref 1.6–2.3)
NONHDLC SERPL-MCNC: 145 MG/DL
PHOSPHATE SERPL-MCNC: 4 MG/DL (ref 2.5–4.5)
PTH-INTACT SERPL-MCNC: 60 PG/ML (ref 18–80)
TRIGL SERPL-MCNC: 245 MG/DL

## 2021-06-09 PROCEDURE — 82040 ASSAY OF SERUM ALBUMIN: CPT | Performed by: NURSE PRACTITIONER

## 2021-06-09 PROCEDURE — 99000 SPECIMEN HANDLING OFFICE-LAB: CPT | Performed by: NURSE PRACTITIONER

## 2021-06-09 PROCEDURE — 80061 LIPID PANEL: CPT | Performed by: NURSE PRACTITIONER

## 2021-06-09 PROCEDURE — 82565 ASSAY OF CREATININE: CPT | Performed by: NURSE PRACTITIONER

## 2021-06-09 PROCEDURE — 83970 ASSAY OF PARATHORMONE: CPT | Performed by: NURSE PRACTITIONER

## 2021-06-09 PROCEDURE — 83516 IMMUNOASSAY NONANTIBODY: CPT | Mod: 59 | Performed by: NURSE PRACTITIONER

## 2021-06-09 PROCEDURE — 36415 COLL VENOUS BLD VENIPUNCTURE: CPT | Performed by: NURSE PRACTITIONER

## 2021-06-09 PROCEDURE — 86256 FLUORESCENT ANTIBODY TITER: CPT | Mod: 90 | Performed by: NURSE PRACTITIONER

## 2021-06-09 PROCEDURE — 83735 ASSAY OF MAGNESIUM: CPT | Performed by: NURSE PRACTITIONER

## 2021-06-09 PROCEDURE — 84100 ASSAY OF PHOSPHORUS: CPT | Performed by: NURSE PRACTITIONER

## 2021-06-09 PROCEDURE — 82310 ASSAY OF CALCIUM: CPT | Performed by: NURSE PRACTITIONER

## 2021-06-09 PROCEDURE — 83516 IMMUNOASSAY NONANTIBODY: CPT | Performed by: NURSE PRACTITIONER

## 2021-06-09 RX ORDER — HYDROCODONE BITARTRATE AND ACETAMINOPHEN 5; 325 MG/1; MG/1
1-2 TABLET ORAL EVERY 8 HOURS PRN
Qty: 90 TABLET | Refills: 0 | Status: SHIPPED | OUTPATIENT
Start: 2021-06-09 | End: 2021-07-08

## 2021-06-09 NOTE — RESULT ENCOUNTER NOTE
Duy Guillory,  Your cholesterol level is stable at 216, your LDL and HDL levels are perfect!    Sincerely,     Susan Haase, CNP

## 2021-06-10 LAB
GLIADIN IGA SER-ACNC: 1 U/ML
GLIADIN IGG SER-ACNC: <1 U/ML
TTG IGA SER-ACNC: <1 U/ML

## 2021-06-11 LAB — ENDOMYSIUM IGA TITR SER IF: NORMAL {TITER}

## 2021-06-14 DIAGNOSIS — M81.0 AGE-RELATED OSTEOPOROSIS WITHOUT CURRENT PATHOLOGICAL FRACTURE: Primary | ICD-10-CM

## 2021-07-08 DIAGNOSIS — G89.29 OTHER CHRONIC PAIN: ICD-10-CM

## 2021-07-08 RX ORDER — HYDROCODONE BITARTRATE AND ACETAMINOPHEN 5; 325 MG/1; MG/1
1-2 TABLET ORAL EVERY 8 HOURS PRN
Qty: 90 TABLET | Refills: 0 | Status: SHIPPED | OUTPATIENT
Start: 2021-07-08 | End: 2021-08-02

## 2021-07-08 NOTE — TELEPHONE ENCOUNTER
Haase, Susan Rachele APRN CNP     Routing refill request to provider for review/approval because:  Drug not on the [facility] refill protocol     Pending Prescriptions:                       Disp   Refills    HYDROcodone-acetaminophen (NORCO) 5-325 M*90 tab*0            Sig: Take 1-2 tablets by mouth every 8 hours as needed           for pain or severe pain Max of 3 tablets per day    Last refill 6/9/2021 #90     Patient is due for visit with pcp - scheduled for 8/2/21 (rescheduled from previous as provider was out of clinic)     Sofy Dial, Registered Nurse, PAL (Patient Advocate Liason)   [facility]   [phone]

## 2021-07-21 DIAGNOSIS — M80.00XD AGE-RELATED OSTEOPOROSIS WITH CURRENT PATHOLOGICAL FRACTURE WITH ROUTINE HEALING: ICD-10-CM

## 2021-07-22 RX ORDER — ALENDRONATE SODIUM 70 MG/1
TABLET ORAL
Qty: 12 TABLET | Refills: 0 | Status: SHIPPED | OUTPATIENT
Start: 2021-07-22 | End: 2021-10-13

## 2021-07-22 NOTE — TELEPHONE ENCOUNTER
Prescription approved per [facility] Refill Protocol.    Charles MITCHELL RN....7/22/2021 11:32 AM

## 2021-07-30 NOTE — PROGRESS NOTES
Pre-Visit Planning     Future Appointments   Date Time Provider Department Center   8/2/2021  2:10 PM Haase, Susan Rachele, APRN CNP [facility]     Arrival Time for this Appointment:  1:55 PM     Appointment Notes for this encounter:   Follow Up for Chronic Pain    Questionnaires Reviewed/Assigned  FALL RISK due - MWV due (RN placed message on appt)      HM due pended   Health Maintenance Due   Topic Date Due     ZOSTER IMMUNIZATION (1 of 2) Never done     ADVANCE CARE PLANNING  06/07/2021     FALL RISK ASSESSMENT  07/09/2021     MEDICARE ANNUAL WELLNESS VISIT  07/09/2021     Patient preferred phone number: [phone]    Unable to reach. Left voicemail. Time/date of appt left along with RN PAL direct number for call back     Sofy Dial Registered Nurse, PAL (Patient Advocate Liason)   [facility]   [phone]

## 2021-08-02 ENCOUNTER — VIRTUAL VISIT (OUTPATIENT)
Dept: FAMILY MEDICINE | Facility: CLINIC | Age: 76
End: 2021-08-02
Payer: COMMERCIAL

## 2021-08-02 VITALS — SYSTOLIC BLOOD PRESSURE: 130 MMHG | DIASTOLIC BLOOD PRESSURE: 80 MMHG

## 2021-08-02 DIAGNOSIS — M46.1 SACROILIITIS (H): ICD-10-CM

## 2021-08-02 DIAGNOSIS — G89.29 OTHER CHRONIC PAIN: Primary | ICD-10-CM

## 2021-08-02 DIAGNOSIS — M54.50 BILATERAL LOW BACK PAIN WITHOUT SCIATICA, UNSPECIFIED CHRONICITY: ICD-10-CM

## 2021-08-02 PROCEDURE — 99441 PR PHYSICIAN TELEPHONE EVALUATION 5-10 MIN: CPT | Mod: 95 | Performed by: NURSE PRACTITIONER

## 2021-08-02 RX ORDER — HYDROCODONE BITARTRATE AND ACETAMINOPHEN 5; 325 MG/1; MG/1
1-2 TABLET ORAL EVERY 8 HOURS PRN
Qty: 90 TABLET | Refills: 0 | Status: SHIPPED | OUTPATIENT
Start: 2021-08-09 | End: 2021-09-09

## 2021-08-02 NOTE — PROGRESS NOTES
Kahlil is a 75 year old who is being evaluated via a billable telephone visit.      What phone number would you like to be contacted at? [phone]  How would you like to obtain your AVS? MyChart    Assessment & Plan     Other chronic pain;Bilateral low back pain without sciatica, unspecified chronicity;  Sacroiliitis (H)  [place], last prescription filled on 2/8/2020, no prescriptions filled by other providers. Is able to complete ADL's with current dosage of medication.  Is also using non medication options for pain control such as ice, Biofreeze, stretching.    - HYDROcodone-acetaminophen (NORCO) 5-325 MG tablet; Take 1-2 tablets by mouth every 8 hours as needed for pain or severe pain Max of 3 tablets per day    Cough:  Will continue to monitor, if increases will refer to pulmonary for further evaluation.        Return in about 3 months (around 11/2/2021) for Physical Exam.    Susan Haase, APRN CNP  [facility]   Khalil is a 75 year old who presents for the following health issues     Future Appointments   Date Time Provider Department Center   8/2/2021  2:10 PM Haase, Susan Rachele, APRN CNP [facility]     Appointment Notes for this encounter:    Chronic Pain    Health Maintenance Due   Topic Date Due     ZOSTER IMMUNIZATION (1 of 2) Never done     FALL RISK ASSESSMENT  07/09/2021     MEDICARE ANNUAL WELLNESS VISIT  07/09/2021     Health Maintenance addressed:  ANNUAL WELLNESS VISIT/ FALL RISK   PT WILL SCHEDULE ANNUAL WELLNESS EXAM NEXT TIME   Mariet Status:  Active and Using  History of Present Illness       Back Pain:  She presents for follow up of back pain. Patient's back pain is a chronic problem.  Location of back pain:  Right lower back, right middle of back, right buttock and right hip  Description of back pain: dull ache and gnawing  Back pain spreads: right buttocks and right knee    Since patient first noticed back pain, pain is:  always present, but gets better and worse  Does back pain interfere with her job:  Not applicable      She eats 2-3 servings of fruits and vegetables daily.She consumes 0 sweetened beverage(s) daily.She exercises with enough effort to increase her heart rate 10 to 19 minutes per day.  She exercises with enough effort to increase her heart rate 3 or less days per week.   She is taking medications regularly.       Chronic Pain Follow-Up    Where in your body do you have pain? LOWER BACK  How has your pain affected your ability to work? Not currently working - unrelated to pain-retired  Which of these pain treatments have you tried since your last clinic visit? Cold, Massage, Rest and Stretching   Biofreeze helps with the right bursitis.    How well are you sleeping? Good  How has your mood been since your last visit? Better  Have you had a significant life event? No  Other aggravating factors: none  Taking medication as directed? Yes  Lower back pain gets better and worse, always present.  Increased pain with walking.  Stretching decreases the pain.    Activity level:  Walking to the mailbox and back.    Last injection was on 5/6, little pain relief from the injection.      Cough:  Increased cough with production in the morning, more shortness of breath throughout the day. Oxygen sats 95% after activity.    PHQ-9 SCORE 1/24/2018 8/8/2019 2/26/2021   PHQ-9 Total Score MyChart - - 1 (Minimal depression)   PHQ-9 Total Score 0 0 1     JOSELINE-7 SCORE 1/24/2018 11/3/2019 2/26/2021   Total Score - 0 (minimal anxiety) 1 (minimal anxiety)   Total Score 0 0 1     No flowsheet data found.  Encounter-Level CSA - 10/16/2015:    Controlled Substance Agreement - Scan on 10/19/2015  9:47 AM: CONTROLLED SUBSTANCE AGREEMENT 10-16-15     Patient-Level CSA:    Controlled Substance Agreement - Opioid - Scan on 4/30/2021 12:45 PM  Controlled Substance Agreement - Opioid - Scan on 2/7/2020  7:19 AM       Osteoporosis:  Had a repeat DEXA due to  osteoporosis, will continue fosamax for the next 2 years.      Review of Systems   CONSTITUTIONAL: NEGATIVE for fever, chills, change in weight  RESP:see HPI  CV: NEGATIVE for chest pain, palpitations or peripheral edema  MUSCULOSKELETAL: see HPI  PSYCHIATRIC: NEGATIVE for changes in mood or affect      Objective           Vitals:  /80     Physical Exam     PSYCH: Alert and oriented times 3; coherent speech, normal   rate and volume, able to articulate logical thoughts, able   to abstract reason, no tangential thoughts, no hallucinations   or delusions  Her affect is normal  RESP: No cough, no audible wheezing, able to talk in full sentences  Remainder of exam unable to be completed due to telephone visits          Phone call duration: 10 minutes

## 2021-08-29 ENCOUNTER — HEALTH MAINTENANCE LETTER (OUTPATIENT)
Age: 76
End: 2021-08-29

## 2021-09-09 ENCOUNTER — PATIENT OUTREACH (OUTPATIENT)
Dept: FAMILY MEDICINE | Facility: CLINIC | Age: 76
End: 2021-09-09

## 2021-09-09 DIAGNOSIS — G89.29 OTHER CHRONIC PAIN: ICD-10-CM

## 2021-09-10 RX ORDER — HYDROCODONE BITARTRATE AND ACETAMINOPHEN 5; 325 MG/1; MG/1
1-2 TABLET ORAL EVERY 8 HOURS PRN
Qty: 90 TABLET | Refills: 0 | Status: SHIPPED | OUTPATIENT
Start: 2021-09-10 | End: 2021-10-11

## 2021-10-11 DIAGNOSIS — G89.29 OTHER CHRONIC PAIN: ICD-10-CM

## 2021-10-11 RX ORDER — HYDROCODONE BITARTRATE AND ACETAMINOPHEN 5; 325 MG/1; MG/1
1-2 TABLET ORAL EVERY 8 HOURS PRN
Qty: 90 TABLET | Refills: 0 | Status: SHIPPED | OUTPATIENT
Start: 2021-10-11 | End: 2021-11-12

## 2021-10-11 NOTE — TELEPHONE ENCOUNTER
Routing refill request to provider for review/approval because:  Drug not on the [facility] refill protocol     Pending Prescriptions:                       Disp   Refills    HYDROcodone-acetaminophen (NORCO) 5-325 M*90 tab*0            Sig: Take 1-2 tablets by mouth every 8 hours as needed           for pain or severe pain Max of 3 tablets per day    Last filled 9/10/2021 per epic chart review   Last virtual visit 08/2/21   Upcoming visit 11/16/2021 F2F physical     Sofy Dial, Registered Nurse, PAL (Patient Advocate Liason)   [facility]   [phone]

## 2021-10-12 ENCOUNTER — MYC MEDICAL ADVICE (OUTPATIENT)
Dept: FAMILY MEDICINE | Facility: CLINIC | Age: 76
End: 2021-10-12

## 2021-10-12 DIAGNOSIS — M80.00XD AGE-RELATED OSTEOPOROSIS WITH CURRENT PATHOLOGICAL FRACTURE WITH ROUTINE HEALING: ICD-10-CM

## 2021-10-13 RX ORDER — ALENDRONATE SODIUM 70 MG/1
TABLET ORAL
Qty: 12 TABLET | Refills: 0 | Status: SHIPPED | OUTPATIENT
Start: 2021-10-13 | End: 2022-01-27

## 2021-10-13 NOTE — TELEPHONE ENCOUNTER
Pending Prescriptions:                       Disp   Refills    alendronate (FOSAMAX) 70 MG tablet [Pharm*12 tab*0            Sig: TAKE 1 TABLET BY MOUTH ONCE WEEKLY. TAKE 30           MINUTES BEFORE FIRST FOOD-DRINK-MEDICATION. AVOID           LYING DOWN FOR 30 MINUTES AFTER TAKING     RN refilled medication per [facility] Refill Protocol.       Bree CONTRERAS RN, [facility] 10/13/21 11:39 AM

## 2021-10-15 ENCOUNTER — MYC MEDICAL ADVICE (OUTPATIENT)
Dept: FAMILY MEDICINE | Facility: CLINIC | Age: 76
End: 2021-10-15

## 2021-10-15 DIAGNOSIS — Z87.891 HISTORY OF NICOTINE USE: Primary | ICD-10-CM

## 2021-10-24 ENCOUNTER — HEALTH MAINTENANCE LETTER (OUTPATIENT)
Age: 76
End: 2021-10-24

## 2021-11-03 ENCOUNTER — HOSPITAL ENCOUNTER (OUTPATIENT)
Dept: CT IMAGING | Facility: CLINIC | Age: 76
Discharge: HOME | End: 2021-11-03
Attending: NURSE PRACTITIONER | Admitting: NURSE PRACTITIONER
Payer: COMMERCIAL

## 2021-11-03 DIAGNOSIS — Z87.891 HISTORY OF NICOTINE USE: ICD-10-CM

## 2021-11-03 PROCEDURE — 71271 CT THORAX LUNG CANCER SCR C-: CPT

## 2021-11-08 ENCOUNTER — ANCILLARY PROCEDURE (OUTPATIENT)
Dept: MAMMOGRAPHY | Facility: CLINIC | Age: 76
End: 2021-11-08
Attending: NURSE PRACTITIONER
Payer: COMMERCIAL

## 2021-11-08 DIAGNOSIS — Z12.31 VISIT FOR SCREENING MAMMOGRAM: ICD-10-CM

## 2021-11-08 PROCEDURE — 77067 SCR MAMMO BI INCL CAD: CPT | Mod: TC | Performed by: RADIOLOGY

## 2021-11-08 PROCEDURE — 77063 BREAST TOMOSYNTHESIS BI: CPT | Mod: TC | Performed by: RADIOLOGY

## 2021-11-12 DIAGNOSIS — G89.29 OTHER CHRONIC PAIN: ICD-10-CM

## 2021-11-12 RX ORDER — HYDROCODONE BITARTRATE AND ACETAMINOPHEN 5; 325 MG/1; MG/1
1-2 TABLET ORAL EVERY 8 HOURS PRN
Qty: 90 TABLET | Refills: 0 | Status: SHIPPED | OUTPATIENT
Start: 2021-11-12 | End: 2021-12-13

## 2021-11-12 NOTE — TELEPHONE ENCOUNTER
Haase, Susan Rachele APRN CNP     Routing refill request to provider for review/approval because:  Drug not on the [facility] refill protocol     Pending Prescriptions:                       Disp   Refills    HYDROcodone-acetaminophen (NORCO) 5-325 M*90 tab*0            Sig: Take 1-2 tablets by mouth every 8 hours as needed           for pain or severe pain Max of 3 tablets per day    Upcoming visit 11/16/2021   Last refill per epic 10/11/2021 #90     Sofy Dial, Registered Nurse, PAL (Patient Advocate Liason)   [facility]   [phone]

## 2021-11-13 SDOH — ECONOMIC STABILITY: INCOME INSECURITY: HOW HARD IS IT FOR YOU TO PAY FOR THE VERY BASICS LIKE FOOD, HOUSING, MEDICAL CARE, AND HEATING?: NOT HARD AT ALL

## 2021-11-13 SDOH — HEALTH STABILITY: PHYSICAL HEALTH: ON AVERAGE, HOW MANY MINUTES DO YOU ENGAGE IN EXERCISE AT THIS LEVEL?: 0 MIN

## 2021-11-13 SDOH — ECONOMIC STABILITY: TRANSPORTATION INSECURITY
IN THE PAST 12 MONTHS, HAS LACK OF TRANSPORTATION KEPT YOU FROM MEETINGS, WORK, OR FROM GETTING THINGS NEEDED FOR DAILY LIVING?: NO

## 2021-11-13 SDOH — ECONOMIC STABILITY: FOOD INSECURITY: WITHIN THE PAST 12 MONTHS, YOU WORRIED THAT YOUR FOOD WOULD RUN OUT BEFORE YOU GOT MONEY TO BUY MORE.: NEVER TRUE

## 2021-11-13 SDOH — ECONOMIC STABILITY: FOOD INSECURITY: WITHIN THE PAST 12 MONTHS, THE FOOD YOU BOUGHT JUST DIDN'T LAST AND YOU DIDN'T HAVE MONEY TO GET MORE.: NEVER TRUE

## 2021-11-13 SDOH — ECONOMIC STABILITY: INCOME INSECURITY: IN THE LAST 12 MONTHS, WAS THERE A TIME WHEN YOU WERE NOT ABLE TO PAY THE MORTGAGE OR RENT ON TIME?: NO

## 2021-11-13 SDOH — ECONOMIC STABILITY: TRANSPORTATION INSECURITY
IN THE PAST 12 MONTHS, HAS THE LACK OF TRANSPORTATION KEPT YOU FROM MEDICAL APPOINTMENTS OR FROM GETTING MEDICATIONS?: NO

## 2021-11-13 SDOH — HEALTH STABILITY: PHYSICAL HEALTH: ON AVERAGE, HOW MANY DAYS PER WEEK DO YOU ENGAGE IN MODERATE TO STRENUOUS EXERCISE (LIKE A BRISK WALK)?: 0 DAYS

## 2021-11-13 ASSESSMENT — SOCIAL DETERMINANTS OF HEALTH (SDOH)
IN A TYPICAL WEEK, HOW MANY TIMES DO YOU TALK ON THE PHONE WITH FAMILY, FRIENDS, OR NEIGHBORS?: MORE THAN THREE TIMES A WEEK
HOW OFTEN DO YOU GET TOGETHER WITH FRIENDS OR RELATIVES?: PATIENT DECLINED
HOW OFTEN DO YOU ATTEND CHURCH OR RELIGIOUS SERVICES?: PATIENT DECLINED
DO YOU BELONG TO ANY CLUBS OR ORGANIZATIONS SUCH AS CHURCH GROUPS UNIONS, FRATERNAL OR ATHLETIC GROUPS, OR SCHOOL GROUPS?: PATIENT DECLINED

## 2021-11-13 ASSESSMENT — LIFESTYLE VARIABLES
HOW OFTEN DO YOU HAVE A DRINK CONTAINING ALCOHOL: PATIENT DECLINED
HOW MANY STANDARD DRINKS CONTAINING ALCOHOL DO YOU HAVE ON A TYPICAL DAY: PATIENT DECLINED
HOW OFTEN DO YOU HAVE SIX OR MORE DRINKS ON ONE OCCASION: NEVER

## 2021-11-13 ASSESSMENT — ENCOUNTER SYMPTOMS
DIZZINESS: 0
HEMATURIA: 0
FEVER: 0
NAUSEA: 0
MYALGIAS: 0
CHILLS: 0
HEMATOCHEZIA: 0
NERVOUS/ANXIOUS: 0
ABDOMINAL PAIN: 0
EYE PAIN: 0
COUGH: 1
PARESTHESIAS: 0
DYSURIA: 0
SHORTNESS OF BREATH: 0
DIARRHEA: 0
JOINT SWELLING: 0
HEADACHES: 0
BREAST MASS: 0
CONSTIPATION: 0
PALPITATIONS: 0
WEAKNESS: 0
ARTHRALGIAS: 1
FREQUENCY: 0
SORE THROAT: 0
HEARTBURN: 0

## 2021-11-13 ASSESSMENT — ACTIVITIES OF DAILY LIVING (ADL): CURRENT_FUNCTION: NO ASSISTANCE NEEDED

## 2021-11-16 ENCOUNTER — OFFICE VISIT (OUTPATIENT)
Dept: FAMILY MEDICINE | Facility: CLINIC | Age: 76
End: 2021-11-16
Payer: COMMERCIAL

## 2021-11-16 VITALS
SYSTOLIC BLOOD PRESSURE: 118 MMHG | WEIGHT: 164 LBS | HEIGHT: 65 IN | HEART RATE: 104 BPM | OXYGEN SATURATION: 96 % | DIASTOLIC BLOOD PRESSURE: 74 MMHG | BODY MASS INDEX: 27.32 KG/M2 | TEMPERATURE: 98.7 F | RESPIRATION RATE: 14 BRPM

## 2021-11-16 DIAGNOSIS — Z00.00 ENCOUNTER FOR MEDICARE ANNUAL WELLNESS EXAM: Primary | ICD-10-CM

## 2021-11-16 DIAGNOSIS — R60.0 LOWER EXTREMITY EDEMA: ICD-10-CM

## 2021-11-16 DIAGNOSIS — M80.00XD AGE-RELATED OSTEOPOROSIS WITH CURRENT PATHOLOGICAL FRACTURE WITH ROUTINE HEALING: ICD-10-CM

## 2021-11-16 DIAGNOSIS — E03.9 HYPOTHYROIDISM, UNSPECIFIED TYPE: ICD-10-CM

## 2021-11-16 LAB
ALBUMIN SERPL-MCNC: 3.4 G/DL (ref 3.4–5)
ALP SERPL-CCNC: 132 U/L (ref 40–150)
ALT SERPL W P-5'-P-CCNC: 231 U/L (ref 0–50)
ANION GAP SERPL CALCULATED.3IONS-SCNC: 6 MMOL/L (ref 3–14)
AST SERPL W P-5'-P-CCNC: 125 U/L (ref 0–45)
BILIRUB SERPL-MCNC: 0.5 MG/DL (ref 0.2–1.3)
BUN SERPL-MCNC: 11 MG/DL (ref 7–30)
CALCIUM SERPL-MCNC: 8.7 MG/DL (ref 8.5–10.1)
CHLORIDE BLD-SCNC: 100 MMOL/L (ref 94–109)
CO2 SERPL-SCNC: 28 MMOL/L (ref 20–32)
CREAT SERPL-MCNC: 0.65 MG/DL (ref 0.52–1.04)
GFR SERPL CREATININE-BSD FRML MDRD: 86 ML/MIN/1.73M2
GLUCOSE BLD-MCNC: 95 MG/DL (ref 70–99)
POTASSIUM BLD-SCNC: 4 MMOL/L (ref 3.4–5.3)
PROT SERPL-MCNC: 6.7 G/DL (ref 6.8–8.8)
SODIUM SERPL-SCNC: 134 MMOL/L (ref 133–144)
TSH SERPL DL<=0.005 MIU/L-ACNC: 1.38 MU/L (ref 0.4–4)

## 2021-11-16 PROCEDURE — 80053 COMPREHEN METABOLIC PANEL: CPT | Performed by: NURSE PRACTITIONER

## 2021-11-16 PROCEDURE — 99397 PER PM REEVAL EST PAT 65+ YR: CPT | Performed by: NURSE PRACTITIONER

## 2021-11-16 PROCEDURE — 84443 ASSAY THYROID STIM HORMONE: CPT | Performed by: NURSE PRACTITIONER

## 2021-11-16 PROCEDURE — 36415 COLL VENOUS BLD VENIPUNCTURE: CPT | Performed by: NURSE PRACTITIONER

## 2021-11-16 RX ORDER — FUROSEMIDE 20 MG
20 TABLET ORAL 2 TIMES DAILY
Qty: 180 TABLET | Refills: 1 | Status: SHIPPED | OUTPATIENT
Start: 2021-11-16 | End: 2022-05-27

## 2021-11-16 ASSESSMENT — ENCOUNTER SYMPTOMS
CONSTIPATION: 0
DIZZINESS: 0
BREAST MASS: 0
EYE PAIN: 0
SORE THROAT: 0
HEARTBURN: 0
FREQUENCY: 0
CHILLS: 0
WEAKNESS: 0
NERVOUS/ANXIOUS: 0
DYSURIA: 0
HEMATOCHEZIA: 0
HEMATURIA: 0
HEADACHES: 0
ABDOMINAL PAIN: 0
ARTHRALGIAS: 1
COUGH: 1
PARESTHESIAS: 0
PALPITATIONS: 0
SHORTNESS OF BREATH: 0
JOINT SWELLING: 0
NAUSEA: 0
MYALGIAS: 0
DIARRHEA: 0
FEVER: 0

## 2021-11-16 ASSESSMENT — ACTIVITIES OF DAILY LIVING (ADL): CURRENT_FUNCTION: NO ASSISTANCE NEEDED

## 2021-11-16 ASSESSMENT — MIFFLIN-ST. JEOR: SCORE: 1229.78

## 2021-11-16 NOTE — PATIENT INSTRUCTIONS
Patient Education   Personalized Prevention Plan  You are due for the preventive services outlined below.  Your care team is available to assist you in scheduling these services.  If you have already completed any of these items, please share that information with your care team to update in your medical record.  Health Maintenance Due   Topic Date Due     Zoster (Shingles) Vaccine (1 of 2) Never done     Discuss Advance Care Planning  06/07/2021     Annual Wellness Visit  07/09/2021     FALL RISK ASSESSMENT  07/09/2021     Flu Vaccine (1) 09/01/2021     Thyroid Function Lab  10/09/2021     ANNUAL REVIEW OF HM ORDERS  10/09/2021

## 2021-11-16 NOTE — PROGRESS NOTES
"SUBJECTIVE:   Kahlil Caputo is a 76 year old female who presents for Preventive Visit.      Patient has been advised of split billing requirements and indicates understanding: Yes   Are you in the first 12 months of your Medicare coverage?  No    Healthy Habits:     In general, how would you rate your overall health?  Good    Frequency of exercise:  2-3 days/week    Duration of exercise:  15-30 minutes    Do you usually eat at least 4 servings of fruit and vegetables a day, include whole grains    & fiber and avoid regularly eating high fat or \"junk\" foods?  Yes    Taking medications regularly:  Yes    Medication side effects:  No muscle aches and No significant flushing    Ability to successfully perform activities of daily living:  No assistance needed    Home Safety:  No safety concerns identified    Hearing Impairment:  Need to ask people to speak up or repeat themselves and difficulty understanding soft or whispered speech    In the past 6 months, have you been bothered by leaking of urine?  No    In general, how would you rate your overall mental or emotional health?  Good      PHQ-2 Total Score: 0    Additional concerns today:  No    Do you feel safe in your environment? Yes    Have you ever done Advance Care Planning? (For example, a Health Directive, POLST, or a discussion with a medical provider or your loved ones about your wishes): No, advance care planning information given to patient to review.  Patient plans to discuss their wishes with loved ones or provider.         Fall risk  Fallen 2 or more times in the past year?: No  Any fall with injury in the past year?: No    Cognitive Screening   1) Repeat 3 items (Leader, Season, Table)    2) Clock draw: NORMAL  3) 3 item recall: Recalls 3 objects  Results: 3 items recalled: COGNITIVE IMPAIRMENT LESS LIKELY    Mini-CogTM Copyright S Jasmine. Licensed by the author for use in [facility] [employer]; reprinted with permission (vince@.[facility]). All rights " reserved.    Breast cancer screening:  Last mammaogrm 2021  DEXA last 6/3/2021, would like to see an endocrinologist in [place].   CT lungs:  Last 11/3/2021  Angular chelitis: improved, seen by dermatology, recommended hydrocortisone 2.5%       Do you have sleep apnea, excessive snoring or daytime drowsiness?: no    Reviewed and updated as needed this visit by clinical staff                Reviewed and updated as needed this visit by Provider               Social History     Tobacco Use     Smoking status: Former Smoker     Packs/day: 1.00     Years: 50.00     Pack years: 50.00     Types: Cigarettes     Quit date: 2010     Years since quittin.4     Smokeless tobacco: Never Used     Tobacco comment: no tobacco use since 2010   Substance Use Topics     Alcohol use: Yes     Alcohol/week: 0.0 standard drinks     Comment: occasional         Alcohol Use 2021   Prescreen: >3 drinks/day or >7 drinks/week? No     Current providers sharing in care for this patient include:   Patient Care Team:  Haase, Susan Rachele, APRN CNP as PCP - General (Nurse Practitioner)  Haase, Susan Rachele, APRN CNP as Assigned PCP  Sofy Dial RN as Personal Advocate & Liaison (PAL)  Eliecer Amador MD as Assigned Endocrinology Provider    The following health maintenance items are reviewed in Epic and correct as of today:  Health Maintenance Due   Topic Date Due     ZOSTER IMMUNIZATION (1 of 2) Never done     ADVANCE CARE PLANNING  2021     MEDICARE ANNUAL WELLNESS VISIT  2021     FALL RISK ASSESSMENT  2021     INFLUENZA VACCINE (1) 2021     TSH W/FREE T4 REFLEX  10/09/2021     ANNUAL REVIEW OF HM ORDERS  10/09/2021     Lab work is in process  BP Readings from Last 3 Encounters:   21 118/74   21 130/80   21 (!) 157/96    Wt Readings from Last 3 Encounters:   21 74.4 kg (164 lb)   21 75.8 kg (167 lb)   10/09/20 73 kg (161 lb)                   Patient Active Problem List   Diagnosis     Hypothyroidism     Stricture of small intestine (H)     Osteoarthritis of hip     Ileitis (ileocecal resection 8/2013)     Lumbago     Palpitations     PVC's (premature ventricular contractions)     Family history of premature CAD     Chronic pain     Pulmonary nodules     ACP (advance care planning)     Coronary artery disease involving native coronary artery of native heart without angina pectoris     Age-related osteoporosis with current pathological fracture with routine healing     Statin intolerance     PAD (peripheral artery disease) (H)     Aspirin sensitivity     Sacroiliitis (H)     Past Surgical History:   Procedure Laterality Date     APPENDECTOMY  8/30/2013    incidental removal-part of small bowel resection     ARTHROPLASTY HIP  11/25/2013    Procedure: ARTHROPLASTY HIP;  Left Total Hip Arthroplasty  ;  Surgeon: Luis Marshall MD;  Location: [facility] OR     BIOPSY  ? 1990    left thigh-squamous cell carcinoma     BREAST SURGERY  ? 1991    left breast bx-negative     ENT SURGERY  ? 1960    T&A     EYE SURGERY  2016    R/L cataract removal with IOL placement; left cataract...     LAPAROTOMY EXPLORATORY  8/30/2013    Procedure: LAPAROTOMY EXPLORATORY;  LAPAROTOMY EXPLORATORY, Ileocecal Resection Resection, Removal of Retained Pill Cam;  Surgeon: Mitchell Fraser MD;  Location: [facility] OR     RESECTION ILEOCECAL  8/30/2013    Procedure: RESECTION ILEOCECAL;;  Surgeon: Mitchell Fraser MD;  Location: [facility] OR     ZZC NONSPECIFIC PROCEDURE  1970's    D& C X 2     ZZC NONSPECIFIC PROCEDURE  1964    PILONIDAL CYSTECTOMY     ZZC NONSPECIFIC PROCEDURE  1959    T&A     ZZC NONSPECIFIC PROCEDURE  1991    BREAST BX & CERVICAL POLYP     ZZHC COLONOSCOPY THRU STOMA, DIAGNOSTIC      2003       Social History     Tobacco Use     Smoking status: Former Smoker     Packs/day: 1.00     Years: 50.00     Pack years: 50.00     Types: Cigarettes     Quit date: 6/7/2010     Years  since quittin.4     Smokeless tobacco: Never Used     Tobacco comment: no tobacco use since 2010   Substance Use Topics     Alcohol use: Yes     Alcohol/week: 0.0 standard drinks     Comment: occasional     Family History   Problem Relation Age of Onset     Alcohol/Drug Mother      Heart Disease Mother         cardiomyopathy     Thyroid Disease Mother      Alcohol/Drug Father      Heart Disease Father         CAD -  1st MI mid 50's     Myocardial Infarction Father         X4     Coronary Artery Disease Father      Osteoporosis Maternal Grandmother      Cancer Maternal Grandfather         stomach or throat - martini drinker/pipe smoker     Myocardial Infarction Brother      Breast Cancer No family hx of      Cancer - colorectal No family hx of          Current Outpatient Medications   Medication Sig Dispense Refill     furosemide (LASIX) 20 MG tablet Take 1 tablet (20 mg) by mouth 2 times daily 180 tablet 1     ACE/ARB NOT PRESCRIBED, INTENTIONAL, Please choose reason not prescribed, below       acetaminophen (TYLENOL) 500 MG tablet Take 500-1,000 mg by mouth as needed for mild pain       alendronate (FOSAMAX) 70 MG tablet TAKE 1 TABLET BY MOUTH ONCE WEEKLY. TAKE 30 MINUTES BEFORE FIRST FOOD-DRINK-MEDICATION. AVOID LYING DOWN FOR 30 MINUTES AFTER TAKING  12 tablet 0     ascorbic acid (VITAMIN C) 500 MG tablet Take by mouth 2 times daily       ASPIRIN NOT PRESCRIBED (INTENTIONAL) Please choose reason not prescribed, below 0 each 0     BETA BLOCKER NOT PRESCRIBED, INTENTIONAL, Beta Blocker not prescribed intentionally due to COPD  0     betaxolol (BETOPTIC) 0.5 % ophthalmic solution INSTILL 1 DROP INTO EACH EYE QD  3     calcium carbonate (OS-THEA) 500 MG tablet Patient is taking 3000 mg daily 100 tablet 3     chlorpheniramine (CHLOR-TRIMETON) 4 MG tablet Take 4 mg by mouth every 6 hours as needed for allergies or rhinitis       Cholecalciferol (VITAMIN D) 1000 UNITS capsule Take 3,000 Units by mouth daily         guaiFENesin (MUCINEX) 600 MG 12 hr tablet Take 2 tablets (1,200 mg) by mouth 2 times daily       HYDROcodone-acetaminophen (NORCO) 5-325 MG tablet Take 1-2 tablets by mouth every 8 hours as needed for pain or severe pain Max of 3 tablets per day 90 tablet 0     levothyroxine (SYNTHROID/LEVOTHROID) 150 MCG tablet TAKE 1 TABLET (150MCG) BY MOUTH EVERY MORNING 90 tablet 2     melatonin 3 MG tablet Take 1-2 tablets (3-6 mg) by mouth nightly as needed for sleep (OTC)       Multiple Vitamin (MULTI-VITAMIN PO) Take by mouth daily        nystatin (MYCOSTATIN) 855895 UNIT/GM external cream Apply topically 2 times daily 30 g 1     STATIN NOT PRESCRIBED, INTENTIONAL, 1 each See Admin Instructions Statin not prescribed intentionally due to Allergy to statin 0 each 0     vitamin E 400 UNIT capsule Take by mouth daily         Pertinent mammograms are reviewed under the imaging tab.    Review of Systems   Constitutional: Negative for chills and fever.   HENT: Positive for congestion. Negative for ear pain, hearing loss and sore throat.    Eyes: Negative for pain and visual disturbance.   Respiratory: Positive for cough. Negative for shortness of breath.    Cardiovascular: Negative for chest pain, palpitations and peripheral edema.   Gastrointestinal: Negative for abdominal pain, constipation, diarrhea, heartburn, hematochezia and nausea.   Breasts:  Negative for tenderness, breast mass and discharge.   Genitourinary: Negative for dysuria, frequency, genital sores, hematuria, pelvic pain, urgency, vaginal bleeding and vaginal discharge.   Musculoskeletal: Positive for arthralgias. Negative for joint swelling and myalgias.   Skin: Negative for rash.   Neurological: Negative for dizziness, weakness, headaches and paresthesias.   Psychiatric/Behavioral: Negative for mood changes. The patient is not nervous/anxious.    Very sick with congestion/cough several weeks ago, continues to have lingering cough, feeling much better.  "    OBJECTIVE:   /74 (BP Location: Right arm, Patient Position: Chair, Cuff Size: Adult Regular)   Pulse 104   Temp 98.7  F (37.1  C) (Oral)   Resp 14   Ht 1.651 m (5' 5\")   Wt 74.4 kg (164 lb)   SpO2 96%   BMI 27.29 kg/m   Estimated body mass index is 27.79 kg/m  as calculated from the following:    Height as of 7/9/20: 1.651 m (5' 5\").    Weight as of 4/29/21: 75.8 kg (167 lb).  Physical Exam  GENERAL APPEARANCE: healthy, alert and no distress  EYES: Eyes grossly normal to inspection  HENT: ear canals and TM's normal, nose and mouth without ulcers or lesions, oropharynx clear and oral mucous membranes moist  NECK: no adenopathy, no asymmetry, masses, or scars and thyroid normal to palpation  RESP: lungs clear to auscultation - no rales, rhonchi or wheezes  CV: regular rate and rhythm, normal S1 S2, no S3 or S4, no murmur, click or rub, no peripheral edema  ABDOMEN: soft, nontender, no hepatosplenomegaly, no masses and bowel sounds normal  MS:  gait is age appropriate without ataxia  NEURO:  mentation intact and speech normal  PSYCH: mentation appears normal and affect normal/bright    ASSESSMENT / PLAN:   Kahlil was seen today for wellness visit.    Diagnoses and all orders for this visit:    Encounter for Medicare annual wellness exam  -     Comprehensive metabolic panel; Future  -     Comprehensive metabolic panel    Hypothyroidism, unspecified type: TSH level pending, will reorder levothyroxine with TSH level is back.  -     TSH WITH FREE T4 REFLEX; Future  -     TSH WITH FREE T4 REFLEX    Age-related osteoporosis with current pathological fracture with routine healing: on fosamax monthly, will need to see endo in 6/2022, will refer to endocrinology in [place].   -     Adult Endocrinology Referral; Future    Lower extremity edema;  Stable on lasix 20 mg bid.  -     furosemide (LASIX) 20 MG tablet; Take 1 tablet (20 mg) by mouth 2 times daily    Other orders  -     REVIEW OF HEALTH MAINTENANCE " "PROTOCOL ORDERS        Patient has been advised of split billing requirements and indicates understanding:   COUNSELING:  Reviewed preventive health counseling, as reflected in patient instructions       Regular exercise       Healthy diet/nutrition    Estimated body mass index is 27.79 kg/m  as calculated from the following:    Height as of 7/9/20: 1.651 m (5' 5\").    Weight as of 4/29/21: 75.8 kg (167 lb).        She reports that she quit smoking about 11 years ago. Her smoking use included cigarettes. She has a 50.00 pack-year smoking history. She has never used smokeless tobacco.      Appropriate preventive services were discussed with this patient, including applicable screening as appropriate for cardiovascular disease, diabetes, osteopenia/osteoporosis, and glaucoma.  As appropriate for age/gender, discussed screening for colorectal cancer, prostate cancer, breast cancer, and cervical cancer. Checklist reviewing preventive services available has been given to the patient.    Reviewed patients plan of care and provided an AVS. The Basic Care Plan (routine screening as documented in Health Maintenance) for Kahlil meets the Care Plan requirement. This Care Plan has been established and reviewed with the Patient.    Counseling Resources:  ATP IV Guidelines  Pooled Cohorts Equation Calculator  Breast Cancer Risk Calculator  Breast Cancer: Medication to Reduce Risk  FRAX Risk Assessment  ICSI Preventive Guidelines  Dietary Guidelines for Americans, 2010  USDA's MyPlate  ASA Prophylaxis  Lung CA Screening  Follow up in 3 months for virtual visit, pain Susan Haase, APRN CNP  [facility]    Identified Health Risks:  "

## 2021-11-17 DIAGNOSIS — E03.9 HYPOTHYROIDISM, UNSPECIFIED TYPE: ICD-10-CM

## 2021-11-17 RX ORDER — LEVOTHYROXINE SODIUM 150 UG/1
150 TABLET ORAL DAILY
Qty: 90 TABLET | Refills: 3 | Status: SHIPPED | OUTPATIENT
Start: 2021-11-17 | End: 2022-11-17

## 2021-11-18 ENCOUNTER — IMMUNIZATION (OUTPATIENT)
Dept: INTERNAL MEDICINE | Facility: CLINIC | Age: 76
End: 2021-11-18
Payer: COMMERCIAL

## 2021-11-18 ENCOUNTER — MYC MEDICAL ADVICE (OUTPATIENT)
Dept: FAMILY MEDICINE | Facility: CLINIC | Age: 76
End: 2021-11-18

## 2021-11-18 DIAGNOSIS — R74.8 ELEVATED LIVER ENZYMES: Primary | ICD-10-CM

## 2021-11-18 DIAGNOSIS — Z23 NEED FOR PROPHYLACTIC VACCINATION AND INOCULATION AGAINST INFLUENZA: Primary | ICD-10-CM

## 2021-11-18 PROCEDURE — 99207 PR NO CHARGE NURSE ONLY: CPT

## 2021-11-18 PROCEDURE — 90662 IIV NO PRSV INCREASED AG IM: CPT

## 2021-11-18 PROCEDURE — G0008 ADMIN INFLUENZA VIRUS VAC: HCPCS

## 2021-11-19 NOTE — TELEPHONE ENCOUNTER
Haase, Susan Rachele APRN CNP      Please review my chart message and advise     Thank you,   Sofy Dial, Registered Nurse, PAL (Patient Advocate Liason)   [facility]   [phone]

## 2021-12-13 DIAGNOSIS — G89.29 OTHER CHRONIC PAIN: ICD-10-CM

## 2021-12-13 RX ORDER — HYDROCODONE BITARTRATE AND ACETAMINOPHEN 5; 325 MG/1; MG/1
1-2 TABLET ORAL EVERY 8 HOURS PRN
Qty: 90 TABLET | Refills: 0 | Status: SHIPPED | OUTPATIENT
Start: 2021-12-13 | End: 2022-01-13

## 2022-01-13 DIAGNOSIS — G89.29 OTHER CHRONIC PAIN: ICD-10-CM

## 2022-01-13 RX ORDER — HYDROCODONE BITARTRATE AND ACETAMINOPHEN 5; 325 MG/1; MG/1
1-2 TABLET ORAL EVERY 8 HOURS PRN
Qty: 90 TABLET | Refills: 0 | Status: SHIPPED | OUTPATIENT
Start: 2022-01-13 | End: 2022-02-11

## 2022-01-13 NOTE — TELEPHONE ENCOUNTER
Routing refill request to provider for review/approval because:  Drug not on the [facility] refill protocol   Pending Prescriptions:                       Disp   Refills    HYDROcodone-acetaminophen (NORCO) 5-325 M*90 tab*0            Sig: Take 1-2 tablets by mouth every 8 hours as needed           for pain or severe pain Max of 3 tablets per day    Last rx per epic 12/13/2021   Last office visit 11/16/2021     Sofy Dial Registered Nurse, PAL (Patient Advocate Liason)   [facility]   [phone]

## 2022-01-19 ENCOUNTER — LAB (OUTPATIENT)
Dept: LAB | Facility: CLINIC | Age: 77
End: 2022-01-19
Payer: COMMERCIAL

## 2022-01-19 DIAGNOSIS — R74.8 ELEVATED LIVER ENZYMES: ICD-10-CM

## 2022-01-19 LAB
ALBUMIN SERPL-MCNC: 3.6 G/DL (ref 3.4–5)
ALP SERPL-CCNC: 92 U/L (ref 40–150)
ALT SERPL W P-5'-P-CCNC: 29 U/L (ref 0–50)
AST SERPL W P-5'-P-CCNC: 14 U/L (ref 0–45)
BILIRUB DIRECT SERPL-MCNC: 0.1 MG/DL (ref 0–0.2)
BILIRUB SERPL-MCNC: 0.4 MG/DL (ref 0.2–1.3)
PROT SERPL-MCNC: 6.7 G/DL (ref 6.8–8.8)

## 2022-01-19 PROCEDURE — 36415 COLL VENOUS BLD VENIPUNCTURE: CPT

## 2022-01-19 PROCEDURE — 80076 HEPATIC FUNCTION PANEL: CPT

## 2022-01-25 DIAGNOSIS — M80.00XD AGE-RELATED OSTEOPOROSIS WITH CURRENT PATHOLOGICAL FRACTURE WITH ROUTINE HEALING: ICD-10-CM

## 2022-01-25 DIAGNOSIS — K13.0 ANGULAR CHEILITIS: ICD-10-CM

## 2022-01-27 RX ORDER — NYSTATIN 100000 U/G
CREAM TOPICAL
Qty: 30 G | Refills: 0 | Status: SHIPPED | OUTPATIENT
Start: 2022-01-27 | End: 2022-03-24

## 2022-01-27 RX ORDER — ALENDRONATE SODIUM 70 MG/1
TABLET ORAL
Qty: 12 TABLET | Refills: 0 | Status: SHIPPED | OUTPATIENT
Start: 2022-01-27 | End: 2022-06-08

## 2022-01-27 NOTE — TELEPHONE ENCOUNTER
Prescription approved per [facility] Refill Protocol.    Vero Norton RN on 1/27/2022 at 1:56 PM

## 2022-01-27 NOTE — TELEPHONE ENCOUNTER
Routing refill request to provider for review/approval because:  Patient needs to be seen because:  LOV 11/11/20    Charles MITCHELL RN....1/27/2022 12:03 PM

## 2022-02-11 DIAGNOSIS — G89.29 OTHER CHRONIC PAIN: ICD-10-CM

## 2022-02-11 RX ORDER — HYDROCODONE BITARTRATE AND ACETAMINOPHEN 5; 325 MG/1; MG/1
1-2 TABLET ORAL EVERY 8 HOURS PRN
Qty: 90 TABLET | Refills: 0 | Status: SHIPPED | OUTPATIENT
Start: 2022-02-12 | End: 2022-03-14

## 2022-02-11 NOTE — TELEPHONE ENCOUNTER
Routing refill request to provider for review/approval because:  Drug not on the [facility] refill protocol     Patient will need medication by this Sunday.    Melina Silverio RN

## 2022-02-17 ENCOUNTER — VIRTUAL VISIT (OUTPATIENT)
Dept: FAMILY MEDICINE | Facility: CLINIC | Age: 77
End: 2022-02-17
Payer: COMMERCIAL

## 2022-02-17 DIAGNOSIS — M46.1 SACROILIITIS (H): ICD-10-CM

## 2022-02-17 DIAGNOSIS — I73.9 PAD (PERIPHERAL ARTERY DISEASE) (H): ICD-10-CM

## 2022-02-17 DIAGNOSIS — G89.29 OTHER CHRONIC PAIN: ICD-10-CM

## 2022-02-17 DIAGNOSIS — R10.9 ACUTE RIGHT FLANK PAIN: Primary | ICD-10-CM

## 2022-02-17 PROCEDURE — 99214 OFFICE O/P EST MOD 30 MIN: CPT | Mod: 95 | Performed by: NURSE PRACTITIONER

## 2022-02-17 ASSESSMENT — ANXIETY QUESTIONNAIRES
5. BEING SO RESTLESS THAT IT IS HARD TO SIT STILL: NOT AT ALL
6. BECOMING EASILY ANNOYED OR IRRITABLE: NOT AT ALL
4. TROUBLE RELAXING: NOT AT ALL
7. FEELING AFRAID AS IF SOMETHING AWFUL MIGHT HAPPEN: NOT AT ALL
GAD7 TOTAL SCORE: 0
GAD7 TOTAL SCORE: 0
7. FEELING AFRAID AS IF SOMETHING AWFUL MIGHT HAPPEN: NOT AT ALL
1. FEELING NERVOUS, ANXIOUS, OR ON EDGE: NOT AT ALL
3. WORRYING TOO MUCH ABOUT DIFFERENT THINGS: NOT AT ALL
2. NOT BEING ABLE TO STOP OR CONTROL WORRYING: NOT AT ALL
GAD7 TOTAL SCORE: 0

## 2022-02-17 ASSESSMENT — PATIENT HEALTH QUESTIONNAIRE - PHQ9
10. IF YOU CHECKED OFF ANY PROBLEMS, HOW DIFFICULT HAVE THESE PROBLEMS MADE IT FOR YOU TO DO YOUR WORK, TAKE CARE OF THINGS AT HOME, OR GET ALONG WITH OTHER PEOPLE: NOT DIFFICULT AT ALL
SUM OF ALL RESPONSES TO PHQ QUESTIONS 1-9: 0
SUM OF ALL RESPONSES TO PHQ QUESTIONS 1-9: 0

## 2022-02-17 NOTE — PROGRESS NOTES
Kahlil is a 76 year old who is being evaluated via a billable telephone visit.      What phone number would you like to be contacted at? [phone]  How would you like to obtain your AVS? MyChart    Assessment & Plan     Acute right flank pain: unclear as to etiology of this new pain that started 10 days ago, at this time pain is manageable, placed an order for a UA, recommend coming to the clinic to leave a urine to further evaluate for kidney stone.  Is aware of need to seek immediate care if pain increases.   - UA Macro with Reflex to Micro and Culture - lab collect    Other chronic pain, Sacroiliitis (H):  [place]ed [place] prescription monitoring, last prescription filled on 2/12/2022, no prescriptions filled by other providers. Is interested in discussion of CBD as a pain treatment option, will refer to MTM.     - Med Therapy Management Referral    PAD (peripheral artery disease) (H): continue to monitor, is not able to tolerate a statin, does not smoke.  Encouraged as much exercise as pain will allow.      Return in about 3 months (around 5/17/2022) for Routine Visit, chronic pain.    Susan Haase, APRN [facility]   Kahlil is a 76 year old who presents for the following health issues     HPI    History of Present Illness     Back Pain:  She presents for follow up of back pain. Patient's back pain is a chronic problem.  Location of back pain:  Right lower back, left lower back, right buttock, left buttock and right hip  Description of back pain: burning, dull ache and gnawing  Back pain spreads: right buttocks and left buttocks    Since patient first noticed back pain, pain is: always present, but gets better and worse  Does back pain interfere with her job:  Not applicable      She eats 2-3 servings of fruits and vegetables daily.She consumes 0 sweetened beverage(s) daily.She exercises with enough effort to increase her heart rate 10 to 19 minutes per day.  She  exercises with enough effort to increase her heart rate 3 or less days per week.   She is taking medications regularly.    Pain has been worse.  Left lower back pain:  Increase in intensity, is not sure why, no new injury, etc.  Using heat or ice with some temporary relief.  Continues to take norco with max of 3 tablets per day.      Right flank pain: this is a new type of pain, present for 10 days.  Denies any known precipitating factors.  Pain is more than achy, constant, increases when laying down.  Denies constipation, diarrhea, abdominal pain, dysuria, hematuria.  Pain does not increase with movement such as bending and twisting.      Is interested in learning more about CBD for pain relief.          Review of Systems   CONSTITUTIONAL: NEGATIVE for fever, chills, change in weight  ENT/MOUTH: NEGATIVE for ear, mouth and throat problems  RESP: NEGATIVE for significant cough or SOB  CV: NEGATIVE for chest pain, palpitations or peripheral edema  GI: NEGATIVE for nausea, abdominal pain, heartburn, or change in bowel habits  : negative for, dysuria, hematuria and kidney stone  MUSCULOSKELETAL: see HPI  PSYCHIATRIC: NEGATIVE for changes in mood or affect      Objective           Vitals:  No vitals were obtained today due to virtual visit.    Physical Exam   PSYCH: Alert and oriented times 3; coherent speech, normal   rate and volume, able to articulate logical thoughts, able   to abstract reason, no tangential thoughts, no hallucinations   or delusions  Her affect is normal and pleasant  RESP: No cough, no audible wheezing, able to talk in full sentences  Remainder of exam unable to be completed due to telephone visits          Phone call duration: 10 minutes

## 2022-02-18 ASSESSMENT — ANXIETY QUESTIONNAIRES: GAD7 TOTAL SCORE: 0

## 2022-02-18 ASSESSMENT — PATIENT HEALTH QUESTIONNAIRE - PHQ9: SUM OF ALL RESPONSES TO PHQ QUESTIONS 1-9: 0

## 2022-03-02 ENCOUNTER — VIRTUAL VISIT (OUTPATIENT)
Dept: PHARMACY | Facility: CLINIC | Age: 77
End: 2022-03-02
Attending: NURSE PRACTITIONER
Payer: COMMERCIAL

## 2022-03-02 DIAGNOSIS — E03.9 HYPOTHYROIDISM, UNSPECIFIED TYPE: ICD-10-CM

## 2022-03-02 DIAGNOSIS — M80.00XD AGE-RELATED OSTEOPOROSIS WITH CURRENT PATHOLOGICAL FRACTURE WITH ROUTINE HEALING: ICD-10-CM

## 2022-03-02 DIAGNOSIS — G89.29 OTHER CHRONIC PAIN: ICD-10-CM

## 2022-03-02 DIAGNOSIS — Z78.9 TAKES DIETARY SUPPLEMENTS: ICD-10-CM

## 2022-03-02 DIAGNOSIS — I73.9 PAD (PERIPHERAL ARTERY DISEASE) (H): Primary | ICD-10-CM

## 2022-03-02 PROCEDURE — 99607 MTMS BY PHARM ADDL 15 MIN: CPT | Performed by: PHARMACIST

## 2022-03-02 PROCEDURE — 99605 MTMS BY PHARM NP 15 MIN: CPT | Performed by: PHARMACIST

## 2022-03-02 NOTE — PATIENT INSTRUCTIONS
Recommendations from today's MTM visit:                                                    MTM (medication therapy management) is a service provided by a clinical pharmacist designed to help you get the most of out of your medicines.   Today we reviewed what your medicines are for, how to know if they are working, that your medicines are safe and how to make your medicine regimen as easy as possible.      1. Stop vitamin E supplement.  2. Look for purity testing if trying CBD products at bedtime. Recommend start at no more than CBD 15 mg bedtime.      Follow-up: Return if symptoms worsen or fail to improve, for Medication Therapy Management Pharmacist.    It was great to speak with you today.  I value your experience and would be very thankful for your time with providing feedback on our clinic survey. You may receive a survey via email or text message in the next few days.     To schedule another MTM appointment, please call the clinic directly or you may call the MTM scheduling line at [phone] or toll-free at [phone].     My Clinical Pharmacist's contact information:                                                      Please feel free to contact me with any questions or concerns you have.      .mrq

## 2022-03-02 NOTE — LETTER
_  Medication List        Prepared on: 3/2/2022     Bring your Medication List when you go to the doctor, hospital, or   emergency room. And, share it with your family or caregivers.     Note any changes to how you take your medications.  Cross out medications when you no longer use them.    Medication How I take it Why I use it Prescriber   acetaminophen (TYLENOL) 500 MG tablet Take 500-1,000 mg by mouth as needed for mild pain Pain  Patient Reported   alendronate (FOSAMAX) 70 MG tablet TAKE 1 TABLET BY MOUTH ONCE WEEKLY. TAKE 30 MINUTES BEFORE FIRST FOOD-DRINK-MEDICATION. AVOID LYING DOWN FOR 30 MINUTES AFTER TAKING Age-related osteoporosis with current pathological fracture with routine healing Eliecer Amador MD   ascorbic acid (VITAMIN C) 500 MG tablet Take 1 tablet by mouth 2 times daily General Health Patient Reported   betaxolol (BETOPTIC) 0.5 % ophthalmic solution INSTILL 1 DROP INTO EACH EYE every day  General Health   Patient Reported   calcium carbonate (OS-THEA) 500 MG tablet Take 2 tablets by mouth 3 times daily  SOB (Shortness of Breath); Limb Pain Eliecer Amador MD   chlorpheniramine (CHLOR-TRIMETON) 4 MG tablet Take 4 mg by mouth every 6 hours as needed for allergies or rhinitis Allergies  Patient Reported   Cholecalciferol (VITAMIN D) 1000 UNITS capsule Take 3,000 Units by mouth daily  General Health Patient Reported   furosemide (LASIX) 20 MG tablet Take 1 tablet (20 mg) by mouth 2 times daily Lower Extremity Edema Susan Rachele Haase, APRN CNP   guaiFENesin (MUCINEX) 600 MG 12 hr tablet Take 1,200 mg by mouth 2 times daily as needed  Chest Congestion Susan Rachele Haase, APRN CNP   HYDROcodone-acetaminophen (NORCO) 5-325 MG tablet Take 1-2 tablets by mouth every 8 hours as needed for pain or severe pain Max of 3 tablets per day Other Chronic Pain Susan Rachele Haase, APRN CNP   levothyroxine (SYNTHROID/LEVOTHROID) 150 MCG tablet Take 1 tablet (150 mcg) by mouth daily  Hypothyroidism, unspecified type Susan Rachele Haase, APRN CNP   melatonin 3 MG tablet Take 1-2 tablets (3-6 mg) by mouth nightly as needed for sleep (OTC) General Health Masha Hart PA-C   Multiple Vitamin (MULTI-VITAMIN PO) Take 1 tablet by mouth daily  General Health Patient Reported   nystatin (MYCOSTATIN) 370111 UNIT/GM external cream Apply topically twice daily Angular Cheilitis Susan Rachele Haase, APRN CNP         Add new medications, over-the-counter drugs, herbals, vitamins, or  minerals in the blank rows below.    Medication How I take it Why I use it Prescriber                          Allergies:      latex; nsaids; aspirin; codeine; pravastatin; simvastatin; tramadol; bacitracin; neosporin        Side effects I have had:              Other Information:             My notes and questions:

## 2022-03-02 NOTE — PROGRESS NOTES
Medication Therapy Management (MTM) Encounter    ASSESSMENT:                            Medication Adherence/Access: No issues identified    PAD/Edema: stable    Pain: Discussed she may try topical Voltaren gel as needed at bedtime and she will consider. Discussed various CBD companies and more reputable companies that perform purity testing for these products if she were to try CBD at bedtime to help decrease hydrocodone use.    Hypothyroidism: stable  Osteoporosis: stable, following endocrinology.     Supplements: Recommend discontinuing vitamin E to decrease pill burden and rare risk of bleeding/hemorrhagic stroke.    PLAN:                            1. Stop vitamin E supplement.  2. Discussed more reputable CBD products/companies and looking for purity testing if trying at bedtime. Recommend start at no more than CBD 15 mg bedtime.    Follow-up: Return if symptoms worsen or fail to improve, for Medication Therapy Management Pharmacist.    SUBJECTIVE/OBJECTIVE:                          Kahlil Caputo is a 76 year old female called for an initial visit. She was referred to me from Susan Haase, CNP.      Reason for visit: Interested in .    Allergies/ADRs: Reviewed in chart  Past Medical History: Reviewed in chart  Tobacco: She reports that she quit smoking about 11 years ago. Her smoking use included cigarettes. She has a 50.00 pack-year smoking history. She has never used smokeless tobacco.  Alcohol: 1-3 beverages / week      Medication Adherence/Access: no issues reported    PAD/Edema: Current medications include furosemide 20 mg twice daily.  Patient does not self-monitor blood pressure.  Patient reports no current medication side effects. No aspirin d/t allergy.   BP Readings from Last 3 Encounters:   11/16/21 118/74   08/02/21 130/80   05/06/21 (!) 157/96       Pain: Patient taking hydrocodone-acetaminophen as needed (just bedtime every nigh and day time as needed, not daily), acetaminophen 500-1000 mg as  "needed (hasn't taken any lately d/t elevated liver enzymes, takes as needed when not taking Norco), biofreeze roll on as needed on back. Lidocaine patches caused bad rash on back so can't use anymore. Allergic to oral NSAIDs. Wonders about using CBD products and she would like to try some at bedtime to help decrease opioid use. Wonders what brands and where to start to look for a product.     Hypothyroidism: Patient is taking levothyroxine 150 mcg daily. Patient is having the following symptoms: none.   TSH   Date Value Ref Range Status   11/16/2021 1.38 0.40 - 4.00 mU/L Final   10/09/2020 0.89 0.40 - 4.00 mU/L Final       Osteoporosis: Current therapy includes: calcium 1000 mg three times daily/Vitamin D 3000 units daily and alendronate (Fosamax) 70mg weekly (Pt has been on current therapy for 4 years). Pt is experiencing side effects - mild heartburn. Following endocrinology.  DEXA History: 6/3/21  \"FINDINGS:               Lumbar Spine (L1-L4)      T-score:  -0.8, degenerative changes present               Right Femoral Neck          T-score:  -2.5               Forearm (radius 33%)      T-score:  -1.2  The left femur is not acceptable for evaluation due to previous arthroplasty.                                Lumbar (L1-L4) BMD: 1.097  Previous: 1.052                                       Right Total Hip BMD: 0.805  Previous: 0.777               Forearm (radius 33%) BMD: 0.773   Previous: 0.759     Comparison is made to another DXA performed on the same [employer]  machine on 6/3/2020.\"  Risk factors: post-menopausal  Last vitamin D level:  Lab Results   Component Value Date    VITDT 33 10/09/2020    VITDT 40 02/06/2020    VITDT 30 10/17/2018    VITDT 31 06/29/2018    VITDT 35 05/24/2017       Supplements: Patient taking vitamin C twice daily, MVI daily, vitamin E 400 units daily, melatonin 3-6 mg as needed. No issues, not sure why she's taking vitamin E and C for immune support which she prefers to stay " on.      Today's Vitals: There were no vitals taken for this visit.  ----------------      I spent 30 minutes with this patient today. All changes were made via collaborative practice agreement with Susan Haase, APRN CNP. A copy of the visit note was provided to the patient's provider(s).    The patient was sent via [address] a summary of these recommendations.     Meme Deluca, PharmD  Medication Therapy Management Provider, [facility]  Pager: [phone]    Telemedicine Visit Details  Type of service:  Telephone visit  Start Time: 11:11 AM  End Time: 11:41 AM  Originating Location (patient location): Home  Distant Location (provider location):  [facility]     Medication Therapy Recommendations  Takes dietary supplements    Current Medication: vitamin E 400 UNIT capsule (Discontinued)   Rationale: No medical indication at this time - Unnecessary medication therapy - Indication   Recommendation: Discontinue Medication   Status: Accepted - no CPA Needed

## 2022-03-02 NOTE — LETTER
"Recommended To-Do List      Prepared on: 3/2/2022     You can get the best results from your medications by completing the items on this \"To-Do List.\"      Bring your To-Do List when you go to your doctor. And, share it with your family or caregivers.    My To-Do List:  What we talked about: What I should do:   A medication that you may no longer need    Stop taking vitamin E (TOCOPHEROL)          What we talked about: What I should do:                     "

## 2022-03-02 NOTE — LETTER
March 2, 2022  Kahlil Caputo  [address] [address]    Dear Ms. Caputo [facility]        Thank you for talking with me on Mar 2, 2022 about your health and medications. As a follow-up to our conversation, I have included two documents:      1. Your Recommended To-Do List has steps you should take to get the best results from your medications.  2. Your Medication List will help you keep track of your medications and how to take them.    If you want to talk about these documents, please call Meme Deluca RPH at phone: [phone], Monday-Friday 8-4:30pm.    I look forward to working with you and your doctors to make sure your medications work well for you.    Sincerely,    Meme Deluca RPH

## 2022-03-14 ENCOUNTER — PATIENT OUTREACH (OUTPATIENT)
Dept: FAMILY MEDICINE | Facility: CLINIC | Age: 77
End: 2022-03-14
Payer: COMMERCIAL

## 2022-03-14 DIAGNOSIS — G89.29 OTHER CHRONIC PAIN: ICD-10-CM

## 2022-03-14 RX ORDER — HYDROCODONE BITARTRATE AND ACETAMINOPHEN 5; 325 MG/1; MG/1
1-2 TABLET ORAL EVERY 8 HOURS PRN
Qty: 90 TABLET | Refills: 0 | Status: SHIPPED | OUTPATIENT
Start: 2022-03-14 | End: 2022-04-08

## 2022-03-14 NOTE — LETTER
Kahlil Caputo  [address] [facility] [facility] [address]    Thank you for choosing [facility] today for your health care needs.     [facility] is transforming primary care  At [facility], we re dedicated to constantly improve how we serve the health care needs of our patients and communities. We re currently making changes to the way we deliver care.     Changes you ll notice include:    An emphasis on building a relationship with a primary care provider    Access to a PAL (patient advocate and liaison) to help guide you with your care needs    Appointment lengths tailored to your specific needs and greater access to a care team to help you and your provider improve and maintain your health and well-being    Improved online access to your care team    Benefits of a primary care provider  If you don t have a designated primary care provider, we encourage you to get to know our care team online and find a provider you d like to see. Most of our providers have a short video on their online provider page. Visit [email or web address] to explore our providers and locations.    Benefits of having a primary care provider include:      They get to know you - your health history, family history and goals, making it easier to make a health plan together.     You get to know them - making health-related conversations and decisions easier      Primary care doctors help you when you re sick or hurt - but also focus on keeping you healthy with preventive care and screenings.      A doctor who sees you regularly is more likely to notice changes in your health.     You ll be connected to a broad care team who partners with your provider to support you.    Patient Advocate Liaison (PAL)   To help make sure you get the right care, at the right time, we include PALs, or Patient Advocate Liaisons, as part of your care team. Your PAL will be your first line of contact. They ll advocate for your needs and help you navigate  our services, connecting you with care team members and community resources to ensure your care is well coordinated. You ll be introduced to a PAL in an upcoming visit.     Expanded care team access with tailored appointment lengths  Depending on your health care needs, you may have longer or shorter appointments and see additional care team providers - including Medication Therapy Management (MTM) pharmacists, diabetes educators, behavioral health clinicians, or social workers. At times, they may be included in your visit with your provider, or you may see them individually.     Online access to your health care records and care team  [employer] is our online tool that makes it easy to see your health care information and communicate with your care team.     [employer] allows you to:     View your health maintenance plan so you know when you re due for a preventive screening    Send secure messages to your care team    View your health history and visit summaries     Schedule appointments     Complete questionnaires and eCheck-in before appointments      Get care from your provider with an e-visit      View and pay your bill     Sign up at [email or web address]. Once you have an account, you also can download the mobile [email or web address].     Connecting to fast and convenient care  When you need fast, convenient care - consider one of the following options:       Video Visit: A convenient care option for visiting with your provider out of the comfort of your own home. Most of the things you come to the clinic to address with your provider can now be done virtually through a video. This includes your chronic medication follow up, questions or concerns you may have, and even your annual Medicare Wellness Visit.       Phone Visit: Another convenient option for follow up of common problems that may require a more in-depth discussion with your provider.       E-visit: When you need acute care quickly, or have a quick question about your  medication, an E-visit is completed through [employer] and your provider will respond within one business day.      Sofy Dial, Registered Nurse, PAL (Patient Advocate Liason)   [facility]   [phone]

## 2022-03-14 NOTE — TELEPHONE ENCOUNTER
Haase, Susan Rachele APRN CNP      Routing refill request to provider for review/approval because:  Drug not on the [facility] [place] [place] refill protocol     Pending Prescriptions:                       Disp   Refills    HYDROcodone-acetaminophen (NORCO) 5-325 M*90 tab*0            Sig: Take 1-2 tablets by mouth every 8 hours as needed           for pain or severe pain Max of 3 tablets per day    Last filled per epic 2/12/22 #90     Last visit 2/17/22 virtual     Sofy Dial Registered Nurse, PAL (Patient Advocate Liason)   [facility]   [phone]

## 2022-03-23 DIAGNOSIS — K13.0 ANGULAR CHEILITIS: ICD-10-CM

## 2022-03-24 RX ORDER — NYSTATIN 100000 U/G
CREAM TOPICAL
Qty: 30 G | Refills: 0 | Status: SHIPPED | OUTPATIENT
Start: 2022-03-24 | End: 2022-07-01

## 2022-03-30 ENCOUNTER — APPOINTMENT (OUTPATIENT)
Dept: CT IMAGING | Facility: CLINIC | Age: 77
End: 2022-03-30
Attending: EMERGENCY MEDICINE
Payer: COMMERCIAL

## 2022-03-30 ENCOUNTER — HOSPITAL ENCOUNTER (EMERGENCY)
Facility: CLINIC | Age: 77
Discharge: HOME | End: 2022-03-30
Attending: EMERGENCY MEDICINE | Admitting: EMERGENCY MEDICINE
Payer: COMMERCIAL

## 2022-03-30 ENCOUNTER — PATIENT OUTREACH (OUTPATIENT)
Dept: FAMILY MEDICINE | Facility: CLINIC | Age: 77
End: 2022-03-30
Payer: COMMERCIAL

## 2022-03-30 VITALS
OXYGEN SATURATION: 96 % | RESPIRATION RATE: 20 BRPM | TEMPERATURE: 98.2 F | DIASTOLIC BLOOD PRESSURE: 99 MMHG | SYSTOLIC BLOOD PRESSURE: 149 MMHG | HEART RATE: 121 BPM

## 2022-03-30 DIAGNOSIS — M54.50 ACUTE LEFT-SIDED LOW BACK PAIN WITHOUT SCIATICA: ICD-10-CM

## 2022-03-30 DIAGNOSIS — E87.6 HYPOKALEMIA: ICD-10-CM

## 2022-03-30 LAB
ALBUMIN UR-MCNC: NEGATIVE MG/DL
ANION GAP SERPL CALCULATED.3IONS-SCNC: 9 MMOL/L (ref 3–14)
APPEARANCE UR: CLEAR
BASOPHILS # BLD AUTO: 0 10E3/UL (ref 0–0.2)
BASOPHILS NFR BLD AUTO: 1 %
BILIRUB UR QL STRIP: NEGATIVE
BUN SERPL-MCNC: 7 MG/DL (ref 7–30)
CALCIUM SERPL-MCNC: 9 MG/DL (ref 8.5–10.1)
CHLORIDE BLD-SCNC: 93 MMOL/L (ref 94–109)
CO2 SERPL-SCNC: 28 MMOL/L (ref 20–32)
COLOR UR AUTO: ABNORMAL
CREAT SERPL-MCNC: 0.64 MG/DL (ref 0.52–1.04)
EOSINOPHIL # BLD AUTO: 0.1 10E3/UL (ref 0–0.7)
EOSINOPHIL NFR BLD AUTO: 2 %
ERYTHROCYTE [DISTWIDTH] IN BLOOD BY AUTOMATED COUNT: 12.7 % (ref 10–15)
GFR SERPL CREATININE-BSD FRML MDRD: >90 ML/MIN/1.73M2
GLUCOSE BLD-MCNC: 112 MG/DL (ref 70–99)
GLUCOSE UR STRIP-MCNC: NEGATIVE MG/DL
HCT VFR BLD AUTO: 44.9 % (ref 35–47)
HGB BLD-MCNC: 14.5 G/DL (ref 11.7–15.7)
HGB UR QL STRIP: NEGATIVE
HOLD SPECIMEN: NORMAL
HOLD SPECIMEN: NORMAL
HYALINE CASTS: 1 /LPF
IMM GRANULOCYTES # BLD: 0 10E3/UL
IMM GRANULOCYTES NFR BLD: 1 %
KETONES UR STRIP-MCNC: 20 MG/DL
LEUKOCYTE ESTERASE UR QL STRIP: NEGATIVE
LYMPHOCYTES # BLD AUTO: 2 10E3/UL (ref 0.8–5.3)
LYMPHOCYTES NFR BLD AUTO: 23 %
MCH RBC QN AUTO: 29.1 PG (ref 26.5–33)
MCHC RBC AUTO-ENTMCNC: 32.3 G/DL (ref 31.5–36.5)
MCV RBC AUTO: 90 FL (ref 78–100)
MONOCYTES # BLD AUTO: 0.8 10E3/UL (ref 0–1.3)
MONOCYTES NFR BLD AUTO: 9 %
MUCOUS THREADS #/AREA URNS LPF: PRESENT /LPF
NEUTROPHILS # BLD AUTO: 5.7 10E3/UL (ref 1.6–8.3)
NEUTROPHILS NFR BLD AUTO: 64 %
NITRATE UR QL: NEGATIVE
NRBC # BLD AUTO: 0 10E3/UL
NRBC BLD AUTO-RTO: 0 /100
PH UR STRIP: 5.5 [PH] (ref 5–7)
PLATELET # BLD AUTO: 349 10E3/UL (ref 150–450)
POTASSIUM BLD-SCNC: 3.2 MMOL/L (ref 3.4–5.3)
RBC # BLD AUTO: 4.99 10E6/UL (ref 3.8–5.2)
RBC URINE: <1 /HPF
SODIUM SERPL-SCNC: 130 MMOL/L (ref 133–144)
SP GR UR STRIP: 1.01 (ref 1–1.03)
SQUAMOUS EPITHELIAL: <1 /HPF
UROBILINOGEN UR STRIP-MCNC: NORMAL MG/DL
WBC # BLD AUTO: 8.7 10E3/UL (ref 4–11)
WBC URINE: 0 /HPF

## 2022-03-30 PROCEDURE — 36415 COLL VENOUS BLD VENIPUNCTURE: CPT | Performed by: EMERGENCY MEDICINE

## 2022-03-30 PROCEDURE — 80048 BASIC METABOLIC PNL TOTAL CA: CPT | Performed by: EMERGENCY MEDICINE

## 2022-03-30 PROCEDURE — 72131 CT LUMBAR SPINE W/O DYE: CPT

## 2022-03-30 PROCEDURE — 85025 COMPLETE CBC W/AUTO DIFF WBC: CPT | Performed by: EMERGENCY MEDICINE

## 2022-03-30 PROCEDURE — 74176 CT ABD & PELVIS W/O CONTRAST: CPT

## 2022-03-30 PROCEDURE — 81001 URINALYSIS AUTO W/SCOPE: CPT | Performed by: EMERGENCY MEDICINE

## 2022-03-30 PROCEDURE — 99285 EMERGENCY DEPT VISIT HI MDM: CPT | Mod: 25

## 2022-03-30 RX ORDER — CYCLOBENZAPRINE HCL 10 MG
10 TABLET ORAL 3 TIMES DAILY PRN
Qty: 15 TABLET | Refills: 0 | Status: SHIPPED | OUTPATIENT
Start: 2022-03-30 | End: 2022-04-05

## 2022-03-30 RX ORDER — POTASSIUM CHLORIDE 1.5 G/1.58G
20 POWDER, FOR SOLUTION ORAL ONCE
Status: DISCONTINUED | OUTPATIENT
Start: 2022-03-30 | End: 2022-03-30

## 2022-03-30 RX ORDER — ONDANSETRON 2 MG/ML
4 INJECTION INTRAMUSCULAR; INTRAVENOUS ONCE
Status: DISCONTINUED | OUTPATIENT
Start: 2022-03-30 | End: 2022-03-30 | Stop reason: HOSPADM

## 2022-03-30 ASSESSMENT — ENCOUNTER SYMPTOMS
BACK PAIN: 1
RESPIRATORY NEGATIVE: 1
FEVER: 0
CARDIOVASCULAR NEGATIVE: 1
GASTROINTESTINAL NEGATIVE: 1
NEUROLOGICAL NEGATIVE: 1

## 2022-03-30 NOTE — TELEPHONE ENCOUNTER
Huddled with Haase, Susan Rachele APRN CNP      Patient is to go to ER for evaluation, patient states understanding     Sofy Dial, Registered Nurse, PAL (Patient Advocate Liason)   [facility]   [phone]

## 2022-03-30 NOTE — DISCHARGE INSTRUCTIONS
Discharge Instructions  Back Pain  You were seen today for back pain. Back pain can have many causes, but most will get better without surgery or other specific treatment. Sometimes there is a herniated ( slipped ) disc. We do not usually do MRI scans to look for these right away, since most herniated discs will get better on their own with time.  Today, we did not find any evidence that your back pain was caused by a serious condition. However, sometimes symptoms develop over time and cannot be found during an emergency visit, so it is very important that you follow up with your primary provider.  Generally, every Emergency Department visit should have a follow-up clinic visit with either a primary or a specialty clinic/provider. Please follow-up as instructed by your emergency provider today.    Return to the Emergency Department if:  You develop a fever with your back pain.   You have weakness or change in sensation in one or both legs.  You lose control of your bowels or bladder, or cannot empty your bladder (cannot pee).  Your pain gets much worse.     Follow-up with your provider:  Unless your pain has completely gone away, please make an appointment with your provider within one week. Most of the routine care for back pain is available in a clinic and not the Emergency Department. You may need further management of your back pain, such as more pain medication, imaging such as an X-ray or MRI, or physical therapy.    What can I do to help myself?  Remain Active -- People are often afraid that they will hurt their back further or delay recovery by remaining active, but this is one of the best things you can do for your back. In fact, staying in bed for a long time to rest is not recommended. Studies have shown that people with low back pain recover faster when they remain active. Movement helps to bring blood flow to the muscles and relieve muscle spasms as well as preventing loss of muscle strength.  Heat --  Using a heating pad can help with low back pain during the first few weeks. Do not sleep with a heating pad, as you can be burned.   Pain medications - You may take a pain medication such as Tylenol  (acetaminophen), Advil , Motrin  (ibuprofen) or Aleve  (naproxen).  If you were given a prescription for medicine here today, be sure to read all of the information (including the package insert) that comes with your prescription.  This will include important information about the medicine, its side effects, and any warnings that you need to know about.  The pharmacist who fills the prescription can provide more information and answer questions you may have about the medicine.  If you have questions or concerns that the pharmacist cannot address, please call or return to the Emergency Department.   Remember that you can always come back to the Emergency Department if you are not able to see your regular provider in the amount of time listed above, if you get any new symptoms, or if there is anything that worries you.        I recommend that you use your home hydrocodone up to 2 every 4-6 hours as needed for assistance with your acute new left-sided low back pain.  I would advise that you contact your primary care provider to notify them of this acute emergent need for more aggressive pain control at least for the next several days.

## 2022-03-30 NOTE — TELEPHONE ENCOUNTER
Haase, Susan Rachele APRN CNP   Covering providers please review and advise - 2nd level triage     S-(situation): phone call from patient     B-(background): history of back pain, ileitis     A-(assessment): onset 3 days ago   Left side pain above waist and below breast (does not feel this is abdominal pain)   Denies vomiting/diarrhea   Does have some nausea but feels this is due to pain   Urinating okay, denies dysuria/frequency/urgency  Left sided low back pain (history of back pain typically on right side this is different from her normal pain)   Last BM - last night normal for her   No numbness/tingling      Typically takes 3 hydrocodone per day (prescribed amount) - however has used more than she is supposed to (took 1 1/2 extra due to pain)     R-(recommendations): routing to provider for recommendations, no openings in clinic today     Sofy Dial, Registered Nurse, PAL (Patient Advocate Liason)   [facility]   [phone]

## 2022-03-30 NOTE — ED PROVIDER NOTES
History     Chief Complaint:    Flank Pain      HPI   Kahlil Caputo is a 76 year old female who presents with complaints of several days of atraumatic left-sided mid to low back discomfort.  This is a constant ache with occasional sharp exacerbations causing her to feel nauseated.  She does have a history of chronic low back pain which typically however affects her right side.  She takes a couple hydrocodone nightly for this which has not been helpful for her new left-sided pain.  She has had no abdominal pain, hematuria, dysuria, or lower extremity motor or sensory changes.  She does note that her pain seems to be worse with certain positions.    Review of Systems   Constitutional: Negative for fever.   Respiratory: Negative.    Cardiovascular: Negative.    Gastrointestinal: Negative.    Genitourinary: Negative.    Musculoskeletal: Positive for back pain.   Neurological: Negative.    All other systems reviewed and are negative.    Allergies:      Latex  Nsaids  Aspirin  Codeine  Pravastatin  Simvastatin  Tramadol  Bacitracin  Neosporin      Medications:      cyclobenzaprine (FLEXERIL) 10 MG tablet  ACE/ARB NOT PRESCRIBED, INTENTIONAL,  acetaminophen (TYLENOL) 500 MG tablet  alendronate (FOSAMAX) 70 MG tablet  ascorbic acid (VITAMIN C) 500 MG tablet  ASPIRIN NOT PRESCRIBED (INTENTIONAL)  BETA BLOCKER NOT PRESCRIBED, INTENTIONAL,  betaxolol (BETOPTIC) 0.5 % ophthalmic solution  calcium carbonate (OS-THEA) 500 MG tablet  chlorpheniramine (CHLOR-TRIMETON) 4 MG tablet  Cholecalciferol (VITAMIN D) 1000 UNITS capsule  furosemide (LASIX) 20 MG tablet  guaiFENesin (MUCINEX) 600 MG 12 hr tablet  HYDROcodone-acetaminophen (NORCO) 5-325 MG tablet  levothyroxine (SYNTHROID/LEVOTHROID) 150 MCG tablet  melatonin 3 MG tablet  Multiple Vitamin (MULTI-VITAMIN PO)  nystatin (MYCOSTATIN) 123067 UNIT/GM external cream  STATIN NOT PRESCRIBED, INTENTIONAL,        Past Medical History:        Past Medical History:   Diagnosis Date      Arthritis ? 1990s     Aspirin sensitive asthma 5/9/2018     Chronic airway obstruction, not elsewhere classified      Chronic pain      Coronary artery disease involving native coronary artery of native heart without angina pectoris 11/16/2016     Crohn's disease (H) 9/2013     Family history of premature CAD      History of blood transfusion 1975     Migraine, unspecified, without mention of intractable migraine without mention of status migrainosus      PVC's (premature ventricular contractions)      Small bowel obstruction (H) 8/28/2013     Stricture of small intestine (H) 9/2013     TOBACCO ABUSE-CONTINUOUS      Unspecified glaucoma(365.9)      Unspecified hypothyroidism      Patient Active Problem List    Diagnosis Date Noted     Sacroiliitis (H) 09/25/2018     Priority: Medium     Aspirin sensitivity 06/26/2018     Priority: Medium     Statin intolerance 05/09/2018     Priority: Medium     PAD (peripheral artery disease) (H) 05/09/2018     Priority: Medium     Age-related osteoporosis with current pathological fracture with routine healing 04/30/2018     Priority: Medium     Coronary artery disease involving native coronary artery of native heart without angina pectoris 11/16/2016     Priority: Medium     Significant coronary Ca++ on CT scans       ACP (advance care planning) 06/07/2016     Priority: Medium     Advance Care Planning 6/7/2016: ACP Facilitation Session:    Kahlil Caputo presented for ACP Facilitation session at a group session. She was accompanied by no one. [facility] information provided and resources reviewed. She currently wishes to give additional consideration to ACP  She currently has the following questions or concerns about Advance Care Planning: none known.  Confirmed/documented legally designated decision maker(s).  Added by Margie Hewitt RN, Advance Care Planning Liaison.         Pulmonary nodules 11/22/2015     Priority: Medium     11/18/2015:  Low dose CT, recommend  follow up CT in 1 year.   11/2020:  Low dose CT, recommend follow up CT in 1 year.       Chronic pain 10/16/2015     Priority: Medium     Patient is followed by HAASE, SUSAN RACHELE for ongoing prescription of pain medication.  All refills should be approved by this provider, or covering partner.    Medication(s): Norco 5 mg/325 mg take 1 tablet every 4-6 hour prn pain  Maximum quantity per month: 60  Clinic visit frequency required: Q 3  months     Controlled substance agreement on file: Yes       Date(s): 12/17/18    Pain Clinic evaluation in the past: Yes, 1/31/2017,[facility] pain [facility]    DIRE Total Score(s): 16, 1/31/2017    Last [facility] website verification: 1/14/20   [email or web address]       Family history of premature CAD 08/24/2015     Priority: Medium     Palpitations 08/20/2015     Priority: Medium     PVCs on holter 08/2015       PVC's (premature ventricular contractions) 08/20/2015     Priority: Medium     Lumbago 06/09/2015     Priority: Medium     Ileitis (ileocecal resection 8/2013) 04/15/2014     Priority: Medium     Osteoarthritis of hip 11/29/2013     Priority: Medium     Do you wish to do the replacement in the background? yes         Stricture of small intestine (H) 09/01/2013     Priority: Medium     Hypothyroidism 07/09/2003     Priority: Medium     Problem list name updated by automated process. Provider to review          Past Surgical History:        Past Surgical History:   Procedure Laterality Date     APPENDECTOMY  8/30/2013    incidental removal-part of small bowel resection     ARTHROPLASTY HIP  11/25/2013    Procedure: ARTHROPLASTY HIP;  Left Total Hip Arthroplasty  ;  Surgeon: Luis Marshall MD;  Location: [facility] OR     BIOPSY  ? 1990    left thigh-squamous cell carcinoma     BREAST SURGERY  ? 1991    left breast bx-negative     ENT SURGERY  ? 1960    T&A     EYE SURGERY  2016    R/L cataract removal with IOL placement; left cataract...     LAPAROTOMY EXPLORATORY  8/30/2013     Procedure: LAPAROTOMY EXPLORATORY;  LAPAROTOMY EXPLORATORY, Ileocecal Resection Resection, Removal of Retained Pill Cam;  Surgeon: Mitchell Fraser MD;  Location: [facility] OR     RESECTION ILEOCECAL  8/30/2013    Procedure: RESECTION ILEOCECAL;;  Surgeon: Mitchell Fraser MD;  Location: [facility] OR     ZZC NONSPECIFIC PROCEDURE  1970's    D& C X 2     ZZC NONSPECIFIC PROCEDURE  1964    PILONIDAL CYSTECTOMY     ZZC NONSPECIFIC PROCEDURE  1959    T&A     ZZC NONSPECIFIC PROCEDURE  1991    BREAST BX & CERVICAL POLYP     ZZHC COLONOSCOPY THRU STOMA, DIAGNOSTIC      2003       Family History:        Family History   Problem Relation Age of Onset     Alcohol/Drug Mother      Heart Disease Mother         cardiomyopathy     Thyroid Disease Mother      Alcohol/Drug Father      Heart Disease Father         CAD -  1st MI mid 50's     Myocardial Infarction Father         X4     Coronary Artery Disease Father      Osteoporosis Maternal Grandmother      Cancer Maternal Grandfather         stomach or throat - martini drinker/pipe smoker     Myocardial Infarction Brother      Breast Cancer No family hx of      Cancer - colorectal No family hx of        Social History:      Physical Exam     Patient Vitals for the past 24 hrs:   BP Temp Temp src Pulse Resp SpO2   03/30/22 1825 (!) 149/99 -- -- (!) 121 -- 96 %   03/30/22 1733 -- -- -- -- -- 96 %   03/30/22 1732 -- -- -- -- -- 97 %   03/30/22 1731 -- -- -- -- -- 96 %   03/30/22 1730 (!) 165/102 -- -- 107 -- 97 %   03/30/22 1422 -- 98.2  F (36.8  C) Oral -- -- --   03/30/22 1421 (!) 174/112 -- -- 115 20 97 %       Physical Exam  General: Patient is alert and cooperative.  HENT:  Normal appearance.   Eyes: EOMI. Normal conjunctiva.  Neck:  Normal range of motion and appearance.   Cardiovascular:  Rate 110, , regular rhythm  Pulmonary/Chest:  Effort normal.   Abdominal: Soft. No distension or tenderness.     Musculoskeletal: Normal range of motion. No edema or tenderness.   Neurological:  oriented, normal strength, sensation, and coordination.   Skin: Warm and dry. No rash or bruising.   Psychiatric: Normal mood and affect. Normal behavior and judgement.      Emergency Department Course     Imaging:  Lumbar spine CT w/o contrast   Final Result   IMPRESSION:   1.  There is mild chronic-appearing L2 vertebral body height loss. No acute fracture line.      2.  At L3-L4, there is moderate to severe spinal canal stenosis.      3.  At L4-L5, there is severe right and moderate left facet arthropathy. There is moderate spinal canal stenosis and mild to moderate right neural foraminal stenosis.      CT Abdomen Pelvis w/o Contrast   Final Result   IMPRESSION:    1.  No acute abnormalities seen to explain patient's left flank pain.      2.  1 mm x 1 mm nonobstructive stone right kidney.      3.  Cholelithiasis with no evidence for cholecystitis.      4.  Advanced vascular calcification with no aneurysmal dilatation.      5.  Numerous nonacute incidental findings, please refer to above report for details.             Laboratory:  Labs Ordered and Resulted from Time of ED Arrival to Time of ED Departure   BASIC METABOLIC PANEL - Abnormal       Result Value    Sodium 130 (*)     Potassium 3.2 (*)     Chloride 93 (*)     Carbon Dioxide (CO2) 28      Anion Gap 9      Urea Nitrogen 7      Creatinine 0.64      Calcium 9.0      Glucose 112 (*)     GFR Estimate >90     ROUTINE UA WITH MICROSCOPIC REFLEX TO CULTURE - Abnormal    Color Urine Light Yellow      Appearance Urine Clear      Glucose Urine Negative      Bilirubin Urine Negative      Ketones Urine 20  (*)     Specific Gravity Urine 1.009      Blood Urine Negative      pH Urine 5.5      Protein Albumin Urine Negative      Urobilinogen Urine Normal      Nitrite Urine Negative      Leukocyte Esterase Urine Negative      Mucus Urine Present (*)     RBC Urine <1      WBC Urine 0      Squamous Epithelials Urine <1      Hyaline Casts Urine 1     CBC WITH PLATELETS AND  DIFFERENTIAL    WBC Count 8.7      RBC Count 4.99      Hemoglobin 14.5      Hematocrit 44.9      MCV 90      MCH 29.1      MCHC 32.3      RDW 12.7      Platelet Count 349      % Neutrophils 64      % Lymphocytes 23      % Monocytes 9      % Eosinophils 2      % Basophils 1      % Immature Granulocytes 1      NRBCs per 100 WBC 0      Absolute Neutrophils 5.7      Absolute Lymphocytes 2.0      Absolute Monocytes 0.8      Absolute Eosinophils 0.1      Absolute Basophils 0.0      Absolute Immature Granulocytes 0.0      Absolute NRBCs 0.0         Emergency Department Course:    Reviewed:    I reviewed nursing notes, vitals, past history and care everywhere    Assessments:   I obtained history and examined the patient as noted above.    I rechecked the patient and explained findings.    Disposition:  The patient was discharged to home.    Impression & Plan    Medical Decision Making:  This patient presented with back pain.  No history of trauma. The patient has not had a fever, saddle/perineal anesthesia, bilateral foot numbness, or bowel or bladder dysfunction.  There is no clinical evidence of cauda equina syndrome, discitis, spinal/epidural space hematoma or epidural abscess. Given L sided flank pain, CT a/p performed, and CT L-spine.  Results above, unremarkable.  The neurological exam is normal and the patient's symptoms are consistent with a musculoskeletal (myofascial) strain. The patient will be discharged with pain medications to use as directed.  Ice or heat to the back and stretching exercises.  No heavy lifting, bending or twisting. Return if increasing pain, numbness, weakness, or bowel or bladder dysfunction.  The patient was advised to schedule follow-up with his/her primary doctor within 3 days to re-assess symptoms.        Diagnosis:    ICD-10-CM    1. Acute left-sided low back pain without sciatica  M54.50    2. Hypokalemia  E87.6        Discharge Medications:  Discharge Medication List as of 3/30/2022   6:30 PM      START taking these medications    Details   cyclobenzaprine (FLEXERIL) 10 MG tablet Take 1 tablet (10 mg) by mouth 3 times daily as needed for muscle spasms, Disp-15 tablet, R-0, E-Prescribe                  Alan Ragsdale MD  03/30/22 6034

## 2022-03-31 ENCOUNTER — PATIENT OUTREACH (OUTPATIENT)
Dept: CARE COORDINATION | Facility: CLINIC | Age: 77
End: 2022-03-31
Payer: COMMERCIAL

## 2022-03-31 DIAGNOSIS — Z71.89 OTHER SPECIFIED COUNSELING: ICD-10-CM

## 2022-03-31 NOTE — PROGRESS NOTES
Clinic Care Coordination Contact  [facility]: Post-Discharge Note  SITUATION                                                      Admission:    Admission Date: 03/30/22   Reason for Admission: Flank Pain  Discharge:   Discharge Date: 03/30/22  Discharge Diagnosis: Flank Pain    BACKGROUND                                                      Per hospital discharge summary and inpatient provider notes:  patient presented with back pain.  No history of trauma. The patient has not had a fever, saddle/perineal anesthesia, bilateral foot numbness, or bowel or bladder dysfunction.  There is no clinical evidence of cauda equina syndrome, discitis, spinal/epidural space hematoma or epidural abscess. Given L sided flank pain, CT a/p performed, and CT L-spine.  Results above, unremarkable.  The neurological exam is normal and the patient's symptoms are consistent with a musculoskeletal (myofascial) strain. The patient will be discharged with pain medications to use as directed.  Ice or heat to the back and stretching exercises.  No heavy lifting, bending or twisting. Return if increasing pain, numbness, weakness, or bowel or bladder dysfunction.  The patient was advised to schedule follow-up with his/her primary doctor within 3 days to re-assess symptoms.    ASSESSMENT      Enrollment  Primary Care Care Coordination Status: Declined    Discharge Assessment  How are you doing now that you are home?: still having pain, muscle relaxant helped  How are your symptoms? (Red Flag symptoms escalate to triage hotline per guidelines): Unchanged  Do you feel your condition is stable enough to be safe at home until your provider visit?: Yes  Does the patient have their discharge instructions? : Yes  Does the patient have questions regarding their discharge instructions? : No  Were you started on any new medications or were there changes to any of your previous medications? : Yes  Does the patient have all of their medications?:  Yes  Do you have questions regarding any of your medications? : No  Discharge follow-up appointment scheduled within 14 calendar days? : Yes (NA)  Discharge Follow Up Appointment Date: 04/05/22  Discharge Follow Up Appointment Scheduled with?: Primary Care Provider      PLAN                                                      Outpatient Plan: The patient was advised to schedule follow-up with his/her primary doctor within 3 days to re-assess symptoms.     Future Appointments   Date Time Provider Department Center   4/5/2022  2:30 PM Haase, Susan Rachele, APRN CNP [facility]   5/17/2022  1:00 PM Haase, Susan Rachele, APRN CNP [facility]   6/8/2022  2:30 PM Ирина Acuña MD [facility]         For any urgent concerns, please contact our 24 hour nurse triage line: [phone] ([phone])         Sujata Contreras MA

## 2022-04-05 DIAGNOSIS — R10.9 ACUTE RIGHT FLANK PAIN: Primary | ICD-10-CM

## 2022-04-05 DIAGNOSIS — M54.50 BILATERAL LOW BACK PAIN WITHOUT SCIATICA, UNSPECIFIED CHRONICITY: ICD-10-CM

## 2022-04-05 RX ORDER — CYCLOBENZAPRINE HCL 10 MG
10 TABLET ORAL 2 TIMES DAILY PRN
Qty: 15 TABLET | Refills: 0 | Status: SHIPPED | OUTPATIENT
Start: 2022-04-05 | End: 2022-05-24

## 2022-04-05 NOTE — TELEPHONE ENCOUNTER
Pt had an appt with  today for ER f/u but has been rescheduled with AG on 4/8/22.  Pt states she is out of Flexeril that was given to her in the ER.  She would like a refill.  Rx and pharmacy t'd up.  Pt would like a call back when this is approved/denied.  Diana Munguia, CMA

## 2022-04-08 ENCOUNTER — OFFICE VISIT (OUTPATIENT)
Dept: FAMILY MEDICINE | Facility: CLINIC | Age: 77
End: 2022-04-08
Payer: COMMERCIAL

## 2022-04-08 VITALS
TEMPERATURE: 97.7 F | BODY MASS INDEX: 27.71 KG/M2 | DIASTOLIC BLOOD PRESSURE: 80 MMHG | HEART RATE: 97 BPM | SYSTOLIC BLOOD PRESSURE: 138 MMHG | RESPIRATION RATE: 18 BRPM | OXYGEN SATURATION: 97 % | WEIGHT: 166.5 LBS

## 2022-04-08 DIAGNOSIS — M48.061 SPINAL STENOSIS OF LUMBAR REGION WITHOUT NEUROGENIC CLAUDICATION: Primary | ICD-10-CM

## 2022-04-08 DIAGNOSIS — G89.29 OTHER CHRONIC PAIN: ICD-10-CM

## 2022-04-08 PROCEDURE — 99214 OFFICE O/P EST MOD 30 MIN: CPT | Performed by: NURSE PRACTITIONER

## 2022-04-08 RX ORDER — METHYLPREDNISOLONE 4 MG
TABLET, DOSE PACK ORAL
Qty: 21 TABLET | Refills: 0 | Status: SHIPPED | OUTPATIENT
Start: 2022-04-08 | End: 2022-05-24

## 2022-04-08 RX ORDER — HYDROCODONE BITARTRATE AND ACETAMINOPHEN 5; 325 MG/1; MG/1
1-2 TABLET ORAL EVERY 8 HOURS PRN
Qty: 60 TABLET | Refills: 0 | Status: SHIPPED | OUTPATIENT
Start: 2022-04-08 | End: 2022-04-20

## 2022-04-08 NOTE — PROGRESS NOTES
"  {PROVIDER CHARTING PREFERENCE:110166}    Paulina Guillory is a 76 year old who presents for the following health issues {ACCOMPANIED BY STATEMENT (Optional):129391}    HPI     Post Discharge Outreach 3/31/2022   Admission Date 3/30/2022   Reason for Admission Flank Pain   Discharge Date 3/30/2022   Discharge Diagnosis Flank Pain   How are you doing now that you are home? still having pain, muscle relaxant helped   How are your symptoms? (Red Flag symptoms escalate to triage hotline per guidelines) Unchanged   Do you feel your condition is stable enough to be safe at home until your provider visit? Yes   Does the patient have their discharge instructions?  Yes   Does the patient have questions regarding their discharge instructions?  No   Were you started on any new medications or were there changes to any of your previous medications?  Yes   Does the patient have all of their medications? Yes   Do you have questions regarding any of your medications?  No   Discharge follow-up appointment scheduled within 14 calendar days?  Yes   Discharge Follow Up Appointment Date 4/5/2022   Discharge Follow Up Appointment Scheduled with? Primary Care Provider     Hospital Follow-up Visit:    Hospital/Nursing Home/IP Rehab Facility: {INPT AND SNF DISCHARGE:374454}  Date of Admission: ***  Date of Discharge: ***  Reason(s) for Admission: ***      Was your hospitalization related to COVID-19? {Yes add questions_No:426094}  Problems taking medications regularly:  {NONE DEFAULTED:880083::\"None\"}  Medication changes since discharge: {NONE DEFAULTED:627453::\"None\"}  Problems adhering to non-medication therapy:  {NONE DEFAULTED:027311::\"None\"}    Summary of hospitalization:  {HOSPITAL DISCHARGE SUMMARY INFO:059998::\"[facility] hospital discharge summary reviewed\"}  Diagnostic Tests/Treatments reviewed.  Follow up needed: {NONE DEFAULTED:939422::\"none\"}  Other Healthcare Providers Involved in Patient s Care:         {those " "currently involved after discharge:760902::\"None\"}  Update since discharge: {IMPROVED DEFAULT:338547::\"improved.\"} {TIP  Include information from family/caregivers, SNF, Care Coordination :009504}      Post Discharge Medication Reconciliation: {ACO Med Rec (Provider):906011}.  Plan of care communicated with {Communicate Plan to:511498::\"patient\"}     {Reference  Coding guidelines- Moderate Complexity F2F/Video within 7 - 14 days of discharge 0139964, High Complexity F2F/Video within 7 days 1356882 or icrrxx71 days 9832642 :107658}           Review of Systems   {ROS COMP (Optional):003367}      Objective    There were no vitals taken for this visit.  There is no height or weight on file to calculate BMI.  Physical Exam   {Exam List (Optional):576124}    {Diagnostic Test Results (Optional):721308}    {AMBULATORY ATTESTATION (Optional):573966}          "

## 2022-04-08 NOTE — PATIENT INSTRUCTIONS
Please follow-up instruction per Medrol Dosepak    May increase hydrocodone to 1-2 tab every 8 hour as needed (max 6 per day for current fill.)    Consult Spine Clinic.     Follow-up sooner with any worsening symptoms

## 2022-04-08 NOTE — PROGRESS NOTES
Assessment & Plan     Spinal stenosis of lumbar region without neurogenic claudication  Uncontrolled pain. Emergency Department CT with moderate to severe spinal stenosis and suspect for pain, abdominal CT without concerns.   Will trial Medrol Dosepak, will elevated blood pressure with prednisone use.   Discussed and agreed to temporary increase to patient's current hydrocodone to 1-2 tab every 8 hours as needed.  Given degree of stenosis and pain, will plan for priority consult with Spine Clinic.   Discussed red flags and need for urgent follow-up.   Follow-up 1 month; sooner as needed.    record reviewed and is without red flags for controlled substance activity.   - methylPREDNISolone (MEDROL DOSEPAK) 4 MG tablet therapy pack  Dispense: 21 tablet; Refill: 0  - Spine Referral    Other chronic pain  As above.   - HYDROcodone-acetaminophen (NORCO) 5-325 MG tablet  Dispense: 60 tablet; Refill: 0               Patient Instructions   Please follow-up instruction per Medrol Dosepak    May increase hydrocodone to 1-2 tab every 8 hour as needed (max 6 per day for current fill.)    Consult Spine Clinic.     Follow-up sooner with any worsening symptoms       Return in about 1 month (around 5/8/2022) for Follow-up back pain.    MALIK Mendez [facility]    Paulina Guillory is a 76 year old who presents for the following health issues     HPI     Post Discharge Outreach 3/31/2022   Admission Date 3/30/2022   Reason for Admission Flank Pain   Discharge Date 3/30/2022   Discharge Diagnosis Flank Pain   How are you doing now that you are home? still having pain, muscle relaxant helped   How are your symptoms? (Red Flag symptoms escalate to triage hotline per guidelines) Unchanged   Do you feel your condition is stable enough to be safe at home until your provider visit? Yes   Does the patient have their discharge instructions?  Yes   Does the patient have questions regarding their  discharge instructions?  No   Were you started on any new medications or were there changes to any of your previous medications?  Yes   Does the patient have all of their medications? Yes   Do you have questions regarding any of your medications?  No   Discharge follow-up appointment scheduled within 14 calendar days?  Yes   Discharge Follow Up Appointment Date 4/5/2022   Discharge Follow Up Appointment Scheduled with? Primary Care Provider     History of chronic right lumbar pain, now on left.   Has used all intervention that have worked in past without benefits.   Cyclobenzaprine is helpful temporarily.   Pain can be 10/10 in morning.   Was given the OK by Emergency Department to increase hydrocodone to every 1-2 tab Q 6 hours.   CT of lumbar in Emergency Department showed:   IMPRESSION:  1.  There is mild chronic-appearing L2 vertebral body height loss. No acute fracture line.  2.  At L3-L4, there is moderate to severe spinal canal stenosis.     3.  At L4-L5, there is severe right and moderate left facet arthropathy. There is moderate spinal canal stenosis and mild to moderate right neural foraminal stenosis.    Review of Systems         Objective    /80 (BP Location: Right arm, Patient Position: Chair, Cuff Size: Adult Regular)   Pulse 97   Temp 97.7  F (36.5  C) (Oral)   Resp 18   Wt 75.5 kg (166 lb 8 oz)   SpO2 97%   BMI 27.71 kg/m    Body mass index is 27.71 kg/m .  Physical Exam

## 2022-04-14 ENCOUNTER — MYC MEDICAL ADVICE (OUTPATIENT)
Dept: FAMILY MEDICINE | Facility: CLINIC | Age: 77
End: 2022-04-14
Payer: COMMERCIAL

## 2022-04-15 ENCOUNTER — PATIENT OUTREACH (OUTPATIENT)
Dept: FAMILY MEDICINE | Facility: CLINIC | Age: 77
End: 2022-04-15
Payer: COMMERCIAL

## 2022-04-15 NOTE — TELEPHONE ENCOUNTER
See my chart     Sofy Dial, Registered Nurse, PAL (Patient Advocate Liason)   [facility]   [phone]

## 2022-04-15 NOTE — TELEPHONE ENCOUNTER
RN PAL received call from patient after reviewing my chart message that pcp is out of the office     Continues with back pain, saw Geraldine Freitas CNP recently and referral placed to spine provider, appt scheduled 4/25/2022     Taking pain medication as prescribed, using ice,warm packs     RN advised that if symptoms worsen, or needs refill of medication to call as she would normally and we will ask for recommendations from covering providers     Patient advised of urgent care locations if needed over the weekend [facility] and [facility] are closest   Educated how to call 24 hour nurse line     Sofy Dial Registered Nurse, PAL (Patient Advocate Liason)   [facility]   [phone]

## 2022-04-19 DIAGNOSIS — G89.29 OTHER CHRONIC PAIN: ICD-10-CM

## 2022-04-19 NOTE — TELEPHONE ENCOUNTER
Haase, Susan Rachele APRN CNP / Covering providers     Patient has controlled substance agreement with pcp who is currently out of the office   Recent visit with Geraldine Freitas CNP who ordered refill 4/8/2022 # 60  patient will be out of medication in a few days   appt with back specialist on 4/25/2022   Trying to use sparingly during the day but mostly at night when laying/turning is pain worse     Routing refill request to provider for review/approval because:  Drug not on the [facility] refill protocol     Sofy Dial, Registered Nurse, PAL (Patient Advocate Liason)   [facility]   [phone]

## 2022-04-20 RX ORDER — HYDROCODONE BITARTRATE AND ACETAMINOPHEN 5; 325 MG/1; MG/1
1 TABLET ORAL EVERY 8 HOURS PRN
Qty: 90 TABLET | Refills: 0 | Status: SHIPPED | OUTPATIENT
Start: 2022-04-20 | End: 2022-05-18

## 2022-04-20 NOTE — TELEPHONE ENCOUNTER
Message left for patient to return call to this RN PAL - please transfer if available     Direct number left for this RN PAL on message for return call     Sofy Dial Registered Nurse PAL (patient advocate liaison)   [facility] [phone]

## 2022-04-20 NOTE — TELEPHONE ENCOUNTER
Refilled. Please update patient to return to use of 1 tab three times per day as needed. No more than 3 tablets/24 hours.   Thank you  MALIK Mendez CNP on 4/20/2022 at 8:06 AM

## 2022-04-20 NOTE — TELEPHONE ENCOUNTER
Are you okay with a refill at this time?  It looks like you saw her back on 4/8/22.    Vero remains out of the office

## 2022-04-20 NOTE — TELEPHONE ENCOUNTER
Geraldine Freitas CNP     RN wanting to clarify -     Refill that was sent was for #90 was this intentional increase?   Pain contract states #60 per month, patient did have an acute exacerbation of pain but note states to decrease use back to previous so wanted to make sure that the rx was correct at #90     Sofy Dial, Registered Nurse, PAL (Patient Advocate Liason)   [facility]   [phone]

## 2022-04-20 NOTE — TELEPHONE ENCOUNTER
"RN kim with Geraldine Freitas CNP   Review of  stated that patient had been obtaining #90 however note on problem list was not updated     RN spoke to patient, advised refill was sent and advised of directions below for patient not to take over 3 tablets in 24 hours patient states \"well good luck with that\" \"I am allergic to all NSAIDS and there is nothing else I can take for pain\"   RN advised that if this plan is not followed you that future refills may be denied     Sofy Dial, Registered Nurse, PAL (Patient Advocate Liason)   [facility]   [phone]     "

## 2022-04-25 ENCOUNTER — OFFICE VISIT (OUTPATIENT)
Dept: NEUROSURGERY | Facility: CLINIC | Age: 77
End: 2022-04-25
Attending: NURSE PRACTITIONER
Payer: COMMERCIAL

## 2022-04-25 VITALS — HEIGHT: 65 IN | WEIGHT: 166 LBS | BODY MASS INDEX: 27.66 KG/M2

## 2022-04-25 DIAGNOSIS — M48.061 SPINAL STENOSIS OF LUMBAR REGION WITH RADICULOPATHY: Primary | ICD-10-CM

## 2022-04-25 DIAGNOSIS — M48.061 SPINAL STENOSIS OF LUMBAR REGION WITHOUT NEUROGENIC CLAUDICATION: ICD-10-CM

## 2022-04-25 DIAGNOSIS — M54.16 SPINAL STENOSIS OF LUMBAR REGION WITH RADICULOPATHY: Primary | ICD-10-CM

## 2022-04-25 PROCEDURE — 99204 OFFICE O/P NEW MOD 45 MIN: CPT | Performed by: NEUROLOGICAL SURGERY

## 2022-04-25 PROCEDURE — G0463 HOSPITAL OUTPT CLINIC VISIT: HCPCS

## 2022-04-25 ASSESSMENT — PAIN SCALES - GENERAL: PAINLEVEL: WORST PAIN (10)

## 2022-04-25 NOTE — PATIENT INSTRUCTIONS
Patient Next Steps:    Order placed for Lumbar 4-5 epidural steroid injection  The steroid can take 10-14 days to reach max effect  Please call our clinic if symptoms persist after this timeframe.  You can call to schedule injection at [phone]    Order placed for physical therapy. You can call the phone number highlighted in the order to schedule your appointment. Please call our clinic if symptoms persist after your course of physical therapy.  If you have not heard from the scheduling office within 2 business days, please call [phone] for  [facility] [place], [phone] for [facility] and [phone] for [facility].    Order placed for Lumbar MRI without contrast. You can schedule at our  today, or central scheduling will call you to make the appointment. If you do not hear from them within 1-2 business days you can call the numbers below. We will call you with the results and next steps once imaging is completed.  [phone]     Please call us if you have any further questions or concerns.    [facility]   Phone: [phone]  Fax: [phone]

## 2022-04-25 NOTE — PROGRESS NOTES
It was a pleasure to see Kahlil Caputo today in Neurosurgery Clinic. She is a 76 year old female has several weeks of low back pain.  It seemed to start initially on the left but then is moved to the right and then back to the left some.  The pain is mainly in the paraspinal area between the lower ribs and upper iliac crest.  She has been given a Medrol Dosepak which seemed to help.  She does describe some symptoms that seem to radiate down the legs and what sounds like an L5 distribution but this seems to be intermittent in nature.  There may be some numbness and tingling in these areas as well.    Past Medical History:   Diagnosis Date     Arthritis ? 1990s    osteoarthritis     Aspirin sensitive asthma 5/9/2018     Chronic airway obstruction, not elsewhere classified      Chronic pain     hip and left shoulder     Coronary artery disease involving native coronary artery of native heart without angina pectoris 11/16/2016     Crohn's disease (H) 9/2013    with stricture in small intestine     Family history of premature CAD      History of blood transfusion 1975    post-partum hemorrhage     Migraine, unspecified, without mention of intractable migraine without mention of status migrainosus      PVC's (premature ventricular contractions)      Small bowel obstruction (H) 8/28/2013    Findings at the time of surgery include a segment of active Crohns' Ileitis with stenosis and pill cam which became lodged in the narrowed area.      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 1990    left thigh-squamous cell  "carcinoma     BREAST SURGERY  ? 1991    left breast bx-negative     ENT SURGERY  ? 1960    T&A     EYE SURGERY  2016    R/L cataract removal with IOL placement; left cataract...     LAPAROTOMY EXPLORATORY  8/30/2013    Procedure: LAPAROTOMY EXPLORATORY;  LAPAROTOMY EXPLORATORY, Ileocecal Resection Resection, Removal of Retained Pill Cam;  Surgeon: Mitchell Fraser MD;  Location: [facility] OR     RESECTION ILEOCECAL  8/30/2013    Procedure: RESECTION ILEOCECAL;;  Surgeon: Mitchell Fraser MD;  Location: [facility] OR     ZZC NONSPECIFIC PROCEDURE  1970's    D& C X 2     ZZC NONSPECIFIC PROCEDURE  1964    PILONIDAL CYSTECTOMY     ZZC NONSPECIFIC PROCEDURE  1959    T&A     Z NONSPECIFIC PROCEDURE  1991    BREAST BX & CERVICAL POLYP     ZZ COLONOSCOPY THRU STOMA, DIAGNOSTIC      2003        Allergies   Allergen Reactions     Latex Difficulty breathing     sensativity - cough, eyes water     Nsaids Shortness Of Breath and Other (See Comments)     bronchospams     Aspirin Difficulty breathing     tight cough     Codeine GI Disturbance     upset GI     Pravastatin      Myalgias     Simvastatin      Myalgias     Tramadol      Heart burn,  Felt \"clammy\", unable to pass gas, unable to urinate and had severe nausea     Bacitracin Rash     Neosporin Blisters, Itching and Rash       Current Outpatient Medications:      ACE/ARB NOT PRESCRIBED, INTENTIONAL,, Please choose reason not prescribed, below, Disp: , Rfl:      acetaminophen (TYLENOL) 500 MG tablet, Take 500-1,000 mg by mouth as needed for mild pain, Disp: , Rfl:      alendronate (FOSAMAX) 70 MG tablet, TAKE 1 TABLET BY MOUTH ONCE WEEKLY. TAKE 30 MINUTES BEFORE FIRST FOOD-DRINK-MEDICATION. AVOID LYING DOWN FOR 30 MINUTES AFTER TAKING, Disp: 12 tablet, Rfl: 0     ascorbic acid (VITAMIN C) 500 MG tablet, Take by mouth 2 times daily, Disp: , Rfl:      ASPIRIN NOT PRESCRIBED (INTENTIONAL), Please choose reason not prescribed, below, Disp: 0 each, Rfl: 0     BETA BLOCKER NOT " PRESCRIBED, INTENTIONAL,, Beta Blocker not prescribed intentionally due to COPD, Disp: , Rfl: 0     betaxolol (BETOPTIC) 0.5 % ophthalmic solution, INSTILL 1 DROP INTO EACH EYE QD, Disp: , Rfl: 3     calcium carbonate (OS-THEA) 500 MG tablet, 2 tablets 3 times daily , Disp: 100 tablet, Rfl: 3     chlorpheniramine (CHLOR-TRIMETON) 4 MG tablet, Take 4 mg by mouth every 6 hours as needed for allergies or rhinitis, Disp: , Rfl:      Cholecalciferol (VITAMIN D) 1000 UNITS capsule, Take 3,000 Units by mouth daily , Disp: , Rfl:      cyclobenzaprine (FLEXERIL) 10 MG tablet, Take 1 tablet (10 mg) by mouth 2 times daily as needed for muscle spasms, Disp: 15 tablet, Rfl: 0     furosemide (LASIX) 20 MG tablet, Take 1 tablet (20 mg) by mouth 2 times daily, Disp: 180 tablet, Rfl: 1     guaiFENesin (MUCINEX) 600 MG 12 hr tablet, Take 1,200 mg by mouth 2 times daily as needed , Disp:  , Rfl:      HYDROcodone-acetaminophen (NORCO) 5-325 MG tablet, Take 1 tablet by mouth every 8 hours as needed for pain or severe pain Max 3 tablets/24 hours, Disp: 90 tablet, Rfl: 0     levothyroxine (SYNTHROID/LEVOTHROID) 150 MCG tablet, Take 1 tablet (150 mcg) by mouth daily, Disp: 90 tablet, Rfl: 3     melatonin 3 MG tablet, Take 1-2 tablets (3-6 mg) by mouth nightly as needed for sleep (OTC), Disp: , Rfl:      methylPREDNISolone (MEDROL DOSEPAK) 4 MG tablet therapy pack, Follow Package Directions, Disp: 21 tablet, Rfl: 0     Multiple Vitamin (MULTI-VITAMIN PO), Take by mouth daily , Disp: , Rfl:      nystatin (MYCOSTATIN) 693520 UNIT/GM external cream, Apply topically twice daily, Disp: 30 g, Rfl: 0     STATIN NOT PRESCRIBED, INTENTIONAL,, 1 each See Admin Instructions Statin not prescribed intentionally due to Allergy to statin, Disp: 0 each, Rfl: 0  Social History     Socioeconomic History     Marital status:      Spouse name: None     Number of children: 2     Years of education: 17     Highest education level: None   Occupational  History     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 2009     Employer: RETIRED   Tobacco Use     Smoking status: Former Smoker     Packs/day: 1.00     Years: 50.00     Pack years: 50.00     Types: Cigarettes     Quit date: 2010     Years since quittin.8     Smokeless tobacco: Never Used     Tobacco comment: no tobacco use since 2010   Substance and Sexual Activity     Alcohol use: Yes     Alcohol/week: 0.0 standard drinks     Comment: occasional     Drug use: No     Sexual activity: Not Currently   Other Topics Concern      Service No     Blood Transfusions Yes     Comment: With second miscarriage in  and after childbirth in      Caffeine Concern Yes     Comment: 0-1 cup coffee per day     Occupational Exposure Yes     Comment: sick building syndrome     Hobby Hazards No     Sleep Concern Yes     Comment: takes melatonin, sleeps 5 hours     Stress Concern Yes     Comment: lesion removed from left thigh, pathology questionable, will have re-excision next week     Weight Concern No     Special Diet Yes     Comment: low residue, steamed veggies, greek yogurt, low sodium     Back Care Yes     Comment: low back     Exercise Yes     Comment: Curves 3 days week     Seat Belt Yes     Self-Exams Yes     Comment: sporadic     Parent/sibling w/ CABG, MI or angioplasty before 65F 55M? No     Social Determinants of Health     Financial Resource Strain: Low Risk      Difficulty of Paying Living Expenses: Not hard at all   Food Insecurity: No Food Insecurity     Worried About Running Out of Food in the Last Year: Never true     Ran Out of Food in the Last Year: Never true   Transportation Needs: No Transportation Needs     Lack of Transportation (Medical): No     Lack of Transportation (Non-Medical): No   Physical Activity: Inactive     Days of Exercise per Week: 0 days     Minutes of Exercise per Session: 0 min   Stress: No Stress Concern Present     Feeling of Stress :  "Not at all   Social Connections: Unknown     Frequency of Communication with Friends and Family: More than three times a week     Frequency of Social Gatherings with Friends and Family: Patient refused     Attends Islam Services: Patient refused     Active Member of Clubs or Organizations: Patient refused     Marital Status:    Housing Stability: Low Risk      Unable to Pay for Housing in the Last Year: No     Number of Places Lived in the Last Year: 1     Unstable Housing in the Last Year: No      Problem (# of Occurrences) Relation (Name,Age of Onset)    Alcohol/Drug (2) Mother (Karen), Father ()    Cancer (1) Maternal Grandfather: stomach or throat - martini drinker/pipe smoker    Coronary Artery Disease (1) Father ()    Heart Disease (2) Mother (Karen): cardiomyopathy, Father (): CAD -  1st MI mid 50's    Myocardial Infarction (2) Father (): X4, Brother    Osteoporosis (1) Maternal Grandmother (Grandma)    Thyroid Disease (1) Mother (Karen)       Negative family history of: Breast Cancer, Cancer - colorectal           ROS: 10 point ROS neg other than the symptoms noted above in the HPI.    Vitals:    22 1343   Weight: 75.3 kg (166 lb)   Height: 1.651 m (5' 5\")     Estimated body mass index is 27.62 kg/m  as calculated from the following:    Height as of this encounter: 1.651 m (5' 5\").    Weight as of this encounter: 75.3 kg (166 lb).  Worst Pain (10)    Oswestry (JUNIOR) Questionnaire    OSWESTRY DISABILITY INDEX 2017   Count 8   Sum 15   Oswestry Score (%) 37.5   Some recent data might be hidden       Visual Analog Scale (VAS) Questionnaire    No flowsheet data found.       Posture erect with antalgic behavior.  Bilateral lower extremity strength is 5 out of 5 in all muscle groups.  Reflexes symmetric and normal.  Sensation intact to light touch.    Negative straight leg raise bilaterally.  Negative LEXUS sign " bilaterally.    Imaging: CT scan of the lumbar spine performed in the emergency room is not terribly helpful but seems to suggest that there may be some stenosis at different levels.  She has an MRI from 2019 which demonstrates some moderate stenosis at multiple levels.    Assessment: Back pain, possible lumbar stenosis with radiculopathy.    Plan: I have recommended an MRI of the lumbar spine, epidural steroid injection, physical therapy.  We will see how these things help address her symptoms and see if they can get her feeling better.

## 2022-04-25 NOTE — LETTER
4/25/2022         RE: Kahlil aCputo  [address]  [facility] [address]        Dear Colleague,    Thank you for referring your patient, Kahlil Caputo, to the [facility] [place]. Please see a copy of my visit note below.    It was a pleasure to see Kahlil Caputo today in Neurosurgery Clinic. She is a 76 year old female has several weeks of low back pain.  It seemed to start initially on the left but then is moved to the right and then back to the left some.  The pain is mainly in the paraspinal area between the lower ribs and upper iliac crest.  She has been given a Medrol Dosepak which seemed to help.  She does describe some symptoms that seem to radiate down the legs and what sounds like an L5 distribution but this seems to be intermittent in nature.  There may be some numbness and tingling in these areas as well.    Past Medical History:   Diagnosis Date     Arthritis ? 1990s    osteoarthritis     Aspirin sensitive asthma 5/9/2018     Chronic airway obstruction, not elsewhere classified      Chronic pain     hip and left shoulder     Coronary artery disease involving native coronary artery of native heart without angina pectoris 11/16/2016     Crohn's disease (H) 9/2013    with stricture in small intestine     Family history of premature CAD      History of blood transfusion 1975    post-partum hemorrhage     Migraine, unspecified, without mention of intractable migraine without mention of status migrainosus      PVC's (premature ventricular contractions)      Small bowel obstruction (H) 8/28/2013    Findings at the time of surgery include a segment of active Crohns' Ileitis with stenosis and pill cam which became lodged in the narrowed area.      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 1990    left thigh-squamous cell carcinoma     BREAST SURGERY  ? 1991    left breast bx-negative     ENT SURGERY  ? 1960    T&A     EYE SURGERY  2016    R/L cataract removal with IOL placement; left cataract...     LAPAROTOMY EXPLORATORY  8/30/2013    Procedure: LAPAROTOMY EXPLORATORY;  LAPAROTOMY EXPLORATORY, Ileocecal Resection Resection, Removal of Retained Pill Cam;  Surgeon: Mitchell Fraser MD;  Location: [facility] OR     RESECTION ILEOCECAL  8/30/2013    Procedure: RESECTION ILEOCECAL;;  Surgeon: Mitchell Fraser MD;  Location: [facility] OR     Z NONSPECIFIC PROCEDURE  1970's    D& C X 2     ZZ NONSPECIFIC PROCEDURE  1964    PILONIDAL CYSTECTOMY     Z NONSPECIFIC PROCEDURE  1959    T&A     ZZ NONSPECIFIC PROCEDURE  1991    BREAST BX & CERVICAL POLYP     ZZ COLONOSCOPY THRU STOMA, DIAGNOSTIC      2003        Allergies   Allergen Reactions     Latex Difficulty breathing     sensativity - cough, eyes water     Nsaids Shortness Of Breath and Other (See Comments)     bronchospams     Aspirin Difficulty breathing     tight cough     Codeine GI Disturbance     upset GI     Pravastatin      Myalgias     Simvastatin      Myalgias     Tramadol      Heart burn,  Felt \"clammy\", unable to pass gas, unable to urinate and had severe nausea     Bacitracin Rash     Neosporin Blisters, Itching and Rash       Current Outpatient Medications:      ACE/ARB NOT PRESCRIBED, INTENTIONAL,, Please choose reason not prescribed, below, Disp: , Rfl:      acetaminophen (TYLENOL) 500 MG tablet, Take 500-1,000 mg by mouth as needed for mild pain, Disp: , Rfl:      alendronate (FOSAMAX) 70 MG tablet, TAKE 1 TABLET BY MOUTH ONCE WEEKLY. TAKE 30 MINUTES BEFORE FIRST FOOD-DRINK-MEDICATION. AVOID LYING DOWN FOR " 30 MINUTES AFTER TAKING, Disp: 12 tablet, Rfl: 0     ascorbic acid (VITAMIN C) 500 MG tablet, Take by mouth 2 times daily, Disp: , Rfl:      ASPIRIN NOT PRESCRIBED (INTENTIONAL), Please choose reason not prescribed, below, Disp: 0 each, Rfl: 0     BETA BLOCKER NOT PRESCRIBED, INTENTIONAL,, Beta Blocker not prescribed intentionally due to COPD, Disp: , Rfl: 0     betaxolol (BETOPTIC) 0.5 % ophthalmic solution, INSTILL 1 DROP INTO EACH EYE QD, Disp: , Rfl: 3     calcium carbonate (OS-THEA) 500 MG tablet, 2 tablets 3 times daily , Disp: 100 tablet, Rfl: 3     chlorpheniramine (CHLOR-TRIMETON) 4 MG tablet, Take 4 mg by mouth every 6 hours as needed for allergies or rhinitis, Disp: , Rfl:      Cholecalciferol (VITAMIN D) 1000 UNITS capsule, Take 3,000 Units by mouth daily , Disp: , Rfl:      cyclobenzaprine (FLEXERIL) 10 MG tablet, Take 1 tablet (10 mg) by mouth 2 times daily as needed for muscle spasms, Disp: 15 tablet, Rfl: 0     furosemide (LASIX) 20 MG tablet, Take 1 tablet (20 mg) by mouth 2 times daily, Disp: 180 tablet, Rfl: 1     guaiFENesin (MUCINEX) 600 MG 12 hr tablet, Take 1,200 mg by mouth 2 times daily as needed , Disp:  , Rfl:      HYDROcodone-acetaminophen (NORCO) 5-325 MG tablet, Take 1 tablet by mouth every 8 hours as needed for pain or severe pain Max 3 tablets/24 hours, Disp: 90 tablet, Rfl: 0     levothyroxine (SYNTHROID/LEVOTHROID) 150 MCG tablet, Take 1 tablet (150 mcg) by mouth daily, Disp: 90 tablet, Rfl: 3     melatonin 3 MG tablet, Take 1-2 tablets (3-6 mg) by mouth nightly as needed for sleep (OTC), Disp: , Rfl:      methylPREDNISolone (MEDROL DOSEPAK) 4 MG tablet therapy pack, Follow Package Directions, Disp: 21 tablet, Rfl: 0     Multiple Vitamin (MULTI-VITAMIN PO), Take by mouth daily , Disp: , Rfl:      nystatin (MYCOSTATIN) 771756 UNIT/GM external cream, Apply topically twice daily, Disp: 30 g, Rfl: 0     STATIN NOT PRESCRIBED, INTENTIONAL,, 1 each See Admin Instructions Statin not  prescribed intentionally due to Allergy to statin, Disp: 0 each, Rfl: 0  Social History     Socioeconomic History     Marital status:      Spouse name: None     Number of children: 2     Years of education: 17     Highest education level: None   Occupational History     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      Employer: RETIRED   Tobacco Use     Smoking status: Former Smoker     Packs/day: 1.00     Years: 50.00     Pack years: 50.00     Types: Cigarettes     Quit date: 2010     Years since quittin.8     Smokeless tobacco: Never Used     Tobacco comment: no tobacco use since 2010   Substance and Sexual Activity     Alcohol use: Yes     Alcohol/week: 0.0 standard drinks     Comment: occasional     Drug use: No     Sexual activity: Not Currently   Other Topics Concern      Service No     Blood Transfusions Yes     Comment: With second miscarriage in  and after childbirth in      Caffeine Concern Yes     Comment: 0-1 cup coffee per day     Occupational Exposure Yes     Comment: sick building syndrome     Hobby Hazards No     Sleep Concern Yes     Comment: takes melatonin, sleeps 5 hours     Stress Concern Yes     Comment: lesion removed from left thigh, pathology questionable, will have re-excision next week     Weight Concern No     Special Diet Yes     Comment: low residue, steamed veggies, greek yogurt, low sodium     Back Care Yes     Comment: low back     Exercise Yes     Comment: Curves 3 days week     Seat Belt Yes     Self-Exams Yes     Comment: sporadic     Parent/sibling w/ CABG, MI or angioplasty before 65F 55M? No     Social Determinants of Health     Financial Resource Strain: Low Risk      Difficulty of Paying Living Expenses: Not hard at all   Food Insecurity: No Food Insecurity     Worried About Running Out of Food in the Last Year: Never true     Ran Out of Food in the Last Year: Never true   Transportation Needs: No  "Transportation Needs     Lack of Transportation (Medical): No     Lack of Transportation (Non-Medical): No   Physical Activity: Inactive     Days of Exercise per Week: 0 days     Minutes of Exercise per Session: 0 min   Stress: No Stress Concern Present     Feeling of Stress : Not at all   Social Connections: Unknown     Frequency of Communication with Friends and Family: More than three times a week     Frequency of Social Gatherings with Friends and Family: Patient refused     Attends Hindu Services: Patient refused     Active Member of Clubs or Organizations: Patient refused     Marital Status:    Housing Stability: Low Risk      Unable to Pay for Housing in the Last Year: No     Number of Places Lived in the Last Year: 1     Unstable Housing in the Last Year: No      Problem (# of Occurrences) Relation (Name,Age of Onset)    Alcohol/Drug (2) Mother (Karen), Father (-)    Cancer (1) Maternal Grandfather: stomach or throat - martini drinker/pipe smoker    Coronary Artery Disease (1) Father ()    Heart Disease (2) Mother (Karen): cardiomyopathy, Father (): CAD -  1st MI mid 50's    Myocardial Infarction (2) Father (): X4, Brother    Osteoporosis (1) Maternal Grandmother (Grandma)    Thyroid Disease (1) Mother (Karen)       Negative family history of: Breast Cancer, Cancer - colorectal           ROS: 10 point ROS neg other than the symptoms noted above in the HPI.    Vitals:    22 1343   Weight: 75.3 kg (166 lb)   Height: 1.651 m (5' 5\")     Estimated body mass index is 27.62 kg/m  as calculated from the following:    Height as of this encounter: 1.651 m (5' 5\").    Weight as of this encounter: 75.3 kg (166 lb).  Worst Pain (10)    Oswestry (JUNIOR) Questionnaire    OSWESTRY DISABILITY INDEX 2017   Count 8   Sum 15   Oswestry Score (%) 37.5   Some recent data might be hidden       Visual Analog Scale (VAS) Questionnaire    No flowsheet data " found.       Posture erect with antalgic behavior.  Bilateral lower extremity strength is 5 out of 5 in all muscle groups.  Reflexes symmetric and normal.  Sensation intact to light touch.    Negative straight leg raise bilaterally.  Negative LEXUS sign bilaterally.    Imaging: CT scan of the lumbar spine performed in the emergency room is not terribly helpful but seems to suggest that there may be some stenosis at different levels.  She has an MRI from 2019 which demonstrates some moderate stenosis at multiple levels.    Assessment: Back pain, possible lumbar stenosis with radiculopathy.    Plan: I have recommended an MRI of the lumbar spine, epidural steroid injection, physical therapy.  We will see how these things help address her symptoms and see if they can get her feeling better.         Again, thank you for allowing me to participate in the care of your patient.        Sincerely,        Heron Collins MD

## 2022-04-26 ENCOUNTER — TELEPHONE (OUTPATIENT)
Dept: NEUROSURGERY | Facility: CLINIC | Age: 77
End: 2022-04-26
Payer: COMMERCIAL

## 2022-04-26 NOTE — TELEPHONE ENCOUNTER
Called patient back to clarify injection orders -     Patient states she does have the correct injection and it is scheduled. She has no further questions at this time.

## 2022-04-26 NOTE — TELEPHONE ENCOUNTER
Patient is concerned that she wasn't given the correct referral for lumbar injections.  She wants to have injection done with a Dr. Castaneda at the pain clinic and not through radiology.  Please review and connect with patient

## 2022-05-03 ENCOUNTER — PATIENT OUTREACH (OUTPATIENT)
Dept: FAMILY MEDICINE | Facility: CLINIC | Age: 77
End: 2022-05-03
Payer: COMMERCIAL

## 2022-05-03 ENCOUNTER — HOSPITAL ENCOUNTER (OUTPATIENT)
Dept: MRI IMAGING | Facility: CLINIC | Age: 77
Discharge: HOME | End: 2022-05-03
Attending: NEUROLOGICAL SURGERY | Admitting: NEUROLOGICAL SURGERY
Payer: COMMERCIAL

## 2022-05-03 DIAGNOSIS — M54.16 SPINAL STENOSIS OF LUMBAR REGION WITH RADICULOPATHY: ICD-10-CM

## 2022-05-03 DIAGNOSIS — M48.061 SPINAL STENOSIS OF LUMBAR REGION WITH RADICULOPATHY: ICD-10-CM

## 2022-05-03 PROCEDURE — 72148 MRI LUMBAR SPINE W/O DYE: CPT

## 2022-05-03 NOTE — TELEPHONE ENCOUNTER
"Covering providers please review -   Would like muscle relaxer prescribed without visit please advise     Patient has been seeing Geraldine Freitas CNP since Haase, Susan Rachele APRN CNP has been out of the office     Back pain - pain contract     Patient was given referral for  spine specialist, MRI completed today shows compression fracture (RN advised patient to contact Dr. Collins as the ordering provider to discuss plan)   RN advised that this referral was placed so this provider would help manage this condition     Patient was advised on 4/19/2022 to only use max 3 per day of hydrocodone and she had been using more than this - replied with \"well good luck with that\"    Patient is crying and upset that we can not get her in to see a provider in clinic this week   Will not go to urgent care as $60 and due to pain contract they will not prescribe narcotic pain medication   Will not go to ER as this is $90 and they will not help with pain medication due to pain contract - RN advised that if deemed necessary by ER provider they may provide additional if appropriate     Patient was given number for scheduling to see if any openings within our system however she will not schedule with any other providers due to pain contract     Patient is very upset that 2 appointments have been cancelled and not her fault, because providers were out - RN apologized     Patient would atleast like a muscle relaxer - RN advised that this may not be an appropriate treatment and that covering providers may not prescribe this without a visit AND some of these medications are not recommended for patients over a certain age     Sofy Dial, Registered Nurse, PAL (Patient Advocate Liason)   [facility]   [phone]         "

## 2022-05-03 NOTE — TELEPHONE ENCOUNTER
"RN PAL received voicemail from patient     \"I got a call from the clinic that my visit today was cancelled as the provider is out ill (Geraldine Freitas CNP)     \"I had my MRI today\" \"I am in pain and need an appointment today\"     RN returned call to patient, received voicemail RN left detailed message advising we do not have any openings in the clinic today or this week - if patient needs to be seen today to call scheduling 1/855/[place] to see if openings in any clinics   Other options are to go to urgent care today, walk in at [facility] or [facility]   If severe pain ER     RN PAL number left for patient to return call to discuss further     Sofy Dial, Registered Nurse, PAL (Patient Advocate Liason)   [facility]   [phone]       "

## 2022-05-04 NOTE — TELEPHONE ENCOUNTER
"RN TAVIA spoke to patient - advised to contact Dr. Collins's office to review results and come up with a plan     Patient states \"my computer is broken cant get in there\"   RN suggested patient call Dr. Collins's office and she replied \" yeah right \"   Patient states she has number to contact Dr. Dennis Dial Registered Nurse, PAL (Patient Advocate Liason)   [facility]   [phone]       "

## 2022-05-04 NOTE — TELEPHONE ENCOUNTER
I would recommend patient discuss options with Dr. Collins and his team. It looks like she has a visit with pain management in 2 days.

## 2022-05-05 NOTE — PROGRESS NOTES
[facility] - Procedure Note    Date of Visit: 5/6/2022    Procedure performed: Lumbar L4-5 interlaminar epidural steroid injection  Diagnosis: Lumbar spondylosis; Lumbar radiculitis/radiculopathy  : Michelle Castaneda MD & Ayaan Babcock DO   Anesthesia: none    Indications: Kahlil Caputo is a 76 year old female who is seen at the request of Dr. Collins for a lumbar epidural steroid injection. The patient describes bilateral low back pain with radiation into the left leg. The patient has been exhibiting symptoms consistent with lumbar intraspinal inflammation and radiculopathy. Symptoms have been persistent, disabling, and intermittently severe. The patient reports minimal improvement with conservative treatment, including previous injections, PT and medications.    S/P Right SI joint injection 5/6/2022    MRI of the LUMBAR SPINE was done on 5/3/2022 which showed:   FINDINGS: There are five lumbar-type vertebrae for the purposes of  this dictation.      There is new mild compression fracture deformity of the L1 vertebral  body with approximately 15% loss of vertebral body height. There is  bone marrow edema throughout the L1 vertebral body consistent with  recent fracture. Vertebral body heights and contours of the lumbar  spine are otherwise normal. Alignment remains normal. Marrow signal  otherwise normal.     There is loss of disc height, disc desiccation and posterior disc  bulging/herniation to varying degrees at all levels of the lumbar  spine.      The tip of the conus medullaris is at the L1-L2 level which is within  normal limits. There is no evidence for intrathecal abnormality.      Level by level:      T12-L1: Loss of disc height, disc desiccation and circumferential disc  bulging with a superimposed moderate size left paracentral disc  herniation (extrusion). Extruded disc material extends cephalad from  the parent disc in the left anterolateral epidural space up to  the  upper T12 level. Moderate facet arthropathy bilaterally. Mild spinal  canal narrowing. Moderate narrowing of the left lateral recess of the  spinal canal from mid T12 down to T12-L1. Mild left foraminal  narrowing. No right foraminal stenosis.     L1-L2: Loss of disc height, disc desiccation and mild circumferential  disc bulging. No herniation. Circumferential endplate spurring.  Moderate facet arthropathy bilaterally. No spinal canal stenosis. No  foraminal stenosis on either side.     L2-L3: Loss of disc height, disc desiccation and mild circumferential  disc bulging. No herniation. Moderate facet arthropathy bilaterally.  No spinal canal stenosis. No foraminal stenosis on either side.     L3-L4: Loss of disc height, disc desiccation and moderate  circumferential disc bulging. No herniation. Moderate facet  arthropathy bilaterally. Mild spinal canal narrowing. No foraminal  stenosis on either side.     L4-L5: Loss of disc height, disc desiccation and mild circumferential  disc bulging. No herniation. Severe facet arthropathy bilaterally.  Mild spinal canal narrowing. Mild right foraminal narrowing. No left  foraminal stenosis.     L5-S1: Loss of disc height, disc desiccation and mild circumferential  disc bulging. No herniation. Moderate facet arthropathy bilaterally.  No spinal canal stenosis. No foraminal stenosis on either side.                                                                   IMPRESSION:  1. Interval development of a recent compression fracture deformity of  the L1 vertebral body with approximately 15% loss of vertebral body  height.  2. Diffuse degenerative change of the lumbar spine as detailed above.  3. Moderate narrowing of the left lateral recess of the spinal canal  from mid T12 down to T12-L1 due to a disc extrusion.  4. No other significant spinal canal, lateral recess or neural  foraminal narrowing of the lumbar spine.    Allergies:      Allergies   Allergen Reactions     Latex  "Difficulty breathing     sensativity - cough, eyes water     Nsaids Shortness Of Breath and Other (See Comments)     bronchospams     Aspirin Difficulty breathing     tight cough     Codeine GI Disturbance     upset GI     Pravastatin      Myalgias     Simvastatin      Myalgias     Tramadol      Heart burn,  Felt \"clammy\", unable to pass gas, unable to urinate and had severe nausea     Bacitracin Rash     Neosporin Blisters, Itching and Rash        Vitals:  BP (!) 147/88   Pulse 93   SpO2 98%     Review of Systems: The patient denies recent fever, chills, illness, use of antibiotics or anticoagulants. All other 10-point review of systems negative.     Procedure: The procedure and risks were explained, and informed written consent was obtained from the patient. Risks include but are not limited to: infection, bleeding, increased pain, and damage to soft tissue, nerve, muscle, and vasculature structures. After getting informed consent, patient was brought into the procedure suite and was placed in a prone position on the procedure table. A Pause for the Cause was performed. Patient was prepped and draped in sterile fashion.     The L4-5 interspace was identified with use of fluoroscopy in AP view. A 25-gauge, 1.5 inch needle was used to anesthetize the skin and subcutaneous tissue entry site with a total of 2 ml of 1% lidocaine. Under fluoroscopic visualization, a 22-gauge, 4.5 inch Tuohy epidural needle was slowly advanced towards the epidural space a few millimeters left of midline. The latter part of the needle advancement was guided with fluoroscopy in the lateral view. The epidural space was identified using loss of resistance technique. After negative aspiration for heme and cerebrospinal fluid, a total of 1 mL of non-ionic contrast was injected to confirm needle placement with 9 mL of contrast wasted. Epidurogram confirmed spread within the posterior epidural space. 2 ml of 40mg/ml of triamcinolone, 2 ml of " 1% lidocaine, and 1 ml of preservative free saline was injected. The needle was removed.  Images were saved to PACS.    The patient tolerated the procedure well, and there was no evidence of procedural complications. No new sensory or motor deficits were noted following the procedure. The patient was stable and able to ambulate on discharge home. Post-procedure instructions were provided.     Pre-procedure pain score: 8/10 in the back, 6/10 in the leg  Post-procedure pain score: 8/10 in the back, 6/10 in the leg    Assessment/Plan: Kahlil Caputo is a 76 year old female s/p lumbar interlaminar epidural steroid injection today for lumbar spondylosis and radiculitis/radiculopathy.     1. Following today's procedure, the patient was advised to contact the Pain Management Center for any of the following:   Fever, chills, or night sweats   New onset of pain, numbness, or weakness   Any questions/concerns regarding the procedure  If unable to contact the Pain Center, the patient was instructed to go to a local Emergency Room for any complications.   2. The patient will receive a follow-up call in 1 week.   3. Follow-up with the referring provider in 2 weeks for post-procedure evaluation.    ALICIA FUCHS MD   Pain Management & Addiction Medicine

## 2022-05-06 ENCOUNTER — RADIOLOGY INJECTION OFFICE VISIT (OUTPATIENT)
Dept: PALLIATIVE MEDICINE | Facility: CLINIC | Age: 77
End: 2022-05-06
Attending: NEUROLOGICAL SURGERY
Payer: COMMERCIAL

## 2022-05-06 VITALS — HEART RATE: 100 BPM | OXYGEN SATURATION: 98 % | DIASTOLIC BLOOD PRESSURE: 90 MMHG | SYSTOLIC BLOOD PRESSURE: 148 MMHG

## 2022-05-06 DIAGNOSIS — M54.16 SPINAL STENOSIS OF LUMBAR REGION WITH RADICULOPATHY: ICD-10-CM

## 2022-05-06 DIAGNOSIS — M54.16 LUMBAR RADICULOPATHY: Primary | ICD-10-CM

## 2022-05-06 DIAGNOSIS — M48.061 SPINAL STENOSIS OF LUMBAR REGION WITH RADICULOPATHY: ICD-10-CM

## 2022-05-06 PROCEDURE — 62323 NJX INTERLAMINAR LMBR/SAC: CPT | Performed by: ANESTHESIOLOGY

## 2022-05-06 RX ORDER — TRIAMCINOLONE ACETONIDE 40 MG/ML
40 INJECTION, SUSPENSION INTRA-ARTICULAR; INTRAMUSCULAR ONCE
Status: COMPLETED | OUTPATIENT
Start: 2022-05-06 | End: 2022-05-06

## 2022-05-06 RX ADMIN — TRIAMCINOLONE ACETONIDE 40 MG: 40 INJECTION, SUSPENSION INTRA-ARTICULAR; INTRAMUSCULAR at 13:43

## 2022-05-06 NOTE — NURSING NOTE
Pre-procedure Intake    If YES to any questions or NO to having a   Please complete laminated checklist and leave on the computer keyboard for Provider, verbally inform provider if able.    For SCS Trial, RFA's or any sedation procedure: NA    If yes, for how long?         Are you taking any any blood thinners such as Coumadin, Warfarin, Jantoven, Pradaxa Xarelto, Eliquis, Edoxaban, Enoxaparin, Lovenox, Heparin, Arixtra, Fondaparinux, or Fragmin? OR Antiplatelet medication such as Plavix, Brilinta, or Effient?  NO     If yes, when did you take your last dose?        Do you take aspirin?   NO    If cervical procedure, have you held aspirin for 6 days?   NA    Do you have any allergies to contrast dye, iodine, steroid and/or numbing medications?  NO     Are you currently taking antibiotics or have an active infection?  NO     Have you had a fever/elevated temperature within the past week? NO     Are you currently taking oral steroids? NO     Do you have a ? Yes     Are you pregnant or breastfeeding? NA     Have you received the COVID-19 vaccine? Yes     If yes, was it your 1st, 2nd or only dose needed? Booster dose 10/15/21    Notify provider and RNs if systolic BP >170, diastolic BP >100, P >100 or O2 sats < 90%

## 2022-05-06 NOTE — NURSING NOTE
Discharge Information    IV Discontiued Time:  NA    Amount of Fluid Infused:  NA    Discharge Criteria = When patient returns to baseline or as per MD order    Consciousness:  Pt is fully awake    Circulation:  BP +/- 20% of pre-procedure level    Respiration:  Patient is able to breathe deeply    O2 Sat:  Patient is able to maintain O2 Sat >92% on room air    Activity:  Moves 4 extremities on command    Ambulation:  Patient is able to stand and walk or stand and pivot into wheelchair    Dressing:  Clean/dry or No Dressing    Notes:   Discharge instructions and AVS given to patient    Patient meets criteria for discharge?  YES    Admitted to PCU?  No    Responsible adult present to accompany patient home?  Yes    Signature/Title:    Bree Gonzales RN  RN Care Coordinator  [facility]

## 2022-05-06 NOTE — PATIENT INSTRUCTIONS
[facility] Procedure Discharge Instructions    Today you saw:   Dr. Michelle Castaneda      Your procedure:  Epidural steroid injection       Medications used:  Lidocaine (anesthetic)   Kenalog (steroid)  Omnipaque (contrast)           Be cautious when walking as numbness and/or weakness in the legs may occur up to 6-8 hours after the procedure due to effect of the local anesthetic  Do not drive for 6 hours. The effect of the local anesthetic could slow your reflexes.   Avoid strenuous activity for the first 24 hours. You may resume your regular activities after that.   You may shower, however avoid swimming, tub baths or hot tubs for 24 hours following your procedure  You may have a mild to moderate increase in pain for several days following the injection.    You may use ice packs for 10-15 minutes, 3 to 4 times a day at the injection site for comfort  Do not use heat to painful areas for 6 to 8 hours. This will give the local anesthetic time to wear off and prevent you from accidentally burning your skin.  Unless you have been directed to avoid the use of anti-inflammatory medications (NSAIDS-ibuprofen, Aleve, Motrin), you may use these medications or Tylenol for pain control if needed.   With diabetes, check your blood sugar more frequently than usual as your blood sugar may be higher than normal for 10-14 days following a steroid injection. Contact your doctor who manages your diabetes if your blood sugar is higher than usual  Possible side effects of steroids that you may experience include flushing, elevated blood pressure, increased appetite, mild headaches and restlessness.  All of these symptoms will get better with time.  It may take up to 14 days for the steroid medication to start working although you may feel the effect as early as a few days after the procedure.   Follow up with your referring provider in 2-3 weeks- Dr Collins    If you experience any of the following, call the pain center  line during work hours at [phone] or on-call physician after hours at [phone]:  -Fever over 100 degree F  -Swelling, bleeding, redness, drainage, warmth at the injection site  -Progressive weakness or numbness in your legs   -Loss of bowel or bladder function  -Unusual headache that is not relieved by Tylenol or your regular headache medication  -Unusual new onset of pain that is not improving

## 2022-05-09 ENCOUNTER — THERAPY VISIT (OUTPATIENT)
Dept: PHYSICAL THERAPY | Facility: CLINIC | Age: 77
End: 2022-05-09
Attending: NEUROLOGICAL SURGERY
Payer: COMMERCIAL

## 2022-05-09 DIAGNOSIS — M54.42 LEFT-SIDED LOW BACK PAIN WITH LEFT-SIDED SCIATICA: Primary | ICD-10-CM

## 2022-05-09 DIAGNOSIS — M54.16 SPINAL STENOSIS OF LUMBAR REGION WITH RADICULOPATHY: ICD-10-CM

## 2022-05-09 DIAGNOSIS — M48.061 SPINAL STENOSIS OF LUMBAR REGION WITH RADICULOPATHY: ICD-10-CM

## 2022-05-09 PROCEDURE — 97110 THERAPEUTIC EXERCISES: CPT | Mod: GP | Performed by: PHYSICAL THERAPIST

## 2022-05-09 PROCEDURE — 97161 PT EVAL LOW COMPLEX 20 MIN: CPT | Mod: GP | Performed by: PHYSICAL THERAPIST

## 2022-05-09 NOTE — PROGRESS NOTES
[facility]    OUTPATIENT Physical Therapy ORTHOPEDIC EVALUATION  PLAN OF TREATMENT FOR OUTPATIENT REHABILITATION  (COMPLETE FOR INITIAL CLAIMS ONLY)  Patient's Last Name, First Name, M.I.  YOB: 1945  Kahlil Caputo       Provider s Name:  [facility]   Medical Record No.  [id number]   Start of Care Date:  05/09/22   Onset Date:   03/28/22   Type:     _X__PT   ___OT Medical Diagnosis:    Encounter Diagnosis   Name Primary?    Spinal stenosis of lumbar region with radiculopathy         Treatment Diagnosis:  LBP with bilateral sciatica        Goals:     05/09/22 0500   Body Part   Goals listed below are for LBP   Goal #1   Goal #1 ambulation   Previous Functional Level Minutes patient could walk   Performance Level 30 min, no pain   Current Functional Level Minutes patient can walk   Performance Level with cane, household, 8/10 pain   STG Target Performance Minutes patient will be able to walk   Performance Level no AD, 5 min, 5/10 pain   Rationale for safe household ambulation   Due Date 06/20/22    LTG Target Performance Minutes patient will be able to  walk   Performance Level no AD, >10 min, 1/10pain   Rationale for safe community ambulation   Due Date 08/01/22       Therapy Frequency:  1X/week for 6 weeks, tapering to 1X every other week  Predicted Duration of Therapy Intervention:  up to 12 weeks    Sanjana Stanton, PT                 I CERTIFY THE NEED FOR THESE SERVICES FURNISHED UNDER        THIS PLAN OF TREATMENT AND WHILE UNDER MY CARE     (Physician attestation of this document indicates review and certification of the therapy plan).                     Certification Date From:  05/09/22   Certification Date To:  08/06/22    Referring Provider:  Heron Collins    Initial Assessment        See Epic Evaluation SOC Date: 05/09/22

## 2022-05-09 NOTE — PROGRESS NOTES
Physical Therapy Initial Evaluation  Subjective:    Patient Health History  Kahlil Caputo being seen for worsening chronic low back pain.     Problem began: 2022.   Problem occurred: current issue started on 3/28/22-happened suddenly. Turned the wrong way ?   Pain is reported as 8/10 on pain scale.  General health as reported by patient is good.  Pertinent medical history includes: osteoarthritis and osteoporosis.     Medical allergies: latex.   Surgeries include:  Orthopedic surgery.    Current medications:  Bone density, pain medication and thyroid medication.    Current occupation is Retired..   Primary job tasks include:  Other.   Other job/home tasks details: Since I'm retired & live alone, I'm responsible for ALL associated tasks..                Physical Therapy Initial Evaluation   2022   Precautions/Restrictions/MD instructions: PT eval and treat    Therapist Impression:   Pt is a 75 y/o female, with chronic history of LBP. Pt presents with pain, decreased ROM/mobility, poor posture, and decreased strength consistent with lumbar stenosis with radiculopathy (bilaterally). These impairments limit their ability to sit, stand, walk, rolling over in bed and bending over. Skilled PT services necessary in order to reduce impairments and improve independent function.    Subjective:   Chief Complaint: LBP with bilateral sciatica; end of March twisted and had event that caused the acute low back pain  DOI/onset: 3/28/2022 DOS: none  Location: L LBP  Quality: sharp, dull, severe ache   Frequency: constant  Radiates: had been radiating pain down the left leg (down to calf)   Pain scale: Rest 7/10 Activity 10/10    Sleepin-3X/night wakes due to pain   Exacerbated by: sit, stand, walk, sleep  Relieved by: ice/heat, tylenol, bio freeze Progression: a little bit better  Previous Treatment: PT  Effect of prior treatment: helpful    PMH and/or surgical history: L FANI ()     Imaging: MRI, Xray      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 toe ext) 5/5 5/5   S1 (ankle PF or knee flex) 5/5 5/5     Sensory Deficit, Reflexes: slightly reduced sensation on left L4    Dural Signs:   L R   Slump + -   Other:    - TA activation:poor    AROM: (Major, Moderate, Minimal or Nil loss)  Movement Loss Miller Mod Min Nil Pain   Flexion X    Increases low back pain   Extension X    Less sharp pain, but provoked L LBP   Side Gliding/Bend L X    LBP provoked   Side Gliding/Bend R X    LBP provoked         System    Physical Exam    General     ROS    Assessment/Plan:    Patient is a 76 year old female with lumbar complaints.    Patient has the following significant findings with corresponding treatment plan.                Diagnosis 1:  LBP with bilateral sciatica   Pain -  hot/cold therapy, splint/taping/bracing/orthotics, self management, education, directional preference exercise and home program  Decreased ROM/flexibility - manual therapy and therapeutic exercise  Decreased joint mobility - manual therapy and therapeutic exercise  Decreased strength - therapeutic exercise and therapeutic activities  Impaired balance - neuro re-education and therapeutic activities  Decreased proprioception - neuro re-education and therapeutic activities  Inflammation - cold therapy and self management/home program  Impaired gait - gait training  Impaired muscle performance - neuro re-education  Decreased function - therapeutic activities  Impaired posture - neuro re-education  Instability -   Therapeutic Activity  Therapeutic Exercise    Therapy Evaluation Codes:     Cumulative Therapy Evaluation is: Low complexity.    Previous and current functional limitations:  (See Goal Flow Sheet for this information)    Short term and Long term goals: (See Goal Flow Sheet for this information)     Communication ability:  Patient appears to be able to clearly communicate and understand verbal and written communication and follow directions correctly.  Treatment Explanation - The following has been discussed with the patient:   RX ordered/plan of care  Anticipated outcomes  Possible risks and side effects  This patient would benefit from PT intervention to resume normal activities.   Rehab potential is good.    Frequency:  1-2 X week, once daily  Duration:  for 6 weeks, tappering to every other week up to 12 weeks  Discharge Plan:  Achieve all LTG.  Independent in home treatment program.  Reach maximal therapeutic benefit.    Please refer to the daily flowsheet for treatment today, total treatment time and time spent performing 1:1 timed codes.

## 2022-05-12 ENCOUNTER — THERAPY VISIT (OUTPATIENT)
Dept: PHYSICAL THERAPY | Facility: CLINIC | Age: 77
End: 2022-05-12
Attending: NEUROLOGICAL SURGERY
Payer: COMMERCIAL

## 2022-05-12 DIAGNOSIS — M54.42 LEFT-SIDED LOW BACK PAIN WITH LEFT-SIDED SCIATICA: Primary | ICD-10-CM

## 2022-05-12 PROCEDURE — 97110 THERAPEUTIC EXERCISES: CPT | Mod: GP | Performed by: PHYSICAL THERAPIST

## 2022-05-12 PROCEDURE — 97140 MANUAL THERAPY 1/> REGIONS: CPT | Mod: GP | Performed by: PHYSICAL THERAPIST

## 2022-05-16 ENCOUNTER — THERAPY VISIT (OUTPATIENT)
Dept: PHYSICAL THERAPY | Facility: CLINIC | Age: 77
End: 2022-05-16
Attending: NEUROLOGICAL SURGERY
Payer: COMMERCIAL

## 2022-05-16 DIAGNOSIS — M54.42 LEFT-SIDED LOW BACK PAIN WITH LEFT-SIDED SCIATICA: Primary | ICD-10-CM

## 2022-05-16 PROCEDURE — 97110 THERAPEUTIC EXERCISES: CPT | Mod: GP | Performed by: PHYSICAL THERAPIST

## 2022-05-18 DIAGNOSIS — G89.29 OTHER CHRONIC PAIN: ICD-10-CM

## 2022-05-18 RX ORDER — HYDROCODONE BITARTRATE AND ACETAMINOPHEN 5; 325 MG/1; MG/1
1 TABLET ORAL EVERY 8 HOURS PRN
Qty: 90 TABLET | Refills: 0 | Status: SHIPPED | OUTPATIENT
Start: 2022-05-19 | End: 2022-06-21

## 2022-05-18 NOTE — TELEPHONE ENCOUNTER
Routing refill request to provider for review/approval because:  Drug not on the [facility] refill protocol   Hydrocodone - last filled per epic 4/20/2022   Upcoming appt with Geraldine Freitas CNP on 5/24/2022     Pending Prescriptions:                       Disp   Refills    HYDROcodone-acetaminophen (NORCO) 5-325 M*90 tab*0            Sig: Take 1 tablet by mouth every 8 hours as needed           for pain or severe pain Max 3 tablets/24 hours    Sofy Dial, Registered Nurse, PAL (Patient Advocate Liason)   [facility]   [phone]

## 2022-05-23 ENCOUNTER — THERAPY VISIT (OUTPATIENT)
Dept: PHYSICAL THERAPY | Facility: CLINIC | Age: 77
End: 2022-05-23
Attending: NEUROLOGICAL SURGERY
Payer: COMMERCIAL

## 2022-05-23 DIAGNOSIS — M54.16 SPINAL STENOSIS OF LUMBAR REGION WITH RADICULOPATHY: Primary | ICD-10-CM

## 2022-05-23 DIAGNOSIS — M48.061 SPINAL STENOSIS OF LUMBAR REGION WITH RADICULOPATHY: Primary | ICD-10-CM

## 2022-05-23 PROCEDURE — 97110 THERAPEUTIC EXERCISES: CPT | Mod: GP | Performed by: PHYSICAL THERAPIST

## 2022-05-24 ENCOUNTER — OFFICE VISIT (OUTPATIENT)
Dept: FAMILY MEDICINE | Facility: CLINIC | Age: 77
End: 2022-05-24
Payer: COMMERCIAL

## 2022-05-24 VITALS
RESPIRATION RATE: 16 BRPM | SYSTOLIC BLOOD PRESSURE: 130 MMHG | WEIGHT: 155.3 LBS | HEART RATE: 116 BPM | OXYGEN SATURATION: 98 % | TEMPERATURE: 97.7 F | DIASTOLIC BLOOD PRESSURE: 90 MMHG | BODY MASS INDEX: 25.84 KG/M2

## 2022-05-24 DIAGNOSIS — E87.0 HYPERNATREMIA: ICD-10-CM

## 2022-05-24 DIAGNOSIS — E87.1 HYPONATREMIA: ICD-10-CM

## 2022-05-24 DIAGNOSIS — M48.061 SPINAL STENOSIS OF LUMBAR REGION WITHOUT NEUROGENIC CLAUDICATION: Primary | ICD-10-CM

## 2022-05-24 DIAGNOSIS — Z79.899 CONTROLLED SUBSTANCE AGREEMENT SIGNED: ICD-10-CM

## 2022-05-24 LAB
ALBUMIN UR-MCNC: NEGATIVE MG/DL
APPEARANCE UR: CLEAR
BILIRUB UR QL STRIP: NEGATIVE
COLOR UR AUTO: YELLOW
GLUCOSE UR STRIP-MCNC: NEGATIVE MG/DL
HGB UR QL STRIP: NEGATIVE
KETONES UR STRIP-MCNC: NEGATIVE MG/DL
LEUKOCYTE ESTERASE UR QL STRIP: NEGATIVE
NITRATE UR QL: NEGATIVE
PH UR STRIP: 5.5 [PH] (ref 5–7)
SP GR UR STRIP: <=1.005 (ref 1–1.03)
UROBILINOGEN UR STRIP-ACNC: 0.2 E.U./DL

## 2022-05-24 PROCEDURE — 99214 OFFICE O/P EST MOD 30 MIN: CPT | Performed by: NURSE PRACTITIONER

## 2022-05-24 PROCEDURE — 36415 COLL VENOUS BLD VENIPUNCTURE: CPT | Performed by: NURSE PRACTITIONER

## 2022-05-24 PROCEDURE — 81003 URINALYSIS AUTO W/O SCOPE: CPT | Performed by: NURSE PRACTITIONER

## 2022-05-24 PROCEDURE — 80048 BASIC METABOLIC PNL TOTAL CA: CPT | Performed by: NURSE PRACTITIONER

## 2022-05-24 PROCEDURE — 80307 DRUG TEST PRSMV CHEM ANLYZR: CPT | Performed by: NURSE PRACTITIONER

## 2022-05-24 ASSESSMENT — ENCOUNTER SYMPTOMS: BACK PAIN: 1

## 2022-05-24 NOTE — LETTER
Opioid / Opioid Plus Controlled Substance Agreement    This is an agreement between you and your provider about the safe and appropriate use of controlled substance/opioids prescribed by your care team. Controlled substances are medicines that can cause physical and mental dependence (abuse).    There are strict laws about having and using these medicines. We here at [facility] are committing to working with you in your efforts to get better. To support you in this work, we ll help you schedule regular office appointments for medicine refills. If we must cancel or change your appointment for any reason, we ll make sure you have enough medicine to last until your next appointment.     As a Provider, I will:    Listen carefully to your concerns and treat you with respect.     Recommend a treatment plan that I believe is in your best interest. This plan may involve therapies other than opioid pain medication.     Talk with you often about the possible benefits, and the risk of harm of any medicine that we prescribe for you.     Provide a plan on how to taper (discontinue or go off) using this medicine if the decision is made to stop its use.    As a Patient, I understand that opioid(s):     Are a controlled substance prescribed by my care team to help me function or work and manage my condition(s).     Are strong medicines and can cause serious side effects such as:    Drowsiness, which can seriously affect my driving ability    A lower breathing rate, enough to cause death    Harm to my thinking ability     Depression     Abuse of and addiction to this medicine    Need to be taken exactly as prescribed. Combining opioids with certain medicines or chemicals (such as illegal drugs, sedatives, sleeping pills, and benzodiazepines) can be dangerous or even fatal. If I stop opioids suddenly, I may have severe withdrawal symptoms.    Do not work for all types of pain nor for all patients. If they re not helpful, I may  be asked to stop them.        The risks, benefits and side effects of these medicine(s) were explained to me. I agree that:  1. I will take part in other treatments as advised by my care team. This may be psychiatry or counseling, physical therapy, behavioral therapy, group treatment or a referral to a specialist.     2. I will keep all my appointments. I understand that this is part of the monitoring of opioids. My care team may require an office visit for EVERY opioid/controlled substance refill. If I miss appointments or don t follow instructions, my care team may stop my medicine.    3. I will take my medicines as prescribed. I will not change the dose or schedule unless my care team tells me to. There will be no refills if I run out early.     4. I may be asked to come to the clinic and complete a urine drug test or complete a pill count at any time. If I don t give a urine sample or participate in a pill count, the care team may stop my medicine.    5. I will only receive prescriptions from this clinic for chronic pain. If I am treated by another provider for acute pain issues, I will tell them that I am taking opioid pain medication for chronic pain and that I have a treatment agreement with this provider. I will inform my [facility] care team within one business day if I am given a prescription for any pain medication by another healthcare provider. My [facility] care team can contact other providers and pharmacists about my use of any medicines.    6. It is up to me to make sure that I don t run out of my medicines on weekends or holidays. If my care team is willing to refill my opioid prescription without a visit, I must request refills only during office hours. Refills may take up to 3 business days to process. I will use one pharmacy to fill all my opioid and other controlled substance prescriptions. I will notify the clinic about any changes to my insurance or medication  availability.    7. I am responsible for my prescriptions. If the medicine/prescription is lost, stolen or destroyed, it will not be replaced. I also agree not to share controlled substance medicines with anyone.    8. I am aware I should not use any illegal or recreational drugs. I agree not to drink alcohol unless my care team says I can.       9. If I enroll in the [place] [facility] Cannabis program, I will tell my care team prior to my next refill.     10. I will tell my care team right away if I become pregnant, have a new medical problem treated outside of my regular clinic, or have a change in my medications.    11. I understand that this medicine can affect my thinking, judgment and reaction time. Alcohol and drugs affect the brain and body, which can affect the safety of my driving. Being under the influence of alcohol or drugs can affect my decision-making, behaviors, personal safety, and the safety of others. Driving while impaired (DWI) can occur if a person is driving, operating, or in physical control of a car, motorcycle, boat, snowmobile, ATV, motorbike, off-road vehicle, or any other motor vehicle ([place] Statute 169A.20). I understand the risk if I choose to drive or operate any vehicle or machinery.    I understand that if I do not follow any of the conditions above, my prescriptions or treatment may be stopped or changed.          Opioids  What You Need to Know    What are opioids?   Opioids are pain medicines that must be prescribed by a doctor. They are also known as narcotics.     Examples are:   1. morphine (MS Contin, Love)  2. oxycodone (Oxycontin)  3. oxycodone and acetaminophen (Percocet)  4. hydrocodone and acetaminophen (Vicodin, Norco)   5. fentanyl patch (Duragesic)   6. hydromorphone (Dilaudid)   7. methadone  8. codeine (Tylenol #3)     What do opioids do well?   Opioids are best for severe short-term pain such as after a surgery or injury. They may work well for cancer pain. They may  help some people with long-lasting (chronic) pain.     What do opioids NOT do well?   Opioids never get rid of pain entirely, and they don t work well for most patients with chronic pain. Opioids don t reduce swelling, one of the causes of pain.                                    Other ways to manage chronic pain and improve function include:       Treat the health problem that may be causing pain    Anti-inflammation medicines, which reduce swelling and tenderness, such as ibuprofen (Advil, Motrin) or naproxen (Aleve)    Acetaminophen (Tylenol)    Antidepressants and anti-seizure medicines, especially for nerve pain    Topical treatments such as patches or creams    Injections or nerve blocks    Chiropractic or osteopathic treatment    Acupuncture, massage, deep breathing, meditation, visual imagery, aromatherapy    Use heat or ice at the pain site    Physical therapy     Exercise    Stop smoking    Take part in therapy       Risks and side effects     Talk to your doctor before you start or decide to keep taking opioids. Possible side effects include:      Lowering your breathing rate enough to cause death    Overdose, including death, especially if taking higher than prescribed doses    Worse depression symptoms; less pleasure in things you usually enjoy    Feeling tired or sluggish    Slower thoughts or cloudy thinking    Being more sensitive to pain over time; pain is harder to control    Trouble sleeping or restless sleep    Changes in hormone levels (for example, less testosterone)    Changes in sex drive or ability to have sex    Constipation    Unsafe driving    Itching and sweating    Dizziness    Nausea, throwing up and dry mouth    What else should I know about opioids?    Opioids may lead to dependence, tolerance, or addiction.      Dependence means that if you stop or reduce the medicine too quickly, you will have withdrawal symptoms. These include loose poop (diarrhea), jitters, flu-like symptoms,  nervousness and tremors. Dependence is not the same as addiction.                       Tolerance means needing higher doses over time to get the same effect. This may increase the chance of serious side effects.      Addiction is when people improperly use a substance that harms their body, their mind or their relations with others. Use of opiates can cause a relapse of addiction if you have a history of drug or alcohol abuse.      People who have used opioids for a long time may have a lower quality of life, worse depression, higher levels of pain and more visits to doctors.    You can overdose on opioids. Take these steps to lower your risk of overdose:    1. Recognize the signs:  Signs of overdose include decrease or loss of consciousness (blackout), slowed breathing, trouble waking up and blue lips. If someone is worried about overdose, they should call 911.    2. Talk to your doctor about Narcan (naloxone).   If you are at risk for overdose, you may be given a prescription for Narcan. This medicine very quickly reverses the effects of opioids.   If you overdose, a friend or family member can give you Narcan while waiting for the ambulance. They need to know the signs of overdose and how to give Narcan.     3. Don't use alcohol or street drugs.   Taking them with opioids can cause death.    4. Do not take any of these medicines unless your doctor says it s OK. Taking these with opioids can cause death:    Benzodiazepines, such as lorazepam (Ativan), alprazolam (Xanax) or diazepam (Valium)    Muscle relaxers, such as cyclobenzaprine (Flexeril)    Sleeping pills like zolpidem (Ambien)     Other opioids      How to keep you and other people safe while taking opioids:    1. Never share your opioids with others.  Opioid medicines are regulated by the Drug Enforcement Agency (LAXMI). Selling or sharing medications is a criminal act.    2. Be sure to store opioids in a secure place, locked up if possible. Young children  can easily swallow them and overdose.    3. When you are traveling with your medicines, keep them in the original bottles. If you use a pill box, be sure you also carry a copy of your medicine list from your clinic or pharmacy.    4. Safe disposal of opioids    Most pharmacies have places to get rid of medicine, called disposal kiosks. Medicine disposal options are also available in every [facility]. Search your county and  medication disposal  to find more options. You can find more details at:  [email or web address]     I agree that my provider, clinic care team, and pharmacy may work with any city, state or federal law enforcement agency that investigates the misuse, sale, or other diversion of my controlled medicine. I will allow my provider to discuss my care with, or share a copy of, this agreement with any other treating provider, pharmacy or emergency room where I receive care.    I have read this agreement and have asked questions about anything I did not understand.    _______________________________________________________  Patient Signature - Kahlil Caputo _____________________                   Date     _______________________________________________________  Provider Signature - MALIK Mendez CNP   _____________________                   Date     _______________________________________________________  Witness Signature (required if provider not present while patient signing)   _____________________                   Date

## 2022-05-24 NOTE — PROGRESS NOTES
"  Assessment & Plan     Spinal stenosis of lumbar region without neurogenic claudication  Pain has stabilized and somewhat improved from recent exacerbation of pain.   Established with [facility], some benefits with Physical Therapy. Does not feels she had any relief with [facility].   Is using Hydrocodone appropriately. CSA updated with myself today given PCP out of office for extended duration. Recently filled.   Urine screening today.   Follow-up 3 months for controlled substance medication follow-up.   - [id number] - Urine Drug Confirmation Panel (Comprehensive)  - [id number] - Urine Drug Confirmation Panel (Comprehensive)    Controlled substance agreement signed  As above   - [id number] - Urine Drug Confirmation Panel (Comprehensive)  - [id number] - Urine Drug Confirmation Panel (Comprehensive)    Hyponatremia  Noted when in Emergency Department; had not eaten and low fluid intake for 24 hours due to pain.  - Basic metabolic panel  (Ca, Cl, CO2, Creat, Gluc, K, Na, BUN)  - Basic metabolic panel  (Ca, Cl, CO2, Creat, Gluc, K, Na, BUN)    Hypernatremia  As above.   - Basic metabolic panel  (Ca, Cl, CO2, Creat, Gluc, K, Na, BUN)  - Basic metabolic panel  (Ca, Cl, CO2, Creat, Gluc, K, Na, BUN)               BMI:   Estimated body mass index is 25.84 kg/m  as calculated from the following:    Height as of 4/25/22: 1.651 m (5' 5\").    Weight as of this encounter: 70.4 kg (155 lb 4.8 oz).           Return in about 3 months (around 8/24/2022) for Video Visit, Medication follow up.    MALIK Mendez CNP  [facility]    Paulina Guillroy is a 76 year old who presents for the following health issues     Back Pain     History of Present Illness       Back Pain:  She presents for follow up of back pain. Patient's back pain is a recurring problem.  Location of back pain:  Right lower back, left lower back and right hip  Description of back pain: burning, cramping, dull ache and gnawing  Back pain spreads: " right buttocks    Since patient first noticed back pain, pain is: always present, but gets better and worse  Does back pain interfere with her job:  Not applicable      She eats 2-3 servings of fruits and vegetables daily.She consumes 0 sweetened beverage(s) daily.She exercises with enough effort to increase her heart rate 9 or less minutes per day.  She exercises with enough effort to increase her heart rate 3 or less days per week.   She is taking medications regularly.     Here to follow-up on progressive lumbar back pain.   Recently with [facility] with little benefits; Physical Therapy has been helpful.   Tylenol and NSAIDs with no benefits.  Doing home exercises.   Hydrocodone used as 2 tabs at bedtime and rare use during day.   Radiculopathy has notably improved since last visit.   Some pain to buttock but without further radiation.   Has follow-up with Spine Clinic 6/6/22.  Has follow-up with Endocrine 6/8/22 for osteoporosis. Current use of Fosamax, vitamin D, and calcium.       Review of Systems   Musculoskeletal: Positive for back pain.            Objective    BP (!) 130/90 (BP Location: Right arm, Patient Position: Sitting, Cuff Size: Adult Regular)   Pulse 116   Temp 97.7  F (36.5  C) (Oral)   Resp 16   Wt 70.4 kg (155 lb 4.8 oz)   SpO2 98%   Breastfeeding No   BMI 25.84 kg/m    Body mass index is 25.84 kg/m .  Physical Exam

## 2022-05-25 ENCOUNTER — MYC MEDICAL ADVICE (OUTPATIENT)
Dept: FAMILY MEDICINE | Facility: CLINIC | Age: 77
End: 2022-05-25
Payer: COMMERCIAL

## 2022-05-25 LAB
ANION GAP SERPL CALCULATED.3IONS-SCNC: 8 MMOL/L (ref 3–14)
BUN SERPL-MCNC: 10 MG/DL (ref 7–30)
CALCIUM SERPL-MCNC: 9.3 MG/DL (ref 8.5–10.1)
CHLORIDE BLD-SCNC: 99 MMOL/L (ref 94–109)
CO2 SERPL-SCNC: 27 MMOL/L (ref 20–32)
CREAT SERPL-MCNC: 0.58 MG/DL (ref 0.52–1.04)
CREAT UR-MCNC: 19 MG/DL
GFR SERPL CREATININE-BSD FRML MDRD: >90 ML/MIN/1.73M2
GLUCOSE BLD-MCNC: 108 MG/DL (ref 70–99)
POTASSIUM BLD-SCNC: 4.1 MMOL/L (ref 3.4–5.3)
SODIUM SERPL-SCNC: 134 MMOL/L (ref 133–144)

## 2022-05-26 DIAGNOSIS — R60.0 LOWER EXTREMITY EDEMA: ICD-10-CM

## 2022-05-27 RX ORDER — FUROSEMIDE 20 MG
TABLET ORAL
Qty: 180 TABLET | Refills: 0 | Status: SHIPPED | OUTPATIENT
Start: 2022-05-27 | End: 2022-08-25

## 2022-05-27 NOTE — TELEPHONE ENCOUNTER
Routing refill request to provider for review/approval because:  Labs out of range:  BP    BP Readings from Last 3 Encounters:   05/24/22 (!) 130/90   05/06/22 (!) 148/90   04/08/22 138/80     Gideon YEE RN

## 2022-06-02 ASSESSMENT — ENCOUNTER SYMPTOMS
MUSCLE WEAKNESS: 0
JOINT SWELLING: 0
MYALGIAS: 1
STIFFNESS: 1
BACK PAIN: 1
MUSCLE CRAMPS: 0
ARTHRALGIAS: 1
NECK PAIN: 0

## 2022-06-03 ENCOUNTER — THERAPY VISIT (OUTPATIENT)
Dept: PHYSICAL THERAPY | Facility: CLINIC | Age: 77
End: 2022-06-03
Payer: COMMERCIAL

## 2022-06-03 DIAGNOSIS — M48.061 SPINAL STENOSIS OF LUMBAR REGION WITH RADICULOPATHY: Primary | ICD-10-CM

## 2022-06-03 DIAGNOSIS — M54.16 SPINAL STENOSIS OF LUMBAR REGION WITH RADICULOPATHY: Primary | ICD-10-CM

## 2022-06-03 PROCEDURE — 97110 THERAPEUTIC EXERCISES: CPT | Mod: GP | Performed by: PHYSICAL THERAPIST

## 2022-06-03 NOTE — PROGRESS NOTES
Subjective:  HPI  Physical Exam  Oswestry Score: 51.11 %                 Objective:  System    Physical Exam    General     ROS    Assessment/Plan:    PROGRESS  REPORT    Progress reporting period is from 6/3/2022.       SUBJECTIVE  Subjective: Pt notes that back pain increased with shifted prone lying, so went back to lying on pillow prone with ice pack. See Dr. Collins next Monday. Pt not needing to use her cane with walking. Able to make it to [place] and walking one lap around lap (slow pace, no increased pain while walking). Sitting prolonged periods and standing short periods at kitchen sink are most provocative.    Current Pain level: 5/10.     Initial Pain level: 10/10.   Changes in function:  Yes,   Adverse reaction to treatment or activity: None    OBJECTIVE  Changes noted in objective findings:  Yes,   Objective: AROM: flexion= to shins (ache in low back); extension=moderate limit (pain/pinch in left low back). R sidebend= slight limit (pain on right); L sidebend= no limit (no pain). +slump test on right; - on left.     ASSESSMENT/PLAN  Updated problem list and treatment plan: Diagnosis 1:  LBP  Pain -  self management, education, directional preference exercise and home program  Decreased ROM/flexibility - manual therapy and therapeutic exercise  Decreased joint mobility - manual therapy and therapeutic exercise  Decreased strength - therapeutic exercise and therapeutic activities  Inflammation - cold therapy and self management/home program  Impaired gait - gait training  Impaired muscle performance - neuro re-education  Decreased function - therapeutic activities  Impaired posture - neuro re-education  STG/LTGs have been met or progress has been made towards goals:  Yes,   Assessment of Progress: The patient's condition is improving.  Self Management Plans:  Patient has been instructed in a home treatment program.  I have re-evaluated this patient and find that the nature, scope, duration and intensity of  the therapy is appropriate for the medical condition of the patient.  Kahlil continues to require the following intervention to meet STG and LTG's:  PT intervention is no longer required to meet STG/LTG.    Recommendations:  This patient is ready to be discharged from therapy and continue their home treatment program.    Please refer to the daily flowsheet for treatment today, total treatment time and time spent performing 1:1 timed codes.

## 2022-06-06 ENCOUNTER — OFFICE VISIT (OUTPATIENT)
Dept: NEUROSURGERY | Facility: CLINIC | Age: 77
End: 2022-06-06
Attending: NEUROLOGICAL SURGERY
Payer: COMMERCIAL

## 2022-06-06 VITALS
WEIGHT: 155 LBS | HEIGHT: 65 IN | OXYGEN SATURATION: 98 % | BODY MASS INDEX: 25.83 KG/M2 | SYSTOLIC BLOOD PRESSURE: 128 MMHG | DIASTOLIC BLOOD PRESSURE: 72 MMHG

## 2022-06-06 DIAGNOSIS — M54.16 SPINAL STENOSIS OF LUMBAR REGION WITH RADICULOPATHY: Primary | ICD-10-CM

## 2022-06-06 DIAGNOSIS — M48.061 SPINAL STENOSIS OF LUMBAR REGION WITH RADICULOPATHY: Primary | ICD-10-CM

## 2022-06-06 PROCEDURE — 99213 OFFICE O/P EST LOW 20 MIN: CPT | Performed by: NEUROLOGICAL SURGERY

## 2022-06-06 PROCEDURE — G0463 HOSPITAL OUTPT CLINIC VISIT: HCPCS

## 2022-06-06 ASSESSMENT — PAIN SCALES - GENERAL: PAINLEVEL: MODERATE PAIN (4)

## 2022-06-06 NOTE — LETTER
"    6/6/2022         RE: Kahlil Caputo  [address]  [facility] [address]        Dear Colleague,    Thank you for referring your patient, Kahlil Caputo, to the [facility] [place]. Please see a copy of my visit note below.    It was a pleasure to see Kahlil Caputo today in Neurosurgery Clinic. She is a 76 year old female who is here for follow-up.  She underwent an epidural steroid injection which she thinks helped somewhat.  Overall she feels like her symptoms are better than they were before.        Vitals:    06/06/22 1343   BP: 128/72   SpO2: 98%   Weight: 70.3 kg (155 lb)   Height: 1.651 m (5' 5\")     Estimated body mass index is 25.79 kg/m  as calculated from the following:    Height as of this encounter: 1.651 m (5' 5\").    Weight as of this encounter: 70.3 kg (155 lb).  Moderate Pain (4)    Exam stable.    Imaging: We reviewed her MRI which shows significant stenosis at L3-4 and L4-5.  Some of this may be concordant with her symptomatology.    Assessment: Lumbar stenosis with radiculopathy.    Plan: She is not eager to undergo surgery and at this point wishes to continue doing physical therapy.  She will let us know if she worsens in the future wishes to undergo surgery in the future.       Kahlil Caputo is a 76 year old female who presents for:  Chief Complaint   Patient presents with     Results     MRI from 5-3-22        Initial Vitals:  /72   Ht 5' 5\" (1.651 m)   Wt 155 lb (70.3 kg)   SpO2 98%   BMI 25.79 kg/m   Estimated body mass index is 25.79 kg/m  as calculated from the following:    Height as of this encounter: 5' 5\" (1.651 m).    Weight as of this encounter: 155 lb (70.3 kg).. Body surface area is 1.8 meters squared. BP completed using cuff size: large  Moderate Pain (4)    Nursing Comments:     Vira Benson MA        Again, thank you for allowing me to participate in the care of your patient.        Sincerely,        Heron" Mohan Collins MD

## 2022-06-06 NOTE — PROGRESS NOTES
"It was a pleasure to see Kahlil Caputo today in Neurosurgery Clinic. She is a 76 year old female who is here for follow-up.  She underwent an epidural steroid injection which she thinks helped somewhat.  Overall she feels like her symptoms are better than they were before.        Vitals:    06/06/22 1343   BP: 128/72   SpO2: 98%   Weight: 70.3 kg (155 lb)   Height: 1.651 m (5' 5\")     Estimated body mass index is 25.79 kg/m  as calculated from the following:    Height as of this encounter: 1.651 m (5' 5\").    Weight as of this encounter: 70.3 kg (155 lb).  Moderate Pain (4)    Exam stable.    Imaging: We reviewed her MRI which shows significant stenosis at L3-4 and L4-5.  Some of this may be concordant with her symptomatology.    Assessment: Lumbar stenosis with radiculopathy.    Plan: She is not eager to undergo surgery and at this point wishes to continue doing physical therapy.  She will let us know if she worsens in the future wishes to undergo surgery in the future.     "

## 2022-06-06 NOTE — PROGRESS NOTES
"Kahlil Caputo is a 76 year old female who presents for:  Chief Complaint   Patient presents with     Results     MRI from 5-3-22        Initial Vitals:  /72   Ht 5' 5\" (1.651 m)   Wt 155 lb (70.3 kg)   SpO2 98%   BMI 25.79 kg/m   Estimated body mass index is 25.79 kg/m  as calculated from the following:    Height as of this encounter: 5' 5\" (1.651 m).    Weight as of this encounter: 155 lb (70.3 kg).. Body surface area is 1.8 meters squared. BP completed using cuff size: large  Moderate Pain (4)    Nursing Comments:     Vira Benson MA    "

## 2022-06-08 ENCOUNTER — OFFICE VISIT (OUTPATIENT)
Dept: ENDOCRINOLOGY | Facility: CLINIC | Age: 77
End: 2022-06-08
Attending: NURSE PRACTITIONER
Payer: COMMERCIAL

## 2022-06-08 VITALS
WEIGHT: 151 LBS | HEIGHT: 65 IN | HEART RATE: 107 BPM | DIASTOLIC BLOOD PRESSURE: 80 MMHG | RESPIRATION RATE: 18 BRPM | SYSTOLIC BLOOD PRESSURE: 132 MMHG | BODY MASS INDEX: 25.16 KG/M2 | TEMPERATURE: 98.1 F | OXYGEN SATURATION: 99 %

## 2022-06-08 DIAGNOSIS — Z78.0 ASYMPTOMATIC MENOPAUSAL STATE: ICD-10-CM

## 2022-06-08 DIAGNOSIS — M81.0 AGE-RELATED OSTEOPOROSIS WITHOUT CURRENT PATHOLOGICAL FRACTURE: ICD-10-CM

## 2022-06-08 DIAGNOSIS — M80.00XD AGE-RELATED OSTEOPOROSIS WITH CURRENT PATHOLOGICAL FRACTURE WITH ROUTINE HEALING: Primary | ICD-10-CM

## 2022-06-08 PROCEDURE — 99215 OFFICE O/P EST HI 40 MIN: CPT | Performed by: INTERNAL MEDICINE

## 2022-06-08 RX ORDER — ALENDRONATE SODIUM 70 MG/1
70 TABLET ORAL
Qty: 12 TABLET | Refills: 3 | Status: SHIPPED | OUTPATIENT
Start: 2022-06-08 | End: 2024-05-16

## 2022-06-08 ASSESSMENT — ENCOUNTER SYMPTOMS
NECK PAIN: 0
JOINT SWELLING: 0
BACK PAIN: 1
MYALGIAS: 1
ARTHRALGIAS: 1

## 2022-06-08 NOTE — PATIENT INSTRUCTIONS
[facility]  Dr Acuña, Endocrinology Department    [facility]   [address]  [place] [address]  Appointment [phone]  Fax: [phone]  [place]: Monday - Thursday      Please check the cost coverage and copay with insurance before recommended tests, services and medications (especially if new medications are prescribed).     If ordered, please get blood work done 1 week prior to your next appointment so they will be available to Dr. Acuña at your visit.    CONTINUE FOSAMAX.  Can switch to BONIVA (once every month) if needed.  Please check cost with insurance.  DEXA in 2023 at [place].  Follow up after that.    The pt was advised to  Maintain an adequate calcium and vitamin D intake and to supplement vitamin D if needed to maintain serum levels of 25 hydroxy D (25 OH D) between 30-60 ng/ml.  Limit alcohol intake to no more than 2 servings per day.  Limit caffeine intake.  Maintain an active lifestyle including weight-bearing exercises for at least 30 mins daily.  Take measures to reduce the risk of falling.    You should get 1000- 1200 mg/day calcium in divided doses of no more than 500 mg/dose.  This INCLUDES what is in your food as well as what is in any supplements you may be taking.    Vit D about 800-1000 IU/day ( unless you have vit D deficiency- in that case higher dose)  Dietary sources of calcium:: These also contain vitamin D  Milk                            8 oz            300 mg calcium  Yogurt                          1 cup           400 mg calcium   Hard cheese                     1.5 oz          300 mg  Cottage cheese                  2 cup           300 mg  Orange juice with Calcium       8 oz            300 mg  Low fat dairy sources are recommended      You should get 30 minutes of moderate weight bearing exercise on most days of the week .  Weight bearing exercise includes such things as walking, jogging, hiking,  dancing.  You should also get Strength training 2 or more times/week in addition to other weight -being exercise. Strength training uses weight or resistance beyond that seen in everyday activities -(pilates, weight training with free weights, weight machines or resistance bands)

## 2022-06-08 NOTE — PROGRESS NOTES
Name: Kahlil Caputo  F/u for osteoporosis.  New pt to me.  VALERY from [facility] and .  HPI:  Kahlil Caputo is a 76 year old female who presents for the management of osteoporosis.   has a past medical history of Arthritis (? 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    DEXA 6/2021:  Osteoporosis.There has been a trend toward  significant increase in bone density of the lumbar spine. There has been no significant change in bone density of the hip(s). No vertebral fractures identified on the VFA.     Lab work up for secondary causes of osteoporosis unremarkable.  Started Fosamax 70 mg weekly approximately 7/2018.  Is tolerating the Fosamax well.  She fractured her left ankle 2/2019 (distal fibula) - she missed the last step in her home and twisted the ankle.  She states the orthopedist told her that if she didn't have osteoporosis, she probably would not have fractured the ankle.      Smoke: history of smoking, quit 8 years ago.  Family History:Yes: MGM with   Menstrual history/Birthing: irregular menses but no long periods of amenorrhea.  Menopausal since her 50's - briefly treated with HRT - didn't tolerate  Fractures:Yes: left wrist fracture 1/2018.    Kidney stones: No  GI Surgery:Yes: small bowel resection 5 years ago  Exercise:Not much.   Diet:   Milk: limited, lactose intolerant.  + Gregory milk or lactose-free  milk   Cheese: occasionally   Yogurt/Cottage Cheese: yogurt daily   Ice Cream  Ca/Vitamin D:  Yes- Vitamin D 3000 IU once daily, calcium 1500 per day.   Alcohol:  Minimal, no more than 1 beer or wine per day  Eating Disorder: No  Steroid Use:  No consistent or regular oral steroid use, has had several steroid injections, most recently hip injection 7/2019.    Is a retired nurse.  PMH/PSH:  Past Medical History:   Diagnosis Date     Arthritis ? 1990s    osteoarthritis     Aspirin sensitive asthma 5/9/2018     Chronic airway obstruction, not elsewhere classified      Chronic pain     hip and left shoulder     Coronary artery disease involving native coronary artery of native heart without angina pectoris 11/16/2016     Crohn's disease (H) 9/2013    with stricture in small intestine     Family history of premature CAD      History of blood transfusion 1975    post-partum hemorrhage     Migraine, unspecified, without mention of intractable migraine without mention of status migrainosus      PVC's (premature ventricular contractions)      Small bowel obstruction (H) 8/28/2013    Findings at the time of surgery include a segment of active Crohns' Ileitis with stenosis and pill cam which became lodged in the narrowed area.      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 1990    left thigh-squamous cell carcinoma     BREAST SURGERY  ? 1991    left breast bx-negative     ENT SURGERY  ? 1960    T&A     EYE SURGERY  2016    R/L cataract removal with IOL placement; left cataract...     LAPAROTOMY EXPLORATORY   2013    Procedure: LAPAROTOMY EXPLORATORY;  LAPAROTOMY EXPLORATORY, Ileocecal Resection Resection, Removal of Retained Pill Cam;  Surgeon: Mitchell Fraser MD;  Location: [facility] OR     RESECTION ILEOCECAL  2013    Procedure: RESECTION ILEOCECAL;;  Surgeon: Mitchell Fraser MD;  Location: [facility] OR     ZZC NONSPECIFIC PROCEDURE      D& C X 2     ZZC NONSPECIFIC PROCEDURE      PILONIDAL CYSTECTOMY     ZZC NONSPECIFIC PROCEDURE      T&A     ZZC NONSPECIFIC PROCEDURE      BREAST BX & CERVICAL POLYP     ZZHC COLONOSCOPY THRU STOMA, DIAGNOSTIC           Family Hx:  Family History   Problem Relation Age of Onset     Alcohol/Drug Mother      Heart Disease Mother         cardiomyopathy     Thyroid Disease Mother      Alcohol/Drug Father      Heart Disease Father         CAD -  1st MI mid 50's     Myocardial Infarction Father         X4     Coronary Artery Disease Father      Osteoporosis Maternal Grandmother      Cancer Maternal Grandfather         stomach or throat - martini drinker/pipe smoker     Myocardial Infarction Brother      Breast Cancer No family hx of      Cancer - colorectal No family hx of      Thyroid disease:         DM2:          Autoimmune: DM1, SLE, RA, Vitiligo     Social Hx:  Social History     Socioeconomic History     Marital status:      Spouse name: Not on file     Number of children: 2     Years of education: 17     Highest education level: Not on file   Occupational History     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      Employer: RETIRED   Tobacco Use     Smoking status: Former Smoker     Packs/day: 1.00     Years: 50.00     Pack years: 50.00     Types: Cigarettes     Quit date: 2010     Years since quittin.0     Smokeless tobacco: Never Used     Tobacco comment: no tobacco use since 2010   Substance and Sexual Activity     Alcohol use: Yes     Alcohol/week: 0.0 standard drinks     Comment: occasional     Drug use:  No     Sexual activity: Not Currently     Partners: Male   Other Topics Concern      Service No     Blood Transfusions Yes     Comment: With second miscarriage in 1972 and after childbirth in 1975     Caffeine Concern Yes     Comment: 0-1 cup coffee per day     Occupational Exposure Yes     Comment: sick building syndrome     Hobby Hazards No     Sleep Concern Yes     Comment: takes melatonin, sleeps 5 hours     Stress Concern Yes     Comment: lesion removed from left thigh, pathology questionable, will have re-excision next week     Weight Concern No     Special Diet Yes     Comment: low residue, steamed veggies, greek yogurt, low sodium     Back Care Yes     Comment: low back     Exercise Yes     Comment: Curves 3 days week     Bike Helmet Not Asked     Seat Belt Yes     Self-Exams Yes     Comment: sporadic     Parent/sibling w/ CABG, MI or angioplasty before 65F 55M? No   Social History Narrative     Not on file     Social Determinants of Health     Financial Resource Strain: Low Risk      Difficulty of Paying Living Expenses: Not hard at all   Food Insecurity: No Food Insecurity     Worried About Running Out of Food in the Last Year: Never true     Ran Out of Food in the Last Year: Never true   Transportation Needs: No Transportation Needs     Lack of Transportation (Medical): No     Lack of Transportation (Non-Medical): No   Physical Activity: Inactive     Days of Exercise per Week: 0 days     Minutes of Exercise per Session: 0 min   Stress: No Stress Concern Present     Feeling of Stress : Not at all   Social Connections: Unknown     Frequency of Communication with Friends and Family: More than three times a week     Frequency of Social Gatherings with Friends and Family: Patient refused     Attends Temple Services: Patient refused     Active Member of Clubs or Organizations: Patient refused     Attends Club or Organization Meetings: Not on file     Marital Status:    Intimate Partner  "Violence: Not on file   Housing Stability: Low Risk      Unable to Pay for Housing in the Last Year: No     Number of Places Lived in the Last Year: 1     Unstable Housing in the Last Year: No          MEDICATIONS:  has a current medication list which includes the following prescription(s): ace/arb/arni not prescribed, acetaminophen, alendronate, vitamin c, aspirin not prescribed, beta blocker not prescribed, betaxolol, calcium carbonate, chlorpheniramine, vitamin d, furosemide, guaifenesin, hydrocodone-acetaminophen, levothyroxine, melatonin, multiple vitamin, nystatin, and statin not prescribed.    ROS     ROS: 10 point ROS neg other than the symptoms noted above in the HPI.    Physical Exam   VS: /80 (BP Location: Left arm, Patient Position: Chair, Cuff Size: Adult Regular)   Pulse 107   Temp 98.1  F (36.7  C) (Tympanic)   Resp 18   Ht 1.651 m (5' 5\")   Wt 68.5 kg (151 lb)   LMP  (LMP Unknown)   SpO2 99%   Breastfeeding No   BMI 25.13 kg/m    GENERAL: healthy, alert and no distress  EYES: Eyes grossly normal to inspection, conjunctivae and sclerae normal  ENT: no nose swelling, nasal discharge.  Thyroid: no apparent thyroid nodules.  Thyroid appears normal in size and nontender.  CV: RRR, no rubs, gallops, no murmurs  RESP: CTAB, no wheezes, rales, or ronchi  ABDO: +BS  EXTREMITIES: no hand tremors.  NEURO: Cranial nerves grossly intact, mentation intact and speech normal  SKIN: No apparent skin lesions, rash or edema seen   PSYCH: mentation appears normal, affect normal/bright, judgement and insight intact, normal speech and appearance well-groomed      LABS:  BMP:  !COMPREHENSIVE Latest Ref Rng & Units 10/17/2018   SODIUM 133 - 144 mmol/L 134   POTASSIUM 3.4 - 5.3 mmol/L 3.3 (L)   CHLORIDE 94 - 109 mmol/L 98   BUN 7 - 30 mg/dL 12   Creatinine 0.52 - 1.04 mg/dL 0.62   Glucose 70 - 99 mg/dL 78   ANION GAP 3 - 14 mmol/L 6   CALCIUM 8.5 - 10.1 mg/dL 8.7   ALBUMIN 3.4 - 5.0 g/dL 3.5   PROTEIN, TOTAL " 6.8 - 8.8 g/dL 6.7 (L)     TFTs:  !THYROID Latest Ref Rng & Units 10/17/2018   TSH 0.40 - 4.00 mU/L 3.18     LFTs:  !LIPID/HEPATIC Latest Ref Rng & Units 10/17/2018   AST 0 - 45 U/L 16   ALT 0 - 50 U/L 22     PTH:  Component      Latest Ref Rng & Units 6/29/2018   Parathyroid Hormone Intact      18 - 80 pg/mL 32     Vitamin D:  Component      Latest Ref Rng & Units 10/17/2018   Vitamin D Deficiency screening      20 - 75 ug/L 30     BoneTurnOverMarkers:  Component      Latest Ref Rng & Units 6/29/2018 7/4/2018   N-Telopeptide X-Link Urine      not reported  30   Creatinine Ur/Vol      mg/dL  39   Osteocalcin      11 - 50 ng/mL 15      Other:  Component      Latest Ref Rng & Units 6/29/2018   Albumin Fraction      3.7 - 5.1 g/dL 4.3   Alpha 1 Fraction      0.2 - 0.4 g/dL 0.3   Alpha 2 Fraction      0.5 - 0.9 g/dL 0.7   Beta Fraction      0.6 - 1.0 g/dL 0.7   Gamma Fraction      0.7 - 1.6 g/dL 0.7   Monoclonal Peak      0.0 g/dL 0.0   ELP Interpretation:       Essentially normal electrophoretic pattern. No monoclonal protein seen.      Component      Latest Ref Rng & Units 7/4/2018   Calcium Urine mg/dL      mg/dL 5.5   Calcium Urine g/24 h      0.10 - 0.30 g/24 h 0.09 (L)   Calcium Urine g/g Cr      g/g Cr 0.14   N-Telopeptide X-Link Urine      not reported 30   Creatinine Ur/Vol      mg/dL 39     DEXA:    BONE DENSITOMETRY  4/21/2015     AGE: 69 year old    RISK FACTORS: Post-menopausal, Height loss of 1.5 inches inches, Condition related to bone loss: inflammatory bowel disease  CURRENT TREATMENT: Calcium with Vitamin D    FINDINGS:  Lumbar Spine (L1-L4) T-score: -1.3  Left Femoral Neck T-score: N/A  Right Femoral Neck T-score: -2.3    Lumbar (L1-L4) BMD: 1.036 Previous: 1.063   Total Hip Mean BMD: 0.796 Previous: 0.859    Comparison is made to another DXA performed on the same Lunar Prodigy machine on 07/09/2012 and 11/13/2007.     LATERAL VERTEBRAL ASSESSMENT  Procedure:  Vertebral fracture assessment was  "performed in the lateral decubitus position using a Lunar Prodigy  densitometer.  Indications for VFA: T-score of -1.0 or worse, age (female>69) and height loss > 1.5\"  Confounding factors for VFA: Arthritis/degenerative disc disease, rib shadows and scapular shadows.  The LVA scan is interpretable from T8 to L3.     VFA Findings: Using the semi-quantitative analysis of Romel there was evidence of no spinal deformity  VFA Impression: Kahlil Caputo has no vertebral fractures identified on the VFA.          IMPRESSION  Osteopenia (low bone mass)    BONE DENSITOMETRY  5/24/2018       AGE:  72 year old                                   RISK FACTORS:  Post-menopausal, Fracture of wrist     CURRENT TREATMENT:  Calcium with Vitamin D      FINDINGS:               Lumbar Spine (L1-L4) T-score:  -1.4, degenerative changes present               Right Femoral Neck T-score:  -2.5               Forearm (radius 33%) T-score:  -0.4  The left femur is not acceptable for evaluation due to previous arthroplasty.                             Lumbar (L1-L4) BMD: 1.025                                                           Total Hip Mean BMD: 0.763                                                  Forearm (radius 33%) BM: 0.676        LATERAL VERTEBRAL ASSESSMENT  Procedure:  Vertebral fracture assessment was performed in the lateral decubitus position using a [employer] Prodigy  densitometer.  Indications for VFA: T-score of -1.0 or worse and age (female>69)  Confounding factors for VFA: rib shadows.  The LVA scan is interpretable from T9 to L5.     VFA Findings: Using the semi-quantitative analysis of Romel there was evidence of no spinal deformity, however can not rule out fracture at T8.   VFA Impression: Kahlil Caputo has no definite vertebral fractures, one possible fracture identified on the VFA.   Further evaluation may be warranted due to equivocal fracture      IMPRESSION  Osteoporosis., Degenerative changes of the lumbar spine " which may falsely elevate results. If a vertebral fracture is confirmed, the diagnosis would be severe osteoporosis.        All pertinent notes, labs, and images personally reviewed by me.     A/P  Ms.Annice Caputo is a 73 year old here for the evaluation of:    #1 Osteoporosis:  Risk factors for low bone density include personal history of fracture or family history, , advanced age, female, h/o smoking, Estrogen deficiency.  Started Fosamax 70 mg weekly approximately 7/2018.  Tolerating Fosamax well.  Has occasional acid reflux.  Plan:  Discussed diagnosis, pathophysiology, management and treatment options of condition with pt.  Continue Fosamax.  Can switch to BONIVA (once every month) if there is worsening of acid reflux symptoms.  DEXA in June 2023.  Follow-up after above.  Adequate calcium and vitamin D intake.    The pt was advised to    Maintain an adequate calcium and vitamin D intake and to supplement vitamin D if needed to maintain serum levels of 25 hydroxy D (25 OH D) between 30-60 ng/ml.    Limit alcohol intake to no more than 2 servings per day.    Limit caffeine intake.    Maintain an active lifestyle including weight-bearing exercises for at least 30 mins daily.    Take measures to reduce the risk of falling.        Total time spent in with the patient evaluation:  20 min    Additional time spent reviewing pertinent lab tests and chart notes, and documentation:20 min    More than 50% of the time spent with Ms. Caputo on counseling / coordinating her care.      All questions were answered.  The patient indicates understanding of the above issues and agrees with the plan set forth.      Follow-up:  One year    Ирина Acuña MD  Endocrinology   [facility]  June 8, 2022

## 2022-06-08 NOTE — PROGRESS NOTES
{PROVIDER CHARTING PREFERENCE:623821}    Paulina Guillory is a 76 year old who presents for the following health issues {ACCOMPANIED BY STATEMENT (Optional):345538}    HPI     ***    Review of Systems   Musculoskeletal: Positive for arthralgias, back pain and myalgias. Negative for joint swelling and neck pain.      {ROS COMP (Optional):495501}      Objective    There were no vitals taken for this visit.  There is no height or weight on file to calculate BMI.  Physical Exam   {Exam List (Optional):042310}    {Diagnostic Test Results (Optional):158797}    {AMBULATORY ATTESTATION (Optional):330640}        Answers for HPI/ROS submitted by the patient on 6/2/2022  General Symptoms: No  Skin Symptoms: No  HENT Symptoms: No  EYE SYMPTOMS: No  HEART SYMPTOMS: No  LUNG SYMPTOMS: No  INTESTINAL SYMPTOMS: No  URINARY SYMPTOMS: No  GYNECOLOGIC SYMPTOMS: No  BREAST SYMPTOMS: No  SKELETAL SYMPTOMS: Yes  BLOOD SYMPTOMS: No  NERVOUS SYSTEM SYMPTOMS: No  MENTAL HEALTH SYMPTOMS: No  Bone pain: No  Muscle cramps: No  Muscle weakness: No  Joint stiffness: Yes  Bone fracture: No

## 2022-06-13 NOTE — TELEPHONE ENCOUNTER
Haase, Susan Rachele APRN CNP     Routing refill request to provider for review/approval because:  Drug not on the [facility] refill protocol     Pending Prescriptions:                       Disp   Refills    HYDROcodone-acetaminophen (NORCO) 5-325 M*90 tab*0            Sig: Take 1-2 tablets by mouth every 8 hours as needed           for pain or severe pain Max of 3 tablets per day    Last visit virtual 8/2/2021     Last rx 8/9/2021     Sofy Dial, Registered Nurse, PAL (Patient Advocate Liason)   [facility]   [phone]          LOV 02/26/2022    LAST LAB 02/04/2022    LAST RX 03/15/2022    Next OV  No future appointments.

## 2022-06-21 DIAGNOSIS — G89.29 OTHER CHRONIC PAIN: ICD-10-CM

## 2022-06-21 RX ORDER — HYDROCODONE BITARTRATE AND ACETAMINOPHEN 5; 325 MG/1; MG/1
1 TABLET ORAL EVERY 8 HOURS PRN
Qty: 90 TABLET | Refills: 0 | Status: SHIPPED | OUTPATIENT
Start: 2022-06-21 | End: 2022-07-21

## 2022-06-21 NOTE — TELEPHONE ENCOUNTER
"Routing refill request to provider for review/approval because:  Drug not on the [facility] refill protocol     Patient requesting as soon as possible. \"Will need it by tonight\".  Melina Silverio RN    "

## 2022-06-29 DIAGNOSIS — K13.0 ANGULAR CHEILITIS: ICD-10-CM

## 2022-07-01 RX ORDER — NYSTATIN 100000 U/G
CREAM TOPICAL
Qty: 30 G | Refills: 0 | Status: SHIPPED | OUTPATIENT
Start: 2022-07-01 | End: 2022-11-17

## 2022-07-01 NOTE — TELEPHONE ENCOUNTER
Prescription approved per [facility] Refill Protocol.    Delphine Galicia RN  [facility]

## 2022-07-21 ENCOUNTER — PATIENT OUTREACH (OUTPATIENT)
Dept: FAMILY MEDICINE | Facility: CLINIC | Age: 77
End: 2022-07-21

## 2022-07-21 DIAGNOSIS — G89.29 OTHER CHRONIC PAIN: ICD-10-CM

## 2022-07-21 RX ORDER — HYDROCODONE BITARTRATE AND ACETAMINOPHEN 5; 325 MG/1; MG/1
1 TABLET ORAL EVERY 8 HOURS PRN
Qty: 90 TABLET | Refills: 0 | Status: SHIPPED | OUTPATIENT
Start: 2022-07-21 | End: 2022-08-19

## 2022-07-21 NOTE — TELEPHONE ENCOUNTER
RN received voicemail from patient asking for refill of hydrocodone     RN will call patient today after 8 AM to discuss new pcp - appears patient has been seeing Geraldine Freitas CNP and has an upcoming appt, will verify if that is who she would like for pcp and change in epic     Sofy Dial, Registered Nurse, PAL (Patient Advocate Liason)   [facility]   [phone]

## 2022-07-21 NOTE — TELEPHONE ENCOUNTER
record reviewed and is without red flags for controlled substance activity.   Has follow-up appointment scheduled.   Will update CSA at that appointment.   Thank you  MALIK Mendez CNP on 7/21/2022 at 3:01 PM

## 2022-07-21 NOTE — TELEPHONE ENCOUNTER
Routing refill request to provider for review/approval because:  Drug not on the [facility] refill protocol, Hydrocodone  (Pt states she is out)    RN updated PCP, pt has seen Geraldine and has another appointment scheduled with provider on 8/25/22    Jessica MITCHELL RN, BSN, PHN - Patient Advocate Liaison - PAL RN  [facility]  [phone]

## 2022-08-05 NOTE — TELEPHONE ENCOUNTER
Leti Corcoran is a 63 year old female who presents for a telehealth visit via live interactive video with a secure connection. This visit was not recorded or stored.     Consent for telehealth visit was verified including risk of electronic data, possibility of equipment failure or need for in person visit if condition cannot be fully assessed by telehealth.      [place] [place]  Patient Location: {EMGlocation:846868} she was accompanied by {EMG Caregiver:871038}    Reason for visit: {EMGtelehealthreason:635085}  Reason for use of telehealth: {ACHTelehealthRationale:165330}    Starting time of visit: ***  Ending time of visit: ***      FAMILY MEDICINE CLINIC NOTE    HPI  Leti Corcoran is a 63 year old female presenting for ***          ROS  GENERAL: No fever/chills, no recent weight loss  HEENT: No visual changes, no changes in hearing, no sore throats  NECK: No pain, no swelling  RESP: No cough, no SOB  CV: No chest pain, no palpitations  GI: No abd pain, no N/V/D  MSK: No edema  SKIN: No new rashes  NEURO: No numbness, no tingling, no headaches    HEALTH MAINTENANCE   Health Maintenance Summary     Hepatitis B Vaccine (1 of 3 - 3-dose series)  Overdue - never done    Shingles Vaccine (1 of 2)  Overdue - never done    COVID-19 Vaccine (3 - Booster for Pfizer series)  Overdue since 7/3/2021    Depression Screening (Yearly)  Overdue since 6/14/2022    Influenza Vaccine (1)  Next due on 9/1/2022    Breast Cancer Screening (Every 2 Years)  Next due on 4/5/2023    DTaP/Tdap/Td Vaccine (2 - Td or Tdap)  Next due on 6/18/2025    Colorectal Cancer Screen- (Colonoscopy - Every 10 Years)  Next due on 7/28/2025    Cervical Cancer Screen 30-64 - (PAP/HPV Co-Testing - Every 5 Years)  Next due on 2/9/2026    Hepatitis C Screening   Completed    Meningococcal Vaccine   Aged Out    HPV Vaccine   Aged Out    Pneumococcal Vaccine 0-64   Aged Out          ALLERGIES  ALLERGIES:   Allergen Reactions  Responded to Sounday message.  Susan Haase, CNP       • Seasonal Runny Nose       MEDICATIONS  Current Outpatient Medications   Medication Sig Dispense Refill   • docusate sodium 100 MG Cap Take 100 mg by mouth 2 times daily. 20 capsule 1   • acetaminophen (TYLENOL) 500 MG tablet Take 1 tablet by mouth every 6 hours as needed for Pain.     • ibuprofen (MOTRIN) 600 MG tablet Take 1 tablet by mouth every 6 hours. 20 tablet 1   • polyethylene glycol (MIRALAX) 17 g packet Take 17 g by mouth 2 times daily. Stir and dissolve powder in any 4 to 8 ounces of beverage, then drink.     • traMADol (ULTRAM) 50 MG tablet Take 1 tablet by mouth every 6 hours as needed for Pain. 10 tablet 0   • loratadine (CLARITIN) 10 MG tablet Take 10 mg by mouth daily as needed for Allergies.     • Multiple Vitamins-Minerals (Multivitamin Women 50+) Tab Take 1 tablet by mouth daily.        No current facility-administered medications for this visit.       ACTIVE PROBLEMS  Patient Active Problem List   Diagnosis   • Encounter for health maintenance examination   • Dense breast tissue on mammogram   • Varicose veins of both lower extremities with pain   • Elevated blood pressure reading without diagnosis of hypertension   • Obesity (BMI 30-39.9)   • Prolapse of female pelvic organs   • Urinary incontinence   • Essential hypertension   • Pre-op evaluation       PAST MEDICAL HISTORY  Past Medical History:   Diagnosis Date   • Dense breast tissue    • Elevated blood pressure reading    • Prolapse of female pelvic organs    • Urinary incontinence    • Varicose veins of leg with pain, bilateral        PAST SOCIAL HISTORY  Social History     Socioeconomic History   • Marital status: /Civil Union     Spouse name: Not on file   • Number of children: Not on file   • Years of education: Not on file   • Highest education level: Not on file   Occupational History   • Not on file   Tobacco Use   • Smoking status: Former Smoker     Quit date: 2019     Years since quitting: 3.5   • Smokeless tobacco: Never  Used   • Tobacco comment: social smoker but quit in 2019   Vaping Use   • Vaping Use: never used   Substance and Sexual Activity   • Alcohol use: Yes     Comment: occ asional   • Drug use: Never   • Sexual activity: Not on file   Other Topics Concern   • Not on file   Social History Narrative    Lives with  and 2 sons right now, works for [employer] for developmentally disabled      Social Determinants of Health     Financial Resource Strain: Not on file   Food Insecurity: Not on file   Transportation Needs: Not on file   Physical Activity: Not on file   Stress: Not on file   Social Connections: Not on file   Intimate Partner Violence: Not At Risk   • Social Determinants: Intimate Partner Violence Past Fear: No   • Social Determinants: Intimate Partner Violence Current Fear: No       PAST SURGICAL HISTORY  Past Surgical History:   Procedure Laterality Date   • Colonoscopy     • Dilation and curettage     • Us vasc guided endovenous radiofrequency ablation Left 04/09/2021    LEFT GREATER SAPHENOUS VEIN        PAST FAMILY HISTORY  Family History   Problem Relation Age of Onset   • Dementia/Alzheimers Mother    • Parkinsonism Mother    • Cancer, Kidney Father    • Cancer, Esophageal Father    • Heart disease Father    • Kidney disease Son    • Aneurysm Maternal Grandfather         cerebral   • Cancer, Stomach Paternal Grandfather    • Cancer, Breast Neg Hx    • Cancer, Colon Neg Hx    • Cancer, Ovarian Neg Hx          PHYSICAL EXAM  General: patient is well appearing  HEENT: conjunctiva normal, no scleral icterus, eye movement intact  Pulmonary: No evidence of labored breathing. No wheezing.   CV: tissues well profused  Skin: no rashes      LABS  WBC (K/mcL)   Date Value   07/07/2021 12.9 (H)   06/14/2021 6.9   03/31/2021 7.0     RBC (mil/mcL)   Date Value   07/07/2021 3.44 (L)   06/14/2021 4.38   03/31/2021 4.27     HCT (%)   Date Value   07/07/2021 32.7 (L)   06/14/2021 42.0   03/31/2021 41.5     HGB  (g/dL)   Date Value   07/07/2021 11.0 (L)   06/14/2021 13.4   03/31/2021 13.1     PLT (K/mcL)   Date Value   07/07/2021 232   06/14/2021 266   03/31/2021 279       Sodium (mmol/L)   Date Value   07/07/2021 138   06/14/2021 139   02/09/2021 140     Potassium (mmol/L)   Date Value   07/07/2021 4.1   06/14/2021 4.8   02/09/2021 4.6     Chloride (mmol/L)   Date Value   07/07/2021 107   06/14/2021 105   02/09/2021 106     Glucose (mg/dL)   Date Value   07/07/2021 115 (H)   06/14/2021 98   02/09/2021 108 (H)     Calcium (mg/dL)   Date Value   07/07/2021 8.3 (L)   06/14/2021 9.5   02/09/2021 9.2     Carbon Dioxide (mmol/L)   Date Value   07/07/2021 26   06/14/2021 30   02/09/2021 29     BUN (mg/dL)   Date Value   07/07/2021 13   06/14/2021 16   02/09/2021 16     Creatinine (mg/dL)   Date Value   07/07/2021 0.63   06/14/2021 0.70   02/09/2021 0.70       GOT/AST (Units/L)   Date Value   02/09/2021 23     GPT/ALT (Units/L)   Date Value   02/09/2021 26     No results found for: GGTP  Alkaline Phosphatase (Units/L)   Date Value   02/09/2021 101     Bilirubin, Total (mg/dL)   Date Value   02/09/2021 0.4       Cholesterol (mg/dL)   Date Value   02/09/2021 222 (H)     HDL (mg/dL)   Date Value   02/09/2021 65     Cholesterol/ HDL Ratio (no units)   Date Value   02/09/2021 3.4     Triglycerides (mg/dL)   Date Value   02/09/2021 123     LDL (mg/dL)   Date Value   02/09/2021 132 (H)       DIAGNOSTICS      ASSESSMENT/PLAN  Problem List Items Addressed This Visit    None         No follow-ups on file.    Moose Shirley  Family Medicine  PGY2

## 2022-08-19 DIAGNOSIS — G89.29 OTHER CHRONIC PAIN: ICD-10-CM

## 2022-08-19 RX ORDER — HYDROCODONE BITARTRATE AND ACETAMINOPHEN 5; 325 MG/1; MG/1
1 TABLET ORAL EVERY 8 HOURS PRN
Qty: 90 TABLET | Refills: 0 | Status: SHIPPED | OUTPATIENT
Start: 2022-08-20 | End: 2022-09-19

## 2022-08-19 NOTE — TELEPHONE ENCOUNTER
Routing refill request to provider for review/approval because:   Drug not on the [facility] refill protocol     Sofy Dial Registered Nurse, PAL (Patient Advocate Liason)   [facility]   [phone]

## 2022-08-19 NOTE — TELEPHONE ENCOUNTER
Refilled x 1 month. Patient due for controlled medication follow-up. Virtual OK.   Thank you  MALIK Mendez CNP on 8/19/2022 at 3:11 PM

## 2022-08-25 ENCOUNTER — VIRTUAL VISIT (OUTPATIENT)
Dept: FAMILY MEDICINE | Facility: CLINIC | Age: 77
End: 2022-08-25
Payer: COMMERCIAL

## 2022-08-25 DIAGNOSIS — M48.061 SPINAL STENOSIS OF LUMBAR REGION WITHOUT NEUROGENIC CLAUDICATION: Primary | ICD-10-CM

## 2022-08-25 DIAGNOSIS — R60.0 LOWER EXTREMITY EDEMA: ICD-10-CM

## 2022-08-25 DIAGNOSIS — J30.1 SEASONAL ALLERGIC RHINITIS DUE TO POLLEN: ICD-10-CM

## 2022-08-25 PROCEDURE — 99214 OFFICE O/P EST MOD 30 MIN: CPT | Mod: 95 | Performed by: NURSE PRACTITIONER

## 2022-08-25 RX ORDER — FUROSEMIDE 20 MG
20 TABLET ORAL 2 TIMES DAILY
Qty: 180 TABLET | Refills: 0 | Status: SHIPPED | OUTPATIENT
Start: 2022-08-25 | End: 2022-11-17

## 2022-08-25 NOTE — PROGRESS NOTES
"Kahlil is a 77 year old who is being evaluated via a billable telephone visit.      What phone number would you like to be contacted at? [phone]    How would you like to obtain your AVS? MyChart    Assessment & Plan     Spinal stenosis of lumbar region without neurogenic claudication  Stable. Using Norco, no overuse noted. Recent refill and none needed today.    record reviewed and is without red flags for controlled substance activity.   Recommended to repeat Physical Therapy potentially in end of year.     Seasonal allergic rhinitis due to pollen  recommended Flonase use.     Lower extremity edema  Stable. Refilled.   - furosemide (LASIX) 20 MG tablet  Dispense: 180 tablet; Refill: 0               BMI:   Estimated body mass index is 25.13 kg/m  as calculated from the following:    Height as of 6/8/22: 1.651 m (5' 5\").    Weight as of 6/8/22: 68.5 kg (151 lb).           No follow-ups on file.   Follow-up Visit   Expected date: Nov 16, 2022      Follow Up Appointment Details:     Follow-up with whom?: Me    Follow-Up for what?: Medicare Wellness    Welcome or Annual?: Annual Wellness    How?: In Person                    MALIK Mendez CNP [facility]   Kahlil is a 77 year old, presenting for the following health issues:  Back Pain      History of Present Illness       Back Pain:  She presents for follow up of back pain. Patient's back pain is a chronic problem.  Location of back pain:  Right lower back, right buttock and right hip  Description of back pain: burning, gnawing and sharp  Back pain spreads: right buttocks, right thigh and right knee    Since patient first noticed back pain, pain is: always present, but gets better and worse  Does back pain interfere with her job:  Not applicable      She eats 2-3 servings of fruits and vegetables daily.She consumes 0 sweetened beverage(s) daily.She exercises with enough effort to increase her heart rate 9 or less " "minutes per day.  She exercises with enough effort to increase her heart rate 3 or less days per week.   She is taking medications regularly.     Right low back pain chronically.   Completed Physical Therapy, doing home exercise.   JOSE LUIS with no benefits. No follow-up with NeuroSurgery unless having weakness.   Using Tylenol 500 mg daily with 0.5 tablet with Norco and 2 additional Norco as needed during day.     Having allergies; using oral over the counter medication.       Review of Systems         Objective    Vitals - Patient Reported  Systolic (Patient Reported): 120  Diastolic (Patient Reported): 72  Weight (Patient Reported): 68 kg (150 lb)  Height (Patient Reported): 165.1 cm (5' 5\")  BMI (Based on Pt Reported Ht/Wt): 24.96      Vitals:  No vitals were obtained today due to virtual visit.    Physical Exam   healthy, alert and no distress  PSYCH: Alert and oriented times 3; coherent speech, normal   rate and volume, able to articulate logical thoughts, able   to abstract reason, no tangential thoughts, no hallucinations   or delusions  Her affect is normal  RESP: No cough, no audible wheezing, able to talk in full sentences  Remainder of exam unable to be completed due to telephone visits                Phone call duration: 12 minutes    .  ..  "

## 2022-08-29 ENCOUNTER — PATIENT OUTREACH (OUTPATIENT)
Dept: FAMILY MEDICINE | Facility: CLINIC | Age: 77
End: 2022-08-29

## 2022-08-29 NOTE — TELEPHONE ENCOUNTER
Patient has changed pcp's to Geraldine Freitas CNP after previous pcp Haase, Susan Rachele APRN CNP retired     This RN PAL name removed from care team, please add your name and send updated letter with contact information     Thank you,   Sofy Dial, Registered Nurse, PAL (Patient Advocate Liason)   [facility]   [phone]

## 2022-08-29 NOTE — LETTER
Kahlil Caputo  [address] [address]    Thank you for choosing [facility] today for your health care needs.     [facility] is transforming primary care  At [facility], we re dedicated to constantly improve how we serve the health care needs of our patients and communities. We re currently making changes to the way we deliver care.     Changes you ll notice include:    An emphasis on building a relationship with a primary care provider    Access to a PAL (patient advocate and liaison) to help guide you with your care needs    Appointment lengths tailored to your specific needs and greater access to a care team to help you and your provider improve and maintain your health and well-being    Improved online access to your care team    Benefits of a primary care provider  If you don t have a designated primary care provider, we encourage you to get to know our care team online and find a provider you d like to see. Most of our providers have a short video on their online provider page. Visit [email or web address] to explore our providers and locations.    Benefits of having a primary care provider include:      They get to know you - your health history, family history and goals, making it easier to make a health plan together.     You get to know them - making health-related conversations and decisions easier      Primary care doctors help you when you re sick or hurt - but also focus on keeping you healthy with preventive care and screenings.      A doctor who sees you regularly is more likely to notice changes in your health.     You ll be connected to a broad care team who partners with your provider to support you.    Patient Advocate Liaison (PAL)   To help make sure you get the right care, at the right time, we include PALs, or Patient Advocate Liaisons, as part of your care team. Your PAL will be your first line of contact. They ll advocate for your needs and help you navigate  our services, connecting you with care team members and community resources to ensure your care is well coordinated. You ll be introduced to a PAL in an upcoming visit.     Expanded care team access with tailored appointment lengths  Depending on your health care needs, you may have longer or shorter appointments and see additional care team providers - including Medication Therapy Management (MTM) pharmacists, diabetes educators, behavioral health clinicians, or social workers. At times, they may be included in your visit with your provider, or you may see them individually.     Online access to your health care records and care team  [employer] is our online tool that makes it easy to see your health care information and communicate with your care team.     [employer] allows you to:     View your health maintenance plan so you know when you re due for a preventive screening    Send secure messages to your care team    View your health history and visit summaries     Schedule appointments     Complete questionnaires and eCheck-in before appointments      Get care from your provider with an e-visit      View and pay your bill     Sign up at [email or web address]. Once you have an account, you also can download the mobile [email or web address].     Connecting to fast and convenient care  When you need fast, convenient care - consider one of the following options:       Video Visit: A convenient care option for visiting with your provider out of the comfort of your own home. Most of the things you come to the clinic to address with your provider can now be done virtually through a video. This includes your chronic medication follow up, questions or concerns you may have, and even your annual Medicare Wellness Visit.       Phone Visit: Another convenient option for follow up of common problems that may require a more in-depth discussion with your provider.       E-visit: When you need acute care quickly, or have a quick question about your  medication, an E-visit is completed through [employer] and your provider will respond within one business day.        Cassidy Espana RN [facility]  [facility]  [phone]       Never smoker

## 2022-09-01 ENCOUNTER — ALLIED HEALTH/NURSE VISIT (OUTPATIENT)
Dept: FAMILY MEDICINE | Facility: CLINIC | Age: 77
End: 2022-09-01
Payer: COMMERCIAL

## 2022-09-01 DIAGNOSIS — Z23 HIGH PRIORITY FOR 2019-NCOV VACCINE: Primary | ICD-10-CM

## 2022-09-01 PROCEDURE — 0054A COVID-19,PF,PFIZER (12+ YRS): CPT

## 2022-09-01 PROCEDURE — 91305 COVID-19,PF,PFIZER (12+ YRS): CPT

## 2022-09-19 DIAGNOSIS — G89.29 OTHER CHRONIC PAIN: ICD-10-CM

## 2022-09-19 RX ORDER — HYDROCODONE BITARTRATE AND ACETAMINOPHEN 5; 325 MG/1; MG/1
1 TABLET ORAL EVERY 8 HOURS PRN
Qty: 90 TABLET | Refills: 0 | Status: SHIPPED | OUTPATIENT
Start: 2022-09-19 | End: 2022-10-20

## 2022-09-19 NOTE — TELEPHONE ENCOUNTER
Patient requesting a refill of her Hydrocodone medication     HYDROcodone-acetaminophen (NORCO) 5-325 MG tablet 90 tablet 0 8/20/2022  No   Sig - Route: Take 1 tablet by mouth every 8 hours as needed for pain or severe pain Max 3 tablets/24 hours - Oral     - Patient had visit with MALIK Mendez CNP on 8/25/2022   - Routing refill requesting to CR refill     Adriane WOOD RN   Patient Advocate Liaison (PAL)  [facility]

## 2022-09-19 NOTE — TELEPHONE ENCOUNTER
Routing refill request to provider for review/approval because:  Drug not on the [facility] refill protocol     Adriane WOOD RN   Patient Advocate Liaison (PAL)  [facility] [place]

## 2022-09-19 NOTE — TELEPHONE ENCOUNTER
Reason for Call:  Medication or medication refill:    Do you use a [facility]?  Name of the pharmacy and phone number for the current request:  [facility] in [place]    Name of the medication requested: Hydrocodone    Other request: Out of medication as of 9/19/2022    Can we leave a detailed message on this number? YES    Phone number patient can be reached at: Home number on file [phone] (home)    Best Time:     Call taken on 9/19/2022 at 9:31 AM by Jyoti Gleason PTA

## 2022-10-10 ENCOUNTER — IMMUNIZATION (OUTPATIENT)
Dept: INTERNAL MEDICINE | Facility: CLINIC | Age: 77
End: 2022-10-10
Payer: COMMERCIAL

## 2022-10-10 DIAGNOSIS — Z23 NEED FOR PROPHYLACTIC VACCINATION AND INOCULATION AGAINST INFLUENZA: Primary | ICD-10-CM

## 2022-10-10 PROCEDURE — 99207 PR NO CHARGE NURSE ONLY: CPT

## 2022-10-10 PROCEDURE — G0008 ADMIN INFLUENZA VIRUS VAC: HCPCS

## 2022-10-10 PROCEDURE — 90662 IIV NO PRSV INCREASED AG IM: CPT

## 2022-10-20 DIAGNOSIS — G89.29 OTHER CHRONIC PAIN: ICD-10-CM

## 2022-10-20 RX ORDER — HYDROCODONE BITARTRATE AND ACETAMINOPHEN 5; 325 MG/1; MG/1
1 TABLET ORAL EVERY 8 HOURS PRN
Qty: 90 TABLET | Refills: 0 | Status: SHIPPED | OUTPATIENT
Start: 2022-10-20 | End: 2022-11-17

## 2022-10-20 NOTE — TELEPHONE ENCOUNTER
Medication Question or Refill        What medication are you calling about (include dose and sig)?: Pending Prescriptions:                       Disp   Refills    HYDROcodone-acetaminophen (NORCO) 5-325 M*90 tab*0            Sig: Take 1 tablet by mouth every 8 hours as needed           for pain or severe pain Max 3 tablets/24 hours        Controlled Substance Agreement on file:   CSA -- Patient Level:     [Media Unavailable] Controlled Substance Agreement - Opioid - Scan on 6/12/2022  2:21 PM   [Media Unavailable] Controlled Substance Agreement - Opioid - Scan on 4/30/2021 12:45 PM   [Media Unavailable] Controlled Substance Agreement - Opioid - Scan on 2/7/2020  7:19 AM       Who prescribed the medication?: Dr. Alan Ricketts     Do you need a refill? Yes: Patient is completely out of medication     When did you use the medication last? Last night     Patient offered an appointment? No, patient is scheduled to be seen 11/14/2022    Do you have any questions or concerns?  No    Preferred Pharmacy:     [id number] - [place] - [address]  [address] [place] [address]  Phone: [phone] Fax: [phone]      Could we send this information to you in [place] [facility] or would you prefer to receive a phone call?:   Patient would prefer a phone call   Okay to leave a detailed message?: Yes at Home number on file [phone] (home)

## 2022-11-15 SDOH — ECONOMIC STABILITY: FOOD INSECURITY: WITHIN THE PAST 12 MONTHS, YOU WORRIED THAT YOUR FOOD WOULD RUN OUT BEFORE YOU GOT MONEY TO BUY MORE.: NEVER TRUE

## 2022-11-15 SDOH — ECONOMIC STABILITY: FOOD INSECURITY: WITHIN THE PAST 12 MONTHS, THE FOOD YOU BOUGHT JUST DIDN'T LAST AND YOU DIDN'T HAVE MONEY TO GET MORE.: NEVER TRUE

## 2022-11-15 SDOH — HEALTH STABILITY: PHYSICAL HEALTH: ON AVERAGE, HOW MANY DAYS PER WEEK DO YOU ENGAGE IN MODERATE TO STRENUOUS EXERCISE (LIKE A BRISK WALK)?: 3 DAYS

## 2022-11-15 SDOH — ECONOMIC STABILITY: INCOME INSECURITY: IN THE LAST 12 MONTHS, WAS THERE A TIME WHEN YOU WERE NOT ABLE TO PAY THE MORTGAGE OR RENT ON TIME?: NO

## 2022-11-15 SDOH — HEALTH STABILITY: PHYSICAL HEALTH: ON AVERAGE, HOW MANY MINUTES DO YOU ENGAGE IN EXERCISE AT THIS LEVEL?: 10 MIN

## 2022-11-15 SDOH — ECONOMIC STABILITY: INCOME INSECURITY: HOW HARD IS IT FOR YOU TO PAY FOR THE VERY BASICS LIKE FOOD, HOUSING, MEDICAL CARE, AND HEATING?: NOT HARD AT ALL

## 2022-11-15 ASSESSMENT — ENCOUNTER SYMPTOMS
HEADACHES: 0
BREAST MASS: 0
SORE THROAT: 0
DYSURIA: 0
CONSTIPATION: 0
HEARTBURN: 0
CHILLS: 0
COUGH: 0
WEAKNESS: 0
PALPITATIONS: 0
FEVER: 0
HEMATOCHEZIA: 0
DIARRHEA: 0
FREQUENCY: 0
MYALGIAS: 0
ARTHRALGIAS: 0
EYE PAIN: 0
NAUSEA: 0
JOINT SWELLING: 0
SHORTNESS OF BREATH: 0
HEMATURIA: 0
ABDOMINAL PAIN: 0
PARESTHESIAS: 0
NERVOUS/ANXIOUS: 0
DIZZINESS: 0

## 2022-11-15 ASSESSMENT — SOCIAL DETERMINANTS OF HEALTH (SDOH)
IN A TYPICAL WEEK, HOW MANY TIMES DO YOU TALK ON THE PHONE WITH FAMILY, FRIENDS, OR NEIGHBORS?: MORE THAN THREE TIMES A WEEK
DO YOU BELONG TO ANY CLUBS OR ORGANIZATIONS SUCH AS CHURCH GROUPS UNIONS, FRATERNAL OR ATHLETIC GROUPS, OR SCHOOL GROUPS?: PATIENT DECLINED
HOW OFTEN DO YOU GET TOGETHER WITH FRIENDS OR RELATIVES?: PATIENT DECLINED
HOW OFTEN DO YOU ATTEND CHURCH OR RELIGIOUS SERVICES?: PATIENT DECLINED

## 2022-11-15 ASSESSMENT — LIFESTYLE VARIABLES
SKIP TO QUESTIONS 9-10: 0
HOW MANY STANDARD DRINKS CONTAINING ALCOHOL DO YOU HAVE ON A TYPICAL DAY: PATIENT DECLINED
HOW OFTEN DO YOU HAVE A DRINK CONTAINING ALCOHOL: PATIENT DECLINED
HOW OFTEN DO YOU HAVE SIX OR MORE DRINKS ON ONE OCCASION: NEVER
AUDIT-C TOTAL SCORE: -1

## 2022-11-15 ASSESSMENT — ACTIVITIES OF DAILY LIVING (ADL): CURRENT_FUNCTION: HOUSEWORK REQUIRES ASSISTANCE

## 2022-11-17 ENCOUNTER — MYC MEDICAL ADVICE (OUTPATIENT)
Dept: FAMILY MEDICINE | Facility: CLINIC | Age: 77
End: 2022-11-17

## 2022-11-17 ENCOUNTER — OFFICE VISIT (OUTPATIENT)
Dept: FAMILY MEDICINE | Facility: CLINIC | Age: 77
End: 2022-11-17
Attending: NURSE PRACTITIONER
Payer: COMMERCIAL

## 2022-11-17 VITALS
OXYGEN SATURATION: 93 % | HEIGHT: 65 IN | SYSTOLIC BLOOD PRESSURE: 112 MMHG | RESPIRATION RATE: 12 BRPM | TEMPERATURE: 98.6 F | WEIGHT: 156.6 LBS | DIASTOLIC BLOOD PRESSURE: 64 MMHG | BODY MASS INDEX: 26.09 KG/M2 | HEART RATE: 96 BPM

## 2022-11-17 DIAGNOSIS — Z13.220 LIPID SCREENING: ICD-10-CM

## 2022-11-17 DIAGNOSIS — Z00.00 MEDICARE ANNUAL WELLNESS VISIT, SUBSEQUENT: Primary | ICD-10-CM

## 2022-11-17 DIAGNOSIS — K13.0 ANGULAR CHEILITIS: ICD-10-CM

## 2022-11-17 DIAGNOSIS — G89.29 OTHER CHRONIC PAIN: ICD-10-CM

## 2022-11-17 DIAGNOSIS — E03.8 OTHER SPECIFIED HYPOTHYROIDISM: ICD-10-CM

## 2022-11-17 DIAGNOSIS — E03.9 HYPOTHYROIDISM, UNSPECIFIED TYPE: ICD-10-CM

## 2022-11-17 DIAGNOSIS — Z23 HIGH PRIORITY FOR 2019-NCOV VACCINE: ICD-10-CM

## 2022-11-17 DIAGNOSIS — M48.061 SPINAL STENOSIS OF LUMBAR REGION WITH RADICULOPATHY: ICD-10-CM

## 2022-11-17 DIAGNOSIS — R60.0 LOWER EXTREMITY EDEMA: ICD-10-CM

## 2022-11-17 DIAGNOSIS — I47.29 PAROXYSMAL VENTRICULAR TACHYCARDIA (H): ICD-10-CM

## 2022-11-17 DIAGNOSIS — M54.16 SPINAL STENOSIS OF LUMBAR REGION WITH RADICULOPATHY: ICD-10-CM

## 2022-11-17 PROBLEM — I47.20 PAROXYSMAL VENTRICULAR TACHYCARDIA (H): Status: ACTIVE | Noted: 2022-11-17

## 2022-11-17 LAB
ANION GAP SERPL CALCULATED.3IONS-SCNC: 11 MMOL/L (ref 7–15)
BUN SERPL-MCNC: 20.5 MG/DL (ref 8–23)
CALCIUM SERPL-MCNC: 8.8 MG/DL (ref 8.8–10.2)
CHLORIDE SERPL-SCNC: 98 MMOL/L (ref 98–107)
CHOLEST SERPL-MCNC: 199 MG/DL
CREAT SERPL-MCNC: 0.66 MG/DL (ref 0.51–0.95)
DEPRECATED HCO3 PLAS-SCNC: 26 MMOL/L (ref 22–29)
GFR SERPL CREATININE-BSD FRML MDRD: 89 ML/MIN/1.73M2
GLUCOSE SERPL-MCNC: 92 MG/DL (ref 70–99)
HDLC SERPL-MCNC: 68 MG/DL
LDLC SERPL CALC-MCNC: 95 MG/DL
NONHDLC SERPL-MCNC: 131 MG/DL
POTASSIUM SERPL-SCNC: 4.5 MMOL/L (ref 3.4–5.3)
SODIUM SERPL-SCNC: 135 MMOL/L (ref 136–145)
TRIGL SERPL-MCNC: 180 MG/DL
TSH SERPL DL<=0.005 MIU/L-ACNC: 1.21 UIU/ML (ref 0.3–4.2)

## 2022-11-17 PROCEDURE — 91312 COVID-19,PF,PFIZER BOOSTER BIVALENT: CPT | Performed by: NURSE PRACTITIONER

## 2022-11-17 PROCEDURE — G0438 PPPS, INITIAL VISIT: HCPCS | Performed by: NURSE PRACTITIONER

## 2022-11-17 PROCEDURE — 0124A COVID-19,PF,PFIZER BOOSTER BIVALENT: CPT | Performed by: NURSE PRACTITIONER

## 2022-11-17 PROCEDURE — 36415 COLL VENOUS BLD VENIPUNCTURE: CPT | Performed by: NURSE PRACTITIONER

## 2022-11-17 PROCEDURE — 80061 LIPID PANEL: CPT | Performed by: NURSE PRACTITIONER

## 2022-11-17 PROCEDURE — 84443 ASSAY THYROID STIM HORMONE: CPT | Performed by: NURSE PRACTITIONER

## 2022-11-17 PROCEDURE — 80048 BASIC METABOLIC PNL TOTAL CA: CPT | Performed by: NURSE PRACTITIONER

## 2022-11-17 RX ORDER — FUROSEMIDE 20 MG
20 TABLET ORAL 2 TIMES DAILY
Qty: 180 TABLET | Refills: 0 | Status: SHIPPED | OUTPATIENT
Start: 2022-11-17 | End: 2023-02-28

## 2022-11-17 RX ORDER — NYSTATIN 100000 U/G
CREAM TOPICAL
Qty: 30 G | Refills: 0 | Status: SHIPPED | OUTPATIENT
Start: 2022-11-17 | End: 2023-04-19

## 2022-11-17 RX ORDER — HYDROCODONE BITARTRATE AND ACETAMINOPHEN 5; 325 MG/1; MG/1
1 TABLET ORAL EVERY 8 HOURS PRN
Qty: 90 TABLET | Refills: 0 | Status: SHIPPED | OUTPATIENT
Start: 2022-11-19 | End: 2022-12-20

## 2022-11-17 RX ORDER — LEVOTHYROXINE SODIUM 150 UG/1
150 TABLET ORAL DAILY
Qty: 90 TABLET | Refills: 3 | Status: SHIPPED | OUTPATIENT
Start: 2022-11-17 | End: 2023-11-13

## 2022-11-17 ASSESSMENT — ENCOUNTER SYMPTOMS
DIARRHEA: 0
MYALGIAS: 0
NERVOUS/ANXIOUS: 0
COUGH: 0
EYE PAIN: 0
HEMATURIA: 0
HEMATOCHEZIA: 0
FEVER: 0
DIZZINESS: 0
CONSTIPATION: 0
ABDOMINAL PAIN: 0
ARTHRALGIAS: 0
PALPITATIONS: 0
WEAKNESS: 0
JOINT SWELLING: 0
DYSURIA: 0
SHORTNESS OF BREATH: 0
BREAST MASS: 0
HEADACHES: 0
HEARTBURN: 0
CHILLS: 0
PARESTHESIAS: 0
NAUSEA: 0
FREQUENCY: 0
SORE THROAT: 0

## 2022-11-17 ASSESSMENT — ACTIVITIES OF DAILY LIVING (ADL): CURRENT_FUNCTION: HOUSEWORK REQUIRES ASSISTANCE

## 2022-11-17 NOTE — PROGRESS NOTES
"SUBJECTIVE:   Kahlil is a 77 year old who presents for Preventive Visit.    Continuing with hydrocodone three times per day as needed for chronic back pain.       Patient has been advised of split billing requirements and indicates understanding: Yes  Are you in the first 12 months of your Medicare coverage?  No    Healthy Habits:     In general, how would you rate your overall health?  Good    Frequency of exercise:  2-3 days/week    Duration of exercise:  Less than 15 minutes    Do you usually eat at least 4 servings of fruit and vegetables a day, include whole grains    & fiber and avoid regularly eating high fat or \"junk\" foods?  Yes    Taking medications regularly:  Yes    Ability to successfully perform activities of daily living:  Housework requires assistance    Home Safety:  No safety concerns identified    Hearing Impairment:  Difficulty following a conversation in a noisy restaurant or crowded room    In the past 6 months, have you been bothered by leaking of urine?  No    In general, how would you rate your overall mental or emotional health?  Good      PHQ-2 Total Score: 0    Additional concerns today:  No      Have you ever done Advance Care Planning? (For example, a Health Directive, POLST, or a discussion with a medical provider or your loved ones about your wishes): No, advance care planning information given to patient to review.  Patient declined advance care planning discussion at this time.      Fall risk  Fallen 2 or more times in the past year?: No  Any fall with injury in the past year?: No    Cognitive Screening   1) Repeat 3 items (Leader, Season, Table)    2) Clock draw: NORMAL  3) 3 item recall: Recalls 3 objects  Results: 3 items recalled: COGNITIVE IMPAIRMENT LESS LIKELY    Mini-CogTM Copyright ROBERT Ferraro. Licensed by the author for use in [facility]; reprinted with permission (vince@.[facility]). All rights reserved.      Do you have sleep apnea, excessive snoring or daytime " drowsiness?: no    Reviewed and updated as needed this visit by clinical staff   Tobacco  Allergies  Meds  Problems  Med Hx  Surg Hx  Fam Hx          Reviewed and updated as needed this visit by Provider   Tobacco  Allergies  Meds  Problems  Med Hx  Surg Hx  Fam Hx         Social History     Tobacco Use     Smoking status: Former     Packs/day: 1.00     Years: 50.00     Pack years: 50.00     Types: Cigarettes     Quit date: 2010     Years since quittin.4     Smokeless tobacco: Never     Tobacco comments:     no tobacco use since 2010   Substance Use Topics     Alcohol use: Yes     Comment: occasional     If you drink alcohol do you typically have >3 drinks per day or >7 drinks per week? No    Alcohol Use 2022   Prescreen: >3 drinks/day or >7 drinks/week? -   Prescreen: >3 drinks/day or >7 drinks/week? No         Current providers sharing in care for this patient include:   Patient Care Team:  Geraldine Freitas APRN CNP as PCP - General (Family Medicine)  Ирина Acuña MD as Hospitalist (Endocrinology, Diabetes, and Metabolism)  Heron Collins MD as Assigned Neuroscience Provider  Cassidy Espana RN as Personal Advocate & Liaison (PAL) (Family Medicine)  Ирина Acuña MD as Assigned Endocrinology Provider  Meme Deluca [facility] as Assigned MT Pharmacist  Haase, Susan Rachele, APRN CNP as Assigned PCP    The following health maintenance items are reviewed in Epic and correct as of today:  Health Maintenance   Topic Date Due     ZOSTER IMMUNIZATION (1 of 2) Never done     DTAP/TDAP/TD IMMUNIZATION (3 - Td or Tdap) 08/15/2022     LUNG CANCER SCREENING  2022     MEDICARE ANNUAL WELLNESS VISIT  2022     TSH W/FREE T4 REFLEX  2022     URINE DRUG SCREEN  2023     DEXA  2023     ANNUAL REVIEW OF HM ORDERS  2023     FALL RISK ASSESSMENT  2023     LIPID  [id number]     ADVANCE CARE PLANNING  2027     HEPATITIS C SCREENING   "Completed     PHQ-2 (once per calendar year)  Completed     INFLUENZA VACCINE  Completed     Pneumococcal Vaccine: 65+ Years  Completed     COVID-19 Vaccine  Completed     IPV IMMUNIZATION  Aged Out     MENINGITIS IMMUNIZATION  Aged Out     MAMMO SCREENING  Discontinued     COLORECTAL CANCER SCREENING  Discontinued     Lab work is in process  Labs reviewed in EPIC        Pertinent mammograms are reviewed under the imaging tab.    Review of Systems   Constitutional: Negative for chills and fever.   HENT: Negative for congestion, ear pain, hearing loss and sore throat.    Eyes: Negative for pain and visual disturbance.   Respiratory: Negative for cough and shortness of breath.    Cardiovascular: Negative for chest pain, palpitations and peripheral edema.   Gastrointestinal: Negative for abdominal pain, constipation, diarrhea, heartburn, hematochezia and nausea.   Breasts:  Negative for tenderness, breast mass and discharge.   Genitourinary: Negative for dysuria, frequency, genital sores, hematuria, pelvic pain, urgency, vaginal bleeding and vaginal discharge.   Musculoskeletal: Negative for arthralgias, joint swelling and myalgias.   Skin: Negative for rash.   Neurological: Negative for dizziness, weakness, headaches and paresthesias.   Psychiatric/Behavioral: Negative for mood changes. The patient is not nervous/anxious.          OBJECTIVE:   /64 (BP Location: Right arm, Patient Position: Sitting, Cuff Size: Adult Regular)   Pulse 96   Temp 98.6  F (37  C) (Oral)   Resp 12   Ht 1.651 m (5' 5\")   Wt 71 kg (156 lb 9.6 oz)   LMP  (LMP Unknown)   SpO2 93%   BMI 26.06 kg/m   Estimated body mass index is 26.06 kg/m  as calculated from the following:    Height as of this encounter: 1.651 m (5' 5\").    Weight as of this encounter: 71 kg (156 lb 9.6 oz).  Physical Exam  GENERAL: healthy, alert and no distress  NECK: no adenopathy, no asymmetry, masses, or scars and thyroid normal to palpation  RESP: lungs clear " to auscultation - no rales, rhonchi or wheezes  CV: regular rate and rhythm, normal S1 S2, no S3 or S4, no murmur, click or rub, no peripheral edema and peripheral pulses strong  ABDOMEN: soft, nontender, no hepatosplenomegaly, no masses and bowel sounds normal  MS: no gross musculoskeletal defects noted, no edema  SKIN: no suspicious lesions or rashes  NEURO: Normal strength and tone, mentation intact and speech normal  PSYCH: mentation appears normal, affect normal/bright    Diagnostic Test Results:  Labs reviewed in Epic    ASSESSMENT / PLAN:   Kahlil was seen today for wellness visit and imm/inj.    Diagnoses and all orders for this visit:    Medicare annual wellness visit, subsequent  HM updated.     Other specified hypothyroidism  -     TSH WITH FREE T4 REFLEX; Future  -     TSH WITH FREE T4 REFLEX  Asymptomatic.     Angular cheilitis  -     nystatin (MYCOSTATIN) 355712 UNIT/GM external cream; Apply TOPICALLY to affected area twice daily Strength: 100,000 UNIT/GM  Asymptomatic.     Lower extremity edema  -     Basic metabolic panel  (Ca, Cl, CO2, Creat, Gluc, K, Na, BUN); Future  -     furosemide (LASIX) 20 MG tablet; Take 1 tablet (20 mg) by mouth 2 times daily  -     Basic metabolic panel  (Ca, Cl, CO2, Creat, Gluc, K, Na, BUN)  Stable.     Hypothyroidism, unspecified type  -     levothyroxine (SYNTHROID/LEVOTHROID) 150 MCG tablet; Take 1 tablet (150 mcg) by mouth daily  As above.     Other chronic pain  -     HYDROcodone-acetaminophen (NORCO) 5-325 MG tablet; Take 1 tablet by mouth every 8 hours as needed for pain or severe pain (7-10) Max 3 tablets/24 hours  Stable.  record reviewed and is without red flags for controlled substance activity.      Spinal stenosis of lumbar region with radiculopathy  Stable. Hydrocodone three times per day as needed. Considering acupuncture.     Lipid screening  -     Lipid panel reflex to direct LDL Non-fasting; Future  -     Lipid panel reflex to direct LDL  "Non-fasting    Paroxysmal ventricular tachycardia  Asymptomatic.     High priority for 2019-nCoV vaccine  -     COVID-19,PF,PFIZER BOOSTER BIVALENT 12+Yrs    Other orders  -     PRIMARY CARE FOLLOW-UP SCHEDULING  -     REVIEW OF HEALTH MAINTENANCE PROTOCOL ORDERS  -     PRIMARY CARE FOLLOW-UP SCHEDULING; Future        Patient has been advised of split billing requirements and indicates understanding: Yes      COUNSELING:  Reviewed preventive health counseling, as reflected in patient instructions      BMI:   Estimated body mass index is 26.06 kg/m  as calculated from the following:    Height as of this encounter: 1.651 m (5' 5\").    Weight as of this encounter: 71 kg (156 lb 9.6 oz).   Weight management plan: Discussed healthy diet and exercise guidelines      She reports that she quit smoking about 12 years ago. Her smoking use included cigarettes. She has a 50.00 pack-year smoking history. She has never used smokeless tobacco.      Appropriate preventive services were discussed with this patient, including applicable screening as appropriate for cardiovascular disease, diabetes, osteopenia/osteoporosis, and glaucoma.  As appropriate for age/gender, discussed screening for colorectal cancer, prostate cancer, breast cancer, and cervical cancer. Checklist reviewing preventive services available has been given to the patient.    Reviewed patients plan of care and provided an AVS. The Intermediate Care Plan ( asthma action plan, low back pain action plan, and migraine action plan) for Kahlil meets the Care Plan requirement. This Care Plan has been established and reviewed with the Patient.          MALIK Mendez [facility]    Identified Health Risks:  "

## 2022-11-18 NOTE — TELEPHONE ENCOUNTER
Geraldine,     Pt sent [employer] message  -States incorrect information on 1/17/22 AVS notes  an action plan was updated for asthma control, & migraines, along with the back pain. I have never had a migraine headache, & I do not have asthma.    Pt wants the information updated to reflect she does not have those conditions.     Jessica MITCHELL RN, BSN, PHN - Patient Advocate Liaison - PAL RN  [facility]  [phone]

## 2022-11-28 ENCOUNTER — PATIENT OUTREACH (OUTPATIENT)
Dept: FAMILY MEDICINE | Facility: CLINIC | Age: 77
End: 2022-11-28

## 2022-11-28 NOTE — LETTER
Kahlil Caputo  [address] [address]    Katia Guillory,    Thank you for choosing [facility] today for your health care needs.     [facility] is transforming primary care  At [facility], we re dedicated to constantly improve how we serve the health care needs of our patients and communities. We re currently making changes to the way we deliver care.     Changes you ll notice include:    An emphasis on building a relationship with a primary care provider    Access to a PAL (patient advocate and liaison) to help guide you with your care needs    Appointment lengths tailored to your specific needs and greater access to a care team to help you and your provider improve and maintain your health and well-being    Improved online access to your care team    Benefits of a primary care provider  If you don t have a designated primary care provider, we encourage you to get to know our care team online and find a provider you d like to see. Most of our providers have a short video on their online provider page. Visit [email or web address] to explore our providers and locations.    Benefits of having a primary care provider include:      They get to know you - your health history, family history and goals, making it easier to make a health plan together.     You get to know them - making health-related conversations and decisions easier      Primary care doctors help you when you re sick or hurt - but also focus on keeping you healthy with preventive care and screenings.      A doctor who sees you regularly is more likely to notice changes in your health.     You ll be connected to a broad care team who partners with your provider to support you.    Patient Advocate Liaison (PAL)   To help make sure you get the right care, at the right time, we include PALs, or Patient Advocate Liaisons, as part of your care team. Your PAL will be your first line of contact. They ll advocate for your needs and  help you navigate our services, connecting you with care team members and community resources to ensure your care is well coordinated. You ll be introduced to a PAL in an upcoming visit.     Expanded care team access with tailored appointment lengths  Depending on your health care needs, you may have longer or shorter appointments and see additional care team providers - including Medication Therapy Management (MTM) pharmacists, diabetes educators, behavioral health clinicians, or social workers. At times, they may be included in your visit with your provider, or you may see them individually.     Online access to your health care records and care team  [employer] is our online tool that makes it easy to see your health care information and communicate with your care team.     [employer] allows you to:     View your health maintenance plan so you know when you re due for a preventive screening    Send secure messages to your care team    View your health history and visit summaries     Schedule appointments     Complete questionnaires and eCheck-in before appointments      Get care from your provider with an e-visit      View and pay your bill     Sign up at [email or web address]. Once you have an account, you also can download the mobile [email or web address].     Connecting to fast and convenient care  When you need fast, convenient care - consider one of the following options:       Video Visit: A convenient care option for visiting with your provider out of the comfort of your own home. Most of the things you come to the clinic to address with your provider can now be done virtually through a video. This includes your chronic medication follow up, questions or concerns you may have, and even your annual Medicare Wellness Visit.       Phone Visit: Another convenient option for follow up of common problems that may require a more in-depth discussion with your provider.       E-visit: When you need acute care quickly, or have a quick  question about your medication, an E-visit is completed through [employer] and your provider will respond within one business day.              Anastasiia CABEZAS RN  Patient Advocate Nisa SQUIRES RN  [facility]  [phone]

## 2022-12-19 DIAGNOSIS — G89.29 OTHER CHRONIC PAIN: ICD-10-CM

## 2022-12-19 NOTE — TELEPHONE ENCOUNTER
Pt calls,  ~needs norco refill, routed to AG, INFORM pt when final, may leave message on voice mail       Routing refill request to provider for review/approval because:  Drug not on the [facility] refill protocol     Reina Pena RN, BSN  [facility]

## 2022-12-20 RX ORDER — HYDROCODONE BITARTRATE AND ACETAMINOPHEN 5; 325 MG/1; MG/1
1 TABLET ORAL EVERY 8 HOURS PRN
Qty: 90 TABLET | Refills: 0 | Status: SHIPPED | OUTPATIENT
Start: 2022-12-20 | End: 2023-01-19

## 2023-01-17 NOTE — LETTER
[facility]  02/06/20    Patient: Kahlil Caputo  YOB: 1945  Medical Record Number: [id number]                                                                  Opioid / Opioid Plus Controlled Substance Agreement    I understand that my care provider has prescribed an opioid (narcotic) controlled substance to help manage my condition(s). I am taking this medicine to help me function or work. I know this is strong medicine, and that it can cause serious side effects. Opioid medicine can be sedating, addicting and may cause a dependency on the drug. They can affect my ability to drive or think, and cause depression. They need to be taken exactly as prescribed. Combining opioids with certain medicines or chemicals (such as cocaine, sedatives and tranquilizers, sleeping pills, meth) can be dangerous or even fatal. Also, if I stop opioids suddenly, I may have severe withdrawal symptoms. Last, I understand that opioids do not work for all types of pain nor for all patients. If not helpful, I may be asked to stop them.    The risks, benefits, and side effects of these medicine(s) were explained to me. I agree that:    1. I will take part in other treatments as advised by my care team. This may be psychiatry or counseling, physical therapy, behavioral therapy, group treatment or a referral to a pain clinic. I will reduce or stop my medicine when my care team tells me to do so.  2. I will take my medicines as prescribed. I will not change the dose or schedule unless my care team tells me to. There will be no refills if I  run out early.   I may be contactedwithout warning and asked to complete a urine drug test or pill count at any time.   3. I will keep all my appointments, and understand this is part of the monitoring of opioids. My care team may require an office visit for EVERY opioid/controlled substance refill. If I miss appointments or don t follow instructions, my care team may stop my  medicine.  4. I will not ask other providers to prescribe controlled substances, and I will not accept controlled substances from other people. If I need another prescribed controlled substance for a new reason, I will tell my care team within 1 business day.  5. I will use one pharmacy to fill all of my controlled substance prescriptions, and it is up to me to make sure that I do not run out of my medicines on weekends or holidays. If my care team is willing to refill my opioid prescription without a visit, I must request refills only during office hours, refills may take up to 3 days to process, and it may take up to 5 to 7 days for my medicine to be mailed and ready at my pharmacy. Prescriptions will not be mailed anywhere except my pharmacy.        [id number]  Rev 12/18         Registration to scan to EHR                             Page 1 of 2               Controlled Substance Agreement Opioid        [facility]  02/06/20  Patient: Kahlil Caputo  YOB: 1945  Medical Record Number: [id number]                                                                  6. I am responsible for my prescriptions. If the medicine/prescription is lost or stolen, it will not be replaced. I also agree not to share controlled substance medicines with anyone.  7. I agree to not use ANY illegal or recreational drugs. This includes marijuana, cocaine, bath salts or other drugs. I agree not to use alcohol unless my care team says I may.          I agree to give urine samples whenever asked. If I don t give a urine sample, the care team may stop my medicine.    8. If I enroll in the [place] Medical Marijuana program, I will tell my care team. I will also sign an agreement to share my medical records with my care team.   9. I will bring in my list of medicines (or my medicine bottles) each time I come to the clinic.   10. I will tell my care team right away if I become pregnant or have a new medical problem  treated outside of my regular clinic.  11. I understand that this medicine can affect my thinking and judgment. It may be unsafe for me to drive, use machinery and do dangerous tasks. I will not do any of these things until I know how the medicine affects me. If my dose changes, I will wait to see how it affects me. I will contact my care team if I have concerns about medicine side effects.    I understand that if I do not follow any of the conditions above, my prescriptions or treatment may be stopped.      I agree that my provider, clinic care team, and pharmacy may work with any city, state or federal law enforcement agency that investigates the misuse, sale, or other diversion of my controlled medicine. I will allow my provider to discuss my care with or share a copy of this agreement with any other treating provider, pharmacy or emergency room where I receive care. I agree to give up (waive) any right of privacy or confidentiality with respect to these consents.     I have read this agreement and have asked questions about anything I did not understand.      ________________________________________________________________________  Patient signature - Date/Time -  Kahlil Caputo                                      ________________________________________________________________________  Witness signature                                                            ________________________________________________________________________  Provider signature - Susan Haase, APRN CNP      [id number]  Rev 12/18         Registration to scan to EHR                         Page 2 of 2                   Controlled Substance Agreement Opioid           Page 1 of 2  Opioid Pain Medicines (also known as Narcotics)  What You Need to Know    What are opioids?   Opioids are pain medicines that must be prescribed by a doctor.  They are also known as narcotics.    Examples are:     morphine (MS Contin, Love)    oxycodone  (Oxycontin)    oxycodone and acetaminophen (Percocet)    hydrocodone and acetaminophen (Vicodin, Norco)     fentanyl patch (Duragesic)     hydromorphone (Dilaudid)     methadone     What do opioids do well?   Opioids are best for short-term pain after a surgery or injury. They also work well for cancer pain. Unlike other pain medicines, they do not cause liver or kidney failure or ulcers. They may help some people with long-lasting (chronic) pain.     What do opioids NOT do well?   Opioids never get rid of pain entirely, and they do not work well for most patients with chronic pain. Opioids do not reduce swelling, one of the causes of pain. They also don t work well for nerve pain.                           For informational purposes only.  Not to replace the advice of your care provider.  Copyright [facility]. All right reserved. [employer] 517202-Szy 02/18.      Page 2 of 2    Risks and side effects   Talk to your doctor before you start or decide to keep taking one of these medicines. Side effects include:    Lowering your breathing rate enough to cause death    Overdose, including death, especially if taking higher than prescribed doses    Long-term opioid use    Worse depression symptoms; less pleasure in things you usually enjoy    Feeling tired or sluggish    Slower thoughts or cloudy thinking    Being more sensitive to pain over time; pain is harder to control    Trouble sleeping or restless sleep    Changes in hormone levels (for example, less testosterone)    Changes in sex drive or ability to have sex    Constipation    Unsafe driving    Itching and sweating    Feeling dizzy    Nausea, vomiting and dry mouth    What else should I know about opioids?  When someone takes opioids for too long or too often, they become dependent. This means that if you stop or reduce the medicine too quickly, you will have withdrawal symptoms.    Dependence is not the same as addiction. Addiction is when  people keep using a substance that harms their body, their mind or their relations with others. If you have a history of drug or alcohol abuse, taking opioids can cause a relapse.    Over time, opioids don t work as well. Most people will need higher and higher doses. The higher the dose, the more serious the side effects. We don t know the long-term effects of opioids.      Prescribed opioids aren't the best way to manage chronic pain    Other ways to manage pain include:      Ibuprofen or acetaminophen.  You should always try this first.      Treat health problems that may be causing pain.      acupuncture or massage, deep breathing, meditation, visual imagery, aromatherapy.      Use heat or ice at the pain site      Physical therapy and exercise      Stop smoking      See a counselor or therapist                                                  People who have used opioids for a long time may have a lower quality of life, worse depression, higher levels of pain and more visits to doctors.    Never share your opioids with others. Be sure to store opioids in a secure place, locked if possible.Young children can easily swallow them and overdose.     You can overdose on opioids.  Signs of overdose include decrease or loss of consciousness, slowed breathing, trouble waking and blue lips.  If someone is worried about overdose, they should call 911.    If you are at risk for overdose, you may get naloxone (Narcan, a medicine that reverses the effects of opioids.  If you overdose, a friend or family member can give you Narcan while waiting for the ambulance.  They need to know the signs of overdose and how to give Narcan.    While you're taking opioids:    Don't use alcohol or street drugs. Taking them together can cause death.    Don't take any of these medicines unless your doctor says its okay.  Taking these with opioids can cause death.    Benzodiazepines (such as lorazepam         or diazepam)    Muscle relaxers  (such as cyclobenzaprine)    sleeping pills    other opioids    Safe disposal of opioids  Find your area drug take-back program, your pharmacy mail-back program, buy a special disposal bag (such as Deterra) from your pharmacy or flush them down the toilet.  Use the guidelines at:  www.fda.gov/drugs/resourcesforyou      Private car

## 2023-01-19 DIAGNOSIS — G89.29 OTHER CHRONIC PAIN: ICD-10-CM

## 2023-01-19 RX ORDER — HYDROCODONE BITARTRATE AND ACETAMINOPHEN 5; 325 MG/1; MG/1
1 TABLET ORAL EVERY 8 HOURS PRN
Qty: 90 TABLET | Refills: 0 | Status: SHIPPED | OUTPATIENT
Start: 2023-01-19 | End: 2023-02-17

## 2023-01-19 NOTE — TELEPHONE ENCOUNTER
Pt calls, calling for monthly refill, call pt only if needed, pt will follow up with pharmacy    Routing refill request to provider for review/approval because:  Drug not on the [facility] refill protocol     Reina Pena RN, BSN  [facility]

## 2023-02-17 ENCOUNTER — VIRTUAL VISIT (OUTPATIENT)
Dept: FAMILY MEDICINE | Facility: CLINIC | Age: 78
End: 2023-02-17
Attending: NURSE PRACTITIONER
Payer: COMMERCIAL

## 2023-02-17 DIAGNOSIS — M48.061 SPINAL STENOSIS OF LUMBAR REGION WITHOUT NEUROGENIC CLAUDICATION: Primary | ICD-10-CM

## 2023-02-17 DIAGNOSIS — G89.29 OTHER CHRONIC PAIN: ICD-10-CM

## 2023-02-17 DIAGNOSIS — Z79.899 CONTROLLED SUBSTANCE AGREEMENT SIGNED: ICD-10-CM

## 2023-02-17 PROCEDURE — 99214 OFFICE O/P EST MOD 30 MIN: CPT | Mod: 95 | Performed by: NURSE PRACTITIONER

## 2023-02-17 RX ORDER — HYDROCODONE BITARTRATE AND ACETAMINOPHEN 5; 325 MG/1; MG/1
1 TABLET ORAL EVERY 8 HOURS PRN
Qty: 90 TABLET | Refills: 0 | Status: SHIPPED | OUTPATIENT
Start: 2023-02-17 | End: 2023-03-20

## 2023-02-17 NOTE — PROGRESS NOTES
"Kahlil is a 77 year old who is being evaluated via a billable telephone visit.      What phone number would you like to be contacted at? [phone]  How would you like to obtain your AVS? [facility]    Distant Location (provider location):  On-site    Assessment & Plan     Spinal stenosis of lumbar region without neurogenic claudication  Controlled substance agreement signed  Other chronic pain  Stable. Continues with appropriate use of Norco. Doing home exercises.  Has had trial and failed other therapies.    Follow-up 3 months for in person visit.   - HYDROcodone-acetaminophen (NORCO) 5-325 MG tablet  Dispense: 90 tablet; Refill: 0                   No follow-ups on file.    MALIK Mendez [facility]   Kahlil is a 77 year old, presenting for the following health issues:  RECHECK      History of Present Illness       Back Pain:  She presents for follow up of back pain. Patient's back pain is a chronic problem.  Location of back pain:  Right lower back, right buttock and right hip  Description of back pain: dull ache and gnawing  Back pain spreads: right buttocks, right thigh and right knee    Since patient first noticed back pain, pain is: always present, but gets better and worse  Does back pain interfere with her job:  Not applicable  She consumes 0 sweetened beverage(s) daily. She exercises with enough effort to increase her heart rate 3 or less days per week.   She is taking medications regularly.       Feels as though weather changes contribute to \"bad\" day and \"good\" days.   Good days still have pain just more tolerable.   Using Norco three times per day.  Doing home exercise from Physical Therapy.          Review of Systems   Constitutional, HEENT, cardiovascular, pulmonary, gi and gu systems are negative, except as otherwise noted.      Objective    Vitals - Patient Reported  Weight (Patient Reported): 68.9 kg (152 lb)  Height (Patient Reported): 165.1 cm " "(5' 5\")  BMI (Based on Pt Reported Ht/Wt): 25.29  Pain Score: Moderate Pain (4) (sitting-standing is an 8/10)  Pain Loc: Low Back      Vitals:  No vitals were obtained today due to virtual visit.    Physical Exam   healthy, alert and no distress  PSYCH: Alert and oriented times 3; coherent speech, normal   rate and volume, able to articulate logical thoughts, able   to abstract reason, no tangential thoughts, no hallucinations   or delusions  Her affect is normal  RESP: No cough, no audible wheezing, able to talk in full sentences  Remainder of exam unable to be completed due to telephone visits                Phone call duration: 7 minutes    "

## 2023-02-28 DIAGNOSIS — R60.0 LOWER EXTREMITY EDEMA: ICD-10-CM

## 2023-02-28 RX ORDER — FUROSEMIDE 20 MG
TABLET ORAL
Qty: 180 TABLET | Refills: 0 | Status: SHIPPED | OUTPATIENT
Start: 2023-02-28 | End: 2023-06-06

## 2023-03-20 DIAGNOSIS — G89.29 OTHER CHRONIC PAIN: ICD-10-CM

## 2023-03-20 RX ORDER — HYDROCODONE BITARTRATE AND ACETAMINOPHEN 5; 325 MG/1; MG/1
1 TABLET ORAL EVERY 8 HOURS PRN
Qty: 90 TABLET | Refills: 0 | Status: SHIPPED | OUTPATIENT
Start: 2023-03-20 | End: 2023-04-20

## 2023-03-20 NOTE — TELEPHONE ENCOUNTER
Pt calls for monthly hydrocodone refill    Routing refill request to provider for review/approval because:  Drug not on the [facility] refill protocol   Pt does not need call back when sent, will follow up with pharmacy, close when final    Reina Pena RN, BSN  [facility]

## 2023-03-20 NOTE — TELEPHONE ENCOUNTER
reviewed. Refilled.    Honorio Mota PA-C on 3/20/2023 at 11:20 AM (covering for Geraldine Freitas)

## 2023-04-17 DIAGNOSIS — K13.0 ANGULAR CHEILITIS: ICD-10-CM

## 2023-04-19 RX ORDER — NYSTATIN 100000 U/G
CREAM TOPICAL
Qty: 30 G | Refills: 0 | Status: SHIPPED | OUTPATIENT
Start: 2023-04-19 | End: 2023-06-06

## 2023-04-20 ENCOUNTER — NURSE TRIAGE (OUTPATIENT)
Dept: NURSING | Facility: CLINIC | Age: 78
End: 2023-04-20
Payer: COMMERCIAL

## 2023-04-20 DIAGNOSIS — M54.16 SPINAL STENOSIS OF LUMBAR REGION WITH RADICULOPATHY: Primary | ICD-10-CM

## 2023-04-20 DIAGNOSIS — M48.061 SPINAL STENOSIS OF LUMBAR REGION WITH RADICULOPATHY: Primary | ICD-10-CM

## 2023-04-20 DIAGNOSIS — G89.29 OTHER CHRONIC PAIN: ICD-10-CM

## 2023-04-20 RX ORDER — HYDROCODONE BITARTRATE AND ACETAMINOPHEN 5; 325 MG/1; MG/1
1 TABLET ORAL EVERY 8 HOURS PRN
Qty: 90 TABLET | Refills: 0 | Status: SHIPPED | OUTPATIENT
Start: 2023-04-20 | End: 2023-04-21

## 2023-04-20 NOTE — TELEPHONE ENCOUNTER
Needs diagnosis for Norco. It has been listed as chronic pain and the pharmacy needs something more specific (such as location of pain in the body, or other disease). .     Message will be forwarded to office: RX written by Geraldine Freitas CNP @ [facility].     Nesha Craven RN Triage Nurse Advisor 5:23 PM 4/20/2023    Reason for Disposition    [1] Pharmacy calling with prescription question AND [2] triager unable to answer question    Additional Information    Negative: [1] Intentional drug overdose AND [2] suicidal thoughts or ideas    Negative: Drug overdose and triager unable to answer question    Negative: Caller requesting a renewal or refill of a medicine patient is currently taking    Negative: Caller requesting information unrelated to medicine    Negative: Caller requesting information about COVID-19 Vaccine    Negative: Caller requesting information about Emergency Contraception    Negative: Caller requesting information about Combined Birth Control Pills    Negative: Caller requesting information about Progestin Birth Control Pills    Negative: Caller requesting information about Post-Op pain or medicines    Negative: Caller requesting a prescription antibiotic (such as Penicillin) for Strep throat and has a positive culture result    Negative: Caller requesting a prescription anti-viral med (such as Tamiflu) and has influenza (flu) symptoms    Negative: Immunization reaction suspected    Negative: Rash while taking a medicine or within 3 days of stopping it    Negative: [1] Asthma and [2] having symptoms of asthma (cough, wheezing, etc.)    Negative: [1] Symptom of illness (e.g., headache, abdominal pain, earache, vomiting) AND [2] more than mild    Negative: Breastfeeding questions about mother's medicines and diet    Negative: MORE THAN A DOUBLE DOSE of a prescription or over-the-counter (OTC) drug    Negative: [1] DOUBLE DOSE (an extra dose or lesser amount) of prescription drug  AND [2] any symptoms (e.g., dizziness, nausea, pain, sleepiness)    Negative: [1] DOUBLE DOSE (an extra dose or lesser amount) of over-the-counter (OTC) drug AND [2] any symptoms (e.g., dizziness, nausea, pain, sleepiness)    Negative: Took another person's prescription drug    Negative: [1] DOUBLE DOSE (an extra dose or lesser amount) of prescription drug AND [2] NO symptoms (Exception: a double dose of antibiotics)    Negative: Diabetes drug error or overdose (e.g., took wrong type of insulin or took extra dose)    Negative: [1] Prescription not at pharmacy AND [2] was prescribed by PCP recently (Exception: triager has access to EMR and prescription is recorded there. Go to Home Care and confirm for pharmacy.)    Protocols used: MEDICATION QUESTION CALL-A-

## 2023-04-23 RX ORDER — HYDROCODONE BITARTRATE AND ACETAMINOPHEN 5; 325 MG/1; MG/1
1 TABLET ORAL EVERY 8 HOURS PRN
Qty: 90 TABLET | Refills: 0 | Status: SHIPPED | OUTPATIENT
Start: 2023-04-23 | End: 2023-05-19

## 2023-04-24 NOTE — TELEPHONE ENCOUNTER
PAL received incoming call from pharmacist, Hector at [facility]   -Hector requesting diagnosis code for Norco rx  -PAL provided the following diagnoses listed on rx:    Spinal stenosis of lumbar region with radiculopathy [M48.061, M54.16]  - Primary       Other chronic pain [G89.29]         Hector was given an opportunity to ask questions and is agreeable.      Anastasiia CABEZAS RN  Patient Advocate Liaison - PAL RN  [facility]  [phone]

## 2023-05-15 ENCOUNTER — ANCILLARY PROCEDURE (OUTPATIENT)
Dept: BONE DENSITY | Facility: CLINIC | Age: 78
End: 2023-05-15
Payer: COMMERCIAL

## 2023-05-15 ENCOUNTER — ANCILLARY PROCEDURE (OUTPATIENT)
Dept: BONE DENSITY | Facility: CLINIC | Age: 78
End: 2023-05-15
Attending: INTERNAL MEDICINE
Payer: COMMERCIAL

## 2023-05-15 DIAGNOSIS — Z78.0 ASYMPTOMATIC MENOPAUSAL STATE: ICD-10-CM

## 2023-05-15 DIAGNOSIS — M81.0 AGE-RELATED OSTEOPOROSIS WITHOUT CURRENT PATHOLOGICAL FRACTURE: ICD-10-CM

## 2023-05-15 DIAGNOSIS — M80.00XD AGE-RELATED OSTEOPOROSIS WITH CURRENT PATHOLOGICAL FRACTURE WITH ROUTINE HEALING: ICD-10-CM

## 2023-05-15 PROCEDURE — 77081 DXA BONE DENSITY APPENDICULR: CPT | Mod: 59 | Performed by: INTERNAL MEDICINE

## 2023-05-15 PROCEDURE — 77085 DXA BONE DENSITY AXL VRT FX: CPT | Performed by: INTERNAL MEDICINE

## 2023-05-19 DIAGNOSIS — M48.061 SPINAL STENOSIS OF LUMBAR REGION WITH RADICULOPATHY: ICD-10-CM

## 2023-05-19 DIAGNOSIS — M54.16 SPINAL STENOSIS OF LUMBAR REGION WITH RADICULOPATHY: ICD-10-CM

## 2023-05-19 DIAGNOSIS — G89.29 OTHER CHRONIC PAIN: ICD-10-CM

## 2023-05-19 RX ORDER — HYDROCODONE BITARTRATE AND ACETAMINOPHEN 5; 325 MG/1; MG/1
1 TABLET ORAL EVERY 8 HOURS PRN
Qty: 90 TABLET | Refills: 0 | Status: SHIPPED | OUTPATIENT
Start: 2023-05-19 | End: 2023-06-06

## 2023-05-19 NOTE — TELEPHONE ENCOUNTER
Patient calling for refill.  States appointment with Geraldine cancelled today.  Needs [place] refill.  She is concerned that last RX dated 4/23/23.  She states that date was an error.  States it was due 4/20/23 and that is the day she filled it.  Needs refill today as will be out tomorrow.  Routed to provider.  Mera Motta RN

## 2023-05-22 NOTE — RESULT ENCOUNTER NOTE
Kahlil    Recently done endocrinology lab test/ imaging test showed:  Severe osteoporosis on the basis of vertebral fracture. There has been no significant change in bone density of the lumbar spine. There has been no significant change in bone density of the hip(s). There has been no significant change in bone density of the forearm.      Here is a copy for your records.    Follow up as discussed in last clinic visit.    Please call endocrinology clinic ( [phone]) if questions.    Ирина Acuña MD  Endocrinology   [facility]  May 22, 2023

## 2023-06-06 ENCOUNTER — OFFICE VISIT (OUTPATIENT)
Dept: FAMILY MEDICINE | Facility: CLINIC | Age: 78
End: 2023-06-06
Payer: COMMERCIAL

## 2023-06-06 VITALS
HEIGHT: 65 IN | WEIGHT: 162.7 LBS | SYSTOLIC BLOOD PRESSURE: 122 MMHG | RESPIRATION RATE: 16 BRPM | DIASTOLIC BLOOD PRESSURE: 74 MMHG | OXYGEN SATURATION: 95 % | TEMPERATURE: 98.1 F | HEART RATE: 95 BPM | BODY MASS INDEX: 27.11 KG/M2

## 2023-06-06 DIAGNOSIS — I47.29 PAROXYSMAL VENTRICULAR TACHYCARDIA (H): ICD-10-CM

## 2023-06-06 DIAGNOSIS — K13.0 ANGULAR CHEILITIS: ICD-10-CM

## 2023-06-06 DIAGNOSIS — Z02.89 PAIN MANAGEMENT CONTRACT SIGNED: ICD-10-CM

## 2023-06-06 DIAGNOSIS — G89.29 OTHER CHRONIC PAIN: ICD-10-CM

## 2023-06-06 DIAGNOSIS — E55.9 VITAMIN D DEFICIENCY: ICD-10-CM

## 2023-06-06 DIAGNOSIS — M54.16 SPINAL STENOSIS OF LUMBAR REGION WITH RADICULOPATHY: ICD-10-CM

## 2023-06-06 DIAGNOSIS — M48.061 SPINAL STENOSIS OF LUMBAR REGION WITH RADICULOPATHY: ICD-10-CM

## 2023-06-06 DIAGNOSIS — R60.0 LOWER EXTREMITY EDEMA: ICD-10-CM

## 2023-06-06 DIAGNOSIS — I73.9 PAD (PERIPHERAL ARTERY DISEASE) (H): ICD-10-CM

## 2023-06-06 DIAGNOSIS — M46.1 SACROILIITIS (H): Primary | ICD-10-CM

## 2023-06-06 LAB
ANION GAP SERPL CALCULATED.3IONS-SCNC: 14 MMOL/L (ref 7–15)
BUN SERPL-MCNC: 13.4 MG/DL (ref 8–23)
CALCIUM SERPL-MCNC: 9.2 MG/DL (ref 8.8–10.2)
CANNABINOIDS UR QL SCN: NORMAL
CHLORIDE SERPL-SCNC: 97 MMOL/L (ref 98–107)
CREAT SERPL-MCNC: 0.69 MG/DL (ref 0.51–0.95)
CREAT UR-MCNC: 29 MG/DL
DEPRECATED HCO3 PLAS-SCNC: 24 MMOL/L (ref 22–29)
GFR SERPL CREATININE-BSD FRML MDRD: 88 ML/MIN/1.73M2
GLUCOSE SERPL-MCNC: 112 MG/DL (ref 70–99)
POTASSIUM SERPL-SCNC: 3.9 MMOL/L (ref 3.4–5.3)
SODIUM SERPL-SCNC: 135 MMOL/L (ref 136–145)

## 2023-06-06 PROCEDURE — 36415 COLL VENOUS BLD VENIPUNCTURE: CPT | Performed by: NURSE PRACTITIONER

## 2023-06-06 PROCEDURE — 80048 BASIC METABOLIC PNL TOTAL CA: CPT | Performed by: NURSE PRACTITIONER

## 2023-06-06 PROCEDURE — 82306 VITAMIN D 25 HYDROXY: CPT | Performed by: NURSE PRACTITIONER

## 2023-06-06 PROCEDURE — 99214 OFFICE O/P EST MOD 30 MIN: CPT | Performed by: NURSE PRACTITIONER

## 2023-06-06 PROCEDURE — 80307 DRUG TEST PRSMV CHEM ANLYZR: CPT | Performed by: NURSE PRACTITIONER

## 2023-06-06 RX ORDER — NYSTATIN 100000 U/G
CREAM TOPICAL
Qty: 30 G | Refills: 0 | Status: SHIPPED | OUTPATIENT
Start: 2023-06-06 | End: 2023-10-19

## 2023-06-06 RX ORDER — HYDROCODONE BITARTRATE AND ACETAMINOPHEN 5; 325 MG/1; MG/1
1 TABLET ORAL EVERY 8 HOURS PRN
Qty: 90 TABLET | Refills: 0 | Status: SHIPPED | OUTPATIENT
Start: 2023-07-17 | End: 2023-08-17

## 2023-06-06 RX ORDER — FUROSEMIDE 20 MG
20 TABLET ORAL 2 TIMES DAILY
Qty: 180 TABLET | Refills: 1 | Status: SHIPPED | OUTPATIENT
Start: 2023-06-06 | End: 2023-11-20

## 2023-06-06 RX ORDER — HYDROCODONE BITARTRATE AND ACETAMINOPHEN 5; 325 MG/1; MG/1
1 TABLET ORAL EVERY 8 HOURS PRN
Qty: 90 TABLET | Refills: 0 | Status: SHIPPED | OUTPATIENT
Start: 2023-06-18 | End: 2023-09-06

## 2023-06-06 ASSESSMENT — PAIN SCALES - GENERAL: PAINLEVEL: SEVERE PAIN (6)

## 2023-06-06 NOTE — PROGRESS NOTES
"  Assessment & Plan       PAD (peripheral artery disease) (H)  Stable.  - Basic metabolic panel  (Ca, Cl, CO2, Creat, Gluc, K, Na, BUN)  - Vitamin D Deficiency  - Drug Confirmation Panel Urine with Creat - lab collect  - Basic metabolic panel  (Ca, Cl, CO2, Creat, Gluc, K, Na, BUN)  - Vitamin D Deficiency  - Drug Confirmation Panel Urine with Creat - lab collect    Paroxysmal ventricular tachycardia (H)  Asymptomatic.     Sacroiliitis (H)  Spinal stenosis of lumbar region with radiculopathy  Other chronic pain  Stable. Using Norco as prescribed. Continue with activity. Declines Physical Therapy.   - HYDROcodone-acetaminophen (NORCO) 5-325 MG tablet  Dispense: 90 tablet; Refill: 0  - Drug Confirmation Panel Urine with Creat - lab collect  - HYDROcodone-acetaminophen (NORCO) 5-325 MG tablet  Dispense: 90 tablet; Refill: 0  - Drug Confirmation Panel Urine with Creat - lab collect    Lower extremity edema  Controlled with furosemide. Recommend low sodium diet.   - furosemide (LASIX) 20 MG tablet  Dispense: 180 tablet; Refill: 1  - Basic metabolic panel  (Ca, Cl, CO2, Creat, Gluc, K, Na, BUN)  - Vitamin D Deficiency  - Drug Confirmation Panel Urine with Creat - lab collect  - Basic metabolic panel  (Ca, Cl, CO2, Creat, Gluc, K, Na, BUN)  - Vitamin D Deficiency  - Drug Confirmation Panel Urine with Creat - lab collect    Angular cheilitis   controlled.   - nystatin (MYCOSTATIN) [id number] UNIT/GM external cream  Dispense: 30 g; Refill: 0    Vitamin D deficiency  - Vitamin D Deficiency  - Vitamin D Deficiency    Pain management contract signed  As above.                BMI:   Estimated body mass index is 27.07 kg/m  as calculated from the following:    Height as of this encounter: 1.651 m (5' 5\").    Weight as of this encounter: 73.8 kg (162 lb 11.2 oz).           MALIK Mendez CNP [facility]    Paulina Guillory is a 77 year old, presenting for the following health issues:  Musculoskeletal " "Problem (Back pain)        6/6/2023    12:46 PM   Additional Questions   Roomed by Masha COOLEY CMA     History of Present Illness       Back Pain:  She presents for follow up of back pain. Patient's back pain is a chronic problem.  Location of back pain:  Right lower back, right middle of back, right buttock and right hip  Description of back pain: gnawing and sharp  Back pain spreads: right buttocks and right knee    Since patient first noticed back pain, pain is: always present, but gets better and worse  Does back pain interfere with her job:  Not applicable      She eats 2-3 servings of fruits and vegetables daily.She consumes 0 sweetened beverage(s) daily.She exercises with enough effort to increase her heart rate 10 to 19 minutes per day.  She exercises with enough effort to increase her heart rate 3 or less days per week.   She is taking medications regularly.     Waxing and waning pain control. Increased at this time due to lesser activity with the outdoor air quality.  Hydrocodone use three times per day. Good control with use.     Furosemide used for dependent edema, mostly in hot summer months. Used year round.       Review of Systems   Constitutional, HEENT, cardiovascular, pulmonary, gi and gu systems are negative, except as otherwise noted.      Objective    /74 (BP Location: Right arm, Patient Position: Sitting)   Pulse 95   Temp 98.1  F (36.7  C) (Oral)   Resp 16   Ht 1.651 m (5' 5\")   Wt 73.8 kg (162 lb 11.2 oz)   LMP  (LMP Unknown)   SpO2 95%   BMI 27.07 kg/m    Body mass index is 27.07 kg/m .  Physical Exam                       "

## 2023-06-06 NOTE — LETTER
Opioid / Opioid Plus Controlled Substance Agreement    This is an agreement between you and your provider about the safe and appropriate use of controlled substance/opioids prescribed by your care team. Controlled substances are medicines that can cause physical and mental dependence (abuse).    There are strict laws about having and using these medicines. We here at [facility] are committing to working with you in your efforts to get better. To support you in this work, we ll help you schedule regular office appointments for medicine refills. If we must cancel or change your appointment for any reason, we ll make sure you have enough medicine to last until your next appointment.     As a Provider, I will:  Listen carefully to your concerns and treat you with respect.   Recommend a treatment plan that I believe is in your best interest. This plan may involve therapies other than opioid pain medication.   Talk with you often about the possible benefits, and the risk of harm of any medicine that we prescribe for you.   Provide a plan on how to taper (discontinue or go off) using this medicine if the decision is made to stop its use.    As a Patient, I understand that opioid(s):   Are a controlled substance prescribed by my care team to help me function or work and manage my condition(s).   Are strong medicines and can cause serious side effects such as:  Drowsiness, which can seriously affect my driving ability  A lower breathing rate, enough to cause death  Harm to my thinking ability   Depression   Abuse of and addiction to this medicine  Need to be taken exactly as prescribed. Combining opioids with certain medicines or chemicals (such as illegal drugs, sedatives, sleeping pills, and benzodiazepines) can be dangerous or even fatal. If I stop opioids suddenly, I may have severe withdrawal symptoms.  Do not work for all types of pain nor for all patients. If they re not helpful, I may be asked to stop  them.      The risks, benefits and side effects of these medicine(s) were explained to me. I agree that:  I will take part in other treatments as advised by my care team. This may be psychiatry or counseling, physical therapy, behavioral therapy, group treatment or a referral to a specialist.     I will keep all my appointments. I understand that this is part of the monitoring of opioids. My care team may require an office visit for EVERY opioid/controlled substance refill. If I miss appointments or don t follow instructions, my care team may stop my medicine.    I will take my medicines as prescribed. I will not change the dose or schedule unless my care team tells me to. There will be no refills if I run out early.     I may be asked to come to the clinic and complete a urine drug test or complete a pill count at any time. If I don t give a urine sample or participate in a pill count, the care team may stop my medicine.    I will only receive prescriptions from this clinic for chronic pain. If I am treated by another provider for acute pain issues, I will tell them that I am taking opioid pain medication for chronic pain and that I have a treatment agreement with this provider. I will inform my [facility] care team within one business day if I am given a prescription for any pain medication by another healthcare provider. My [facility] care team can contact other providers and pharmacists about my use of any medicines.    It is up to me to make sure that I don t run out of my medicines on weekends or holidays. If my care team is willing to refill my opioid prescription without a visit, I must request refills only during office hours. Refills may take up to 3 business days to process. I will use one pharmacy to fill all my opioid and other controlled substance prescriptions. I will notify the clinic about any changes to my insurance or medication availability.    I am responsible for my prescriptions.  If the medicine/prescription is lost, stolen or destroyed, it will not be replaced. I also agree not to share controlled substance medicines with anyone.    I am aware I should not use any illegal or recreational drugs. I agree not to drink alcohol unless my care team says I can.       If I enroll in the [place] [facility] Cannabis program, I will tell my care team prior to my next refill.     I will tell my care team right away if I become pregnant, have a new medical problem treated outside of my regular clinic, or have a change in my medications.    I understand that this medicine can affect my thinking, judgment and reaction time. Alcohol and drugs affect the brain and body, which can affect the safety of my driving. Being under the influence of alcohol or drugs can affect my decision-making, behaviors, personal safety, and the safety of others. Driving while impaired (DWI) can occur if a person is driving, operating, or in physical control of a car, motorcycle, boat, snowmobile, ATV, motorbike, off-road vehicle, or any other motor vehicle ([place] Statute 169A.20). I understand the risk if I choose to drive or operate any vehicle or machinery.    I understand that if I do not follow any of the conditions above, my prescriptions or treatment may be stopped or changed.          Opioids  What You Need to Know    What are opioids?   Opioids are pain medicines that must be prescribed by a doctor. They are also known as narcotics.     Examples are:   morphine (MS Contin, Love)  oxycodone (Oxycontin)  oxycodone and acetaminophen (Percocet)  hydrocodone and acetaminophen (Vicodin, Norco)   fentanyl patch (Duragesic)   hydromorphone (Dilaudid)   methadone  codeine (Tylenol #3)     What do opioids do well?   Opioids are best for severe short-term pain such as after a surgery or injury. They may work well for cancer pain. They may help some people with long-lasting (chronic) pain.     What do opioids NOT do well?   Opioids  never get rid of pain entirely, and they don t work well for most patients with chronic pain. Opioids don t reduce swelling, one of the causes of pain.                                    Other ways to manage chronic pain and improve function include:     Treat the health problem that may be causing pain  Anti-inflammation medicines, which reduce swelling and tenderness, such as ibuprofen (Advil, Motrin) or naproxen (Aleve)  Acetaminophen (Tylenol)  Antidepressants and anti-seizure medicines, especially for nerve pain  Topical treatments such as patches or creams  Injections or nerve blocks  Chiropractic or osteopathic treatment  Acupuncture, massage, deep breathing, meditation, visual imagery, aromatherapy  Use heat or ice at the pain site  Physical therapy   Exercise  Stop smoking  Take part in therapy       Risks and side effects     Talk to your doctor before you start or decide to keep taking opioids. Possible side effects include:    Lowering your breathing rate enough to cause death  Overdose, including death, especially if taking higher than prescribed doses  Worse depression symptoms; less pleasure in things you usually enjoy  Feeling tired or sluggish  Slower thoughts or cloudy thinking  Being more sensitive to pain over time; pain is harder to control  Trouble sleeping or restless sleep  Changes in hormone levels (for example, less testosterone)  Changes in sex drive or ability to have sex  Constipation  Unsafe driving  Itching and sweating  Dizziness  Nausea, throwing up and dry mouth    What else should I know about opioids?    Opioids may lead to dependence, tolerance, or addiction.    Dependence means that if you stop or reduce the medicine too quickly, you will have withdrawal symptoms. These include loose poop (diarrhea), jitters, flu-like symptoms, nervousness and tremors. Dependence is not the same as addiction.                     Tolerance means needing higher doses over time to get the same  effect. This may increase the chance of serious side effects.    Addiction is when people improperly use a substance that harms their body, their mind or their relations with others. Use of opiates can cause a relapse of addiction if you have a history of drug or alcohol abuse.    People who have used opioids for a long time may have a lower quality of life, worse depression, higher levels of pain and more visits to doctors.    You can overdose on opioids. Take these steps to lower your risk of overdose:    Recognize the signs:  Signs of overdose include decrease or loss of consciousness (blackout), slowed breathing, trouble waking up and blue lips. If someone is worried about overdose, they should call 911.    Talk to your doctor about Narcan (naloxone).   If you are at risk for overdose, you may be given a prescription for Narcan. This medicine very quickly reverses the effects of opioids.   If you overdose, a friend or family member can give you Narcan while waiting for the ambulance. They need to know the signs of overdose and how to give Narcan.     Don't use alcohol or street drugs.   Taking them with opioids can cause death.    Do not take any of these medicines unless your doctor says it s OK. Taking these with opioids can cause death:  Benzodiazepines, such as lorazepam (Ativan), alprazolam (Xanax) or diazepam (Valium)  Muscle relaxers, such as cyclobenzaprine (Flexeril)  Sleeping pills like zolpidem (Ambien)   Other opioids      How to keep you and other people safe while taking opioids:    Never share your opioids with others.  Opioid medicines are regulated by the Drug Enforcement Agency (LAXMI). Selling or sharing medications is a criminal act.    2. Be sure to store opioids in a secure place, locked up if possible. Young children can easily swallow them and overdose.    3. When you are traveling with your medicines, keep them in the original bottles. If you use a pill box, be sure you also carry a copy  of your medicine list from your clinic or pharmacy.    4. Safe disposal of opioids    Most pharmacies have places to get rid of medicine, called disposal kiosks. Medicine disposal options are also available in every [facility]. Search your county and  medication disposal  to find more options. You can find more details at:  https://[email or web address].[facility].[email or web address]./living-green/managing-unwanted-medications     I agree that my provider, clinic care team, and pharmacy may work with any city, state or federal law enforcement agency that investigates the misuse, sale, or other diversion of my controlled medicine. I will allow my provider to discuss my care with, or share a copy of, this agreement with any other treating provider, pharmacy or emergency room where I receive care.    I have read this agreement and have asked questions about anything I did not understand.    _______________________________________________________  Patient Signature - Kahlil Voegele _____________________                   Date     _______________________________________________________  Provider Signature - MALIK Mendez CNP   _____________________                   Date     _______________________________________________________  Witness Signature (required if provider not present while patient signing)   _____________________                   Date

## 2023-06-07 ENCOUNTER — MYC MEDICAL ADVICE (OUTPATIENT)
Dept: FAMILY MEDICINE | Facility: CLINIC | Age: 78
End: 2023-06-07
Payer: COMMERCIAL

## 2023-06-07 LAB — DEPRECATED CALCIDIOL+CALCIFEROL SERPL-MC: 37 UG/L (ref 20–75)

## 2023-06-07 NOTE — TELEPHONE ENCOUNTER
See [employer] [employer] follow up message, routed to , please review and advise  Reina Pena RN, BSN  [facility]

## 2023-06-08 LAB
DHC UR CFM-MCNC: 123 NG/ML
DHC/CREAT UR: 424 NG/MG {CREAT}
HYDROCODONE UR CFM-MCNC: 123 NG/ML
HYDROCODONE/CREAT UR: 424 NG/MG {CREAT}
HYDROMORPHONE UR CFM-MCNC: 87 NG/ML
HYDROMORPHONE/CREAT UR: 300 NG/MG {CREAT}

## 2023-08-17 ENCOUNTER — TELEPHONE (OUTPATIENT)
Dept: FAMILY MEDICINE | Facility: CLINIC | Age: 78
End: 2023-08-17
Payer: COMMERCIAL

## 2023-08-17 DIAGNOSIS — G89.29 OTHER CHRONIC PAIN: ICD-10-CM

## 2023-08-17 DIAGNOSIS — M48.061 SPINAL STENOSIS OF LUMBAR REGION WITH RADICULOPATHY: ICD-10-CM

## 2023-08-17 DIAGNOSIS — M54.16 SPINAL STENOSIS OF LUMBAR REGION WITH RADICULOPATHY: ICD-10-CM

## 2023-08-17 RX ORDER — HYDROCODONE BITARTRATE AND ACETAMINOPHEN 5; 325 MG/1; MG/1
1 TABLET ORAL EVERY 8 HOURS PRN
Qty: 90 TABLET | Refills: 0 | Status: SHIPPED | OUTPATIENT
Start: 2023-08-18 | End: 2023-09-06

## 2023-08-17 NOTE — TELEPHONE ENCOUNTER
Geraldine    Pt called and she needs a refill of her hydrocodone   She will be out on Saturday    Thank you  Celina Kraft RN on 8/17/2023 at 1:09 PM

## 2023-09-06 ENCOUNTER — VIRTUAL VISIT (OUTPATIENT)
Dept: FAMILY MEDICINE | Facility: CLINIC | Age: 78
End: 2023-09-06
Attending: NURSE PRACTITIONER
Payer: COMMERCIAL

## 2023-09-06 DIAGNOSIS — G89.29 OTHER CHRONIC PAIN: ICD-10-CM

## 2023-09-06 DIAGNOSIS — M48.061 SPINAL STENOSIS OF LUMBAR REGION WITH RADICULOPATHY: ICD-10-CM

## 2023-09-06 DIAGNOSIS — M54.16 SPINAL STENOSIS OF LUMBAR REGION WITH RADICULOPATHY: ICD-10-CM

## 2023-09-06 PROCEDURE — 99214 OFFICE O/P EST MOD 30 MIN: CPT | Mod: 95 | Performed by: NURSE PRACTITIONER

## 2023-09-06 RX ORDER — HYDROCODONE BITARTRATE AND ACETAMINOPHEN 5; 325 MG/1; MG/1
1 TABLET ORAL EVERY 8 HOURS PRN
Qty: 90 TABLET | Refills: 0 | Status: SHIPPED | OUTPATIENT
Start: 2023-09-18 | End: 2023-10-17

## 2023-09-06 NOTE — PROGRESS NOTES
"Kahlil is a 78 year old who is being evaluated via a billable telephone visit.      What phone number would you like to be contacted at? [phone]  How would you like to obtain your AVS? Alison    Distant Location (provider location):  Off-site    Assessment & Plan     Other chronic pain  Stable. Using medication appropriately. Recommended attempting to substitute tylenol for some doses.   Patient will attempt.   - HYDROcodone-acetaminophen (NORCO) 5-325 MG tablet  Dispense: 90 tablet; Refill: 0    Spinal stenosis of lumbar region with radiculopathy  As above.   - HYDROcodone-acetaminophen (NORCO) 5-325 MG tablet  Dispense: 90 tablet; Refill: 0               BMI:   Estimated body mass index is 27.07 kg/m  as calculated from the following:    Height as of 6/6/23: 1.651 m (5' 5\").    Weight as of 6/6/23: 73.8 kg (162 lb 11.2 oz).           MALIK Mendez CNP  [facility]   Kahlil is a 78 year old, presenting for the following health issues:  No chief complaint on file.      9/6/2023    10:15 AM   Additional Questions   Roomed by Zari       History of Present Illness       Back Pain:  She presents for follow up of back pain. Patient's back pain is a chronic problem.  Location of back pain:  Right lower back, right buttock and right hip  Description of back pain: gnawing and sharp  Back pain spreads: right buttocks, right thigh and right knee    Since patient first noticed back pain, pain is: always present, but gets better and worse  Does back pain interfere with her job:  Not applicable       She eats 0-1 servings of fruits and vegetables daily.She consumes 0 sweetened beverage(s) daily.She exercises with enough effort to increase her heart rate 10 to 19 minutes per day.  She exercises with enough effort to increase her heart rate 3 or less days per week.   She is taking medications regularly.           Review of Systems   Constitutional, HEENT, cardiovascular, " pulmonary, gi and gu systems are negative, except as otherwise noted.      Objective           Vitals:  No vitals were obtained today due to virtual visit.    Physical Exam   healthy, alert, and no distress  PSYCH: Alert and oriented times 3; coherent speech, normal   rate and volume, able to articulate logical thoughts, able   to abstract reason, no tangential thoughts, no hallucinations   or delusions  Her affect is normal  RESP: No cough, no audible wheezing, able to talk in full sentences  Remainder of exam unable to be completed due to telephone visits                Phone call duration: 6 minutes

## 2023-09-12 ENCOUNTER — MYC MEDICAL ADVICE (OUTPATIENT)
Dept: FAMILY MEDICINE | Facility: CLINIC | Age: 78
End: 2023-09-12
Payer: COMMERCIAL

## 2023-09-12 DIAGNOSIS — M48.061 SPINAL STENOSIS OF LUMBAR REGION WITH RADICULOPATHY: ICD-10-CM

## 2023-09-12 DIAGNOSIS — G89.29 OTHER CHRONIC PAIN: ICD-10-CM

## 2023-09-12 DIAGNOSIS — M54.16 SPINAL STENOSIS OF LUMBAR REGION WITH RADICULOPATHY: ICD-10-CM

## 2023-09-14 RX ORDER — HYDROCODONE BITARTRATE AND ACETAMINOPHEN 5; 325 MG/1; MG/1
1 TABLET ORAL EVERY 8 HOURS PRN
Qty: 90 TABLET | Refills: 0 | Status: CANCELLED | OUTPATIENT
Start: 2023-10-17

## 2023-09-14 NOTE — TELEPHONE ENCOUNTER
See pt MyChart message  -Levothyroxine can be filled by covering provider in MALIK Mendez CNP absence   -Hydrocodone refill could be futured? Next refill due 10/17/23  -Pt is scheduled to establish care with Alva Kaiser PA-C 11/20/23, could try to reschedule pt for sooner appt if needed    Rx pended, Routing to AG to review and advise    Anastasiia CABEZAS RN  Patient Advocate Liaison - PAL RN  [facility]  [phone]

## 2023-09-14 NOTE — TELEPHONE ENCOUNTER
PAL replied to pt MyChart message  -Informed that covering providers will process pt's refill requests in MALIK Mendez CNP absence  -Pt scheduled to establish care with Alva Kaiser PA-C 11/20/23    Patient Quality Outreach Health Maintenance - PAL RN    Summary:    PAL RN contacted pt regarding overdue health maintenance     Patient is due/failing the following:   Lung Cancer Screening       Topic Date Due    Zoster (Shingles) Vaccine (1 of 2) Never done    Diptheria Tetanus Pertussis (DTAP/TDAP/TD) Vaccine (3 - Td or Tdap) 08/15/2022    COVID-19 Vaccine (6 - Pfizer series) 03/17/2023    Flu Vaccine (1) 09/01/2023     Type of outreach:    Chart review performed.    Questions for provider review:    None    Anastasiia CABEZAS RN  Patient Advocate Liaison - PAL RN  [facility]  [phone]

## 2023-09-14 NOTE — TELEPHONE ENCOUNTER
Thyroxine has refills through November  Opioids cannot be futured, but can be refilled when due  Alan Ricketts MD

## 2023-10-17 DIAGNOSIS — G89.29 OTHER CHRONIC PAIN: ICD-10-CM

## 2023-10-17 DIAGNOSIS — M54.16 SPINAL STENOSIS OF LUMBAR REGION WITH RADICULOPATHY: ICD-10-CM

## 2023-10-17 DIAGNOSIS — M48.061 SPINAL STENOSIS OF LUMBAR REGION WITH RADICULOPATHY: ICD-10-CM

## 2023-10-17 RX ORDER — HYDROCODONE BITARTRATE AND ACETAMINOPHEN 5; 325 MG/1; MG/1
1 TABLET ORAL EVERY 8 HOURS PRN
Qty: 90 TABLET | Refills: 0 | Status: SHIPPED | OUTPATIENT
Start: 2023-10-17 | End: 2023-11-16

## 2023-10-17 NOTE — TELEPHONE ENCOUNTER
Patient calling for [place] refill.  Will need by Friday.  T'd up.  Routed to refill pool to process.  Mera Motta RN

## 2023-10-19 ENCOUNTER — MYC REFILL (OUTPATIENT)
Dept: FAMILY MEDICINE | Facility: CLINIC | Age: 78
End: 2023-10-19
Payer: COMMERCIAL

## 2023-10-19 DIAGNOSIS — K13.0 ANGULAR CHEILITIS: ICD-10-CM

## 2023-10-20 RX ORDER — NYSTATIN 100000 U/G
CREAM TOPICAL
Qty: 30 G | Refills: 0 | Status: SHIPPED | OUTPATIENT
Start: 2023-10-20 | End: 2023-11-20

## 2023-11-03 ENCOUNTER — IMMUNIZATION (OUTPATIENT)
Dept: INTERNAL MEDICINE | Facility: CLINIC | Age: 78
End: 2023-11-03
Payer: COMMERCIAL

## 2023-11-03 DIAGNOSIS — Z23 NEED FOR PROPHYLACTIC VACCINATION AND INOCULATION AGAINST INFLUENZA: Primary | ICD-10-CM

## 2023-11-03 PROCEDURE — 90662 IIV NO PRSV INCREASED AG IM: CPT | Performed by: INTERNAL MEDICINE

## 2023-11-03 PROCEDURE — 99207 PR NO CHARGE NURSE ONLY: CPT | Performed by: INTERNAL MEDICINE

## 2023-11-03 PROCEDURE — G0008 ADMIN INFLUENZA VIRUS VAC: HCPCS | Performed by: INTERNAL MEDICINE

## 2023-11-10 ENCOUNTER — IMMUNIZATION (OUTPATIENT)
Dept: FAMILY MEDICINE | Facility: CLINIC | Age: 78
End: 2023-11-10
Payer: COMMERCIAL

## 2023-11-10 DIAGNOSIS — Z23 HIGH PRIORITY FOR 2019-NCOV VACCINE: Primary | ICD-10-CM

## 2023-11-10 PROCEDURE — 90480 ADMN SARSCOV2 VAC 1/ONLY CMP: CPT

## 2023-11-10 PROCEDURE — 91320 SARSCV2 VAC 30MCG TRS-SUC IM: CPT

## 2023-11-10 PROCEDURE — 99207 PR NO CHARGE NURSE ONLY: CPT

## 2023-11-10 NOTE — PROGRESS NOTES
Prior to immunization administration, verified patients identity using patient s name and date of birth. Please see Immunization Activity for additional information.     Screening Questionnaire for Adult Immunization    Are you sick today?   No   Do you have allergies to medications, food, a vaccine component or latex?   No   Have you ever had a serious reaction after receiving a vaccination?   No   Do you have a long-term health problem with heart, lung, kidney, or metabolic disease (e.g., diabetes), asthma, a blood disorder, no spleen, complement component deficiency, a cochlear implant, or a spinal fluid leak?  Are you on long-term aspirin therapy?   No   Do you have cancer, leukemia, HIV/AIDS, or any other immune system problem?   No   Do you have a parent, brother, or sister with an immune system problem?   No   In the past 3 months, have you taken medications that affect  your immune system, such as prednisone, other steroids, or anticancer drugs; drugs for the treatment of rheumatoid arthritis, Crohn s disease, or psoriasis; or have you had radiation treatments?   No   Have you had a seizure, or a brain or other nervous system problem?   No   During the past year, have you received a transfusion of blood or blood    products, or been given immune (gamma) globulin or antiviral drug?   No   For women: Are you pregnant or is there a chance you could become       pregnant during the next month?   No   Have you received any vaccinations in the past 4 weeks?   No     Immunization questionnaire answers were all negative.    I have reviewed the following standing orders:   This patient is due and qualifies for the Covid-19 vaccine.     Click here for COVID-19 Standing Order    I have reviewed the vaccines inclusion and exclusion criteria; No concerns regarding eligibility.     Patient instructed to remain in clinic for 15 minutes afterwards, and to report any adverse reactions.     Screening performed by Pita  CIERRA Lopez on 11/10/2023 at 1:46 PM.

## 2023-11-13 DIAGNOSIS — E03.9 HYPOTHYROIDISM, UNSPECIFIED TYPE: ICD-10-CM

## 2023-11-13 RX ORDER — LEVOTHYROXINE SODIUM 150 UG/1
150 TABLET ORAL DAILY
Qty: 90 TABLET | Refills: 0 | Status: SHIPPED | OUTPATIENT
Start: 2023-11-13 | End: 2023-11-20

## 2023-11-16 DIAGNOSIS — M54.16 SPINAL STENOSIS OF LUMBAR REGION WITH RADICULOPATHY: ICD-10-CM

## 2023-11-16 DIAGNOSIS — M48.061 SPINAL STENOSIS OF LUMBAR REGION WITH RADICULOPATHY: ICD-10-CM

## 2023-11-16 DIAGNOSIS — G89.29 OTHER CHRONIC PAIN: ICD-10-CM

## 2023-11-16 RX ORDER — HYDROCODONE BITARTRATE AND ACETAMINOPHEN 5; 325 MG/1; MG/1
1 TABLET ORAL EVERY 8 HOURS PRN
Qty: 90 TABLET | Refills: 0 | Status: SHIPPED | OUTPATIENT
Start: 2023-11-16 | End: 2023-12-18

## 2023-11-16 NOTE — TELEPHONE ENCOUNTER
Pt called to request refill for hydrocodone-acetaminophen 5-325 mg tablet.     pended med and pharmacy and routing refill request to Geraldine RODRÍGUEZ.    Mariella Sesay RN

## 2023-11-16 NOTE — TELEPHONE ENCOUNTER
reviewed. Refilled. Patient has upcoming visit with Alva Kaiser PA-C.    Honorio Mota PA-C on 11/16/2023 at 10:34 AM (covering for Geraldine Freitas)

## 2023-11-17 SDOH — HEALTH STABILITY: PHYSICAL HEALTH: ON AVERAGE, HOW MANY MINUTES DO YOU ENGAGE IN EXERCISE AT THIS LEVEL?: 0 MIN

## 2023-11-17 SDOH — HEALTH STABILITY: PHYSICAL HEALTH: ON AVERAGE, HOW MANY DAYS PER WEEK DO YOU ENGAGE IN MODERATE TO STRENUOUS EXERCISE (LIKE A BRISK WALK)?: 0 DAYS

## 2023-11-17 ASSESSMENT — SOCIAL DETERMINANTS OF HEALTH (SDOH)
DO YOU BELONG TO ANY CLUBS OR ORGANIZATIONS SUCH AS CHURCH GROUPS UNIONS, FRATERNAL OR ATHLETIC GROUPS, OR SCHOOL GROUPS?: PATIENT DECLINED
IN A TYPICAL WEEK, HOW MANY TIMES DO YOU TALK ON THE PHONE WITH FAMILY, FRIENDS, OR NEIGHBORS?: MORE THAN THREE TIMES A WEEK
HOW OFTEN DO YOU ATTENT MEETINGS OF THE CLUB OR ORGANIZATION YOU BELONG TO?: PATIENT DECLINED
HOW OFTEN DO YOU GET TOGETHER WITH FRIENDS OR RELATIVES?: TWICE A WEEK
HOW OFTEN DO YOU ATTEND CHURCH OR RELIGIOUS SERVICES?: PATIENT DECLINED
ARE YOU MARRIED, WIDOWED, DIVORCED, SEPARATED, NEVER MARRIED, OR LIVING WITH A PARTNER?: PATIENT DECLINED

## 2023-11-17 ASSESSMENT — ENCOUNTER SYMPTOMS
WEAKNESS: 0
HEMATURIA: 0
CONSTIPATION: 0
BREAST MASS: 0
COUGH: 0
FREQUENCY: 0
PARESTHESIAS: 0
ABDOMINAL PAIN: 0
NAUSEA: 0
FEVER: 0
EYE PAIN: 0
SHORTNESS OF BREATH: 0
MYALGIAS: 0
DIZZINESS: 0
NERVOUS/ANXIOUS: 0
HEADACHES: 0
PALPITATIONS: 0
HEMATOCHEZIA: 0
ARTHRALGIAS: 0
DYSURIA: 0
JOINT SWELLING: 0
HEARTBURN: 0
SORE THROAT: 0
CHILLS: 0
DIARRHEA: 0

## 2023-11-17 ASSESSMENT — LIFESTYLE VARIABLES
HOW MANY STANDARD DRINKS CONTAINING ALCOHOL DO YOU HAVE ON A TYPICAL DAY: 1 OR 2
AUDIT-C TOTAL SCORE: 2
HOW OFTEN DO YOU HAVE A DRINK CONTAINING ALCOHOL: 2-4 TIMES A MONTH
SKIP TO QUESTIONS 9-10: 1
HOW OFTEN DO YOU HAVE SIX OR MORE DRINKS ON ONE OCCASION: NEVER

## 2023-11-17 ASSESSMENT — ACTIVITIES OF DAILY LIVING (ADL): CURRENT_FUNCTION: NO ASSISTANCE NEEDED

## 2023-11-17 NOTE — COMMUNITY RESOURCES LIST (ENGLISH)
11/17/2023   [facility] [employer]  N/A  For questions about this resource list or additional care needs, please contact your primary care clinic or care manager.  Phone: [phone]   Email: N/A   Address: [address] [place] [address]   Hours: N/A        Exercise and Recreation       Gym or workout facility  1  [address] [place] [address] - Kickboxing Classes Distance: 2.62 miles      In-Person   [address] [place] [address]  Language: English  Hours: Mon - Fri 6:00 AM - 12:30 PM , Mon - Fri 3:00 PM - 8:00 PM , Sat 8:00 AM - 12:00 PM  Fees: Self Pay   Phone: [phone] [phone] Website: [email or web address]     2  [employer] [facility] Distance: 7.06 miles      In-Person   [address] [place] [address]  Language: English  Hours: Mon - Sun Open 24 Hours  Fees: Insurance, Self Pay, Sliding Fee   Phone: [phone] Website: [email or web address]          Important Numbers & Websites       Emergency Services   [address]  [place] [address]  Poison Control   (512) 820-1481  Suicide Prevention Lifeline   (782) 582-7378 (TALK)  Child Abuse Hotline   (744) 660-7942 (4-A-Child)  Sexual Assault Hotline   (888) 695-5442 (HOPE)  National Runaway Safeline   (388) 388-6666 (RUNAWAY)  All-Options Talkline   (956) 491-2193  Substance Abuse Referral   (419) 861-2160 (HELP)

## 2023-11-17 NOTE — COMMUNITY RESOURCES LIST (ENGLISH)
11/17/2023   [facility] [employer]  N/A  For questions about this resource list or additional care needs, please contact your primary care clinic or care manager.  Phone: [phone]   Email: N/A   Address: [address] [place] [address]   Hours: N/A        Exercise and Recreation       Gym or workout facility  [address]  [address] [place] [address] - Kickboxing Classes Distance: 2.62 miles      In-Person   [address] [place] [address]  Language: English  Hours: Mon - Fri 6:00 AM - 12:30 PM , Mon - Fri 3:00 PM - 8:00 PM , Sat 8:00 AM - 12:00 PM  Fees: Self Pay   Phone: ([phone] Website: [email or web address]     2  [employer] [facility] Distance: 7.06 miles      In-Person   [address] [place] [address]  Language: English  Hours: Mon - Sun Open 24 Hours  Fees: Insurance, Self Pay, Sliding Fee   Phone: [phone] Website: [email or web address]          Important Numbers & Websites       Emergency Services   [address]  [place] [address]  Poison Control   (971) 310-7745  Suicide Prevention Lifeline   (368) 969-9792 (TALK)  Child Abuse Hotline   (458) 156-6691 (4-A-Child)  Sexual Assault Hotline   (389) 215-1865 (HOPE)  National Runaway Safeline   (111) 300-6798 (RUNAWAY)  All-Options Talkline   (502) 401-3915  Substance Abuse Referral   (919) 529-4671 (HELP)

## 2023-11-20 ENCOUNTER — OFFICE VISIT (OUTPATIENT)
Dept: FAMILY MEDICINE | Facility: CLINIC | Age: 78
End: 2023-11-20
Payer: COMMERCIAL

## 2023-11-20 VITALS
BODY MASS INDEX: 25.99 KG/M2 | WEIGHT: 161.7 LBS | HEART RATE: 98 BPM | HEIGHT: 66 IN | OXYGEN SATURATION: 96 % | TEMPERATURE: 97.6 F | RESPIRATION RATE: 18 BRPM | DIASTOLIC BLOOD PRESSURE: 72 MMHG | SYSTOLIC BLOOD PRESSURE: 136 MMHG

## 2023-11-20 DIAGNOSIS — K13.0 ANGULAR CHEILITIS: ICD-10-CM

## 2023-11-20 DIAGNOSIS — R60.0 LOWER EXTREMITY EDEMA: ICD-10-CM

## 2023-11-20 DIAGNOSIS — Z00.00 ENCOUNTER FOR MEDICARE ANNUAL WELLNESS EXAM: Primary | ICD-10-CM

## 2023-11-20 DIAGNOSIS — E03.9 HYPOTHYROIDISM, UNSPECIFIED TYPE: ICD-10-CM

## 2023-11-20 DIAGNOSIS — M48.061 SPINAL STENOSIS OF LUMBAR REGION WITH RADICULOPATHY: ICD-10-CM

## 2023-11-20 DIAGNOSIS — M54.16 SPINAL STENOSIS OF LUMBAR REGION WITH RADICULOPATHY: ICD-10-CM

## 2023-11-20 LAB
ANION GAP SERPL CALCULATED.3IONS-SCNC: 12 MMOL/L (ref 7–15)
BUN SERPL-MCNC: 14 MG/DL (ref 8–23)
CALCIUM SERPL-MCNC: 9.1 MG/DL (ref 8.8–10.2)
CHLORIDE SERPL-SCNC: 97 MMOL/L (ref 98–107)
CREAT SERPL-MCNC: 0.64 MG/DL (ref 0.51–0.95)
DEPRECATED HCO3 PLAS-SCNC: 25 MMOL/L (ref 22–29)
EGFRCR SERPLBLD CKD-EPI 2021: 89 ML/MIN/1.73M2
GLUCOSE SERPL-MCNC: 92 MG/DL (ref 70–99)
HGB BLD-MCNC: 13.7 G/DL (ref 11.7–15.7)
POTASSIUM SERPL-SCNC: 4.5 MMOL/L (ref 3.4–5.3)
SODIUM SERPL-SCNC: 134 MMOL/L (ref 135–145)
TSH SERPL DL<=0.005 MIU/L-ACNC: 1.38 UIU/ML (ref 0.3–4.2)

## 2023-11-20 PROCEDURE — 36415 COLL VENOUS BLD VENIPUNCTURE: CPT

## 2023-11-20 PROCEDURE — 85018 HEMOGLOBIN: CPT

## 2023-11-20 PROCEDURE — 99214 OFFICE O/P EST MOD 30 MIN: CPT | Mod: 25

## 2023-11-20 PROCEDURE — 84443 ASSAY THYROID STIM HORMONE: CPT

## 2023-11-20 PROCEDURE — G0439 PPPS, SUBSEQ VISIT: HCPCS

## 2023-11-20 PROCEDURE — 80048 BASIC METABOLIC PNL TOTAL CA: CPT

## 2023-11-20 RX ORDER — RESPIRATORY SYNCYTIAL VIRUS VACCINE 120MCG/0.5
0.5 KIT INTRAMUSCULAR ONCE
Qty: 1 EACH | Refills: 0 | Status: CANCELLED | OUTPATIENT
Start: 2023-11-20 | End: 2023-11-20

## 2023-11-20 RX ORDER — LEVOTHYROXINE SODIUM 150 UG/1
150 TABLET ORAL DAILY
Qty: 90 TABLET | Refills: 1 | Status: SHIPPED | OUTPATIENT
Start: 2023-11-20 | End: 2024-06-13

## 2023-11-20 RX ORDER — NYSTATIN 100000 U/G
CREAM TOPICAL
Qty: 30 G | Refills: 1 | Status: SHIPPED | OUTPATIENT
Start: 2023-11-20

## 2023-11-20 RX ORDER — FUROSEMIDE 20 MG
20 TABLET ORAL 2 TIMES DAILY
Qty: 180 TABLET | Refills: 1 | Status: SHIPPED | OUTPATIENT
Start: 2023-11-20 | End: 2024-06-13

## 2023-11-20 ASSESSMENT — ENCOUNTER SYMPTOMS
HEMATURIA: 0
PARESTHESIAS: 0
COUGH: 0
DYSURIA: 0
WEAKNESS: 0
NAUSEA: 0
NERVOUS/ANXIOUS: 0
BREAST MASS: 0
CONSTIPATION: 0
PALPITATIONS: 0
CHILLS: 0
DIARRHEA: 0
ARTHRALGIAS: 0
MYALGIAS: 0
ABDOMINAL PAIN: 0
HEADACHES: 0
HEMATOCHEZIA: 0
SHORTNESS OF BREATH: 0
FREQUENCY: 0
HEARTBURN: 0
JOINT SWELLING: 0
DIZZINESS: 0
EYE PAIN: 0
FEVER: 0
SORE THROAT: 0

## 2023-11-20 ASSESSMENT — ACTIVITIES OF DAILY LIVING (ADL): CURRENT_FUNCTION: NO ASSISTANCE NEEDED

## 2023-11-20 NOTE — LETTER
Opioid / Opioid Plus Controlled Substance Agreement    This is an agreement between you and your provider about the safe and appropriate use of controlled substance/opioids prescribed by your care team. Controlled substances are medicines that can cause physical and mental dependence (abuse).    There are strict laws about having and using these medicines. We here at [facility] are committing to working with you in your efforts to get better. To support you in this work, we ll help you schedule regular office appointments for medicine refills. If we must cancel or change your appointment for any reason, we ll make sure you have enough medicine to last until your next appointment.     As a Provider, I will:  Listen carefully to your concerns and treat you with respect.   Recommend a treatment plan that I believe is in your best interest. This plan may involve therapies other than opioid pain medication.   Talk with you often about the possible benefits, and the risk of harm of any medicine that we prescribe for you.   Provide a plan on how to taper (discontinue or go off) using this medicine if the decision is made to stop its use.    As a Patient, I understand that opioid(s):   Are a controlled substance prescribed by my care team to help me function or work and manage my condition(s).   Are strong medicines and can cause serious side effects such as:  Drowsiness, which can seriously affect my driving ability  A lower breathing rate, enough to cause death  Harm to my thinking ability   Depression   Abuse of and addiction to this medicine  Need to be taken exactly as prescribed. Combining opioids with certain medicines or chemicals (such as illegal drugs, sedatives, sleeping pills, and benzodiazepines) can be dangerous or even fatal. If I stop opioids suddenly, I may have severe withdrawal symptoms.  Do not work for all types of pain nor for all patients. If they re not helpful, I may be asked to stop  them.      The risks, benefits and side effects of these medicine(s) were explained to me. I agree that:  I will take part in other treatments as advised by my care team. This may be psychiatry or counseling, physical therapy, behavioral therapy, group treatment or a referral to a specialist.     I will keep all my appointments. I understand that this is part of the monitoring of opioids. My care team may require an office visit for EVERY opioid/controlled substance refill. If I miss appointments or don t follow instructions, my care team may stop my medicine.    I will take my medicines as prescribed. I will not change the dose or schedule unless my care team tells me to. There will be no refills if I run out early.     I may be asked to come to the clinic and complete a urine drug test or complete a pill count at any time. If I don t give a urine sample or participate in a pill count, the care team may stop my medicine.    I will only receive prescriptions from this clinic for chronic pain. If I am treated by another provider for acute pain issues, I will tell them that I am taking opioid pain medication for chronic pain and that I have a treatment agreement with this provider. I will inform my [facility] care team within one business day if I am given a prescription for any pain medication by another healthcare provider. My [facility] care team can contact other providers and pharmacists about my use of any medicines.    It is up to me to make sure that I don t run out of my medicines on weekends or holidays. If my care team is willing to refill my opioid prescription without a visit, I must request refills only during office hours. Refills may take up to 3 business days to process. I will use one pharmacy to fill all my opioid and other controlled substance prescriptions. I will notify the clinic about any changes to my insurance or medication availability.    I am responsible for my prescriptions.  If the medicine/prescription is lost, stolen or destroyed, it will not be replaced. I also agree not to share controlled substance medicines with anyone.    I am aware I should not use any illegal or recreational drugs. I agree not to drink alcohol unless my care team says I can.       If I enroll in the [place] [facility] program, I will tell my care team prior to my next refill.     I will tell my care team right away if I become pregnant, have a new medical problem treated outside of my regular clinic, or have a change in my medications.    I understand that this medicine can affect my thinking, judgment and reaction time. Alcohol and drugs affect the brain and body, which can affect the safety of my driving. Being under the influence of alcohol or drugs can affect my decision-making, behaviors, personal safety, and the safety of others. Driving while impaired (DWI) can occur if a person is driving, operating, or in physical control of a car, motorcycle, boat, snowmobile, ATV, motorbike, off-road vehicle, or any other motor vehicle ([place] Statute 169A.20). I understand the risk if I choose to drive or operate any vehicle or machinery.    I understand that if I do not follow any of the conditions above, my prescriptions or treatment may be stopped or changed.          Opioids  What You Need to Know    What are opioids?   Opioids are pain medicines that must be prescribed by a doctor. They are also known as narcotics.     Examples are:   morphine (MS Contin, Love)  oxycodone (Oxycontin)  oxycodone and acetaminophen (Percocet)  hydrocodone and acetaminophen (Vicodin, Norco)   fentanyl patch (Duragesic)   hydromorphone (Dilaudid)   methadone  codeine (Tylenol #3)     What do opioids do well?   Opioids are best for severe short-term pain such as after a surgery or injury. They may work well for cancer pain. They may help some people with long-lasting (chronic) pain.     What do opioids NOT do well?   Opioids  never get rid of pain entirely, and they don t work well for most patients with chronic pain. Opioids don t reduce swelling, one of the causes of pain.                                    Other ways to manage chronic pain and improve function include:     Treat the health problem that may be causing pain  Anti-inflammation medicines, which reduce swelling and tenderness, such as ibuprofen (Advil, Motrin) or naproxen (Aleve)  Acetaminophen (Tylenol)  Antidepressants and anti-seizure medicines, especially for nerve pain  Topical treatments such as patches or creams  Injections or nerve blocks  Chiropractic or osteopathic treatment  Acupuncture, massage, deep breathing, meditation, visual imagery, aromatherapy  Use heat or ice at the pain site  Physical therapy   Exercise  Stop smoking  Take part in therapy       Risks and side effects     Talk to your doctor before you start or decide to keep taking opioids. Possible side effects include:    Lowering your breathing rate enough to cause death  Overdose, including death, especially if taking higher than prescribed doses  Worse depression symptoms; less pleasure in things you usually enjoy  Feeling tired or sluggish  Slower thoughts or cloudy thinking  Being more sensitive to pain over time; pain is harder to control  Trouble sleeping or restless sleep  Changes in hormone levels (for example, less testosterone)  Changes in sex drive or ability to have sex  Constipation  Unsafe driving  Itching and sweating  Dizziness  Nausea, throwing up and dry mouth    What else should I know about opioids?    Opioids may lead to dependence, tolerance, or addiction.    Dependence means that if you stop or reduce the medicine too quickly, you will have withdrawal symptoms. These include loose poop (diarrhea), jitters, flu-like symptoms, nervousness and tremors. Dependence is not the same as addiction.                     Tolerance means needing higher doses over time to get the same  effect. This may increase the chance of serious side effects.    Addiction is when people improperly use a substance that harms their body, their mind or their relations with others. Use of opiates can cause a relapse of addiction if you have a history of drug or alcohol abuse.    People who have used opioids for a long time may have a lower quality of life, worse depression, higher levels of pain and more visits to doctors.    You can overdose on opioids. Take these steps to lower your risk of overdose:    Recognize the signs:  Signs of overdose include decrease or loss of consciousness (blackout), slowed breathing, trouble waking up and blue lips. If someone is worried about overdose, they should call 911.    Talk to your doctor about Narcan (naloxone).   If you are at risk for overdose, you may be given a prescription for Narcan. This medicine very quickly reverses the effects of opioids.   If you overdose, a friend or family member can give you Narcan while waiting for the ambulance. They need to know the signs of overdose and how to give Narcan.     Don't use alcohol or street drugs.   Taking them with opioids can cause death.    Do not take any of these medicines unless your doctor says it s OK. Taking these with opioids can cause death:  Benzodiazepines, such as lorazepam (Ativan), alprazolam (Xanax) or diazepam (Valium)  Muscle relaxers, such as cyclobenzaprine (Flexeril)  Sleeping pills like zolpidem (Ambien)   Other opioids      How to keep you and other people safe while taking opioids:    Never share your opioids with others.  Opioid medicines are regulated by the Drug Enforcement Agency (LAXMI). Selling or sharing medications is a criminal act.    2. Be sure to store opioids in a secure place, locked up if possible. Young children can easily swallow them and overdose.    3. When you are traveling with your medicines, keep them in the original bottles. If you use a pill box, be sure you also carry a copy  of your medicine list from your clinic or pharmacy.    4. Safe disposal of opioids    Most pharmacies have places to get rid of medicine, called disposal kiosks. Medicine disposal options are also available in every [facility]. Search your county and  medication disposal  to find more options. You can find more details at:  https://www.pca.[facility].[place]./living-green/managing-unwanted-medications     I agree that my provider, clinic care team, and pharmacy may work with any city, state or federal law enforcement agency that investigates the misuse, sale, or other diversion of my controlled medicine. I will allow my provider to discuss my care with, or share a copy of, this agreement with any other treating provider, pharmacy or emergency room where I receive care.    I have read this agreement and have asked questions about anything I did not understand.    _______________________________________________________  Patient Signature - Kahlil Caputo _____________________                   Date     _______________________________________________________  Provider Signature - Alva Kaiser PA-C   _____________________                   Date     _______________________________________________________  Witness Signature (required if provider not present while patient signing)   _____________________                   Date

## 2023-11-20 NOTE — COMMUNITY RESOURCES LIST (ENGLISH)
11/20/2023   [facility] [place] [employer]  N/A  For questions about this resource list or additional care needs, please contact your primary care clinic or care manager.  Phone: [phone]   Email: N/A   Address: [address] [place] [id number]   Hours: N/A        Exercise and Recreation       Gym or workout facility  1  [address] [place] [address] - Kickboxing Classes Distance: 2.62 miles      In-Person   [address] [place] [address]  Language: English  Hours: Mon - Fri 6:00 AM - 12:30 PM , Mon - Fri 3:00 PM - 8:00 PM , Sat 8:00 AM - 12:00 PM  Fees: Self Pay   Phone: [phone] Website: [email or web address]     2  [employer] [facility] Distance: 7.06 miles      In-Person   [address] [place] [address]  Language: English  Hours: Mon - Sun Open 24 Hours  Fees: Insurance, Self Pay, Sliding Fee   Phone: [phone] Website: [email or web address]          Important Numbers & Websites       Emergency Services   [address]  [place] [address]  Poison Control   [phone]  Suicide Prevention Lifeline   [phone] (TALK)  Child Abuse Hotline   (246) 884-8748 (4-A-Child)  Sexual Assault Hotline   (275) 407-1167 (HOPE)  National Runaway Safeline   (743) 546-1104 (RUNAWAY)  All-Options Talkline   (578) 237-9513  Substance Abuse Referral   [phone] [phone] (HELP)

## 2023-11-20 NOTE — PATIENT INSTRUCTIONS
Patient Education   Personalized Prevention Plan  You are due for the preventive services outlined below.  Your care team is available to assist you in scheduling these services.  If you have already completed any of these items, please share that information with your care team to update in your medical record.  Health Maintenance Due   Topic Date Due     Zoster (Shingles) Vaccine (1 of 2) Never done     RSV VACCINE (Pregnancy & 60+) (1 - 1-dose 60+ series) Never done     Diptheria Tetanus Pertussis (DTAP/TDAP/TD) Vaccine (3 - Td or Tdap) 08/15/2022     LUNG CANCER SCREENING  11/03/2022     ANNUAL REVIEW OF HM ORDERS  11/17/2023     Thyroid Function Lab  11/17/2023     Preventive Health Recommendations    See your health care provider every year to  Review health changes.   Discuss preventive care.    Review your medicines if your doctor has prescribed any.  You no longer need a yearly Pap test unless you've had an abnormal Pap test in the past 10 years. If you have vaginal symptoms, such as bleeding or discharge, be sure to talk with your provider about a Pap test.  Every 1 to 2 years, have a mammogram.  If you are over 69, talk with your health care provider about whether or not you want to continue having screening mammograms.  Every 10 years, have a colonoscopy. Or, have a yearly FIT test (stool test). These exams will check for colon cancer.   Have a cholesterol test every 5 years, or more often if your doctor advises it.   Have a diabetes test (fasting glucose) every three years. If you are at risk for diabetes, you should have this test more often.   At age 65, have a bone density scan (DEXA) to check for osteoporosis (brittle bone disease).    Shots:  Get a flu shot each year.  Get a tetanus shot every 10 years.  Talk to your doctor about your pneumonia vaccines. There are now two you should receive - Pneumovax (PPSV 23) and Prevnar (PCV 13).  Talk to your pharmacist about the shingles vaccine.  Talk to  your doctor about the hepatitis B vaccine.    Nutrition:   Eat at least 5 servings of fruits and vegetables each day.  Eat whole-grain bread, whole-wheat pasta and brown rice instead of white grains and rice.  Get adequate Calcium and Vitamin D.     Lifestyle  Exercise at least 150 minutes a week (30 minutes a day, 5 days a week). This will help you control your weight and prevent disease.  Limit alcohol to one drink per day.  No smoking.   Wear sunscreen to prevent skin cancer.   See your dentist twice a year for an exam and cleaning.  See your eye doctor every 1 to 2 years to screen for conditions such as glaucoma, macular degeneration and cataracts.    Personalized Prevention Plan  You are due for the preventive services outlined below.  Your care team is available to assist you in scheduling these services.  If you have already completed any of these items, please share that information with your care team to update in your medical record.  Health Maintenance   Topic Date Due     ZOSTER IMMUNIZATION (1 of 2) Never done     RSV VACCINE (Pregnancy & 60+) (1 - 1-dose 60+ series) Never done     DTAP/TDAP/TD IMMUNIZATION (3 - Td or Tdap) 08/15/2022     LUNG CANCER SCREENING  11/03/2022     ANNUAL REVIEW OF HM ORDERS  11/17/2023     TSH W/FREE T4 REFLEX  11/17/2023     URINE DRUG SCREEN  06/06/2024     MEDICARE ANNUAL WELLNESS VISIT  11/20/2024     FALL RISK ASSESSMENT  11/20/2024     DEXA  05/15/2025     LIPID  11/17/2027     ADVANCE CARE PLANNING  11/20/2028     HEPATITIS C SCREENING  Completed     PHQ-2 (once per calendar year)  Completed     INFLUENZA VACCINE  Completed     Pneumococcal Vaccine: 65+ Years  Completed     COVID-19 Vaccine  Completed     IPV IMMUNIZATION  Aged Out     HPV IMMUNIZATION  Aged Out     MENINGITIS IMMUNIZATION  Aged Out     RSV MONOCLONAL ANTIBODY  Aged Out     MAMMO SCREENING  Discontinued     COLORECTAL CANCER SCREENING  Discontinued     Learning About Dietary Guidelines  What are the  Dietary Guidelines for Americans?     Dietary Guidelines for Americans provide tips for eating well and staying healthy. This helps reduce the risk for long-term (chronic) diseases.  These guidelines recommend that you:  Eat and drink the right amount for you. The U.S. government's food guide is called MyPlate. It can help you make your own well-balanced eating plan.  Try to balance your eating with your activity. This helps you stay at a healthy weight.  Drink alcohol in moderation, if at all.  Limit foods high in salt, saturated fat, trans fat, and added sugar.  These guidelines are from the U.S. Department of Agriculture and the U.S. Department of Health and Human Services. They are updated every 5 years.  What is MyPlate?  MyPlate is the U.S. government's food guide. It can help you make your own well-balanced eating plan. A balanced eating plan means that you eat enough, but not too much, and that your food gives you the nutrients you need to stay healthy.  MyPlate focuses on eating plenty of whole grains, fruits, and vegetables, and on limiting fat and sugar. It is available online at www.ChooseMyPlate.gov.  How can you get started?  If you're trying to eat healthier, you can slowly change your eating habits over time. You don't have to make big changes all at once. Start by adding one or two healthy foods to your meals each day.  Grains  Choose whole-grain breads and cereals and whole-wheat pasta and whole-grain crackers.  Vegetables  Eat a variety of vegetables every day. They have lots of nutrients and are part of a heart-healthy diet.  Fruits  Eat a variety of fruits every day. Fruits contain lots of nutrients. Choose fresh fruit instead of fruit juice.  Protein foods  Choose fish and lean poultry more often. Eat red meat and fried meats less often. Dried beans, tofu, and nuts are also good sources of protein.  Dairy  Choose low-fat or fat-free products from this food group. If you have problems digesting  "milk, try eating cheese or yogurt instead.  Fats and oils  Limit fats and oils if you're trying to cut calories. Choose healthy fats when you cook. These include canola oil and olive oil.  Where can you learn more?  Go to https://www."[employer].net/patiented  Enter D676 in the search box to learn more about \"Learning About Dietary Guidelines.\"  Current as of: February 28, 2023               Content Version: 13.8    0718-7099 [employer].   Care instructions adapted under license by your healthcare professional. If you have questions about a medical condition or this instruction, always ask your healthcare professional. [employer] disclaims any warranty or liability for your use of this information.      Hearing Loss: Care Instructions  Overview     Hearing loss is a sudden or slow decrease in how well you hear. It can range from slight to profound. Permanent hearing loss can occur with aging. It also can happen when you are exposed long-term to loud noise. Examples include listening to loud music, riding motorcycles, or being around other loud machines.  Hearing loss can affect your work and home life. It can make you feel lonely or depressed. You may feel that you have lost your independence. But hearing aids and other devices can help you hear better and feel connected to others.  Follow-up care is a key part of your treatment and safety. Be sure to make and go to all appointments, and call your doctor if you are having problems. It's also a good idea to know your test results and keep a list of the medicines you take.  How can you care for yourself at home?  Avoid loud noises whenever possible. This helps keep your hearing from getting worse.  Always wear hearing protection around loud noises.  Wear a hearing aid as directed.  A professional can help you pick a hearing aid that will work best for you.  You can also get hearing aids over the counter for mild to moderate hearing " "loss.  Have hearing tests as your doctor suggests. They can show whether your hearing has changed. Your hearing aid may need to be adjusted.  Use other devices as needed. These may include:  Telephone amplifiers and hearing aids that can connect to a television, stereo, radio, or microphone.  Devices that use lights or vibrations. These alert you to the doorbell, a ringing telephone, or a baby monitor.  Television closed-captioning. This shows the words at the bottom of the screen. Most new TVs can do this.  TTY (text telephone). This lets you type messages back and forth on the telephone instead of talking or listening. These devices are also called TDD. When messages are typed on the keyboard, they are sent over the phone line to a receiving TTY. The message is shown on a monitor.  Use text messaging, social media, and email if it is hard for you to communicate by telephone.  Try to learn a listening technique called speechreading. It is not lipreading. You pay attention to people's gestures, expressions, posture, and tone of voice. These clues can help you understand what a person is saying. Face the person you are talking to, and have them face you. Make sure the lighting is good. You need to see the other person's face clearly.  Think about counseling if you need help to adjust to your hearing loss.  When should you call for help?  Watch closely for changes in your health, and be sure to contact your doctor if:    You think your hearing is getting worse.     You have new symptoms, such as dizziness or nausea.   Where can you learn more?  Go to https://www.[employer].net/patiented  Enter R798 in the search box to learn more about \"Hearing Loss: Care Instructions.\"  Current as of: February 28, 2023               Content Version: 13.8    6877-8219 [employer].   Care instructions adapted under license by your healthcare professional. If you have questions about a medical condition or this instruction, " always ask your healthcare professional. [employer] disclaims any warranty or liability for your use of this information.

## 2023-11-20 NOTE — PROGRESS NOTES
"SUBJECTIVE:   Kahlil is a 78 year old, presenting for the following:  Wellness Visit        11/20/2023    10:22 AM   Additional Questions   Roomed by Pita Lopez     Are you in the first 12 months of your Medicare coverage?  No    Healthy Habits:     In general, how would you rate your overall health?  Good    Frequency of exercise:  2-3 days/week    Duration of exercise:  Less than 15 minutes    Do you usually eat at least 4 servings of fruit and vegetables a day, include whole grains    & fiber and avoid regularly eating high fat or \"junk\" foods?  No    Taking medications regularly:  Yes    Medication side effects:  Not applicable    Ability to successfully perform activities of daily living:  No assistance needed    Home Safety:  No safety concerns identified    Hearing Impairment:  Difficulty following a conversation in a noisy restaurant or crowded room and difficulty understanding soft or whispered speech    In the past 6 months, have you been bothered by leaking of urine?  No    In general, how would you rate your overall mental or emotional health?  Good    Additional concerns today:  No      Chronic Pain: Uses two Norco at night and typically will use 0.5 to 1 tablet during the day. Works well for pain. Has been using this for years.     Have you ever done Advance Care Planning? (For example, a Health Directive, POLST, or a discussion with a medical provider or your loved ones about your wishes): No, advance care planning information given to patient to review.  Patient plans to discuss their wishes with loved ones or provider.      Fall risk  Fallen 2 or more times in the past year?: No  Any fall with injury in the past year?: No    Cognitive Screening   1) Repeat 3 items (Leader, Season, Table)    2) Clock draw: NORMAL  3) 3 item recall: Recalls 3 objects  Results: 3 items recalled: COGNITIVE IMPAIRMENT LESS LIKELY    Mini-CogTM Copyright S Jasmine. Licensed by the author for use in [employer] " Services; reprinted with permission (soob@[facility]). All rights reserved.      Reviewed and updated as needed this visit by clinical staff   Tobacco  Allergies  Meds  Problems  Med Hx  Surg Hx  Fam Hx          Reviewed and updated as needed this visit by Provider   Tobacco  Allergies  Meds  Problems  Med Hx  Surg Hx  Fam Hx           Social History     Tobacco Use    Smoking status: Former     Packs/day: 1.00     Years: 50.00     Additional pack years: 0.00     Total pack years: 50.00     Types: Cigarettes     Quit date: 2010     Years since quittin.4     Passive exposure: Never    Smokeless tobacco: Never    Tobacco comments:     no tobacco use since 2010   Substance Use Topics    Alcohol use: Yes     Comment: occasional         2023     2:05 PM   Alcohol Use   Prescreen: >3 drinks/day or >7 drinks/week? No     Do you have a current opioid prescription? Yes   How severe is your pain on a scale from 1-10? 8/10       Do you use any other controlled substances or medications that are not prescribed by a provider? None    Current providers sharing in care for this patient include:   Patient Care Team:  Clinic - [facility] as PCP - General  Ирина Acuña MD as Hospitalist (Endocrinology, Diabetes, and Metabolism)  Heron Collins MD as Assigned Neuroscience Provider  Ирина Acuña MD as Assigned Endocrinology Provider  Geraldine Freitas APRN CNP as Assigned PCP  ZachAnastasiia RN as Personal Advocate & Liaison (PAL)  Geraldine Freitas APRN CNP as Assigned Pain Medication Provider    The following health maintenance items are reviewed in Epic and correct as of today:  Health Maintenance   Topic Date Due    ZOSTER IMMUNIZATION (1 of 2) Never done    RSV VACCINE (Pregnancy & 60+) (1 - 1-dose 60+ series) Never done    DTAP/TDAP/TD IMMUNIZATION (3 - Td or Tdap) 08/15/2022    LUNG CANCER SCREENING  2022    ANNUAL REVIEW OF HM ORDERS  2023     MEDICARE ANNUAL WELLNESS VISIT  11/17/2023    TSH W/FREE T4 REFLEX  11/17/2023    URINE DRUG SCREEN  06/06/2024    FALL RISK ASSESSMENT  11/20/2024    DEXA  05/15/2025    LIPID  11/17/2027    ADVANCE CARE PLANNING  11/17/2027    HEPATITIS C SCREENING  Completed    PHQ-2 (once per calendar year)  Completed    INFLUENZA VACCINE  Completed    Pneumococcal Vaccine: 65+ Years  Completed    COVID-19 Vaccine  Completed    IPV IMMUNIZATION  Aged Out    HPV IMMUNIZATION  Aged Out    MENINGITIS IMMUNIZATION  Aged Out    RSV MONOCLONAL ANTIBODY  Aged Out    MAMMO SCREENING  Discontinued    COLORECTAL CANCER SCREENING  Discontinued     BP Readings from Last 3 Encounters:   11/20/23 136/72   06/06/23 122/74   11/17/22 112/64    Wt Readings from Last 3 Encounters:   11/20/23 73.3 kg (161 lb 11.2 oz)   06/06/23 73.8 kg (162 lb 11.2 oz)   11/17/22 71 kg (156 lb 9.6 oz)         Patient Active Problem List   Diagnosis    Hypothyroidism    Stricture of small intestine (H)    Osteoarthritis of hip    Ileitis (ileocecal resection 8/2013)    Lumbago    Palpitations    PVC's (premature ventricular contractions)    Family history of premature CAD    Chronic pain    Pulmonary nodules    ACP (advance care planning)    Coronary artery disease involving native coronary artery of native heart without angina pectoris    Age-related osteoporosis with current pathological fracture with routine healing    Statin intolerance    PAD (peripheral artery disease) (H24)    Aspirin sensitivity    Sacroiliitis (H24)    Spinal stenosis of lumbar region with radiculopathy    Controlled substance agreement signed    Paroxysmal ventricular tachycardia (H)    Pain management contract signed     Past Surgical History:   Procedure Laterality Date    APPENDECTOMY  8/30/2013    incidental removal-part of small bowel resection    ARTHROPLASTY HIP  11/25/2013    Procedure: ARTHROPLASTY HIP;  Left Total Hip Arthroplasty  ;  Surgeon: Luis Marshall MD;   Location: [facility] OR    BIOPSY  ?     left thigh-squamous cell carcinoma    BREAST SURGERY  ?     left breast bx-negative    ENT SURGERY  ?     T&A    EYE SURGERY      R/L cataract removal with IOL placement; left cataract...    LAPAROTOMY EXPLORATORY  2013    Procedure: LAPAROTOMY EXPLORATORY;  LAPAROTOMY EXPLORATORY, Ileocecal Resection Resection, Removal of Retained Pill Cam;  Surgeon: Mitchell Fraser MD;  Location: [facility] OR    RESECTION ILEOCECAL  2013    Procedure: RESECTION ILEOCECAL;;  Surgeon: Mitchell Fraser MD;  Location: [facility] OR    ZZC NONSPECIFIC PROCEDURE      D& C X 2    ZZC NONSPECIFIC PROCEDURE      PILONIDAL CYSTECTOMY    ZZC NONSPECIFIC PROCEDURE      T&A    ZZC NONSPECIFIC PROCEDURE      BREAST BX & CERVICAL POLYP    ZZHC COLONOSCOPY THRU STOMA, DIAGNOSTIC             Social History     Tobacco Use    Smoking status: Former     Packs/day: 1.00     Years: 50.00     Additional pack years: 0.00     Total pack years: 50.00     Types: Cigarettes     Quit date: 2010     Years since quittin.4     Passive exposure: Never    Smokeless tobacco: Never    Tobacco comments:     no tobacco use since 2010   Substance Use Topics    Alcohol use: Yes     Comment: occasional     Family History   Problem Relation Age of Onset    Alcohol/Drug Mother     Heart Disease Mother         cardiomyopathy    Thyroid Disease Mother     Alcohol/Drug Father     Heart Disease Father         CAD -  1st MI mid 50's    Myocardial Infarction Father         X4    Coronary Artery Disease Father     Osteoporosis Maternal Grandmother     Cancer Maternal Grandfather         stomach or throat - martini drinker/pipe smoker    Myocardial Infarction Brother     Breast Cancer No family hx of     Cancer - colorectal No family hx of          Current Outpatient Medications   Medication Sig Dispense Refill    ACE/ARB NOT PRESCRIBED, INTENTIONAL, Please choose reason not prescribed, below       acetaminophen (TYLENOL) 500 MG tablet Take 500-1,000 mg by mouth as needed for mild pain      alendronate (FOSAMAX) 70 MG tablet Take 1 tablet (70 mg) by mouth every 7 days 12 tablet 3    ascorbic acid (VITAMIN C) 500 MG tablet Take by mouth 2 times daily      ASPIRIN NOT PRESCRIBED (INTENTIONAL) Please choose reason not prescribed, below 0 each 0    BETA BLOCKER NOT PRESCRIBED, INTENTIONAL, Beta Blocker not prescribed intentionally due to COPD  0    betaxolol (BETOPTIC) 0.5 % ophthalmic solution INSTILL 1 DROP INTO EACH EYE QD  3    calcium carbonate (OS-THEA) 500 MG tablet 2 tablets 3 times daily  100 tablet 3    chlorpheniramine (CHLOR-TRIMETON) 4 MG tablet Take 4 mg by mouth every 6 hours as needed for allergies or rhinitis      Cholecalciferol (VITAMIN D) 1000 UNITS capsule Take 3,000 Units by mouth daily       furosemide (LASIX) 20 MG tablet Take 1 tablet (20 mg) by mouth 2 times daily 180 tablet 1    HYDROcodone-acetaminophen (NORCO) 5-325 MG tablet Take 1 tablet by mouth every 8 hours as needed for pain or severe pain Max 3 tablets/24 hours 90 tablet 0    levothyroxine (SYNTHROID/LEVOTHROID) 150 MCG tablet TAKE ONE TABLET BY MOUTH ONE TIME DAILY 90 tablet 0    melatonin 3 MG tablet Take 1-2 tablets (3-6 mg) by mouth nightly as needed for sleep (OTC)      Multiple Vitamin (MULTI-VITAMIN PO) Take by mouth daily       nystatin (MYCOSTATIN) 372384 UNIT/GM external cream APPLY TOPICALLY TO THE AFFECTED AREA 2 TIMES DAILY 30 g 0    STATIN NOT PRESCRIBED, INTENTIONAL, 1 each See Admin Instructions Statin not prescribed intentionally due to Allergy to statin 0 each 0     Allergies   Allergen Reactions    Latex Difficulty breathing     sensativity - cough, eyes water    Nsaids Shortness Of Breath and Other (See Comments)     bronchospams    Aspirin Difficulty breathing     tight cough    Morphine And Related GI Disturbance     upset GI    Pravastatin      Myalgias    Simvastatin      Myalgias    Tramadol      Heart  "burn,  Felt \"clammy\", unable to pass gas, unable to urinate and had severe nausea    Bacitracin Rash    Neomycin-Polymyxin B Gu Blisters, Itching and Rash     Mammogram Screening - Patient over age 75, has elected to discontinue screenings.  Pertinent mammograms are reviewed under the imaging tab.    Review of Systems   Constitutional:  Negative for chills and fever.   HENT:  Negative for congestion, ear pain, hearing loss and sore throat.    Eyes:  Negative for pain and visual disturbance.   Respiratory:  Negative for cough and shortness of breath.    Cardiovascular:  Negative for chest pain, palpitations and peripheral edema.   Gastrointestinal:  Negative for abdominal pain, constipation, diarrhea, heartburn, hematochezia and nausea.   Breasts:  Negative for tenderness, breast mass and discharge.   Genitourinary:  Negative for dysuria, frequency, genital sores, hematuria, pelvic pain, urgency, vaginal bleeding and vaginal discharge.   Musculoskeletal:  Negative for arthralgias, joint swelling and myalgias.   Skin:  Negative for rash.   Neurological:  Negative for dizziness, weakness, headaches and paresthesias.   Psychiatric/Behavioral:  Negative for mood changes. The patient is not nervous/anxious.      OBJECTIVE:   /72 (BP Location: Right arm, Patient Position: Sitting, Cuff Size: Adult Regular)   Pulse 98   Temp 97.6  F (36.4  C) (Oral)   Resp 18   Ht 1.676 m (5' 6\")   Wt 73.3 kg (161 lb 11.2 oz)   LMP  (LMP Unknown)   SpO2 96%   BMI 26.10 kg/m   Estimated body mass index is 26.1 kg/m  as calculated from the following:    Height as of this encounter: 1.676 m (5' 6\").    Weight as of this encounter: 73.3 kg (161 lb 11.2 oz).  Physical Exam  GENERAL: healthy, alert and no distress  RESP: lungs clear to auscultation - no rales, rhonchi or wheezes  CV: regular rate and rhythm, normal S1 S2, no S3 or S4, no murmur, click or rub  MS: no gross musculoskeletal defects noted, no edema    Diagnostic Test " "Results:  Labs reviewed in Epic    ASSESSMENT / PLAN:   (Z00.00) Encounter for Medicare annual wellness exam  (primary encounter diagnosis)  Stable exam. Follow up in 1 year for annual wellness; sooner as needed for acute concerns.  Plan: REVIEW OF HEALTH MAINTENANCE PROTOCOL ORDERS,         Hemoglobin, Basic metabolic panel  (Ca, Cl,         CO2, Creat, Gluc, K, Na, BUN)          (E03.9) Hypothyroidism, unspecified type  Well controlled with use of levothyroxine. Check TSH today. No new side effects of the medication. Refill provided.  Plan: TSH WITH FREE T4 REFLEX, levothyroxine         (SYNTHROID/LEVOTHROID) 150 MCG tablet          (R60.0) Lower extremity edema  Well controlled with use of furosemide. No new side effects of the medication. Refill provided.  Plan: furosemide (LASIX) 20 MG tablet          (K13.0) Angular cheilitis  Well controlled with use of nystatin cream. No new side effects of the medication. Refill provided.  Plan: nystatin (MYCOSTATIN) 056328 UNIT/GM external         cream          (M48.061,  M54.16) Spinal stenosis of lumbar region with radiculopathy  On Norco for chronic pain. Has been on the same dose for \"a long time\". Has continued to make pain tolerable. Current regimen in two Norco at night for sleep and then 0.5-1 tablet during the day for pain. No recent falls, injuries or trauma. New controlled substance agreement done today. Virtual follow up in 3 months and then in person visit in 6 months; sooner with any acute concerns. Discussed today and patient is aware that if pain needs increase would recommend she see pain management and she was in agreement with this plan. For now doing well on current Norco prescription.     Patient has been advised of split billing requirements and indicates understanding: Yes    COUNSELING:  Reviewed preventive health counseling, as reflected in patient instructions    BMI:   Estimated body mass index is 26.1 kg/m  as calculated from the following:    " "Height as of this encounter: 1.676 m (5' 6\").    Weight as of this encounter: 73.3 kg (161 lb 11.2 oz).       She reports that she quit smoking about 13 years ago. Her smoking use included cigarettes. She has a 50.00 pack-year smoking history. She has never been exposed to tobacco smoke. She has never used smokeless tobacco.    Appropriate preventive services were discussed with this patient, including applicable screening as appropriate for fall prevention, nutrition, physical activity, Tobacco-use cessation, weight loss and cognition.  Checklist reviewing preventive services available has been given to the patient.    Reviewed patients plan of care and provided an AVS. The Basic Care Plan (routine screening as documented in Health Maintenance) for Kahlil meets the Care Plan requirement. This Care Plan has been established and reviewed with the Patient.          Alva Kaiser PA-C  [facility]    Identified Health Risks:  I have reviewed Opioid Use Disorder and Substance Use Disorder risk factors and made any needed referrals. The patient was counseled and encouraged to consider modifying their diet and eating habits. She was provided with information on recommended healthy diet options.  The patient was provided with written information regarding signs of hearing loss.  "

## 2023-11-22 ENCOUNTER — PATIENT OUTREACH (OUTPATIENT)
Dept: FAMILY MEDICINE | Facility: CLINIC | Age: 78
End: 2023-11-22
Payer: COMMERCIAL

## 2023-11-22 NOTE — TELEPHONE ENCOUNTER
SB3/5 PAL welcome letter sent     DORA Cordero (Patient Advocate Liaison)  [facility]  [phone]

## 2023-11-22 NOTE — LETTER
Duy Guillory,     Thank you for choosing [facility] for your health care needs.     [facility] is transforming primary care  At [facility], we re dedicated to constantly improve how we serve the health care needs of our patients and communities. We re currently making changes to the way we deliver care.     Changes you ll notice include:  An emphasis on building a relationship with a primary care provider  Access to a PAL (patient advocate and liaison) to help guide you with your care needs  Appointment lengths tailored to your specific needs and greater access to a care team to help you and your provider improve and maintain your health and well-being  Improved online access to your care team    Benefits of a primary care provider  If you don t have a designated primary care provider, we encourage you to get to know our care team online and find a provider you d like to see. Most of our providers have a short video on their online provider page. Visit [email or web address] to explore our providers and locations.    Benefits of having a primary care provider include:    They get to know you - your health history, family history and goals, making it easier to make a health plan together.   You get to know them - making health-related conversations and decisions easier    Primary care doctors help you when you re sick or hurt - but also focus on keeping you healthy with preventive care and screenings.    A doctor who sees you regularly is more likely to notice changes in your health.   You ll be connected to a broad care team who partners with your provider to support you.    Patient Advocate Liaison (PAL)   To help make sure you get the right care, at the right time, we include PALs, or Patient Advocate Liaisons, as part of your care team. Your PAL will be your first line of contact. They ll advocate for your needs and help you navigate our services, connecting you with care team members and community  resources to ensure your care is well coordinated. You may be introduced to a PAL in an upcoming visit.     Expanded care team access with tailored appointment lengths  Depending on your health care needs, you may have longer or shorter appointments and see additional care team providers - including Medication Therapy Management (MTM) pharmacists, diabetes educators, behavioral health clinicians, or social workers. At times, they may be included in your visit with your provider, or you may see them individually.     Online access to your health care records and care team  [employer] is our online tool that makes it easy to see your health care information and communicate with your care team.     [employer] allows you to:   View your health maintenance plan so you know when you re due for a preventive screening  Send secure messages to your care team  View your health history and visit summaries   Schedule appointments   Complete questionnaires and eCheck-in before appointments    Get care from your provider with an e-visit    View and pay your bill     Sign up at [email or web address]. Once you have an account, you also can download the mobile [email or web address].     Connecting to fast and convenient care  When you need fast, convenient care - consider one of the following options:     Video Visit: A convenient care option for visiting with your provider out of the comfort of your own home. Most of the things you come to the clinic to address with your provider can now be done virtually through a video. This includes your chronic medication follow up, questions or concerns you may have, and even your annual Medicare Wellness Visit.     Phone Visit: Another convenient option for follow up of common problems that may require a more in-depth discussion with your provider.     E-visit: When you need acute care quickly, or have a quick question about your medication, an E-visit is completed through [employer] and your provider will respond  within one business day.        Take care,     Jessica WEBB RN   PAL (Patient Advocate Liaison)  [facility]  [phone]

## 2023-12-15 DIAGNOSIS — M54.16 SPINAL STENOSIS OF LUMBAR REGION WITH RADICULOPATHY: ICD-10-CM

## 2023-12-15 DIAGNOSIS — M48.061 SPINAL STENOSIS OF LUMBAR REGION WITH RADICULOPATHY: ICD-10-CM

## 2023-12-15 DIAGNOSIS — G89.29 OTHER CHRONIC PAIN: ICD-10-CM

## 2023-12-15 NOTE — TELEPHONE ENCOUNTER
Medication Question or Refill Patient will be out on Monday, 12/18 Please expedite. Thank you!        What medication are you calling about (include dose and sig)?: [id number]    Preferred Pharmacy:  [id number] - [place] - [address]  [address] [address] [place] [address]  Phone: [phone] Fax: [phone]      Controlled Substance Agreement on file:   CSA -- Patient Level:     [Media Unavailable] Controlled Substance Agreement - Opioid - Scan on 11/20/2023  1:17 PM: Controlled Substance Agreement - Opioid   [Media Unavailable] Controlled Substance Agreement - Opioid - Scan on 6/6/2023  1:45 PM   [Media Unavailable] Controlled Substance Agreement - Opioid - Scan on 6/12/2022  2:21 PM   [Media Unavailable] Controlled Substance Agreement - Opioid - Scan on 4/30/2021 12:45 PM   [Media Unavailable] Controlled Substance Agreement - Opioid - Scan on 2/7/2020  7:19 AM       Who prescribed the medication?: Alva Kaiser    Do you need a refill? Yes    When did you use the medication last? Last night     Patient offered an appointment? No    Do you have any questions or concerns?  No      Could we send this information to you in [facility] or would you prefer to receive a phone call?:   Patient would prefer a phone call   Okay to leave a detailed message?: Yes at Cell number on file:    Telephone Information:   Mobile [phone]

## 2023-12-18 RX ORDER — HYDROCODONE BITARTRATE AND ACETAMINOPHEN 5; 325 MG/1; MG/1
1 TABLET ORAL EVERY 8 HOURS PRN
Qty: 90 TABLET | Refills: 0 | Status: SHIPPED | OUTPATIENT
Start: 2023-12-18 | End: 2024-01-16

## 2023-12-18 NOTE — TELEPHONE ENCOUNTER
Patient is out of medication, please review request.     Thank you!     DORA Ga on 12/18/2023 at 12:33 PM

## 2024-01-16 DIAGNOSIS — M54.16 SPINAL STENOSIS OF LUMBAR REGION WITH RADICULOPATHY: ICD-10-CM

## 2024-01-16 DIAGNOSIS — G89.29 OTHER CHRONIC PAIN: ICD-10-CM

## 2024-01-16 DIAGNOSIS — M48.061 SPINAL STENOSIS OF LUMBAR REGION WITH RADICULOPATHY: ICD-10-CM

## 2024-01-16 RX ORDER — HYDROCODONE BITARTRATE AND ACETAMINOPHEN 5; 325 MG/1; MG/1
1 TABLET ORAL EVERY 8 HOURS PRN
Qty: 90 TABLET | Refills: 0 | Status: SHIPPED | OUTPATIENT
Start: 2024-01-16 | End: 2024-02-08

## 2024-01-16 NOTE — TELEPHONE ENCOUNTER
Received call back from patient, requesting refill for [place]. Routing refill request to provider for review/approval because:  Drug not on the [facility] refill protocol     Gideon YEE RN 1/16/2024 at 11:02 AM

## 2024-01-16 NOTE — TELEPHONE ENCOUNTER
Refilled medication. Patient has appointment scheduled next month for follow up.     Alva Kaiser PA-C on 1/16/2024 at 1:23 PM

## 2024-02-08 ENCOUNTER — VIRTUAL VISIT (OUTPATIENT)
Dept: FAMILY MEDICINE | Facility: CLINIC | Age: 79
End: 2024-02-08
Payer: COMMERCIAL

## 2024-02-08 DIAGNOSIS — G89.29 OTHER CHRONIC PAIN: ICD-10-CM

## 2024-02-08 DIAGNOSIS — M54.16 SPINAL STENOSIS OF LUMBAR REGION WITH RADICULOPATHY: ICD-10-CM

## 2024-02-08 DIAGNOSIS — M48.061 SPINAL STENOSIS OF LUMBAR REGION WITH RADICULOPATHY: ICD-10-CM

## 2024-02-08 PROCEDURE — 99442 PR PHYSICIAN TELEPHONE EVALUATION 11-20 MIN: CPT | Mod: 93

## 2024-02-08 RX ORDER — HYDROCODONE BITARTRATE AND ACETAMINOPHEN 5; 325 MG/1; MG/1
1 TABLET ORAL EVERY 8 HOURS PRN
Qty: 90 TABLET | Refills: 0 | Status: SHIPPED | OUTPATIENT
Start: 2024-02-15 | End: 2024-03-18

## 2024-02-08 NOTE — PROGRESS NOTES
"Kahlil is a 78 year old who is being evaluated via a billable telephone visit.      What phone number would you like to be contacted at? [phone]  How would you like to obtain your AVS? Alison  Distant Location (provider location):  Off-site    Assessment & Plan     (G89.29) Other chronic pain  (M48.061,  M54.16) Spinal stenosis of lumbar region with radiculopathy  On Norco for chronic pain. Has been on the same dose for \"a long time\". Has continued to make pain tolerable. Current regimen in two Norco at night for sleep and then 0.5-1 tablet during the day for pain. No recent falls, injuries or trauma. Previously discussed that if pain needs increase would recommend she see pain management and she was in agreement with this plan. For now doing well on current Norco prescription. Follow up in person in May as scheduled.   Plan: HYDROcodone-acetaminophen (NORCO) 5-325 MG         tablet          BMI  Estimated body mass index is 26.1 kg/m  as calculated from the following:    Height as of 11/20/23: 1.676 m (5' 6\").    Weight as of 11/20/23: 73.3 kg (161 lb 11.2 oz).       Follow up in person in May as scheduled.     Subjective   Kahlil is a 78 year old, presenting for the following health issues:  Back Pain        2/8/2024     9:50 AM   Additional Questions   Roomed by Pita Lopez     History of Present Illness       Back Pain:  She presents for follow up of back pain. Patient's back pain is a chronic problem.  Location of back pain:  Right lower back, left lower back and right hip  Description of back pain: dull ache, gnawing and sharp  Back pain spreads: right buttocks, right thigh and right knee    Since patient first noticed back pain, pain is: always present, but gets better and worse  Does back pain interfere with her job:  Not applicable       She eats 2-3 servings of fruits and vegetables daily.She consumes 0 sweetened beverage(s) daily.She exercises with enough effort to increase her heart rate 10 to " "19 minutes per day.  She exercises with enough effort to increase her heart rate 3 or less days per week.   She is taking medications regularly.     No injuries or trauma that she knows of, but has had acute on chronic exacerbation of her pain. This will happen occasionally. She is currently using Norco two tablets at night and typically       Review of Systems  Constitutional, HEENT, cardiovascular, pulmonary, gi and gu systems are negative, except as otherwise noted.         Objective    Vitals - Patient Reported  Weight (Patient Reported): 70.3 kg (155 lb)  Height (Patient Reported): 167.6 cm (5' 6\")  BMI (Based on Pt Reported Ht/Wt): 25.02    Vitals:  No vitals were obtained today due to virtual visit.    Physical Exam   General: Alert and no distress //Respiratory: No audible wheeze, cough, or shortness of breath // Psychiatric:  Appropriate affect, tone, and pace of words      Phone call duration: 15 minutes    Signed Electronically by: Alva Kaiser PA-C  "

## 2024-02-16 ENCOUNTER — TELEPHONE (OUTPATIENT)
Dept: FAMILY MEDICINE | Facility: CLINIC | Age: 79
End: 2024-02-16
Payer: COMMERCIAL

## 2024-02-16 NOTE — TELEPHONE ENCOUNTER
Pt calls, confirmed oxycodone sent for oxycodone on 2/8/24, pt will follow up with [facility], confirmed receipt  Reina Pena, RN, BSN  [facility]

## 2024-03-18 ENCOUNTER — TELEPHONE (OUTPATIENT)
Dept: FAMILY MEDICINE | Facility: CLINIC | Age: 79
End: 2024-03-18
Payer: COMMERCIAL

## 2024-03-18 DIAGNOSIS — M48.061 SPINAL STENOSIS OF LUMBAR REGION WITH RADICULOPATHY: ICD-10-CM

## 2024-03-18 DIAGNOSIS — M54.16 SPINAL STENOSIS OF LUMBAR REGION WITH RADICULOPATHY: ICD-10-CM

## 2024-03-18 DIAGNOSIS — G89.29 OTHER CHRONIC PAIN: ICD-10-CM

## 2024-03-18 RX ORDER — HYDROCODONE BITARTRATE AND ACETAMINOPHEN 5; 325 MG/1; MG/1
1 TABLET ORAL EVERY 8 HOURS PRN
Qty: 90 TABLET | Refills: 0 | Status: SHIPPED | OUTPATIENT
Start: 2024-03-18 | End: 2024-04-17

## 2024-03-18 NOTE — TELEPHONE ENCOUNTER
Reason for Call:  Other prescription    Detailed comments: HYDROcodone-acetaminophen (NORCO) 5-325 MG tablet [32123]     Phone Number Patient can be reached at: Cell number on file:    Telephone Information:   Mobile [phone]       Best Time: ASAP    Can we leave a detailed message on this number? YES    Call taken on 3/18/2024 at 12:31 PM by Meme Moyer

## 2024-04-17 ENCOUNTER — TELEPHONE (OUTPATIENT)
Dept: FAMILY MEDICINE | Facility: CLINIC | Age: 79
End: 2024-04-17
Payer: COMMERCIAL

## 2024-04-17 DIAGNOSIS — M54.16 SPINAL STENOSIS OF LUMBAR REGION WITH RADICULOPATHY: ICD-10-CM

## 2024-04-17 DIAGNOSIS — M48.061 SPINAL STENOSIS OF LUMBAR REGION WITH RADICULOPATHY: ICD-10-CM

## 2024-04-17 DIAGNOSIS — G89.29 OTHER CHRONIC PAIN: ICD-10-CM

## 2024-04-17 RX ORDER — HYDROCODONE BITARTRATE AND ACETAMINOPHEN 5; 325 MG/1; MG/1
1 TABLET ORAL EVERY 8 HOURS PRN
Qty: 90 TABLET | Refills: 0 | Status: SHIPPED | OUTPATIENT
Start: 2024-04-17 | End: 2024-05-15

## 2024-04-17 NOTE — TELEPHONE ENCOUNTER
Reason for Call:  Other prescription    Detailed comments: refill on pain medication    Phone Number Patient can be reached at: Cell number on file:    Telephone Information:   Mobile [phone]       Best Time: anytime    Can we leave a detailed message on this number? YES    Call taken on 4/17/2024 at 9:49 AM by Ghazala Jo

## 2024-04-17 NOTE — TELEPHONE ENCOUNTER
Outgoing call to patient. I let her know Norco was sent to [facility] # [id number] - [place], [place] - [address] [address] [facility]

## 2024-05-15 ENCOUNTER — NURSE TRIAGE (OUTPATIENT)
Dept: FAMILY MEDICINE | Facility: CLINIC | Age: 79
End: 2024-05-15
Payer: COMMERCIAL

## 2024-05-15 DIAGNOSIS — M48.061 SPINAL STENOSIS OF LUMBAR REGION WITH RADICULOPATHY: ICD-10-CM

## 2024-05-15 DIAGNOSIS — G89.29 OTHER CHRONIC PAIN: ICD-10-CM

## 2024-05-15 DIAGNOSIS — M54.16 SPINAL STENOSIS OF LUMBAR REGION WITH RADICULOPATHY: ICD-10-CM

## 2024-05-15 RX ORDER — HYDROCODONE BITARTRATE AND ACETAMINOPHEN 5; 325 MG/1; MG/1
1 TABLET ORAL EVERY 8 HOURS PRN
Qty: 90 TABLET | Refills: 0 | Status: SHIPPED | OUTPATIENT
Start: 2024-05-15 | End: 2024-06-10

## 2024-05-15 NOTE — TELEPHONE ENCOUNTER
"Nurse Triage SBAR    Is this a 2nd Level Triage? YES, LICENSED PRACTITIONER REVIEW IS REQUIRED    Situation: Pt reports a rash on her right side behind her ear, scalp, above right breast, and neck.     Background: She has not been vaccinated against shingles. Chronic pain, hypothyroidism, CAD, PAD.    Assessment:   Located behind right ear, above right breast, between neck and shoulder on the right, scalp.   Rash noted to right side of head. Reports as \"red, warm, looks like hives, not true hives, not blisters.\"  States the rash appeared about 24-36 hours ago.  She states the rash initially itched, however no longer itches.   She reports mild pain and describes it as burning spasms.   She reports 5 different spots and that they are the size of a pencil eraser, about 1-2 inches, and slightly raised.   Denies fever or any other symptoms.       Protocol Recommended Disposition:   See in Office Today, See More Appropriate Protocol    Recommendation: Pt given care advice.     Scheduled pt on 5/16 with Joesph Padilla NP at 10:10 AM. -Pt verbalizes understanding and is agreeable with appointment time and date.     Per Joseph Padilla NP: Okay to be seen in clinic on 5/16 if symptoms have not started mpre than 72 hours prior to appointment on 5/16.          Does the patient meet one of the following criteria for ADS visit consideration? 16+ years old, with an [facility] PCP     TIP  Providers, please consider if this condition is appropriate for management at one of our Acute and Diagnostic Services sites.     If patient is a good candidate, please use dotphrase <dot>triageresponse and select Refer to ADS to document.    Called pt and notified her of Alva Kaiser PA-C below regarding refill of [place] being sent to pharmacy.     Pt verbalizes understanding and is agreeable with plan. She notes concern for a rash. See SBAR above.     Jessica WEBB RN   PAL (Patient Advocate Liaison)  [facility]          "

## 2024-05-15 NOTE — TELEPHONE ENCOUNTER
Medication Question or Refill    Contacts         Type Contact Phone/Fax    05/15/2024 02:24 PM CDT Phone (Incoming) RudyjermaineKahlil (Self) [phone] (M)            What medication are you calling about (include dose and sig)?: Hydrocodone 5MG    Preferred Pharmacy:       [id number] [place], [place] - [address]  [address]  [facility] [address]  Phone: [phone] Fax: [phone]      Controlled Substance Agreement on file:   CSA -- Patient Level:     [Media Unavailable] Controlled Substance Agreement - Opioid - Scan on 11/20/2023  1:17 PM: Controlled Substance Agreement - Opioid   [Media Unavailable] Controlled Substance Agreement - Opioid - Scan on 6/6/2023  1:45 PM   [Media Unavailable] Controlled Substance Agreement - Opioid - Scan on 6/12/2022  2:21 PM   [Media Unavailable] Controlled Substance Agreement - Opioid - Scan on 4/30/2021 12:45 PM   [Media Unavailable] Controlled Substance Agreement - Opioid - Scan on 2/7/2020  7:19 AM       Who prescribed the medication?: Alva Kaiser    Do you need a refill? Yes    When did you use the medication last? 05/14/24    Patient offered an appointment? No    Do you have any questions or concerns?  No      Could we send this information to you in [facility] or would you prefer to receive a phone call?:   Patient would prefer a phone call   Okay to leave a detailed message?: Yes at Home number on file [phone] (home)

## 2024-05-15 NOTE — TELEPHONE ENCOUNTER
Reason for Disposition    Shingles suspected (i.e., painful rash, multiple small blisters grouped together in one area of body; dermatomal distribution)    Shingles rash (matches SYMPTOMS) and onset < 72 hours ago (3 days)    Additional Information    Negative: Difficult to awaken or acting confused (e.g., disoriented, slurred speech)    Negative: Sounds like a life-threatening emergency to the triager    Negative: Localized rash and doesn't match the SYMPTOMS of shingles    Negative: Back pain and doesn't match the SYMPTOMS of shingles    Negative: Shingles Vaccine (Recombinant Zoster Vaccine; RZV; Shingrix), questions about    Negative: Shingles rash of face and eye pain or blurred vision    Negative: Shingles rash on the eyelid or tip of the nose    Negative: Shingles rash and spots start appearing other places on body    Negative: Patient sounds very sick or weak to the triager    Negative: Shingles rash (matches SYMPTOMS) and weak immune system (e.g., HIV positive, cancer chemotherapy, chronic steroid treatment, splenectomy) and NOT taking antiviral medication    Negative: Shingles rash of face and facial weakness    Negative: Shingles rash of face or ear and earache or ringing in the ear    Negative: Fever > 100.4 F  (38.0 C)    Negative: SEVERE pain (e.g., excruciating)    Negative: Sounds like a life-threatening emergency to the triager    Negative: Athlete's Foot suspected (i.e., itchy rash between the toes)    Negative: Insect bite(s) suspected    Negative: Jock Itch suspected (i.e., itchy rash on inner thighs near genital area)    Negative: Localized lump (or swelling) without redness or rash    Negative: MPOX SUSPECTED (e.g., direct skin contact such as sex, recent travel to [place] or [place]) and any SYMPTOMS OF MPOX (e.g., rash, fever, muscle aches, or swollen lymph nodes)    Negative: At risk for Mpox (men-who-have-sex-with-men) and possible exposure (e.g., multiple sex partners in past 21 days)  "and ANY SYMPTOMS OF MPOX (e.g., rash, fever, muscle aches, or swollen lymph nodes)    Negative: Poison ivy, oak, or sumac rash suspected (e.g., itchy rash after contact with poison ivy)    Negative: Rash of female genital area (e.g., labia, vagina, vulva)    Negative: Rash of male genital area (e.g., penis, scrotum)    Negative: Redness of immunization site    Answer Assessment - Initial Assessment Questions  1. APPEARANCE of RASH: \"Describe the rash.\"    Red, warm, looks like hives, not true hives, not blisters.   2. LOCATION: \"Where is the rash located?\"    Behind right ear, above right breast, between neck and shoulder on the right, scalp.   3. ONSET: \"When did the rash start?\"       24-36 hours  4. ITCHING: \"Does the rash itch?\" If Yes, ask: \"How bad is the itch?\"  (Scale 1-10; or mild, moderate, severe)      Not currently  5. PAIN: \"Does the rash hurt?\" If Yes, ask: \"How bad is the pain?\"  (Scale 0-10; or none, mild, moderate, severe)     - NONE (0): no pain     - MILD (1-3): doesn't interfere with normal activities      - MODERATE (4-7): interferes with normal activities or awakens from sleep      - SEVERE (8-10): excruciating pain, unable to do any normal activities      Mild  6. OTHER SYMPTOMS: \"Do you have any other symptoms?\" (e.g., fever)      No  7. PREGNANCY: \"Is there any chance you are pregnant?\" \"When was your last menstrual period?\"      No    Answer Assessment - Initial Assessment Questions  1. APPEARANCE of RASH: \"Describe the rash.\"       Red, warm, looks like hives, not true hives, not blisters.   2. LOCATION: \"Where is the rash located?\"       Behind right ear, above right breast, between neck and shoulder on the right, scalp.   3. NUMBER: \"How many spots are there?\"       5  4. SIZE: \"How big are the spots?\" (Inches, centimeters or compare to size of a coin)       Size of pencil eraser, about 1-2 inches and raised  5. ONSET: \"When did the rash start?\"       Last 24-36 hours  6. ITCHING: \"Does " "the rash itch?\" If Yes, ask: \"How bad is the itch?\"  (Scale 0-10; or none, mild, moderate, severe)      Does not currently itch, however initially it did.   7. PAIN: \"Does the rash hurt?\" If Yes, ask: \"How bad is the pain?\"  (Scale 0-10; or none, mild, moderate, severe)     - NONE (0): no pain     - MILD (1-3): doesn't interfere with normal activities      - MODERATE (4-7): interferes with normal activities or awakens from sleep      - SEVERE (8-10): excruciating pain, unable to do any normal activities      Mild pain, shooting burning pain, like spasms.   8. OTHER SYMPTOMS: \"Do you have any other symptoms?\" (e.g., fever)      No  9. PREGNANCY: \"Is there any chance you are pregnant?\" \"When was your last menstrual period?\"      No    Protocols used: Rash or Redness - Wwlexvyaw-L-DF, Shingles (Zoster)-A-OH    "

## 2024-05-15 NOTE — TELEPHONE ENCOUNTER
Reviewed PDMP and appropriate for refill. Refill signed.     Alva Kaiser PA-C on 5/15/2024 at 3:48 PM

## 2024-05-16 ENCOUNTER — OFFICE VISIT (OUTPATIENT)
Dept: FAMILY MEDICINE | Facility: CLINIC | Age: 79
End: 2024-05-16
Payer: COMMERCIAL

## 2024-05-16 VITALS
RESPIRATION RATE: 15 BRPM | HEART RATE: 92 BPM | HEIGHT: 66 IN | BODY MASS INDEX: 25.55 KG/M2 | SYSTOLIC BLOOD PRESSURE: 130 MMHG | DIASTOLIC BLOOD PRESSURE: 86 MMHG | TEMPERATURE: 98 F | OXYGEN SATURATION: 97 % | WEIGHT: 159 LBS

## 2024-05-16 DIAGNOSIS — R21 RASH AND NONSPECIFIC SKIN ERUPTION: Primary | ICD-10-CM

## 2024-05-16 PROCEDURE — 99213 OFFICE O/P EST LOW 20 MIN: CPT

## 2024-05-16 RX ORDER — VALACYCLOVIR HYDROCHLORIDE 1 G/1
1000 TABLET, FILM COATED ORAL 3 TIMES DAILY
Qty: 21 TABLET | Refills: 0 | Status: SHIPPED | OUTPATIENT
Start: 2024-05-16 | End: 2024-08-29

## 2024-05-16 RX ORDER — LIDOCAINE/PRILOCAINE 2.5 %-2.5%
CREAM (GRAM) TOPICAL PRN
Qty: 5 G | Refills: 0 | Status: SHIPPED | OUTPATIENT
Start: 2024-05-16 | End: 2024-05-21

## 2024-05-16 ASSESSMENT — PAIN SCALES - GENERAL: PAINLEVEL: SEVERE PAIN (7)

## 2024-05-16 NOTE — PROGRESS NOTES
"    Assessment & Plan     (R21) Rash and nonspecific skin eruption  (primary encounter diagnosis)  Comment: will treat for shingles today given hx and exam. Valtrex ordered. Will provide EMLA cream to see if this is of any benefit w/ rash pain. Discussed medication risks and benefits of Valtrex and EMLA cream with patient in detail with patient verbal understanding. Discussed  following up if noting rash worsening or spreading towards eye, at that point would recommend UC/ED. Patient fully understands and is agreeable with plan of care, at this point patient will follow up as needed unless acute concerns arise in the meantime.  Plan: valACYclovir (VALTREX) 1000 mg tablet,         lidocaine-prilocaine (EMLA) 2.5-2.5 % external         cream      BMI  Estimated body mass index is 25.66 kg/m  as calculated from the following:    Height as of this encounter: 1.676 m (5' 6\").    Weight as of this encounter: 72.1 kg (159 lb).     Paulina Guilolry is a 78 year old, presenting for the following health issues:  No chief complaint on file.    History of Present Illness       Reason for visit:  New onset of rash, as previously described.  Symptom onset:  1-3 days ago  Symptoms include:  Raised, red, painful rash upper right chest, lower right jawline, behind right ear.  Symptom intensity:  Moderate  Symptom progression:  Staying the same  Had these symptoms before:  No  What makes it worse:  No  What makes it better:  No    She eats 0-1 servings of fruits and vegetables daily.She consumes 0 sweetened beverage(s) daily.She exercises with enough effort to increase her heart rate 10 to 19 minutes per day.  She exercises with enough effort to increase her heart rate 3 or less days per week.   She is taking medications regularly.    Rash    Onset/Duration: 5/14/24  Description  Location: behind right ear, above right breast, between neck and shoulder on the right, scalp.   Rash noted to right side of head.   Character: raised, " painful, red- sharp shooting pain that occurs  Itching: mild  Intensity:  moderate  Progression of Symptoms:  worsening  Accompanying signs and symptoms:   Fever: No  Body aches or joint pain: YES  Sore throat symptoms: YES- Right side  Recent cold symptoms: PT reports having headache   History:           Previous episodes of similar rash: None  New exposures:  None  Recent travel: No  Exposure to similar rash: No  Precipitating or alleviating factors: None  Therapies tried and outcome: hydrocortisone cream -  not effective    Review of Systems  Constitutional, HEENT, cardiovascular, pulmonary, GI, , skin systems are negative, except as otherwise noted.      Objective    LMP  (LMP Unknown)   There is no height or weight on file to calculate BMI.  Physical Exam  Vitals and nursing note reviewed.   Constitutional:       General: She is not in acute distress.     Appearance: Normal appearance. She is not ill-appearing.   Cardiovascular:      Rate and Rhythm: Normal rate.   Pulmonary:      Effort: Pulmonary effort is normal.   Skin:     General: Skin is warm and dry.      Findings: Rash present.      Comments: See picture for details.   Neurological:      Mental Status: She is alert.   Psychiatric:         Mood and Affect: Mood normal.         Behavior: Behavior normal.         Thought Content: Thought content normal.         Judgment: Judgment normal.         Rash behind right ear       Rash on right side of face       Rash Right chest        Signed Electronically by: MALIK Swenson CNP

## 2024-05-16 NOTE — PATIENT INSTRUCTIONS
Valaciclovir for shingles    Emla cream to see if this can help w/ rash pain.     Follow up if worsening

## 2024-05-21 ENCOUNTER — OFFICE VISIT (OUTPATIENT)
Dept: FAMILY MEDICINE | Facility: CLINIC | Age: 79
End: 2024-05-21
Payer: COMMERCIAL

## 2024-05-21 VITALS
TEMPERATURE: 98.6 F | HEIGHT: 66 IN | OXYGEN SATURATION: 94 % | SYSTOLIC BLOOD PRESSURE: 124 MMHG | DIASTOLIC BLOOD PRESSURE: 80 MMHG | WEIGHT: 159 LBS | HEART RATE: 98 BPM | BODY MASS INDEX: 25.55 KG/M2 | RESPIRATION RATE: 20 BRPM

## 2024-05-21 DIAGNOSIS — R21 RASH AND NONSPECIFIC SKIN ERUPTION: ICD-10-CM

## 2024-05-21 DIAGNOSIS — B02.9 HERPES ZOSTER WITHOUT COMPLICATION: Primary | ICD-10-CM

## 2024-05-21 DIAGNOSIS — G89.29 OTHER CHRONIC PAIN: ICD-10-CM

## 2024-05-21 LAB
CREAT UR-MCNC: 11 MG/DL
HOLD SPECIMEN: NORMAL

## 2024-05-21 PROCEDURE — 99213 OFFICE O/P EST LOW 20 MIN: CPT

## 2024-05-21 PROCEDURE — G0481 DRUG TEST DEF 8-14 CLASSES: HCPCS

## 2024-05-21 RX ORDER — LIDOCAINE/PRILOCAINE 2.5 %-2.5%
CREAM (GRAM) TOPICAL PRN
Qty: 5 G | Refills: 0 | Status: SHIPPED | OUTPATIENT
Start: 2024-05-21 | End: 2024-08-29

## 2024-05-21 RX ORDER — RESPIRATORY SYNCYTIAL VIRUS VACCINE 120MCG/0.5
0.5 KIT INTRAMUSCULAR ONCE
Qty: 1 EACH | Refills: 0 | Status: CANCELLED | OUTPATIENT
Start: 2024-05-21 | End: 2024-05-21

## 2024-05-21 NOTE — PROGRESS NOTES
"  Assessment & Plan     (B02.9) Herpes zoster without complication  (primary encounter diagnosis)  (R21) Rash and nonspecific skin eruption  Herpes rash consistent with shingles. Currently on antiviral and feels this is starting to help with symptoms. Continues to have discomfort. Using topical lidocaine with some relief. Wondering if able to use pain medication, additional 1-2 tablets a day over the next several days for severe pain. Okay to increase temporarily but she is aware that this increased dose should only be for a few days to a week at most and then needs to go back to 3 tablets/day. Urine drug screening obtained today as well for chronic pain medication use monitoring.   Plan: lidocaine-prilocaine (EMLA) 2.5-2.5 % external         cream    BMI  Estimated body mass index is 25.66 kg/m  as calculated from the following:    Height as of this encounter: 1.676 m (5' 6\").    Weight as of this encounter: 72.1 kg (159 lb).     Follow up as needed if symptoms worsen or fail to improve.    Paulina Guillory is a 78 year old, presenting for the following health issues:  Follow Up        5/21/2024    11:09 AM   Additional Questions   Roomed by Pita Lopez     History of Present Illness       Reason for visit:  New onset of rash, as previously described.  Symptom onset:  1-3 days ago  Symptoms include:  Raised, red, painful rash upper right chest, lower right jawline, behind right ear.  Symptom intensity:  Moderate  Symptom progression:  Staying the same  Had these symptoms before:  No  What makes it worse:  No  What makes it better:  No    She eats 0-1 servings of fruits and vegetables daily.She consumes 0 sweetened beverage(s) daily.She exercises with enough effort to increase her heart rate 10 to 19 minutes per day.  She exercises with enough effort to increase her heart rate 3 or less days per week.   She is taking medications regularly.       HPI  General Follow Up  Concern: Shingles  Problem started: " "follow up from 5/16/2024  Progression of symptoms: better  Description: patient is still taking medication. She is still having symptoms. She still has pain. Behind right ear and scalp is a little itchy.       Review of Systems  Constitutional, HEENT, cardiovascular, pulmonary, gi and gu systems are negative, except as otherwise noted.        Objective    /80 (BP Location: Right arm, Patient Position: Sitting, Cuff Size: Adult Regular)   Pulse 98   Temp 98.6  F (37  C) (Oral)   Resp 20   Ht 1.676 m (5' 6\")   Wt 72.1 kg (159 lb)   LMP  (LMP Unknown)   SpO2 94%   BMI 25.66 kg/m    Body mass index is 25.66 kg/m .  Physical Exam   GENERAL: alert and no distress  RESP: no respiratory distress, no audible wheezes  SKIN: lesions noted on and behind right ear, right cheek and on right chest. Erythematous and scaly.       Signed Electronically by: Alva Kaiser PA-C    "

## 2024-05-23 LAB
DHC UR CFM-MCNC: 86 NG/ML
DHC/CREAT UR: 782 NG/MG {CREAT}
HYDROCODONE UR CFM-MCNC: 130 NG/ML
HYDROCODONE/CREAT UR: 1182 NG/MG {CREAT}
HYDROMORPHONE UR CFM-MCNC: 55 NG/ML
HYDROMORPHONE/CREAT UR: 500 NG/MG {CREAT}

## 2024-06-09 ENCOUNTER — MYC MEDICAL ADVICE (OUTPATIENT)
Dept: FAMILY MEDICINE | Facility: CLINIC | Age: 79
End: 2024-06-09
Payer: COMMERCIAL

## 2024-06-09 DIAGNOSIS — G89.29 OTHER CHRONIC PAIN: ICD-10-CM

## 2024-06-09 DIAGNOSIS — M54.16 SPINAL STENOSIS OF LUMBAR REGION WITH RADICULOPATHY: ICD-10-CM

## 2024-06-09 DIAGNOSIS — M48.061 SPINAL STENOSIS OF LUMBAR REGION WITH RADICULOPATHY: ICD-10-CM

## 2024-06-10 RX ORDER — HYDROCODONE BITARTRATE AND ACETAMINOPHEN 5; 325 MG/1; MG/1
1 TABLET ORAL EVERY 8 HOURS PRN
Qty: 90 TABLET | Refills: 0 | Status: SHIPPED | OUTPATIENT
Start: 2024-06-13 | End: 2024-07-12

## 2024-06-10 NOTE — TELEPHONE ENCOUNTER
Reviewed PDMP and patient was recently seen. Currently  experiencing Shingles outbreak so did okay slight temporary increase in Norco use. Back to typically daily use. Refilling two days early-okay per provider.     Alva Kaiser PA-C on 6/10/2024 at 3:20 PM

## 2024-06-13 DIAGNOSIS — R60.0 LOWER EXTREMITY EDEMA: ICD-10-CM

## 2024-06-13 DIAGNOSIS — E03.9 HYPOTHYROIDISM, UNSPECIFIED TYPE: ICD-10-CM

## 2024-06-13 RX ORDER — FUROSEMIDE 20 MG
20 TABLET ORAL 2 TIMES DAILY
Qty: 180 TABLET | Refills: 3 | Status: SHIPPED | OUTPATIENT
Start: 2024-06-13

## 2024-06-13 NOTE — TELEPHONE ENCOUNTER
Medication Question or Refill    Contacts         Type Contact Phone/Fax    06/13/2024 02:22 PM CDT Phone (Incoming) Kahlil Caputo (Self) [phone] (M)            What medication are you calling about (include dose and sig)?: furosemide (LASIX) 20 MG tablet     Preferred Pharmacy:       [id number] [place], [place] - [address]  [address] [address]   [facility] [address]  Phone: [phone] Fax: [phone]      Controlled Substance Agreement on file:   CSA -- Patient Level:     [Media Unavailable] Controlled Substance Agreement - Opioid - Scan on 11/20/2023  1:17 PM: Controlled Substance Agreement - Opioid   [Media Unavailable] Controlled Substance Agreement - Opioid - Scan on 6/6/2023  1:45 PM   [Media Unavailable] Controlled Substance Agreement - Opioid - Scan on 6/12/2022  2:21 PM   [Media Unavailable] Controlled Substance Agreement - Opioid - Scan on 4/30/2021 12:45 PM   [Media Unavailable] Controlled Substance Agreement - Opioid - Scan on 2/7/2020  7:19 AM       Who prescribed the medication?: PCP    Do you need a refill? Yes    When did you use the medication last? TODAY    Patient offered an appointment? No    Do you have any questions or concerns?  Yes: [employer] TOLD PT. THEY HAVE FAXED REFILL REQUEST 4 TIMES. HAVEN'T HEARD ANYTHING BACK. ONLY HAS ENOUGH MEDICATION FOR TOMORROW.       Could we send this information to you in [facility] or would you prefer to receive a phone call?:   Patient would prefer a phone call   Okay to leave a detailed message?: Yes at Cell number on file:    Telephone Information:   Mobile [phone]

## 2024-06-14 RX ORDER — LEVOTHYROXINE SODIUM 150 UG/1
150 TABLET ORAL DAILY
Qty: 90 TABLET | Refills: 0 | Status: SHIPPED | OUTPATIENT
Start: 2024-06-14 | End: 2024-08-14

## 2024-06-25 NOTE — TELEPHONE ENCOUNTER
Haase, Susan Rachele APRN CNP     Routing refill request to provider for review/approval because:  Drug not on the [facility] refill protocol   Last office visit 11/16/2021     Pending Prescriptions:                       Disp   Refills    HYDROcodone-acetaminophen (NORCO) 5-325 M*90 tab*0            Sig: Take 1-2 tablets by mouth every 8 hours as needed           for pain or severe pain Max of 3 tablets per day    Sofy Dial, Registered Nurse, PAL (Patient Advocate Liason)   [facility]   [phone]      Subjective     Luis Medina is a 9 m.o. male who presents for Nasal Congestion (Runny nose and decreased eating/ Here with Mom).  Today he is accompanied by accompanied by mother.     HPI  Nasal congestion and runny nose for the last 4 days  Wet, congested cough  Patient attends   Restless last night  Drooling - teething  No fever but felt warm  Drinking well and eating ok  Good urine output    Review of Systems  ROS negative for General, Eyes, ENT, Cardiovascular, GI, , Ortho, Derm, Neuro, Psych, Lymph unless noted in the HPI above.     Objective   Temp 36.8 °C (98.2 °F) (Axillary)   Wt 7.258 kg Comment: 16lb  BSA: There is no height or weight on file to calculate BSA.  Growth percentiles: No height on file for this encounter. 2 %ile (Z= -2.02) based on WHO (Boys, 0-2 years) weight-for-age data using vitals from 6/25/2024.     Physical Exam  General: Well-developed, well-nourished, alert and oriented, no acute distress  Eyes: Normal sclera, PERRLA, EOMI  ENT:  Throat is mildly red but not beefy, no exudate, there is some nasal congestion.  Bilateral Tms have a purulent fluid level, are bulging and erythematous with inflammation  Cardiac: Regular rate and rhythm, normal S1/S2, no murmurs.  Pulmonary: Clear to auscultation bilaterally, no work of breathing.  GI: Soft nondistended nontender abdomen without rebound or guarding.  Skin: No rashes  Neuro: Symmetric face, no ataxia, grossly normal strength.  Lymph: No lymphadenopathy    Assessment/Plan   Diagnoses and all orders for this visit:  Acute bilateral otitis media  -     amoxicillin-pot clavulanate (Augmentin ES-600) 600-42.9 mg/5 mL suspension; Take 2.5 mL (300 mg) by mouth 2 times a day for 10 days.      Mary Jane Gauthier, YECENIA-CNP

## 2024-07-12 DIAGNOSIS — M54.16 SPINAL STENOSIS OF LUMBAR REGION WITH RADICULOPATHY: ICD-10-CM

## 2024-07-12 DIAGNOSIS — G89.29 OTHER CHRONIC PAIN: ICD-10-CM

## 2024-07-12 DIAGNOSIS — M48.061 SPINAL STENOSIS OF LUMBAR REGION WITH RADICULOPATHY: ICD-10-CM

## 2024-07-15 RX ORDER — HYDROCODONE BITARTRATE AND ACETAMINOPHEN 5; 325 MG/1; MG/1
1 TABLET ORAL EVERY 8 HOURS PRN
Qty: 90 TABLET | Refills: 0 | Status: SHIPPED | OUTPATIENT
Start: 2024-07-15 | End: 2024-08-14

## 2024-08-14 DIAGNOSIS — M48.061 SPINAL STENOSIS OF LUMBAR REGION WITH RADICULOPATHY: ICD-10-CM

## 2024-08-14 DIAGNOSIS — M54.16 SPINAL STENOSIS OF LUMBAR REGION WITH RADICULOPATHY: ICD-10-CM

## 2024-08-14 DIAGNOSIS — G89.29 OTHER CHRONIC PAIN: ICD-10-CM

## 2024-08-14 DIAGNOSIS — E03.9 HYPOTHYROIDISM, UNSPECIFIED TYPE: ICD-10-CM

## 2024-08-14 NOTE — TELEPHONE ENCOUNTER
Medication Question or Refill    Contacts       Contact Date/Time Type Contact Phone/Fax    08/14/2024 11:21 AM CDT Phone (Incoming) Kahlil Caputo (Self) [phone] (M)            What medication are you calling about (include dose and sig)?: levothyroxine, hydrocodone-acetaminophen    Preferred Pharmacy:     [id number]  [place], [place] - [address]  [address] [place] [address]  Phone: [phone] Fax: [phone]      Controlled Substance Agreement on file:   CSA -- Patient Level:     [Media Unavailable] Controlled Substance Agreement - Opioid - Scan on 11/20/2023  1:17 PM: Controlled Substance Agreement - Opioid   [Media Unavailable] Controlled Substance Agreement - Opioid - Scan on 6/6/2023  1:45 PM   [Media Unavailable] Controlled Substance Agreement - Opioid - Scan on 6/12/2022  2:21 PM   [Media Unavailable] Controlled Substance Agreement - Opioid - Scan on 4/30/2021 12:45 PM   [Media Unavailable] Controlled Substance Agreement - Opioid - Scan on 2/7/2020  7:19 AM       Who prescribed the medication?: Alva Kaiser    Do you need a refill? Yes    When did you use the medication last?     Patient offered an appointment? No    Do you have any questions or concerns?  No      Could we send this information to you in [facility] or would you prefer to receive a phone call?:   Patient would prefer a phone call   Okay to leave a detailed message?: Yes at Cell number on file:    Telephone Information:   Mobile [phone]

## 2024-08-16 RX ORDER — HYDROCODONE BITARTRATE AND ACETAMINOPHEN 5; 325 MG/1; MG/1
1 TABLET ORAL EVERY 8 HOURS PRN
Qty: 90 TABLET | Refills: 0 | Status: SHIPPED | OUTPATIENT
Start: 2024-08-16 | End: 2024-08-29

## 2024-08-16 RX ORDER — LEVOTHYROXINE SODIUM 150 UG/1
150 TABLET ORAL DAILY
Qty: 90 TABLET | Refills: 0 | Status: SHIPPED | OUTPATIENT
Start: 2024-08-16

## 2024-08-16 NOTE — TELEPHONE ENCOUNTER
reviewed. Refilled.    Honorio Mota PA-C on 8/16/2024 at 10:17 AM (covering for Alva Kaiser PA-C)

## 2024-08-29 ENCOUNTER — VIRTUAL VISIT (OUTPATIENT)
Dept: FAMILY MEDICINE | Facility: CLINIC | Age: 79
End: 2024-08-29
Payer: COMMERCIAL

## 2024-08-29 DIAGNOSIS — G89.29 OTHER CHRONIC PAIN: ICD-10-CM

## 2024-08-29 DIAGNOSIS — M48.061 SPINAL STENOSIS OF LUMBAR REGION WITH RADICULOPATHY: ICD-10-CM

## 2024-08-29 DIAGNOSIS — M54.16 SPINAL STENOSIS OF LUMBAR REGION WITH RADICULOPATHY: ICD-10-CM

## 2024-08-29 PROCEDURE — 99442 PR PHYSICIAN TELEPHONE EVALUATION 11-20 MIN: CPT | Mod: 93

## 2024-08-29 RX ORDER — HYDROCODONE BITARTRATE AND ACETAMINOPHEN 5; 325 MG/1; MG/1
1 TABLET ORAL EVERY 8 HOURS PRN
Qty: 90 TABLET | Refills: 0 | Status: SHIPPED | OUTPATIENT
Start: 2024-09-13

## 2024-08-29 NOTE — PROGRESS NOTES
"Kahlil is a 79 year old who is being evaluated via a billable telephone visit.    What phone number would you like to be contacted at? [phone]  How would you like to obtain your AVS? Alison  Originating Location (pt. Location): Home  Distant Location (provider location):  On-site    Assessment & Plan     (G89.29) Other chronic pain  (M48.061,  M54.16) Spinal stenosis of lumbar region with radiculopathy  Continues to use Norco up to 3 tablets daily. She typically is using 3 tablets every day. Pain overall has been stable. No recent concerns. Shingles continues to improve-does still have occasional discomforts in the ear.   Plan: HYDROcodone-acetaminophen (NORCO) 5-325 MG         tablet    BMI  Estimated body mass index is 25.66 kg/m  as calculated from the following:    Height as of 5/21/24: 1.676 m (5' 6\").    Weight as of 5/21/24: 72.1 kg (159 lb).       Follow up in November for physical and chronic pain management; sooner for any acute concerns.    Subjective   Kahlil is a 79 year old, presenting for the following health issues:  Back Pain        8/29/2024     8:38 AM   Additional Questions   Roomed by Pita Lopez     History of Present Illness       Back Pain:  She presents for follow up of back pain. Patient's back pain is a chronic problem.  Location of back pain:  Right lower back, right upper back, right buttock and right hip  Description of back pain: dull ache, gnawing and sharp  Back pain spreads: right buttocks and right knee    Since patient first noticed back pain, pain is: always present, but gets better and worse  Does back pain interfere with her job:  Not applicable       She eats 2-3 servings of fruits and vegetables daily.She consumes 0 sweetened beverage(s) daily.She exercises with enough effort to increase her heart rate 9 or less minutes per day.  She exercises with enough effort to increase her heart rate 3 or less days per week.   She is taking medications regularly.    Continues to " have occasional pains with the shingles but overall it is improving. She does still have fatigue intermittently. Current ongoing pains are manageable. Otherwise has been doing well.     Continues to use Norco for chronic pain, no more than 3 tablets per day. Not taking extra.    Review of Systems  Constitutional, HEENT, cardiovascular, pulmonary, gi and gu systems are negative, except as otherwise noted.        Objective         Vitals:  No vitals were obtained today due to virtual visit.    Physical Exam   General: Alert and no distress //Respiratory: No audible wheeze, cough, or shortness of breath // Psychiatric:  Appropriate affect, tone, and pace of words      Phone call duration: 14 minutes    Signed Electronically by: Alva Kaiser PA-C

## 2024-10-12 ENCOUNTER — MYC MEDICAL ADVICE (OUTPATIENT)
Dept: FAMILY MEDICINE | Facility: CLINIC | Age: 79
End: 2024-10-12
Payer: COMMERCIAL

## 2024-10-12 DIAGNOSIS — G89.29 OTHER CHRONIC PAIN: ICD-10-CM

## 2024-10-12 DIAGNOSIS — M48.061 SPINAL STENOSIS OF LUMBAR REGION WITH RADICULOPATHY: ICD-10-CM

## 2024-10-12 DIAGNOSIS — M54.16 SPINAL STENOSIS OF LUMBAR REGION WITH RADICULOPATHY: ICD-10-CM

## 2024-10-14 RX ORDER — HYDROCODONE BITARTRATE AND ACETAMINOPHEN 5; 325 MG/1; MG/1
1 TABLET ORAL EVERY 8 HOURS PRN
Qty: 90 TABLET | Refills: 0 | Status: SHIPPED | OUTPATIENT
Start: 2024-10-14

## 2024-10-14 NOTE — TELEPHONE ENCOUNTER
Alva Kaiser PA-C- See pt's Filtr8 message.     Pended requested prescription. Please review and order, if appropriate.     Routed to PCP    Jessica WEBB RN   Clinic RN  [facility]

## 2024-10-14 NOTE — TELEPHONE ENCOUNTER
PDMP reviewed. Due for refill based on review. Refilled prescription. Has follow up next month for chronic pain.     Alva Kaiser PA-C on 10/14/2024 at 9:00 AM

## 2024-11-12 ENCOUNTER — MYC MEDICAL ADVICE (OUTPATIENT)
Dept: FAMILY MEDICINE | Facility: CLINIC | Age: 79
End: 2024-11-12
Payer: COMMERCIAL

## 2024-11-12 DIAGNOSIS — G89.29 OTHER CHRONIC PAIN: ICD-10-CM

## 2024-11-12 DIAGNOSIS — M48.061 SPINAL STENOSIS OF LUMBAR REGION WITH RADICULOPATHY: ICD-10-CM

## 2024-11-12 DIAGNOSIS — M54.16 SPINAL STENOSIS OF LUMBAR REGION WITH RADICULOPATHY: ICD-10-CM

## 2024-11-12 DIAGNOSIS — E03.9 HYPOTHYROIDISM, UNSPECIFIED TYPE: ICD-10-CM

## 2024-11-12 RX ORDER — LEVOTHYROXINE SODIUM 150 UG/1
150 TABLET ORAL DAILY
Qty: 90 TABLET | Refills: 0 | Status: SHIPPED | OUTPATIENT
Start: 2024-11-12

## 2024-11-15 RX ORDER — HYDROCODONE BITARTRATE AND ACETAMINOPHEN 5; 325 MG/1; MG/1
1 TABLET ORAL EVERY 8 HOURS PRN
Qty: 90 TABLET | Refills: 0 | Status: SHIPPED | OUTPATIENT
Start: 2024-11-15

## 2024-11-15 NOTE — TELEPHONE ENCOUNTER
Pt following up on requested Hydrocodone-acetaminophen refill request.  Hoping for refill today.    Michelle Kimble RN, BSN  [facility]

## 2024-11-23 DIAGNOSIS — K13.0 ANGULAR CHEILITIS: ICD-10-CM

## 2024-11-24 SDOH — HEALTH STABILITY: PHYSICAL HEALTH
ON AVERAGE, HOW MANY DAYS PER WEEK DO YOU ENGAGE IN MODERATE TO STRENUOUS EXERCISE (LIKE A BRISK WALK)?: PATIENT DECLINED

## 2024-11-24 SDOH — HEALTH STABILITY: PHYSICAL HEALTH: ON AVERAGE, HOW MANY MINUTES DO YOU ENGAGE IN EXERCISE AT THIS LEVEL?: PATIENT DECLINED

## 2024-11-24 ASSESSMENT — SOCIAL DETERMINANTS OF HEALTH (SDOH): HOW OFTEN DO YOU GET TOGETHER WITH FRIENDS OR RELATIVES?: TWICE A WEEK

## 2024-11-25 RX ORDER — NYSTATIN 100000 U/G
CREAM TOPICAL
Qty: 30 G | Refills: 0 | Status: SHIPPED | OUTPATIENT
Start: 2024-11-25 | End: 2024-11-26

## 2024-11-26 ENCOUNTER — OFFICE VISIT (OUTPATIENT)
Dept: FAMILY MEDICINE | Facility: CLINIC | Age: 79
End: 2024-11-26
Payer: COMMERCIAL

## 2024-11-26 VITALS
WEIGHT: 158 LBS | OXYGEN SATURATION: 96 % | BODY MASS INDEX: 25.39 KG/M2 | SYSTOLIC BLOOD PRESSURE: 120 MMHG | HEIGHT: 66 IN | HEART RATE: 97 BPM | DIASTOLIC BLOOD PRESSURE: 82 MMHG | TEMPERATURE: 98 F | RESPIRATION RATE: 18 BRPM

## 2024-11-26 DIAGNOSIS — M54.16 SPINAL STENOSIS OF LUMBAR REGION WITH RADICULOPATHY: ICD-10-CM

## 2024-11-26 DIAGNOSIS — Z00.00 ENCOUNTER FOR MEDICARE ANNUAL WELLNESS EXAM: Primary | ICD-10-CM

## 2024-11-26 DIAGNOSIS — B37.2 CANDIDAL INTERTRIGO: ICD-10-CM

## 2024-11-26 DIAGNOSIS — I47.29 PAROXYSMAL VENTRICULAR TACHYCARDIA (H): ICD-10-CM

## 2024-11-26 DIAGNOSIS — R60.0 LOWER EXTREMITY EDEMA: ICD-10-CM

## 2024-11-26 DIAGNOSIS — M46.1 SACROILIITIS (H): ICD-10-CM

## 2024-11-26 DIAGNOSIS — R91.8 PULMONARY NODULES: ICD-10-CM

## 2024-11-26 DIAGNOSIS — G89.29 OTHER CHRONIC PAIN: ICD-10-CM

## 2024-11-26 DIAGNOSIS — E03.9 HYPOTHYROIDISM, UNSPECIFIED TYPE: ICD-10-CM

## 2024-11-26 DIAGNOSIS — I73.9 PAD (PERIPHERAL ARTERY DISEASE) (H): ICD-10-CM

## 2024-11-26 DIAGNOSIS — E55.9 VITAMIN D DEFICIENCY: ICD-10-CM

## 2024-11-26 DIAGNOSIS — K13.0 ANGULAR CHEILITIS: ICD-10-CM

## 2024-11-26 DIAGNOSIS — R05.9 NEW ONSET COUGH: ICD-10-CM

## 2024-11-26 DIAGNOSIS — Z79.899 CONTROLLED SUBSTANCE AGREEMENT SIGNED: ICD-10-CM

## 2024-11-26 DIAGNOSIS — M48.061 SPINAL STENOSIS OF LUMBAR REGION WITH RADICULOPATHY: ICD-10-CM

## 2024-11-26 LAB
ANION GAP SERPL CALCULATED.3IONS-SCNC: 11 MMOL/L (ref 7–15)
BUN SERPL-MCNC: 17 MG/DL (ref 8–23)
CALCIUM SERPL-MCNC: 9.1 MG/DL (ref 8.8–10.4)
CHLORIDE SERPL-SCNC: 99 MMOL/L (ref 98–107)
CREAT SERPL-MCNC: 0.71 MG/DL (ref 0.51–0.95)
CREAT UR-MCNC: 49 MG/DL
EGFRCR SERPLBLD CKD-EPI 2021: 85 ML/MIN/1.73M2
GLUCOSE SERPL-MCNC: 94 MG/DL (ref 70–99)
HCO3 SERPL-SCNC: 26 MMOL/L (ref 22–29)
HGB BLD-MCNC: 13.6 G/DL (ref 11.7–15.7)
POTASSIUM SERPL-SCNC: 4.4 MMOL/L (ref 3.4–5.3)
SODIUM SERPL-SCNC: 136 MMOL/L (ref 135–145)
T4 FREE SERPL-MCNC: 1.63 NG/DL (ref 0.9–1.7)
TSH SERPL DL<=0.005 MIU/L-ACNC: 4.99 UIU/ML (ref 0.3–4.2)
VIT D+METAB SERPL-MCNC: 39 NG/ML (ref 20–50)

## 2024-11-26 PROCEDURE — 36415 COLL VENOUS BLD VENIPUNCTURE: CPT

## 2024-11-26 PROCEDURE — 80048 BASIC METABOLIC PNL TOTAL CA: CPT

## 2024-11-26 PROCEDURE — 99214 OFFICE O/P EST MOD 30 MIN: CPT | Mod: 25

## 2024-11-26 PROCEDURE — 85018 HEMOGLOBIN: CPT

## 2024-11-26 PROCEDURE — 82306 VITAMIN D 25 HYDROXY: CPT

## 2024-11-26 PROCEDURE — 84443 ASSAY THYROID STIM HORMONE: CPT

## 2024-11-26 PROCEDURE — G0439 PPPS, SUBSEQ VISIT: HCPCS

## 2024-11-26 PROCEDURE — 84439 ASSAY OF FREE THYROXINE: CPT

## 2024-11-26 RX ORDER — NYSTATIN 100000 U/G
CREAM TOPICAL
Qty: 30 G | Refills: 2 | Status: SHIPPED | OUTPATIENT
Start: 2024-11-26

## 2024-11-26 RX ORDER — LEVOTHYROXINE SODIUM 150 UG/1
150 TABLET ORAL DAILY
Qty: 90 TABLET | Refills: 3 | Status: SHIPPED | OUTPATIENT
Start: 2024-11-26

## 2024-11-26 RX ORDER — HYDROCODONE BITARTRATE AND ACETAMINOPHEN 5; 325 MG/1; MG/1
1 TABLET ORAL EVERY 8 HOURS PRN
Qty: 90 TABLET | Refills: 0 | Status: SHIPPED | OUTPATIENT
Start: 2024-12-13

## 2024-11-26 ASSESSMENT — ANXIETY QUESTIONNAIRES
2. NOT BEING ABLE TO STOP OR CONTROL WORRYING: NOT AT ALL
5. BEING SO RESTLESS THAT IT IS HARD TO SIT STILL: NOT AT ALL
IF YOU CHECKED OFF ANY PROBLEMS ON THIS QUESTIONNAIRE, HOW DIFFICULT HAVE THESE PROBLEMS MADE IT FOR YOU TO DO YOUR WORK, TAKE CARE OF THINGS AT HOME, OR GET ALONG WITH OTHER PEOPLE: NOT DIFFICULT AT ALL
1. FEELING NERVOUS, ANXIOUS, OR ON EDGE: NOT AT ALL
6. BECOMING EASILY ANNOYED OR IRRITABLE: NOT AT ALL
GAD7 TOTAL SCORE: 0
3. WORRYING TOO MUCH ABOUT DIFFERENT THINGS: NOT AT ALL
7. FEELING AFRAID AS IF SOMETHING AWFUL MIGHT HAPPEN: NOT AT ALL
GAD7 TOTAL SCORE: 0

## 2024-11-26 ASSESSMENT — PATIENT HEALTH QUESTIONNAIRE - PHQ9
SUM OF ALL RESPONSES TO PHQ QUESTIONS 1-9: 0
5. POOR APPETITE OR OVEREATING: NOT AT ALL

## 2024-11-26 NOTE — LETTER

## 2024-11-26 NOTE — PROGRESS NOTES
Preventive Care Visit  [facility]  Alva Kaiser PA-C, Family Medicine  Nov 26, 2024    Assessment & Plan     (Z00.00) Encounter for Medicare annual wellness exam  (primary encounter diagnosis)  Stable exam. Routine screening labs, she is not fasting today. Follow up in 1 year for annual wellness; sooner as needed for acute concerns.  Plan: Basic metabolic panel  (Ca, Cl, CO2, Creat,         Gluc, K, Na, BUN), Hemoglobin          (K13.0) Angular cheilitis  Well controlled with use of nystatin. No new side effects of the medication. Refill provided.  Plan: nystatin (MYCOSTATIN) 696137 UNIT/GM external         cream          (G89.29) Other chronic pain  (Z79.899) Controlled substance agreement signed  (M46.1) Sacroiliitis (H)  (M48.061,  M54.16) Spinal stenosis of lumbar region with radiculopathy  CSA completed in clinic today. Using medication appropriately. No acute concerns at this time. Refilled today. Urine drug screening pending. Follow up in 3 months.   Plan: HYDROcodone-acetaminophen (NORCO) 5-325 MG         tablet, Drug Confirmation Panel Urine with         Creat - lab collect    (E03.9) Hypothyroidism, unspecified type  Due for TSH level check. Has been well controlled on current dose of levothyroxine for years. NO acute concerns at this time.   Plan: TSH WITH FREE T4 REFLEX, levothyroxine         (SYNTHROID/LEVOTHROID) 150 MCG tablet    (R60.0) Lower extremity edema  (E55.9) Vitamin D deficiency  Vitamin D level check today. No acute concerns.  Plan: Vitamin D Deficiency    (I73.9) PAD (peripheral artery disease) (H)  Stable. Continue to monitor labs.    (I47.29) Paroxysmal ventricular tachycardia (H)  Asymptomatic. Will continue to monitor.     (R91.8) Pulmonary nodules  (R05.9) New onset cough  Was having lung cancer screening but no longer qualifies due to age. She is now having a new cough and with known pulmonary nodules would recommend repeat chest CT for further assessment.  "Ordered today.   Plan: CT Chest w/o Contrast    Patient has been advised of split billing requirements and indicates understanding: Yes    BMI  Estimated body mass index is 25.5 kg/m  as calculated from the following:    Height as of this encounter: 1.676 m (5' 6\").    Weight as of this encounter: 71.7 kg (158 lb).   Weight management plan: Discussed healthy diet and exercise guidelines    Counseling  Appropriate preventive services were addressed with this patient via screening, questionnaire, or discussion as appropriate for fall prevention, nutrition, physical activity, Tobacco-use cessation, social engagement, weight loss and cognition.  Checklist reviewing preventive services available has been given to the patient.  Reviewed patient's diet, addressing concerns and/or questions.   Updated plan of care.  Patient reported difficulty with activities of daily living were addressed today.The patient was provided with written information regarding signs of hearing loss.   I have reviewed Opioid Use Disorder and Substance Use Disorder risk factors and made any needed referrals.       Follow up in 3 months for med check; sooner with any acute concerns.    Paulina Guillory is a 79 year old, presenting for the following:  Physical        11/26/2024     1:29 PM   Additional Questions   Roomed by Katie JONES     [facility]    -Overall doing well. Pain medication continues to work well for her. No acute concerns at this time.    Health Care Directive  Patient does not have a Health Care Directive: Discussed advance care planning with patient; however, patient declined at this time.        11/24/2024   General Health   How would you rate your overall physical health? Good   Feel stress (tense, anxious, or unable to sleep) Not at all          11/24/2024   Nutrition   Diet: Regular (no restrictions)          11/24/2024   Exercise   Days per week of moderate/strenous exercise Patient declined   Average minutes spent exercising at this " level Patient declined          11/24/2024   Social Factors   Frequency of gathering with friends or relatives Twice a week   Worry food won't last until get money to buy more No   Food not last or not have enough money for food? No   Do you have housing? (Housing is defined as stable permanent housing and does not include staying ouside in a car, in a tent, in an abandoned building, in an overnight shelter, or couch-surfing.) Yes   Are you worried about losing your housing? No   Lack of transportation? No   Unable to get utilities (heat,electricity)? No          11/24/2024   Fall Risk   Fallen 2 or more times in the past year? No     No    Trouble with walking or balance? No     No        Patient-reported    Multiple values from one day are sorted in reverse-chronological order          11/24/2024   Activities of Daily Living- Home Safety   Needs help with the following daily activites Housework   Safety concerns in the home None of the above          11/24/2024   Dental   Dentist two times every year? Yes          11/24/2024   Hearing Screening   Hearing concerns? (!) I NEED TO ASK PEOPLE TO SPEAK UP OR REPEAT THEMSELVES.    (!) IT'S HARD TO FOLLOW A CONVERSATION IN A NOISY RESTAURANT OR CROWDED ROOM.       Multiple values from one day are sorted in reverse-chronological order         11/24/2024   Driving Risk Screening   Patient/family members have concerns about driving No          11/24/2024   General Alertness/Fatigue Screening   Have you been more tired than usual lately? No          11/24/2024   Urinary Incontinence Screening   Bothered by leaking urine in past 6 months No      Today's PHQ-2 Score:       11/26/2024     1:31 PM   PHQ-2 ( 1999 [employer])   Q1: Little interest or pleasure in doing things 0   Q2: Feeling down, depressed or hopeless 0   PHQ-2 Score 0         11/24/2024   Substance Use   Alcohol more than 3/day or more than 7/wk No   Do you have a current opioid prescription? (!) YES   How severe/bad  is pain from 1 to 10? 5/10   Do you use any other substances recreationally? No           No data to display              Low Risk (0-3)  Moderate Risk (4-7)  High Risk (>8)    Social History     Tobacco Use    Smoking status: Former     Current packs/day: 0.00     Average packs/day: 1 pack/day for 50.0 years (50.0 ttl pk-yrs)     Types: Cigarettes     Start date: 1960     Quit date: 2010     Years since quittin.4     Passive exposure: Never    Smokeless tobacco: Never    Tobacco comments:     no tobacco use since 2010   Vaping Use    Vaping status: Never Used   Substance Use Topics    Alcohol use: Yes     Comment: occasional    Drug use: Never           2021   LAST FHS-7 RESULTS   1st degree relative breast or ovarian cancer No   Any relative bilateral breast cancer No   Any male have breast cancer No   Any ONE woman have BOTH breast AND ovarian cancer No   Any woman with breast cancer before 50yrs No   2 or more relatives with breast AND/OR ovarian cancer No   2 or more relatives with breast AND/OR bowel cancer No     Mammogram Screening - After age 74- determine frequency with patient based on health status, life expectancy and patient goals  Mammogram Screening - Mammography discussed and declined    Reviewed and updated as needed this visit by Provider   Tobacco  Allergies  Meds  Problems  Med Hx  Surg Hx  Fam Hx            Past Medical History:   Diagnosis Date    Arthritis ?     osteoarthritis    Aspirin sensitive asthma 2018    Chronic airway obstruction, not elsewhere classified     Chronic pain     hip and left shoulder    Coronary artery disease involving native coronary artery of native heart without angina pectoris 2016    Crohn's disease (H) 2013    with stricture in small intestine    Family history of premature CAD     History of blood transfusion     post-partum hemorrhage    Migraine, unspecified, without mention of intractable migraine without  mention of status migrainosus     PVC's (premature ventricular contractions)     Small bowel obstruction (H) 8/28/2013    Findings at the time of surgery include a segment of active Crohns' Ileitis with stenosis and pill cam which became lodged in the narrowed area.     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 1990    left thigh-squamous cell carcinoma    BREAST SURGERY  ? 1991    left breast bx-negative    ENT SURGERY  ? 1960    T&A    EYE SURGERY  2016    R/L cataract removal with IOL placement; left cataract...    LAPAROTOMY EXPLORATORY  8/30/2013    Procedure: LAPAROTOMY EXPLORATORY;  LAPAROTOMY EXPLORATORY, Ileocecal Resection Resection, Removal of Retained Pill Cam;  Surgeon: Mitchell Fraser MD;  Location: [facility] OR    RESECTION ILEOCECAL  8/30/2013    Procedure: RESECTION ILEOCECAL;;  Surgeon: Mitchell Fraser MD;  Location: [facility] OR    ZZC NONSPECIFIC PROCEDURE  1970's    D& C X 2    ZZC NONSPECIFIC PROCEDURE  1964    PILONIDAL CYSTECTOMY    ZZC NONSPECIFIC PROCEDURE  1959    T&A    ZZC NONSPECIFIC PROCEDURE  1991    BREAST BX & CERVICAL POLYP    ZZHC COLONOSCOPY THRU STOMA, DIAGNOSTIC      2003     BP Readings from Last 3 Encounters:   11/26/24 120/82   05/21/24 124/80   05/16/24 130/86    Wt Readings from Last 3 Encounters:   11/26/24 71.7 kg (158 lb)   05/21/24 72.1 kg (159 lb)   05/16/24 72.1 kg (159 lb)                  Patient Active Problem List   Diagnosis    Hypothyroidism    Stricture of small intestine (H)    Osteoarthritis of hip    Ileitis (ileocecal resection 8/2013)    Lumbago     Palpitations    PVC's (premature ventricular contractions)    Family history of premature CAD    Chronic pain    Pulmonary nodules    ACP (advance care planning)    Coronary artery disease involving native coronary artery of native heart without angina pectoris    Age-related osteoporosis with current pathological fracture with routine healing    Statin intolerance    PAD (peripheral artery disease) (H)    Aspirin sensitivity    Sacroiliitis (H)    Spinal stenosis of lumbar region with radiculopathy    Controlled substance agreement signed    Paroxysmal ventricular tachycardia (H)    Pain management contract signed     Past Surgical History:   Procedure Laterality Date    APPENDECTOMY  8/30/2013    incidental removal-part of small bowel resection    ARTHROPLASTY HIP  11/25/2013    Procedure: ARTHROPLASTY HIP;  Left Total Hip Arthroplasty  ;  Surgeon: Luis Marshall MD;  Location: [facility] OR    BIOPSY  ? 1990    left thigh-squamous cell carcinoma    BREAST SURGERY  ? 1991    left breast bx-negative    ENT SURGERY  ? 1960    T&A    EYE SURGERY  2016    R/L cataract removal with IOL placement; left cataract...    LAPAROTOMY EXPLORATORY  8/30/2013    Procedure: LAPAROTOMY EXPLORATORY;  LAPAROTOMY EXPLORATORY, Ileocecal Resection Resection, Removal of Retained Pill Cam;  Surgeon: Mitchell Fraser MD;  Location: [facility] OR    RESECTION ILEOCECAL  8/30/2013    Procedure: RESECTION ILEOCECAL;;  Surgeon: Mitchell Fraser MD;  Location: [facility] OR    ZZC NONSPECIFIC PROCEDURE  1970's    D& C X 2    ZZC NONSPECIFIC PROCEDURE  1964    PILONIDAL CYSTECTOMY    ZZC NONSPECIFIC PROCEDURE  1959    T&A    ZZC NONSPECIFIC PROCEDURE  1991    BREAST BX & CERVICAL POLYP    ZZHC COLONOSCOPY THRU STOMA, DIAGNOSTIC      2003       Social History     Tobacco Use    Smoking status: Former     Current packs/day: 0.00     Average packs/day: 1 pack/day for 50.0 years (50.0 ttl pk-yrs)     Types: Cigarettes     Start date: 6/7/1960     Quit date:  2010     Years since quittin.4     Passive exposure: Never    Smokeless tobacco: Never    Tobacco comments:     no tobacco use since 2010   Substance Use Topics    Alcohol use: Yes     Comment: occasional     Family History   Problem Relation Age of Onset    Alcohol/Drug Mother     Heart Disease Mother         cardiomyopathy    Thyroid Disease Mother     Alcohol/Drug Father     Heart Disease Father         CAD -  1st MI mid 50's    Myocardial Infarction Father         X4    Coronary Artery Disease Father     Osteoporosis Maternal Grandmother     Cancer Maternal Grandfather         stomach or throat - martini drinker/pipe smoker    Myocardial Infarction Brother     Breast Cancer No family hx of     Cancer - colorectal No family hx of          Current Outpatient Medications   Medication Sig Dispense Refill    ACE/ARB NOT PRESCRIBED, INTENTIONAL, Please choose reason not prescribed, below      acetaminophen (TYLENOL) 500 MG tablet Take 500-1,000 mg by mouth as needed for mild pain      ascorbic acid (VITAMIN C) 500 MG tablet Take by mouth 2 times daily      ASPIRIN NOT PRESCRIBED (INTENTIONAL) Please choose reason not prescribed, below 0 each 0    BETA BLOCKER NOT PRESCRIBED, INTENTIONAL, Beta Blocker not prescribed intentionally due to COPD  0    betaxolol (BETOPTIC) 0.5 % ophthalmic solution INSTILL 1 DROP INTO EACH EYE QD  3    calcium carbonate (OS-THEA) 500 MG tablet 2 tablets 3 times daily  100 tablet 3    chlorpheniramine (CHLOR-TRIMETON) 4 MG tablet Take 4 mg by mouth every 6 hours as needed for allergies or rhinitis      Cholecalciferol (VITAMIN D) 1000 UNITS capsule Take 3,000 Units by mouth daily       furosemide (LASIX) 20 MG tablet Take 1 tablet (20 mg) by mouth 2 times daily 180 tablet 3    HYDROcodone-acetaminophen (NORCO) 5-325 MG tablet Take 1 tablet by mouth every 8 hours as needed for pain or severe pain. Max 3 tablets/24 hours 90 tablet 0    levothyroxine (SYNTHROID/LEVOTHROID) 150 MCG  "tablet Take 1 tablet (150 mcg) by mouth daily. 90 tablet 0    melatonin 3 MG tablet Take 1-2 tablets (3-6 mg) by mouth nightly as needed for sleep (OTC)      Multiple Vitamin (MULTI-VITAMIN PO) Take by mouth daily       nystatin (MYCOSTATIN) 122387 UNIT/GM external cream APPLY TOPICALLY TO THE AFFECTED AREA 2 TIMES DAILY 30 g 0    STATIN NOT PRESCRIBED, INTENTIONAL, 1 each See Admin Instructions Statin not prescribed intentionally due to Allergy to statin 0 each 0     Allergies   Allergen Reactions    Latex Difficulty breathing     sensativity - cough, eyes water    Nsaids Shortness Of Breath and Other (See Comments)     bronchospams    Aspirin Difficulty breathing     tight cough    Morphine And Codeine GI Disturbance     upset GI    Pravastatin      Myalgias    Simvastatin      Myalgias    Tramadol      Heart burn,  Felt \"clammy\", unable to pass gas, unable to urinate and had severe nausea    Bacitracin Rash    Neomycin-Polymyxin B Gu Blisters, Itching and Rash       Current providers sharing in care for this patient include:  Patient Care Team:  Alva Kaiser PA-C as PCP - General (Family Medicine)  Ирина Acuña MD as Hospitalist (Endocrinology, Diabetes, and Metabolism)  Alva Kaiser PA-C as Assigned PCP  Alva Kaiser PA-C as Assigned Pain Medication Provider    The following health maintenance items are reviewed in Epic and correct as of today:  Health Maintenance   Topic Date Due    ZOSTER IMMUNIZATION (1 of 2) Never done    RSV VACCINE (1 - 1-dose 75+ series) Never done    DTAP/TDAP/TD IMMUNIZATION (3 - Td or Tdap) 08/15/2022    ANNUAL REVIEW OF HM ORDERS  11/20/2024    MEDICARE ANNUAL WELLNESS VISIT  11/20/2024    TSH W/FREE T4 REFLEX  11/20/2024    DEXA  05/15/2025    URINE DRUG SCREEN  05/21/2025    FALL RISK ASSESSMENT  11/26/2025    GLUCOSE  11/20/2026    LIPID  11/17/2027    ADVANCE CARE PLANNING  11/26/2029    PHQ-2 (once per calendar year)  Completed    INFLUENZA VACCINE  Completed    " "Pneumococcal Vaccine: 65+ Years  Completed    COVID-19 Vaccine  Completed    HPV IMMUNIZATION  Aged Out    MENINGITIS IMMUNIZATION  Aged Out    RSV MONOCLONAL ANTIBODY  Aged Out    MAMMO SCREENING  Discontinued    COLORECTAL CANCER SCREENING  Discontinued    LUNG CANCER SCREENING  Discontinued       Review of Systems  Constitutional, HEENT, cardiovascular, pulmonary, GI, , musculoskeletal, neuro, skin, endocrine and psych systems are negative, except as otherwise noted.       Objective    Exam  /82 (BP Location: Right arm, Patient Position: Sitting, Cuff Size: Adult Regular)   Pulse 97   Temp 98  F (36.7  C) (Oral)   Resp 18   Ht 1.676 m (5' 6\")   Wt 71.7 kg (158 lb)   LMP  (LMP Unknown)   SpO2 96%   BMI 25.50 kg/m     Estimated body mass index is 25.5 kg/m  as calculated from the following:    Height as of this encounter: 1.676 m (5' 6\").    Weight as of this encounter: 71.7 kg (158 lb).    Physical Exam  GENERAL: alert and no distress  RESP: lungs clear to auscultation - no rales, rhonchi or wheezes  CV: regular rate and rhythm, normal S1 S2, no S3 or S4, no murmur, click or rub, no peripheral edema  MS: no gross musculoskeletal defects noted, no edema            11/26/2024   Mini Cog   Clock Draw Score 2 Normal   3 Item Recall 3 objects recalled   Mini Cog Total Score 5        Signed Electronically by: Alva Kaiser PA-C    "

## 2024-11-26 NOTE — PATIENT INSTRUCTIONS
Patient Education   Preventive Care Advice   This is general advice given by our system to help you stay healthy. However, your care team may have specific advice just for you. Please talk to your care team about your preventive care needs.  Nutrition  Eat 5 or more servings of fruits and vegetables each day.  Try wheat bread, brown rice and whole grain pasta (instead of white bread, rice, and pasta).  Get enough calcium and vitamin D. Check the label on foods and aim for 100% of the RDA (recommended daily allowance).  Lifestyle  Exercise at least 150 minutes each week  (30 minutes a day, 5 days a week).  Do muscle strengthening activities 2 days a week. These help control your weight and prevent disease.  No smoking.  Wear sunscreen to prevent skin cancer.  Have a dental exam and cleaning every 6 months.  Yearly exams  See your health care team every year to talk about:  Any changes in your health.  Any medicines your care team has prescribed.  Preventive care, family planning, and ways to prevent chronic diseases.  Shots (vaccines)   HPV shots (up to age 26), if you've never had them before.  Hepatitis B shots (up to age 59), if you've never had them before.  COVID-19 shot: Get this shot when it's due.  Flu shot: Get a flu shot every year.  Tetanus shot: Get a tetanus shot every 10 years.  Pneumococcal, hepatitis A, and RSV shots: Ask your care team if you need these based on your risk.  Shingles shot (for age 50 and up)  General health tests  Diabetes screening:  Starting at age 35, Get screened for diabetes at least every 3 years.  If you are younger than age 35, ask your care team if you should be screened for diabetes.  Cholesterol test: At age 39, start having a cholesterol test every 5 years, or more often if advised.  Bone density scan (DEXA): At age 50, ask your care team if you should have this scan for osteoporosis (brittle bones).  Hepatitis C: Get tested at least once in your life.  STIs (sexually  transmitted infections)  Before age 24: Ask your care team if you should be screened for STIs.  After age 24: Get screened for STIs if you're at risk. You are at risk for STIs (including HIV) if:  You are sexually active with more than one person.  You don't use condoms every time.  You or a partner was diagnosed with a sexually transmitted infection.  If you are at risk for HIV, ask about PrEP medicine to prevent HIV.  Get tested for HIV at least once in your life, whether you are at risk for HIV or not.  Cancer screening tests  Cervical cancer screening: If you have a cervix, begin getting regular cervical cancer screening tests starting at age 21.  Breast cancer scan (mammogram): If you've ever had breasts, begin having regular mammograms starting at age 40. This is a scan to check for breast cancer.  Colon cancer screening: It is important to start screening for colon cancer at age 45.  Have a colonoscopy test every 10 years (or more often if you're at risk) Or, ask your provider about stool tests like a FIT test every year or Cologuard test every 3 years.  To learn more about your testing options, visit:   .  For help making a decision, visit:   https://bit.ly/hh83190.  Prostate cancer screening test: If you have a prostate, ask your care team if a prostate cancer screening test (PSA) at age 55 is right for you.  Lung cancer screening: If you are a current or former smoker ages 50 to 80, ask your care team if ongoing lung cancer screenings are right for you.  For informational purposes only. Not to replace the advice of your health care provider. Copyright   2023 [facility]. All rights reserved. Clinically reviewed by the [facility]itions Program. "[employer] [id number] - REV 01/24.  Learning About Activities of Daily Living  What are activities of daily living?     Activities of daily living (ADLs) are the basic self-care tasks you do every day. These include eating, bathing, dressing,  and moving around.  As you age, and if you have health problems, you may find that it's harder to do some of these tasks. If so, your doctor can suggest ideas that may help.  To measure what kind of help you may need, your doctor will ask how well you are able to do ADLs. Let your doctor know if there are any tasks that you are having trouble doing. This is an important first step to getting help. And when you have the help you need, you can stay as independent as possible.  How will a doctor assess your ADLs?  Asking about ADLs is part of a routine health checkup your doctor will likely do as you age. Your health check might be done in a doctor's office, in your home, or at a hospital. The goal is to find out if you are having any problems that could make it hard to care for yourself or that make it unsafe for you to be on your own.  To measure your ADLs, your doctor will ask how hard it is for you to do routine tasks. Your doctor may also want to know if you have changed the way you do a task because of a health problem. Your doctor may watch how you:  Walk back and forth.  Keep your balance while you stand or walk.  Move from sitting to standing or from a bed to a chair.  Button or unbutton a shirt or sweater.  Remove and put on your shoes.  It's common to feel a little worried or anxious if you find you can't do all the things you used to be able to do. Talking with your doctor about ADLs is a way to make sure you're as safe as possible and able to care for yourself as well as you can. You may want to bring a caregiver, friend, or family member to your checkup. They can help you talk to your doctor.  Follow-up care is a key part of your treatment and safety. Be sure to make and go to all appointments, and call your doctor if you are having problems. It's also a good idea to know your test results and keep a list of the medicines you take.  Current as of: October 24, 2023  Content Version: 14.2 2024 [facility]  [employer].   Care instructions adapted under license by your healthcare professional. If you have questions about a medical condition or this instruction, always ask your healthcare professional. [employer] disclaims any warranty or liability for your use of this information.

## 2024-11-26 NOTE — PROGRESS NOTES
Preventive Care Visit  [facility]  Alva Kaiser PA-C, Family Medicine  Nov 26, 2024      Assessment & Plan     (Z00.00) Encounter for Medicare annual wellness exam  (primary encounter diagnosis)  Routine physical and lab work completed today. Plan to follow up in 1 year for next physical.   Plan: Basic metabolic panel  (Ca, Cl, CO2, Creat,         Gluc, K, Na, BUN), Hemoglobin            (B37.2) Candidal Intertrigo   Episodic yeast infections on abdomen, managed well with nystatin cream. Refill provided today.  Plan: nystatin (MYCOSTATIN) [id number] UNIT/GM external         cream           (G89.29) Other chronic pain  (Z79.899) Controlled substance agreement signed  Continued use of Norco for chronic pain management. Urine and controlled substance agreement completed today.   Plan: HYDROcodone-acetaminophen (NORCO) 5-325 MG         tablet, Drug Confirmation Panel Urine with         Creat - lab collect            (M48.061,  M54.16) Spinal stenosis of lumbar region with radiculopathy  Plan: HYDROcodone-acetaminophen (NORCO) 5-325 MG         tablet, Drug Confirmation Panel Urine with         Creat - lab collect            (E03.9) Hypothyroidism, unspecified type  Current dose of levothyroxine managing hypothyroidism well with no side effects. TSH checked today, will make any dose adjustments to levothyroxine as needed.  Plan: TSH WITH FREE T4 REFLEX, levothyroxine         (SYNTHROID/LEVOTHROID) 150 MCG tablet            (R60.0) Lower extremity edema  Continue furosemide as prescribed.      (E55.9) Vitamin D deficiency  Vitamin D levels obtained today.  Plan: Vitamin D Deficiency            (R91.8) Pulmonary nodules  (R05.9) New onset cough  Due to history of pulmonary nodules, new onset cough and history of smoking, recommended patient updated chest CT, ordered today.  Plan: CT Chest w/o Contrast            Counseling  Appropriate preventive services were addressed with this patient via screening,  questionnaire, or discussion as appropriate for fall prevention, nutrition, physical activity, Tobacco-use cessation, social engagement, weight loss and cognition.  Checklist reviewing preventive services available has been given to the patient.  Reviewed patient's diet, addressing concerns and/or questions.   Updated plan of care.  Patient reported difficulty with activities of daily living were addressed today.The patient was provided with written information regarding signs of hearing loss.   I have reviewed Opioid Use Disorder and Substance Use Disorder risk factors and made any needed referrals.     Paulina Guillory is a 79 year old, presenting for the following:  Physical        11/26/2024     1:29 PM   Additional Questions   Roomed by Katie RAMÍREZ  Patient presents for annual physical and ongoing pain management. She reports chronic pain in most joint, worse in the hips and back. She states the weather changes have not been helpful.     Otherwise doing well. Patient had shingles in May, she is planning on waiting until spring for Shingrix vaccination. She reports occasional episodes of yeast infections in the skin folds on her abdomen, she uses nystatin cream as needed which clears the infection well.     Health Care Directive  Patient does not have a Health Care Directive: Discussed advance care planning with patient; however, patient declined at this time.      11/24/2024   General Health   How would you rate your overall physical health? Good   Feel stress (tense, anxious, or unable to sleep) Not at all            11/24/2024   Nutrition   Diet: Regular (no restrictions)            11/24/2024   Exercise   Days per week of moderate/strenous exercise Patient declined   Average minutes spent exercising at this level Patient declined            11/24/2024   Social Factors   Frequency of gathering with friends or relatives Twice a week   Worry food won't last until get money to buy more No   Food  not last or not have enough money for food? No   Do you have housing? (Housing is defined as stable permanent housing and does not include staying ouside in a car, in a tent, in an abandoned building, in an overnight shelter, or couch-surfing.) Yes   Are you worried about losing your housing? No   Lack of transportation? No   Unable to get utilities (heat,electricity)? No            11/24/2024   Fall Risk   Fallen 2 or more times in the past year? No     No    Trouble with walking or balance? No     No        Patient-reported    Multiple values from one day are sorted in reverse-chronological order          11/24/2024   Activities of Daily Living- Home Safety   Needs help with the following daily activites Housework   Safety concerns in the home None of the above            11/24/2024   Dental   Dentist two times every year? Yes            11/24/2024   Hearing Screening   Hearing concerns? (!) I NEED TO ASK PEOPLE TO SPEAK UP OR REPEAT THEMSELVES.    (!) IT'S HARD TO FOLLOW A CONVERSATION IN A NOISY RESTAURANT OR CROWDED ROOM.       Multiple values from one day are sorted in reverse-chronological order         11/24/2024   Driving Risk Screening   Patient/family members have concerns about driving No            11/24/2024   General Alertness/Fatigue Screening   Have you been more tired than usual lately? No            11/24/2024   Urinary Incontinence Screening   Bothered by leaking urine in past 6 months No               Today's PHQ-2 Score:       11/26/2024     1:31 PM   PHQ-2 ( 1999 [employer])   Q1: Little interest or pleasure in doing things 0   Q2: Feeling down, depressed or hopeless 0   PHQ-2 Score 0           11/24/2024   Substance Use   Alcohol more than 3/day or more than 7/wk No   Do you have a current opioid prescription? (!) YES   How severe/bad is pain from 1 to 10? 5/10   Do you use any other substances recreationally? No             No data to display              Low Risk (0-3)  Moderate Risk  (4-7)  High Risk (>8)  Social History     Tobacco Use    Smoking status: Former     Current packs/day: 0.00     Average packs/day: 1 pack/day for 50.0 years (50.0 ttl pk-yrs)     Types: Cigarettes     Start date: 1960     Quit date: 2010     Years since quittin.4     Passive exposure: Never    Smokeless tobacco: Never    Tobacco comments:     no tobacco use since 2010   Vaping Use    Vaping status: Never Used   Substance Use Topics    Alcohol use: Yes     Comment: occasional    Drug use: Never           2021   LAST FHS-7 RESULTS   1st degree relative breast or ovarian cancer No   Any relative bilateral breast cancer No   Any male have breast cancer No   Any ONE woman have BOTH breast AND ovarian cancer No   Any woman with breast cancer before 50yrs No   2 or more relatives with breast AND/OR ovarian cancer No   2 or more relatives with breast AND/OR bowel cancer No            Reviewed and updated as needed this visit by Provider                  Current providers sharing in care for this patient include:  Patient Care Team:  Alva Kaiser PA-C as PCP - General (Family Medicine)  Ирина Acuña MD as Hospitalist (Endocrinology, Diabetes, and Metabolism)  Alva Kaiser PA-C as Assigned PCP  Alva Kaiser PA-C as Assigned Pain Medication Provider    The following health maintenance items are reviewed in Epic and correct as of today:  Health Maintenance   Topic Date Due    ZOSTER IMMUNIZATION (1 of 2) Never done    RSV VACCINE (1 - 1-dose 75+ series) Never done    DTAP/TDAP/TD IMMUNIZATION (3 - Td or Tdap) 08/15/2022    ANNUAL REVIEW OF HM ORDERS  2024    MEDICARE ANNUAL WELLNESS VISIT  2024    TSH W/FREE T4 REFLEX  2024    DEXA  05/15/2025    URINE DRUG SCREEN  2025    FALL RISK ASSESSMENT  2025    GLUCOSE  2026    LIPID  2027    ADVANCE CARE PLANNING  2028    PHQ-2 (once per calendar year)  Completed    INFLUENZA VACCINE  Completed     "Pneumococcal Vaccine: 65+ Years  Completed    COVID-19 Vaccine  Completed    HPV IMMUNIZATION  Aged Out    MENINGITIS IMMUNIZATION  Aged Out    RSV MONOCLONAL ANTIBODY  Aged Out    MAMMO SCREENING  Discontinued    COLORECTAL CANCER SCREENING  Discontinued    LUNG CANCER SCREENING  Discontinued         Review of Systems  Constitutional, HEENT, cardiovascular, pulmonary, GI, , musculoskeletal, neuro, skin, endocrine and psych systems are negative, except as otherwise noted.     Objective    Exam  /82 (BP Location: Right arm, Patient Position: Sitting, Cuff Size: Adult Regular)   Pulse 97   Temp 98  F (36.7  C) (Oral)   Resp 18   Ht 1.676 m (5' 6\")   Wt 71.7 kg (158 lb)   LMP  (LMP Unknown)   SpO2 96%   BMI 25.50 kg/m     Estimated body mass index is 25.5 kg/m  as calculated from the following:    Height as of this encounter: 1.676 m (5' 6\").    Weight as of this encounter: 71.7 kg (158 lb).    Physical Exam  GENERAL: alert and no distress  EYES: Eyes grossly normal to inspection, conjunctivae and sclerae normal  NECK: no asymmetry, masses, or scars  RESP: lungs clear to auscultation - no rales, rhonchi or wheezes  CV: regular rate and rhythm, normal S1 S2, no S3 or S4, occasional PVC's, no murmur, click or rub, no peripheral edema  MS: no gross musculoskeletal defects noted  SKIN: no suspicious lesions or rashes  NEURO: Normal strength and tone, mentation intact and speech normal  PSYCH: mentation appears normal, affect normal/bright        11/26/2024   Mini Cog   Clock Draw Score 2 Normal   3 Item Recall 3 objects recalled   Mini Cog Total Score 5                 Signed Electronically by: EDGARD Rajan-S2    "

## 2024-11-28 LAB
DHC UR CFM-MCNC: 177 NG/ML
DHC/CREAT UR: 361 NG/MG {CREAT}
HYDROCODONE UR CFM-MCNC: 542 NG/ML
HYDROCODONE/CREAT UR: 1106 NG/MG {CREAT}
HYDROMORPHONE UR CFM-MCNC: 214 NG/ML
HYDROMORPHONE/CREAT UR: 437 NG/MG {CREAT}

## 2024-12-07 ENCOUNTER — HOSPITAL ENCOUNTER (OUTPATIENT)
Dept: CT IMAGING | Facility: CLINIC | Age: 79
Discharge: HOME | End: 2024-12-07
Payer: COMMERCIAL

## 2024-12-07 DIAGNOSIS — R91.8 PULMONARY NODULES: ICD-10-CM

## 2024-12-07 DIAGNOSIS — R05.9 NEW ONSET COUGH: ICD-10-CM

## 2024-12-07 PROCEDURE — 71250 CT THORAX DX C-: CPT

## 2025-01-10 ENCOUNTER — MYC MEDICAL ADVICE (OUTPATIENT)
Dept: FAMILY MEDICINE | Facility: CLINIC | Age: 80
End: 2025-01-10
Payer: COMMERCIAL

## 2025-01-10 DIAGNOSIS — G89.29 OTHER CHRONIC PAIN: ICD-10-CM

## 2025-01-10 DIAGNOSIS — M48.061 SPINAL STENOSIS OF LUMBAR REGION WITH RADICULOPATHY: ICD-10-CM

## 2025-01-10 DIAGNOSIS — M54.16 SPINAL STENOSIS OF LUMBAR REGION WITH RADICULOPATHY: ICD-10-CM

## 2025-01-13 RX ORDER — HYDROCODONE BITARTRATE AND ACETAMINOPHEN 5; 325 MG/1; MG/1
1 TABLET ORAL EVERY 8 HOURS PRN
Qty: 90 TABLET | Refills: 0 | Status: SHIPPED | OUTPATIENT
Start: 2025-01-13

## 2025-01-13 NOTE — TELEPHONE ENCOUNTER
PDMP reviewed. Appropriate for refill. Refilled medication.    Alva Kaiser PA-C on 1/13/2025 at 7:57 AM

## 2025-01-16 ENCOUNTER — LAB (OUTPATIENT)
Dept: LAB | Facility: CLINIC | Age: 80
End: 2025-01-16
Payer: COMMERCIAL

## 2025-01-16 DIAGNOSIS — E03.9 HYPOTHYROIDISM, UNSPECIFIED TYPE: ICD-10-CM

## 2025-02-10 ENCOUNTER — MYC MEDICAL ADVICE (OUTPATIENT)
Dept: FAMILY MEDICINE | Facility: CLINIC | Age: 80
End: 2025-02-10
Payer: COMMERCIAL

## 2025-02-10 DIAGNOSIS — M54.16 SPINAL STENOSIS OF LUMBAR REGION WITH RADICULOPATHY: ICD-10-CM

## 2025-02-10 DIAGNOSIS — G89.29 OTHER CHRONIC PAIN: ICD-10-CM

## 2025-02-10 DIAGNOSIS — M48.061 SPINAL STENOSIS OF LUMBAR REGION WITH RADICULOPATHY: ICD-10-CM

## 2025-02-10 RX ORDER — HYDROCODONE BITARTRATE AND ACETAMINOPHEN 5; 325 MG/1; MG/1
1 TABLET ORAL EVERY 8 HOURS PRN
Qty: 90 TABLET | Refills: 0 | Status: SHIPPED | OUTPATIENT
Start: 2025-02-12

## 2025-02-10 NOTE — TELEPHONE ENCOUNTER
Alva- see [employer] message below.  Has refills of levothyroxine if no dose change.  T'd up [place] refill.  Please advise.  Mrea Motta, DORA Guillory,     Your thyroid function is normal but your TSH (stimulating hormone) continues to be slightly high. If you are feeling well on the current dose of levothyroxine we do not need to adjust anything as your thyroid function is normal. If you are feeling some ongoing fatigue, skin dryness, trouble with hot/cold tolerance we could always try increasing the levothyroxine as well.     Take care,  Alva Kaiser PA-C   Written by Alva Kaiser PA-C on 1/17/2025 12:00 PM CST  Seen by patient Kahlil Patito on 2/10/2025 12:38 PM

## 2025-02-10 NOTE — TELEPHONE ENCOUNTER
Can we call patient to let her know I am fine with her staying on the same dose of the levothyroxine for now. I also sent in a renewal of the Norco which will be ready to be refilled on 02/12. PDMP reviewed and appropriate for refill.     Thanks!  Alva Kaiser PA-C

## 2025-03-04 ENCOUNTER — VIRTUAL VISIT (OUTPATIENT)
Dept: FAMILY MEDICINE | Facility: CLINIC | Age: 80
End: 2025-03-04
Payer: COMMERCIAL

## 2025-03-04 DIAGNOSIS — M54.16 SPINAL STENOSIS OF LUMBAR REGION WITH RADICULOPATHY: ICD-10-CM

## 2025-03-04 DIAGNOSIS — G89.29 OTHER CHRONIC PAIN: Primary | ICD-10-CM

## 2025-03-04 DIAGNOSIS — M48.061 SPINAL STENOSIS OF LUMBAR REGION WITH RADICULOPATHY: ICD-10-CM

## 2025-03-04 DIAGNOSIS — E03.9 HYPOTHYROIDISM, UNSPECIFIED TYPE: ICD-10-CM

## 2025-03-04 PROCEDURE — 98014 SYNCH AUDIO-ONLY EST MOD 30: CPT

## 2025-03-04 RX ORDER — HYDROCODONE BITARTRATE AND ACETAMINOPHEN 5; 325 MG/1; MG/1
1 TABLET ORAL EVERY 8 HOURS PRN
Qty: 90 TABLET | Refills: 0 | Status: SHIPPED | OUTPATIENT
Start: 2025-03-14

## 2025-03-04 RX ORDER — LEVOTHYROXINE SODIUM 175 UG/1
175 TABLET ORAL
Qty: 30 TABLET | Refills: 2 | Status: SHIPPED | OUTPATIENT
Start: 2025-03-04

## 2025-03-04 NOTE — PROGRESS NOTES
Kahlil is a 79 year old who is being evaluated via a billable telephone visit.    What phone number would you like to be contacted at? [phone]  How would you like to obtain your AVS? Alison  Originating Location (pt. Location): Home  Distant Location (provider location):  On-site  Telephone visit completed due to the patient did not consent to a video visit.    Assessment & Plan     (G89.29) Other chronic pain  (primary encounter diagnosis)  (M48.061,  M54.16) Spinal stenosis of lumbar region with radiculopathy  Pain has been slightly increased in the last couple months. Needing to use all 3 tablets of pain medication daily due to increased diffuse joint pains. She is wondering if in part due to elevated TSH and weather. Will refill pain medication today. If ongoing concerns with inadequate pain relief could consider referral to pain clinic to further assess but for now will refill at current dosing. Follow up visit in 3 months; sooner with any acute concerns.   Plan: HYDROcodone-acetaminophen (NORCO) 5-325 MG         tablet          (E03.9) Hypothyroidism, unspecified type  TSH has been increasing although T4 level has been stable. With increased joint pains and increase in TSH will increase levothyroxine dosing to see if any improvement in symptoms. Follow up scheduled for 3 months and will check TSH at that visit. Further recommendations pending labs results.   Plan: levothyroxine (SYNTHROID/LEVOTHROID) 175 MCG         tablet            The longitudinal plan of care for the diagnosis(es)/condition(s) as documented were addressed during this visit. Due to the added complexity in care, I will continue to support Kahlil in the subsequent management and with ongoing continuity of care.    Follow up in 3 months for pain medication monitoring; sooner with any acute concerns.    Subjective   Kahlil is a 79 year old, presenting for the following health issues:  No chief complaint on file.        3/4/2025     1:55  PM   Additional Questions   Roomed by Pita Lopez     History of Present Illness       Back Pain:  She presents for follow up of back pain. Patient's back pain is a chronic problem.  Location of back pain:  Right lower back, right buttock and right hip  Description of back pain: gnawing, sharp and shooting  Back pain spreads: right thigh and right knee    Since patient first noticed back pain, pain is: always present, but gets better and worse  Does back pain interfere with her job:  Not applicable       Reason for visit:  F/U back pain; Thyroid concerns.    She eats 2-3 servings of fruits and vegetables daily.She consumes 0 sweetened beverage(s) daily.She exercises with enough effort to increase her heart rate 10 to 19 minutes per day.  She exercises with enough effort to increase her heart rate 3 or less days per week.   She is taking medications regularly.    -Having to use cane more frequently because having more leg and joint pains. Every day she is having more pain. She is wondering if any relation to thyroid as TSH has been slightly higher.      Review of Systems  Constitutional, HEENT, cardiovascular, pulmonary, gi and gu systems are negative, except as otherwise noted.        Objective         Vitals:  No vitals were obtained today due to virtual visit.    Physical Exam   General: Alert and no distress //Respiratory: No audible wheeze, cough, or shortness of breath // Psychiatric:  Appropriate affect, tone, and pace of words.    Phone call duration: 13 minutes    Signed Electronically by: Alva Kaiser PA-C

## 2025-04-10 ENCOUNTER — MYC MEDICAL ADVICE (OUTPATIENT)
Dept: FAMILY MEDICINE | Facility: CLINIC | Age: 80
End: 2025-04-10
Payer: COMMERCIAL

## 2025-04-10 DIAGNOSIS — M48.061 SPINAL STENOSIS OF LUMBAR REGION WITH RADICULOPATHY: ICD-10-CM

## 2025-04-10 DIAGNOSIS — G89.29 OTHER CHRONIC PAIN: ICD-10-CM

## 2025-04-10 DIAGNOSIS — M54.16 SPINAL STENOSIS OF LUMBAR REGION WITH RADICULOPATHY: ICD-10-CM

## 2025-04-10 RX ORDER — HYDROCODONE BITARTRATE AND ACETAMINOPHEN 5; 325 MG/1; MG/1
1 TABLET ORAL EVERY 8 HOURS PRN
Qty: 90 TABLET | Refills: 0 | Status: SHIPPED | OUTPATIENT
Start: 2025-04-14

## 2025-04-10 NOTE — TELEPHONE ENCOUNTER
Routing to provider- please review [employer] message with update from patient, also requesting hydrocodone refill, RN will pend refill for future date if appropriate.     Note from Virtual Visit 3/4/25:   Assessment & Plan  (G89.29) Other chronic pain  (primary encounter diagnosis)  (M48.061,  M54.16) Spinal stenosis of lumbar region with radiculopathy  Pain has been slightly increased in the last couple months. Needing to use all 3 tablets of pain medication daily due to increased diffuse joint pains. She is wondering if in part due to elevated TSH and weather. Will refill pain medication today. If ongoing concerns with inadequate pain relief could consider referral to pain clinic to further assess but for now will refill at current dosing. Follow up visit in 3 months; sooner with any acute concerns.   Plan: HYDROcodone-acetaminophen (NORCO) 5-325 MG         tablet          (E03.9) Hypothyroidism, unspecified type  TSH has been increasing although T4 level has been stable. With increased joint pains and increase in TSH will increase levothyroxine dosing to see if any improvement in symptoms. Follow up scheduled for 3 months and will check TSH at that visit. Further recommendations pending labs results.   Plan: levothyroxine (SYNTHROID/LEVOTHROID) 175 MCG         Tablet      Masha DIAMOND RN  [facility]

## 2025-04-12 ENCOUNTER — HEALTH MAINTENANCE LETTER (OUTPATIENT)
Age: 80
End: 2025-04-12

## 2025-05-11 ENCOUNTER — MYC MEDICAL ADVICE (OUTPATIENT)
Dept: FAMILY MEDICINE | Facility: CLINIC | Age: 80
End: 2025-05-11
Payer: COMMERCIAL

## 2025-05-11 DIAGNOSIS — M54.16 SPINAL STENOSIS OF LUMBAR REGION WITH RADICULOPATHY: ICD-10-CM

## 2025-05-11 DIAGNOSIS — G89.29 OTHER CHRONIC PAIN: ICD-10-CM

## 2025-05-11 DIAGNOSIS — M48.061 SPINAL STENOSIS OF LUMBAR REGION WITH RADICULOPATHY: ICD-10-CM

## 2025-05-12 RX ORDER — HYDROCODONE BITARTRATE AND ACETAMINOPHEN 5; 325 MG/1; MG/1
1 TABLET ORAL EVERY 8 HOURS PRN
Qty: 90 TABLET | Refills: 0 | Status: SHIPPED | OUTPATIENT
Start: 2025-05-14

## 2025-05-12 NOTE — TELEPHONE ENCOUNTER
Sent refill of prescription to the pharmacy. PDMP reviewed and appropriate for refill on 05/14.     Alva Kaiser PA-C on 5/12/2025 at 1:51 PM

## 2025-06-03 ENCOUNTER — OFFICE VISIT (OUTPATIENT)
Dept: FAMILY MEDICINE | Facility: CLINIC | Age: 80
End: 2025-06-03
Payer: COMMERCIAL

## 2025-06-03 VITALS
SYSTOLIC BLOOD PRESSURE: 150 MMHG | OXYGEN SATURATION: 96 % | RESPIRATION RATE: 19 BRPM | HEART RATE: 100 BPM | TEMPERATURE: 98 F | DIASTOLIC BLOOD PRESSURE: 75 MMHG

## 2025-06-03 DIAGNOSIS — G89.29 OTHER CHRONIC PAIN: ICD-10-CM

## 2025-06-03 DIAGNOSIS — M48.061 SPINAL STENOSIS OF LUMBAR REGION WITH RADICULOPATHY: ICD-10-CM

## 2025-06-03 DIAGNOSIS — M80.00XD AGE-RELATED OSTEOPOROSIS WITH CURRENT PATHOLOGICAL FRACTURE WITH ROUTINE HEALING: Primary | ICD-10-CM

## 2025-06-03 DIAGNOSIS — M54.16 SPINAL STENOSIS OF LUMBAR REGION WITH RADICULOPATHY: ICD-10-CM

## 2025-06-03 DIAGNOSIS — E03.9 HYPOTHYROIDISM, UNSPECIFIED TYPE: ICD-10-CM

## 2025-06-03 LAB — TSH SERPL DL<=0.005 MIU/L-ACNC: 6.11 UIU/ML (ref 0.3–4.2)

## 2025-06-03 PROCEDURE — G2211 COMPLEX E/M VISIT ADD ON: HCPCS

## 2025-06-03 PROCEDURE — 3078F DIAST BP <80 MM HG: CPT

## 2025-06-03 PROCEDURE — 84439 ASSAY OF FREE THYROXINE: CPT

## 2025-06-03 PROCEDURE — 36415 COLL VENOUS BLD VENIPUNCTURE: CPT

## 2025-06-03 PROCEDURE — 99214 OFFICE O/P EST MOD 30 MIN: CPT

## 2025-06-03 PROCEDURE — 84443 ASSAY THYROID STIM HORMONE: CPT

## 2025-06-03 PROCEDURE — 3077F SYST BP >= 140 MM HG: CPT

## 2025-06-03 RX ORDER — LEVOTHYROXINE SODIUM 150 UG/1
150 TABLET ORAL
Qty: 90 TABLET | Refills: 1 | Status: SHIPPED | OUTPATIENT
Start: 2025-06-03

## 2025-06-03 RX ORDER — HYDROCODONE BITARTRATE AND ACETAMINOPHEN 5; 325 MG/1; MG/1
1 TABLET ORAL EVERY 8 HOURS PRN
Qty: 90 TABLET | Refills: 0 | Status: SHIPPED | OUTPATIENT
Start: 2025-06-13

## 2025-06-03 RX ORDER — LEVOTHYROXINE SODIUM 175 UG/1
175 TABLET ORAL
Qty: 30 TABLET | Refills: 2 | Status: CANCELLED | OUTPATIENT
Start: 2025-06-03

## 2025-06-03 RX ORDER — CYCLOBENZAPRINE HCL 5 MG
5-10 TABLET ORAL 2 TIMES DAILY PRN
Qty: 40 TABLET | Refills: 0 | Status: SHIPPED | OUTPATIENT
Start: 2025-06-03

## 2025-06-03 NOTE — PROGRESS NOTES
"  Assessment & Plan     (M80.00XD) Age-related osteoporosis with current pathological fracture with routine healing  (primary encounter diagnosis)  Discussed dexa scan, not interested in dexa scan for screening.     (G89.29) Other chronic pain  (M48.061,  M54.16) Spinal stenosis of lumbar region with radiculopathy  Chronic pain on Norco. In the past two weeks she has been having increased discomfort, unknown trigger. Discussed pain and currently using 1/2 tablet twice daily and then 2 tablets at night. Recommend doing 1.5 tablets at night and do 1/2 tablet 3 times daily to provide more pain relief during the daytimes hours and keeps total daily usage to 3 pills/daily. Will also given Flexeril to use, did caution to only use between Norco doses and not at the same time. She is going to reach out in 1 week with update on symptoms and can adjust plan if not improvement in symptoms.   Plan: HYDROcodone-acetaminophen (NORCO) 5-325 MG         tablet, cyclobenzaprine (FLEXERIL) 5 MG tablet          (E03.9) Hypothyroidism, unspecified type  Levothyroxine is working well for management but is not tolerating the 175 mcg dose-seems to give her more PVC's. Due to symptoms will decreased back to 150 mcg daily as she seemed to tolerate this better. Check TSH level today (on 175 mcg dose at time of testing) but due to symptoms will decrease back to 150 mcg dose.   Plan: TSH with free T4 reflex, levothyroxine         (SYNTHROID/LEVOTHROID) 150 MCG tablet          The longitudinal plan of care for the diagnosis(es)/condition(s) as documented were addressed during this visit. Due to the added complexity in care, I will continue to support Kahlil in the subsequent management and with ongoing continuity of care.    BMI  Estimated body mass index is 25.5 kg/m  as calculated from the following:    Height as of 11/26/24: 1.676 m (5' 6\").    Weight as of 11/26/24: 71.7 kg (158 lb).   Weight management plan: Discussed healthy diet and " exercise guidelines    Follow up in 3 months for chronic pain follow up; sooner with any acute concerns.    Paulina Guillory is a 79 year old, presenting for the following health issues:  Recheck Medication and Back Pain        6/3/2025    12:46 PM   Additional Questions   Roomed by Yvette MARTINEZ         6/3/2025   Declines Weight   Did patient decline having their weight taken? Yes     HPI     Chronic/Recurring Back Pain Follow Up  Where is your back pain located? (Select all that apply) low/mid back right  How would you describe your back pain?  Burning, aching  Where does your back pain spread? the right buttock and the right  thigh  Since your last clinic visit for back pain, how has your pain changed? rapidly worsening  Does your back pain interfere with your job? YES  Since your last visit, have you tried any new treatment? No    Hypothyroidism Follow-up  Since last visit, patient describes the following symptoms: Weight stable, no hair loss, no skin changes, no constipation, no loose stools    How many servings of fruits and vegetables do you eat daily?  2-3  On average, how many sweetened beverages do you drink each day (Examples: soda, juice, sweet tea, etc.  Do NOT count diet or artificially sweetened beverages)?   2  How many days per week do you exercise enough to make your heart beat faster? 3 or less  How many minutes a day do you exercise enough to make your heart beat faster? 60 or more  How many days per week do you miss taking your medication? 0    Review of Systems  Constitutional, HEENT, cardiovascular, pulmonary, gi and gu systems are negative, except as otherwise noted.        Objective    BP (!) 150/75 (BP Location: Left arm, Patient Position: Sitting, Cuff Size: Adult Large)   Pulse 100   Temp 98  F (36.7  C) (Oral)   Resp 19   LMP  (LMP Unknown)   SpO2 96%   There is no height or weight on file to calculate BMI.  Physical Exam   GENERAL: alert and no distress  EYES: Eyes grossly normal to  inspection, conjunctivae and sclerae normal  RESP: lungs clear to auscultation - no rales, rhonchi or wheezes  CV: regular rate  MS: no gross musculoskeletal defects noted        Signed Electronically by: Alva Kaiser PA-C

## 2025-06-04 ENCOUNTER — RESULTS FOLLOW-UP (OUTPATIENT)
Dept: FAMILY MEDICINE | Facility: CLINIC | Age: 80
End: 2025-06-04

## 2025-06-04 LAB — T4 FREE SERPL-MCNC: 1.69 NG/DL (ref 0.9–1.7)

## 2025-06-11 ENCOUNTER — MYC MEDICAL ADVICE (OUTPATIENT)
Dept: FAMILY MEDICINE | Facility: CLINIC | Age: 80
End: 2025-06-11
Payer: COMMERCIAL

## 2025-06-11 DIAGNOSIS — G89.29 OTHER CHRONIC PAIN: ICD-10-CM

## 2025-06-11 DIAGNOSIS — M54.16 SPINAL STENOSIS OF LUMBAR REGION WITH RADICULOPATHY: ICD-10-CM

## 2025-06-11 DIAGNOSIS — M48.061 SPINAL STENOSIS OF LUMBAR REGION WITH RADICULOPATHY: ICD-10-CM

## 2025-06-12 RX ORDER — CYCLOBENZAPRINE HCL 5 MG
5-10 TABLET ORAL 2 TIMES DAILY PRN
Qty: 40 TABLET | Refills: 0 | Status: SHIPPED | OUTPATIENT
Start: 2025-06-12

## 2025-06-12 NOTE — TELEPHONE ENCOUNTER
Alva- see mychart update below.  T'd up cyclobenzaprine.  I do not see mention of imaging.  Please advise.  Mera Motta, RN    6/3/2025  [facility]    (G89.29) Other chronic pain  (M48.061,  M54.16) Spinal stenosis of lumbar region with radiculopathy  Chronic pain on Norco. In the past two weeks she has been having increased discomfort, unknown trigger. Discussed pain and currently using 1/2 tablet twice daily and then 2 tablets at night. Recommend doing 1.5 tablets at night and do 1/2 tablet 3 times daily to provide more pain relief during the daytimes hours and keeps total daily usage to 3 pills/daily. Will also given Flexeril to use, did caution to only use between Norco doses and not at the same time. She is going to reach out in 1 week with update on symptoms and can adjust plan if not improvement in symptoms.   Plan: HYDROcodone-acetaminophen (NORCO) 5-325 MG         tablet, cyclobenzaprine (FLEXERIL) 5 MG tablet    Signed Electronically by: Alva Kaiser PA-C

## 2025-06-30 ENCOUNTER — MYC REFILL (OUTPATIENT)
Dept: FAMILY MEDICINE | Facility: CLINIC | Age: 80
End: 2025-06-30
Payer: COMMERCIAL

## 2025-06-30 DIAGNOSIS — R60.0 LOWER EXTREMITY EDEMA: ICD-10-CM

## 2025-06-30 RX ORDER — FUROSEMIDE 20 MG/1
20 TABLET ORAL 2 TIMES DAILY
Qty: 180 TABLET | Refills: 3 | Status: SHIPPED | OUTPATIENT
Start: 2025-06-30

## 2025-07-09 ENCOUNTER — MYC MEDICAL ADVICE (OUTPATIENT)
Dept: FAMILY MEDICINE | Facility: CLINIC | Age: 80
End: 2025-07-09
Payer: COMMERCIAL

## 2025-07-09 DIAGNOSIS — M54.16 SPINAL STENOSIS OF LUMBAR REGION WITH RADICULOPATHY: ICD-10-CM

## 2025-07-09 DIAGNOSIS — G89.29 OTHER CHRONIC PAIN: ICD-10-CM

## 2025-07-09 DIAGNOSIS — M48.061 SPINAL STENOSIS OF LUMBAR REGION WITH RADICULOPATHY: ICD-10-CM

## 2025-07-09 NOTE — TELEPHONE ENCOUNTER
Alva- see message below.  Please advise.  DORA Haile.  Is there anyway you can refill the Hydrocodone for 7/12, since [facility] isn't open on Sundays ?  I am taking 3 per day, with 1 tablet of the muscle relaxant in the afternoon, but the pain isn't much better now. MRI is tomorrow...  Thank you.  Kahlil

## 2025-07-10 RX ORDER — HYDROCODONE BITARTRATE AND ACETAMINOPHEN 5; 325 MG/1; MG/1
1 TABLET ORAL EVERY 8 HOURS PRN
Qty: 90 TABLET | Refills: 0 | Status: SHIPPED | OUTPATIENT
Start: 2025-07-12

## 2025-07-14 ENCOUNTER — TELEPHONE (OUTPATIENT)
Dept: OTHER | Age: 80
End: 2025-07-14

## 2025-07-14 NOTE — TELEPHONE ENCOUNTER
SPINE PATIENTS - NEW PROTOCOL PREVISIT     RECORDS RECEVEIVED FROM: Referred by EDGARD King   REASON FOR VISIT: closed burst fracture of lumbar vertebra   PROVIDER: S[facility]   DATE OF APPT: 07/16/2025     NOTES (FOR ALL VISITS) STATUS DETAILS   OFFICE NOTE from referring provider  Referral and notes in chart   OFFICE NOTE from other specialist     DISCHARGE SUMMARY from hospital     DISCHARGE REPORT from ER     OPERATIVE REPORT     EMG REPORT     Injection     Physical therapy internal PT last completed in 2022     IMAGING  (FOR ALL VISITS) STATUS DETAILS   MRI (HEAD, NECK, SPINE) internal MRI lumbar 07/10/2025   XRAY (SPINE) *NEUROSURGERY*     CT (HEAD, NECK, SPINE)          Does patient have C2C?  Year last updated Action     YES   [x]   2016   Please update at appointment if outdated more than 5 years       NO     []   N/A   Please complete C2C at appointment

## 2025-07-14 NOTE — TELEPHONE ENCOUNTER
Phone call to patient and offered an appointment on 7/16/25 at 9:40 with a 9:25 am check in. Appointment was scheduled and she was appreciative of the call.     EDDIE Devries RN

## 2025-07-14 NOTE — TELEPHONE ENCOUNTER
What is the Concern:  Fracture, prefers [facility]  Date the concern started: 2 months ago  Injury related? no  Is this related to recent surgery?no  Laceration?  No  Body part affected:  R hip fracture  Has Patient been evaluated for condition? yes  Location the patient was evaluated and treated for the condition?   [facility], [facility]  Who is referring provider, (name and clinic location) EDGARD King  What images have been done? MRI; Location and City where images were taken:  [place]    Could we send this information to you in [facility] or would you prefer to receive a phone call?:   Patient would prefer a phone call   Okay to leave a detailed message?: Yes at Cell number on file:    Telephone Information:   Mobile [phone]

## 2025-07-15 ASSESSMENT — HOOS JR
HOOS JR TOTAL INTERVAL SCORE: 46.65
GOING UP OR DOWN STAIRS: SEVERE
RISING FROM SITTING: MODERATE
WALKING ON UNEVEN SURFACE: SEVERE
SITTING: MODERATE
BENDING TO THE FLOOR TO PICK UP OBJECT: MODERATE
LYING IN BED (TURNING OVER, MAINTAINING HIP POSITION): MODERATE

## 2025-07-16 ENCOUNTER — ANCILLARY PROCEDURE (OUTPATIENT)
Dept: GENERAL RADIOLOGY | Facility: CLINIC | Age: 80
End: 2025-07-16
Attending: STUDENT IN AN ORGANIZED HEALTH CARE EDUCATION/TRAINING PROGRAM
Payer: COMMERCIAL

## 2025-07-16 ENCOUNTER — PRE VISIT (OUTPATIENT)
Dept: NEUROSURGERY | Facility: CLINIC | Age: 80
End: 2025-07-16

## 2025-07-16 ENCOUNTER — ANCILLARY PROCEDURE (OUTPATIENT)
Dept: GENERAL RADIOLOGY | Facility: CLINIC | Age: 80
End: 2025-07-16
Attending: PHYSICIAN ASSISTANT
Payer: COMMERCIAL

## 2025-07-16 ENCOUNTER — OFFICE VISIT (OUTPATIENT)
Dept: NEUROSURGERY | Facility: CLINIC | Age: 80
End: 2025-07-16
Attending: PHYSICIAN ASSISTANT
Payer: COMMERCIAL

## 2025-07-16 ENCOUNTER — OFFICE VISIT (OUTPATIENT)
Dept: ORTHOPEDICS | Facility: CLINIC | Age: 80
End: 2025-07-16
Payer: COMMERCIAL

## 2025-07-16 DIAGNOSIS — S32.001A CLOSED BURST FRACTURE OF LUMBAR VERTEBRA, INITIAL ENCOUNTER (H): ICD-10-CM

## 2025-07-16 DIAGNOSIS — M16.11 OSTEOARTHRITIS OF ONE HIP, RIGHT: Primary | ICD-10-CM

## 2025-07-16 DIAGNOSIS — S72.001A HIP FRACTURE, RIGHT, CLOSED, INITIAL ENCOUNTER (H): ICD-10-CM

## 2025-07-16 PROCEDURE — G0463 HOSPITAL OUTPT CLINIC VISIT: HCPCS | Performed by: PHYSICIAN ASSISTANT

## 2025-07-16 PROCEDURE — 99204 OFFICE O/P NEW MOD 45 MIN: CPT | Performed by: STUDENT IN AN ORGANIZED HEALTH CARE EDUCATION/TRAINING PROGRAM

## 2025-07-16 PROCEDURE — 72202 X-RAY EXAM SI JOINTS 3/> VWS: CPT | Mod: TC | Performed by: RADIOLOGY

## 2025-07-16 PROCEDURE — 72100 X-RAY EXAM L-S SPINE 2/3 VWS: CPT | Mod: TC | Performed by: INTERNAL MEDICINE

## 2025-07-16 RX ORDER — TRANEXAMIC ACID 650 MG/1
1950 TABLET ORAL ONCE
OUTPATIENT
Start: 2025-07-16 | End: 2025-07-16

## 2025-07-16 ASSESSMENT — PAIN SCALES - GENERAL: PAINLEVEL_OUTOF10: SEVERE PAIN (8)

## 2025-07-16 NOTE — LETTER
7/16/2025      Kahlil Caputo  [address]  [facility] [address]      Dear Colleague,    Thank you for referring your patient, Kahlil Caputo, to the [facility] [place]. Please see a copy of my visit note below.        [facility]  Orthopaedic Surgery Consultation by Venancio Lima M.D.    Kahlil Caputo MRN# [id number]   Age: 79 year old YOB: 1945     Requesting physician: Alva Hilario     Background history:  DX:  Osteoporosis  Lumbar spinal stenosis, with DDD  Coronary artery disease  Hypothyroidism    TREATMENTS:  11/25/2013, Left Total Hip Arthroplasty, Chai Roy  8/30/2013, Exploratory laparotomy, ileocecal resection, removal retained pill cam, Chai Sloan           History of Present Illness:   Chief complaint: Right hip pain    79 year old female who presents for chronic right hip pain, ongoing for years and progressively worsening, especially in the last few months. Denies any injuries to the right hip, but she has experienced pain in recent weeks that is significantly affecting her quality of life. She localizes pain to the right buttock, lateral hip, and groin. Notes the location of her pain varies based on her positioning. States she has 5/10 pain at rest, with 7/10 pain with walking, and 10/10 pain with any increase in activity or movement of the hip, such as ambulating on stairs, lifting her RLE to get in/out of a vehicle, seated to standing, putting on shoes, as well as with sleep. Of note, the patient has a longstanding history of back pain and she endorses intermittent radiating pain from the lateral hip to the knee, as well as some radiating pain into the groin. She denies any history of back surgery. Denies any previous surgeries to the right hip.    She was previously seen at [facility] and has had bursa injections. Most recent was ~4 years ago that provided 1-2 weeks of relief. She mentions previous hip and back  corticosteroid injections have caused facial flushing and she would like to avoid further injections. For her pain, she is prescribed hydrocodone which she takes 0.5 tabs twice a day, in addition to 2 tabs at night.    She is s/p Left Total Hip Arthroplasty with Dr. Marshall at [facility] from 11/25/2013. Left hip doing well.    Patient ambulates with a cane, she lives alone and is independent for ADLs, though notes she has been limited due to her hip pain. She has two adult sons that live locally.     Social:   Occupation: retired   Living situation: lives alone in [facility] home   Hobbies / Sports: none currently, would like to get back to class at the [employer] [facility]    Smoking: No  Alcohol: Yes  Illicit drug use: No         Physical Exam:     EXAMINATION pertinent findings:   PSYCH: Pleasant, healthy-appearing, alert, oriented x3, cooperative. Normal mood and affect.  VITAL SIGNS: not currently breastfeeding.  Reviewed nursing intake notes.   There is no height or weight on file to calculate BMI.  RESP: non labored breathing   ABD: benign, soft, non-tender, no acute peritoneal findings  SKIN: grossly normal   LYMPHATIC: grossly normal, no adenopathy, no extremity edema  NEURO: grossly normal , no motor deficits  VASCULAR: satisfactory perfusion of all extremities   MUSCULOSKELETAL:   Alignment: Normal  Gait: antalgic due to right hip  Hips:   R hip: ROM  FF 80 , Extension 0 , IRF 10 , ERF 15 , ABD 30 , ADD 10 . Recognizable pain with hip flexion, internal and external rotation. Recognizable pain with log roll. SLR with pain to thigh and buttock.     Right LE:   Thigh and leg compartments soft and compressible   +Quad/TA/GSC/FHL/EHL   SILT DP/SP/Cb/Saph/Tib nerve distributions   Palpable dorsalis pedis pulse          Data:   All laboratory data reviewed  All imaging studies reviewed by me personally.     XR Pelvis and Hip Right 1 View 7/16/25:  My interpretation: End stage osteoarthritis of the right femoral acetabular  joint with subchondral sclerosis and subchondral cystic changes. S/p cemented left total hip arthroplasty in good position without evidence of loosening. No sign of fracture or malalignment.         Assessment and Plan:   Assessment:  79 year old female with chronic right hip pain due to severe osteoarthritic changes of the femoral acetabular joint. Insufficiently responding to nonsurgical treatment options.      Plan:  We had a long discussion with the patient regarding etiology and ongoing management options.  Reviewed surgical and nonsurgical treatments.  The non-operative management options consist of activity modification, pain medication, PT and injection therapy.  Patient has not yet trialed formal physical therapy or injection therapy but given the severity of osteoarthritic changes visualized on radiographic imaging studies and her current symptoms I do not anticipate that these would provide any long-term or significant relief.  We reviewed total hip replacement in detail including the procedure, the implants, the recovery process, and long-term outcomes.  We reviewed that the risks of the surgery include but are not limited to infection, wound problems, dislocation, persistent pain, leg length discrepancy, loosening, revision surgery.  We also reviewed less common risks such as neurovascular injury fracture, and other implant-related issues.  We reviewed other medical complications such as a blood clot which we would be mitigating by oral anticoagulants.  We discussed that the vast majority of cases have a highly successful outcome.  However there is a small subset of patients that do experience complications or problems following the hip replacement.  Based on a discussion of the risks and benefits, we believe that the benefits far outweigh the risks at this point and the patient would like to proceed with cemented possible hybrid right total hip arthroplasty surgery.  We will work on scheduling surgery  at a time that works well for them in the next few months.  Patient will contact us if they have any questions or concerns leading up to surgery. Before surgery the patient will attend a joint replacement class and will be seen by the PCP/anesthesiologist and dentist.    For additional information and frequently asked questions regarding joint replacements, scan the QR code image below on your phone camera or go to: [email or web address]         Thank you for your referral.    Venancio Lima MD, PhD     Adult Reconstruction  [facility] Department of Orthopaedic Surgery    This note was created using dictation software and may contain errors.  Please contact the creator for any clarifications that are needed.    DATA for DOCUMENTATION:         Past Medical History:     Patient Active Problem List   Diagnosis     Hypothyroidism     Stricture of small intestine (H)     Osteoarthritis of hip     Ileitis (ileocecal resection 8/2013)     Lumbago     Palpitations     PVC's (premature ventricular contractions)     Family history of premature CAD     Chronic pain     Pulmonary nodules     ACP (advance care planning)     Coronary artery disease involving native coronary artery of native heart without angina pectoris     Age-related osteoporosis with current pathological fracture with routine healing     Statin intolerance     PAD (peripheral artery disease)     Aspirin sensitivity     Sacroiliitis     Spinal stenosis of lumbar region with radiculopathy     Controlled substance agreement signed     Paroxysmal ventricular tachycardia (H)     Pain management contract signed     Past Medical History:   Diagnosis Date     Arthritis ? 1990s    osteoarthritis     Aspirin sensitive asthma 5/9/2018     Chronic airway obstruction, not elsewhere classified      Chronic pain     hip and left shoulder     Coronary artery disease involving native coronary  artery of native heart without angina pectoris 11/16/2016     Crohn's disease (H) 9/2013    with stricture in small intestine     Family history of premature CAD      History of blood transfusion 1975    post-partum hemorrhage     Migraine, unspecified, without mention of intractable migraine without mention of status migrainosus      PVC's (premature ventricular contractions)      Small bowel obstruction (H) 8/28/2013    Findings at the time of surgery include a segment of active Crohns' Ileitis with stenosis and pill cam which became lodged in the narrowed area.      Stricture of small intestine (H) 9/2013     TOBACCO ABUSE-CONTINUOUS      Unspecified glaucoma(365.9)     removed as a diagnosis     Unspecified hypothyroidism        Also see scanned health assessment forms.       Past Surgical History:     Past Surgical History:   Procedure Laterality Date     APPENDECTOMY  8/30/2013    incidental removal-part of small bowel resection     ARTHROPLASTY HIP  11/25/2013    Procedure: ARTHROPLASTY HIP;  Left Total Hip Arthroplasty  ;  Surgeon: Luis Marshall MD;  Location: [facility] OR     BIOPSY  ? 1990    left thigh-squamous cell carcinoma     BREAST SURGERY  ? 1991    left breast bx-negative     ENT SURGERY  ? 1960    T&A     EYE SURGERY  2016    R/L cataract removal with IOL placement; left cataract...     LAPAROTOMY EXPLORATORY  8/30/2013    Procedure: LAPAROTOMY EXPLORATORY;  LAPAROTOMY EXPLORATORY, Ileocecal Resection Resection, Removal of Retained Pill Cam;  Surgeon: Mitchell Fraser MD;  Location: [facility] OR     RESECTION ILEOCECAL  8/30/2013    Procedure: RESECTION ILEOCECAL;;  Surgeon: Mitchell Fraser MD;  Location: [facility] OR     ZZC NONSPECIFIC PROCEDURE  1970's    D& C X 2     ZZC NONSPECIFIC PROCEDURE  1964    PILONIDAL CYSTECTOMY     ZZC NONSPECIFIC PROCEDURE  1959    T&A     ZZC NONSPECIFIC PROCEDURE  1991    BREAST BX & CERVICAL POLYP     ZZHC COLONOSCOPY THRU STOMA, DIAGNOSTIC      2003            Social  History:     Social History     Socioeconomic History     Marital status:      Spouse name: Not on file     Number of children: 2     Years of education: 17     Highest education level: Not on file   Occupational History     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 2009     Employer: RETIRED   Tobacco Use     Smoking status: Former     Current packs/day: 0.00     Average packs/day: 1 pack/day for 50.0 years (50.0 ttl pk-yrs)     Types: Cigarettes     Start date: 6/7/1960     Quit date: 6/7/2010     Years since quitting: 15.1     Passive exposure: Never     Smokeless tobacco: Never     Tobacco comments:     no tobacco use since 6/7/2010   Vaping Use     Vaping status: Never Used   Substance and Sexual Activity     Alcohol use: Yes     Comment: occasional     Drug use: Never     Sexual activity: Not Currently     Partners: Male     Birth control/protection: None   Other Topics Concern      Service No     Blood Transfusions Yes     Comment: With second miscarriage in 1972 and after childbirth in 1975     Caffeine Concern Yes     Comment: 0-1 cup coffee per day     Occupational Exposure Yes     Comment: sick building syndrome     Hobby Hazards No     Sleep Concern Yes     Comment: takes melatonin, sleeps 5 hours     Stress Concern Yes     Comment: lesion removed from left thigh, pathology questionable, will have re-excision next week     Weight Concern No     Special Diet Yes     Comment: low residue, steamed veggies, greek yogurt, low sodium     Back Care Yes     Comment: low back     Exercise Yes     Comment: Curves 3 days week     Bike Helmet Not Asked     Seat Belt Yes     Self-Exams Yes     Comment: sporadic     Parent/sibling w/ CABG, MI or angioplasty before 65F 55M? No   Social History Narrative     Not on file     Social Drivers of Health     Financial Resource Strain: Low Risk  (11/24/2024)    Financial Resource Strain      Within the past 12 months, have you or  your family members you live with been unable to get utilities (heat, electricity) when it was really needed?: No   Food Insecurity: Low Risk  (11/24/2024)    Food Insecurity      Within the past 12 months, did you worry that your food would run out before you got money to buy more?: No      Within the past 12 months, did the food you bought just not last and you didn t have money to get more?: No   Transportation Needs: Low Risk  (11/24/2024)    Transportation Needs      Within the past 12 months, has lack of transportation kept you from medical appointments, getting your medicines, non-medical meetings or appointments, work, or from getting things that you need?: No   Physical Activity: Patient Declined (11/24/2024)    Exercise Vital Sign      Days of Exercise per Week: Patient declined      Minutes of Exercise per Session: Patient declined   Stress: No Stress Concern Present (11/24/2024)    [employer] [place] [employer] - Occupational Stress Questionnaire      Feeling of Stress : Not at all   Social Connections: Unknown (11/24/2024)    Social Connection and Isolation Panel [NHANES]      Frequency of Communication with Friends and Family: Not on file      Frequency of Social Gatherings with Friends and Family: Twice a week      Attends Restorationism Services: Not on file      Active Member of Clubs or Organizations: Not on file      Attends Club or Organization Meetings: Not on file      Marital Status: Not on file   Interpersonal Safety: Low Risk  (6/3/2025)    Interpersonal Safety      Do you feel physically and emotionally safe where you currently live?: Yes      Within the past 12 months, have you been hit, slapped, kicked or otherwise physically hurt by someone?: No      Within the past 12 months, have you been humiliated or emotionally abused in other ways by your partner or ex-partner?: No   Housing Stability: Low Risk  (11/24/2024)    Housing Stability      Do you have housing? : Yes      Are you  worried about losing your housing?: No            Family History:       Family History   Problem Relation Age of Onset     Alcohol/Drug Mother      Heart Disease Mother         cardiomyopathy     Thyroid Disease Mother      Alcohol/Drug Father      Heart Disease Father         CAD -  1st MI mid 50's     Myocardial Infarction Father         X4     Coronary Artery Disease Father      Osteoporosis Maternal Grandmother      Cancer Maternal Grandfather         stomach or throat - martini drinker/pipe smoker     Myocardial Infarction Brother      Breast Cancer No family hx of      Cancer - colorectal No family hx of             Medications:     Current Outpatient Medications   Medication Sig Dispense Refill     ACE/ARB NOT PRESCRIBED, INTENTIONAL, Please choose reason not prescribed, below       acetaminophen (TYLENOL) 500 MG tablet Take 500-1,000 mg by mouth as needed for mild pain       ascorbic acid (VITAMIN C) 500 MG tablet Take by mouth 2 times daily       ASPIRIN NOT PRESCRIBED (INTENTIONAL) Please choose reason not prescribed, below 0 each 0     BETA BLOCKER NOT PRESCRIBED, INTENTIONAL, Beta Blocker not prescribed intentionally due to COPD  0     betaxolol (BETOPTIC) 0.5 % ophthalmic solution INSTILL 1 DROP INTO EACH EYE QD  3     calcium carbonate (OS-THEA) 500 MG tablet 2 tablets 3 times daily  100 tablet 3     chlorpheniramine (CHLOR-TRIMETON) 4 MG tablet Take 4 mg by mouth every 6 hours as needed for allergies or rhinitis       Cholecalciferol (VITAMIN D) 1000 UNITS capsule Take 3,000 Units by mouth daily        cyclobenzaprine (FLEXERIL) 5 MG tablet Take 1-2 tablets (5-10 mg) by mouth 2 times daily as needed for muscle spasms. 40 tablet 0     furosemide (LASIX) 20 MG tablet Take 1 tablet (20 mg) by mouth 2 times daily. 180 tablet 3     HYDROcodone-acetaminophen (NORCO) 5-325 MG tablet Take 1 tablet by mouth every 8 hours as needed for pain or severe pain. Max 3 tablets/24 hours 90 tablet 0     levothyroxine  (SYNTHROID/LEVOTHROID) 150 MCG tablet Take 1 tablet (150 mcg) by mouth every morning (before breakfast). 90 tablet 1     melatonin 3 MG tablet Take 1-2 tablets (3-6 mg) by mouth nightly as needed for sleep (OTC)       Multiple Vitamin (MULTI-VITAMIN PO) Take by mouth daily        nystatin (MYCOSTATIN) 601151 UNIT/GM external cream APPLY TOPICALLY TO THE AFFECTED AREA 2 TIMES DAILY 30 g 2     STATIN NOT PRESCRIBED, INTENTIONAL, 1 each See Admin Instructions Statin not prescribed intentionally due to Allergy to statin 0 each 0     No current facility-administered medications for this visit.              Review of Systems:   A comprehensive 10 point review of systems (constitutional, ENT, cardiac, peripheral vascular, lymphatic, respiratory, GI, , Musculoskeletal, skin, Neurological) was performed and found to be negative except as described in this note.     See intake form completed by patient       Again, thank you for allowing me to participate in the care of your patient.        Sincerely,        Venancio Lima MD    Electronically signed

## 2025-07-16 NOTE — NURSING NOTE
"Kahlil Caputo is a 79 year old female who presents for:  Chief Complaint   Patient presents with    Consult        Vitals:    There were no vitals filed for this visit.  Pt did not want BP taken     BMI:  Estimated body mass index is 25.5 kg/m  as calculated from the following:    Height as of 11/26/24: 5' 6\" (1.676 m).    Weight as of 11/26/24: 158 lb (71.7 kg).    Pain Score:  Severe Pain (8)        Amendo Phorn      "

## 2025-07-16 NOTE — PROGRESS NOTES
[facility]  Orthopaedic Surgery Consultation by Venancio Lima M.D.    Kahlil Caputo MRN# [id number]   Age: 79 year old YOB: 1945     Requesting physician: Alva Hilario     Background history:  DX:  Osteoporosis  Lumbar spinal stenosis, with DDD  Coronary artery disease  Hypothyroidism    TREATMENTS:  11/25/2013, Left Total Hip Arthroplasty, Chai Roy  8/30/2013, Exploratory laparotomy, ileocecal resection, removal retained pill cam, Chai Sloan           History of Present Illness:   Chief complaint: Right hip pain    79 year old female who presents for chronic right hip pain, ongoing for years and progressively worsening, especially in the last few months. Denies any injuries to the right hip, but she has experienced pain in recent weeks that is significantly affecting her quality of life. She localizes pain to the right buttock, lateral hip, and groin. Notes the location of her pain varies based on her positioning. States she has 5/10 pain at rest, with 7/10 pain with walking, and 10/10 pain with any increase in activity or movement of the hip, such as ambulating on stairs, lifting her RLE to get in/out of a vehicle, seated to standing, putting on shoes, as well as with sleep. Of note, the patient has a longstanding history of back pain and she endorses intermittent radiating pain from the lateral hip to the knee, as well as some radiating pain into the groin. She denies any history of back surgery. Denies any previous surgeries to the right hip.    She was previously seen at [facility] and has had bursa injections. Most recent was ~4 years ago that provided 1-2 weeks of relief. She mentions previous hip and back corticosteroid injections have caused facial flushing and she would like to avoid further injections. For her pain, she is prescribed hydrocodone which she takes 0.5 tabs twice a day, in addition to 2 tabs at night.    She is s/p Left Total Hip  Arthroplasty with Dr. Marshall at [facility] from 11/25/2013. Left hip doing well.    Patient ambulates with a cane, she lives alone and is independent for ADLs, though notes she has been limited due to her hip pain. She has two adult sons that live locally.     Social:   Occupation: retired   Living situation: lives alone in [facility] home   Hobbies / Sports: none currently, would like to get back to class at the [employer]    Smoking: No  Alcohol: Yes  Illicit drug use: No         Physical Exam:     EXAMINATION pertinent findings:   PSYCH: Pleasant, healthy-appearing, alert, oriented x3, cooperative. Normal mood and affect.  VITAL SIGNS: not currently breastfeeding.  Reviewed nursing intake notes.   There is no height or weight on file to calculate BMI.  RESP: non labored breathing   ABD: benign, soft, non-tender, no acute peritoneal findings  SKIN: grossly normal   LYMPHATIC: grossly normal, no adenopathy, no extremity edema  NEURO: grossly normal , no motor deficits  VASCULAR: satisfactory perfusion of all extremities   MUSCULOSKELETAL:   Alignment: Normal  Gait: antalgic due to right hip  Hips:   R hip: ROM  FF 80 , Extension 0 , IRF 10 , ERF 15 , ABD 30 , ADD 10 . Recognizable pain with hip flexion, internal and external rotation. Recognizable pain with log roll. SLR with pain to thigh and buttock.     Right LE:   Thigh and leg compartments soft and compressible   +Quad/TA/GSC/FHL/EHL   SILT DP/SP/Cb/Saph/Tib nerve distributions   Palpable dorsalis pedis pulse          Data:   All laboratory data reviewed  All imaging studies reviewed by me personally.     XR Pelvis and Hip Right 1 View 7/16/25:  My interpretation: End stage osteoarthritis of the right femoral acetabular joint with subchondral sclerosis and subchondral cystic changes. S/p cemented left total hip arthroplasty in good position without evidence of loosening. No sign of fracture or malalignment.         Assessment and Plan:   Assessment:  79 year old  female with chronic right hip pain due to severe osteoarthritic changes of the femoral acetabular joint. Insufficiently responding to nonsurgical treatment options.      Plan:  We had a long discussion with the patient regarding etiology and ongoing management options.  Reviewed surgical and nonsurgical treatments.  The non-operative management options consist of activity modification, pain medication, PT and injection therapy.  Patient has not yet trialed formal physical therapy or injection therapy but given the severity of osteoarthritic changes visualized on radiographic imaging studies and her current symptoms I do not anticipate that these would provide any long-term or significant relief.  We reviewed total hip replacement in detail including the procedure, the implants, the recovery process, and long-term outcomes.  We reviewed that the risks of the surgery include but are not limited to infection, wound problems, dislocation, persistent pain, leg length discrepancy, loosening, revision surgery.  We also reviewed less common risks such as neurovascular injury fracture, and other implant-related issues.  We reviewed other medical complications such as a blood clot which we would be mitigating by oral anticoagulants.  We discussed that the vast majority of cases have a highly successful outcome.  However there is a small subset of patients that do experience complications or problems following the hip replacement.  Based on a discussion of the risks and benefits, we believe that the benefits far outweigh the risks at this point and the patient would like to proceed with cemented possible hybrid right total hip arthroplasty surgery.  We will work on scheduling surgery at a time that works well for them in the next few months.  Patient will contact us if they have any questions or concerns leading up to surgery. Before surgery the patient will attend a joint replacement class and will be seen by the  PCP/anesthesiologist and dentist.    For additional information and frequently asked questions regarding joint replacements, scan the QR code image below on your phone camera or go to: [email or web address]         Thank you for your referral.    Venancio Lima MD, PhD     Adult Reconstruction  [facility] Department of Orthopaedic Surgery    This note was created using dictation software and may contain errors.  Please contact the creator for any clarifications that are needed.    DATA for DOCUMENTATION:         Past Medical History:     Patient Active Problem List   Diagnosis    Hypothyroidism    Stricture of small intestine (H)    Osteoarthritis of hip    Ileitis (ileocecal resection 8/2013)    Lumbago    Palpitations    PVC's (premature ventricular contractions)    Family history of premature CAD    Chronic pain    Pulmonary nodules    ACP (advance care planning)    Coronary artery disease involving native coronary artery of native heart without angina pectoris    Age-related osteoporosis with current pathological fracture with routine healing    Statin intolerance    PAD (peripheral artery disease)    Aspirin sensitivity    Sacroiliitis    Spinal stenosis of lumbar region with radiculopathy    Controlled substance agreement signed    Paroxysmal ventricular tachycardia (H)    Pain management contract signed     Past Medical History:   Diagnosis Date    Arthritis ? 1990s    osteoarthritis    Aspirin sensitive asthma 5/9/2018    Chronic airway obstruction, not elsewhere classified     Chronic pain     hip and left shoulder    Coronary artery disease involving native coronary artery of native heart without angina pectoris 11/16/2016    Crohn's disease (H) 9/2013    with stricture in small intestine    Family history of premature CAD     History of blood transfusion 1975    post-partum hemorrhage    Migraine, unspecified, without mention  of intractable migraine without mention of status migrainosus     PVC's (premature ventricular contractions)     Small bowel obstruction (H) 8/28/2013    Findings at the time of surgery include a segment of active Crohns' Ileitis with stenosis and pill cam which became lodged in the narrowed area.     Stricture of small intestine (H) 9/2013    TOBACCO ABUSE-CONTINUOUS     Unspecified glaucoma(365.9)     removed as a diagnosis    Unspecified hypothyroidism        Also see scanned health assessment forms.       Past Surgical History:     Past Surgical History:   Procedure Laterality Date    APPENDECTOMY  8/30/2013    incidental removal-part of small bowel resection    ARTHROPLASTY HIP  11/25/2013    Procedure: ARTHROPLASTY HIP;  Left Total Hip Arthroplasty  ;  Surgeon: Luis Marshall MD;  Location: [facility] OR    BIOPSY  ? 1990    left thigh-squamous cell carcinoma    BREAST SURGERY  ? 1991    left breast bx-negative    ENT SURGERY  ? 1960    T&A    EYE SURGERY  2016    R/L cataract removal with IOL placement; left cataract...    LAPAROTOMY EXPLORATORY  8/30/2013    Procedure: LAPAROTOMY EXPLORATORY;  LAPAROTOMY EXPLORATORY, Ileocecal Resection Resection, Removal of Retained Pill Cam;  Surgeon: Mitchell Fraser MD;  Location: [facility] OR    RESECTION ILEOCECAL  8/30/2013    Procedure: RESECTION ILEOCECAL;;  Surgeon: Mitchell Fraser MD;  Location: [facility] OR    ZZC NONSPECIFIC PROCEDURE  1970's    D& C X 2    ZZC NONSPECIFIC PROCEDURE  1964    PILONIDAL CYSTECTOMY    ZZC NONSPECIFIC PROCEDURE  1959    T&A    ZZC NONSPECIFIC PROCEDURE  1991    BREAST BX & CERVICAL POLYP    ZZHC COLONOSCOPY THRU STOMA, DIAGNOSTIC      2003            Social History:     Social History     Socioeconomic History    Marital status:      Spouse name: Not on file    Number of children: 2    Years of education: 17    Highest education level: Not on file   Occupational History    Occupation: RN     Employer: [employer]     Comment:  retired 2009     Employer: RETIRED   Tobacco Use    Smoking status: Former     Current packs/day: 0.00     Average packs/day: 1 pack/day for 50.0 years (50.0 ttl pk-yrs)     Types: Cigarettes     Start date: 6/7/1960     Quit date: 6/7/2010     Years since quitting: 15.1     Passive exposure: Never    Smokeless tobacco: Never    Tobacco comments:     no tobacco use since 6/7/2010   Vaping Use    Vaping status: Never Used   Substance and Sexual Activity    Alcohol use: Yes     Comment: occasional    Drug use: Never    Sexual activity: Not Currently     Partners: Male     Birth control/protection: None   Other Topics Concern     Service No    Blood Transfusions Yes     Comment: With second miscarriage in 1972 and after childbirth in 1975    Caffeine Concern Yes     Comment: 0-1 cup coffee per day    Occupational Exposure Yes     Comment: sick building syndrome    Hobby Hazards No    Sleep Concern Yes     Comment: takes melatonin, sleeps 5 hours    Stress Concern Yes     Comment: lesion removed from left thigh, pathology questionable, will have re-excision next week    Weight Concern No    Special Diet Yes     Comment: low residue, steamed veggies, greek yogurt, low sodium    Back Care Yes     Comment: low back    Exercise Yes     Comment: Curves 3 days week    Bike Helmet Not Asked    Seat Belt Yes    Self-Exams Yes     Comment: sporadic    Parent/sibling w/ CABG, MI or angioplasty before 65F 55M? No   Social History Narrative    Not on file     Social Drivers of Health     Financial Resource Strain: Low Risk  (11/24/2024)    Financial Resource Strain     Within the past 12 months, have you or your family members you live with been unable to get utilities (heat, electricity) when it was really needed?: No   Food Insecurity: Low Risk  (11/24/2024)    Food Insecurity     Within the past 12 months, did you worry that your food would run out before you got money to buy more?: No     Within the past 12 months, did  the food you bought just not last and you didn t have money to get more?: No   Transportation Needs: Low Risk  (11/24/2024)    Transportation Needs     Within the past 12 months, has lack of transportation kept you from medical appointments, getting your medicines, non-medical meetings or appointments, work, or from getting things that you need?: No   Physical Activity: Patient Declined (11/24/2024)    Exercise Vital Sign     Days of Exercise per Week: Patient declined     Minutes of Exercise per Session: Patient declined   Stress: No Stress Concern Present (11/24/2024)    [place] [employer] - Occupational Stress Questionnaire     Feeling of Stress : Not at all   Social Connections: Unknown (11/24/2024)    Social Connection and Isolation Panel [NHANES]     Frequency of Communication with Friends and Family: Not on file     Frequency of Social Gatherings with Friends and Family: Twice a week     Attends Congregation Services: Not on file     Active Member of Clubs or Organizations: Not on file     Attends Club or Organization Meetings: Not on file     Marital Status: Not on file   Interpersonal Safety: Low Risk  (6/3/2025)    Interpersonal Safety     Do you feel physically and emotionally safe where you currently live?: Yes     Within the past 12 months, have you been hit, slapped, kicked or otherwise physically hurt by someone?: No     Within the past 12 months, have you been humiliated or emotionally abused in other ways by your partner or ex-partner?: No   Housing Stability: Low Risk  (11/24/2024)    Housing Stability     Do you have housing? : Yes     Are you worried about losing your housing?: No            Family History:       Family History   Problem Relation Age of Onset    Alcohol/Drug Mother     Heart Disease Mother         cardiomyopathy    Thyroid Disease Mother     Alcohol/Drug Father     Heart Disease Father         CAD -  1st MI mid 50's    Myocardial Infarction Father         X4     Coronary Artery Disease Father     Osteoporosis Maternal Grandmother     Cancer Maternal Grandfather         stomach or throat - martini drinker/pipe smoker    Myocardial Infarction Brother     Breast Cancer No family hx of     Cancer - colorectal No family hx of             Medications:     Current Outpatient Medications   Medication Sig Dispense Refill    ACE/ARB NOT PRESCRIBED, INTENTIONAL, Please choose reason not prescribed, below      acetaminophen (TYLENOL) 500 MG tablet Take 500-1,000 mg by mouth as needed for mild pain      ascorbic acid (VITAMIN C) 500 MG tablet Take by mouth 2 times daily      ASPIRIN NOT PRESCRIBED (INTENTIONAL) Please choose reason not prescribed, below 0 each 0    BETA BLOCKER NOT PRESCRIBED, INTENTIONAL, Beta Blocker not prescribed intentionally due to COPD  0    betaxolol (BETOPTIC) 0.5 % ophthalmic solution INSTILL 1 DROP INTO EACH EYE QD  3    calcium carbonate (OS-THEA) 500 MG tablet 2 tablets 3 times daily  100 tablet 3    chlorpheniramine (CHLOR-TRIMETON) 4 MG tablet Take 4 mg by mouth every 6 hours as needed for allergies or rhinitis      Cholecalciferol (VITAMIN D) 1000 UNITS capsule Take 3,000 Units by mouth daily       cyclobenzaprine (FLEXERIL) 5 MG tablet Take 1-2 tablets (5-10 mg) by mouth 2 times daily as needed for muscle spasms. 40 tablet 0    furosemide (LASIX) 20 MG tablet Take 1 tablet (20 mg) by mouth 2 times daily. 180 tablet 3    HYDROcodone-acetaminophen (NORCO) 5-325 MG tablet Take 1 tablet by mouth every 8 hours as needed for pain or severe pain. Max 3 tablets/24 hours 90 tablet 0    levothyroxine (SYNTHROID/LEVOTHROID) 150 MCG tablet Take 1 tablet (150 mcg) by mouth every morning (before breakfast). 90 tablet 1    melatonin 3 MG tablet Take 1-2 tablets (3-6 mg) by mouth nightly as needed for sleep (OTC)      Multiple Vitamin (MULTI-VITAMIN PO) Take by mouth daily       nystatin (MYCOSTATIN) 884602 UNIT/GM external cream APPLY TOPICALLY TO THE AFFECTED AREA 2  TIMES DAILY 30 g 2    STATIN NOT PRESCRIBED, INTENTIONAL, 1 each See Admin Instructions Statin not prescribed intentionally due to Allergy to statin 0 each 0     No current facility-administered medications for this visit.              Review of Systems:   A comprehensive 10 point review of systems (constitutional, ENT, cardiac, peripheral vascular, lymphatic, respiratory, GI, , Musculoskeletal, skin, Neurological) was performed and found to be negative except as described in this note.     See intake form completed by patient

## 2025-07-16 NOTE — PATIENT INSTRUCTIONS
1. Hip fracture, right, closed, initial encounter (H)    Schedule surgery.   Surgery Scheduler will contact you to assist with scheduling surgery.   You can contact her directly at [phone].   Prior to your scheduled surgery we advise scheduling with your dentist to obtain clearance for surgery, and to complete any recommended dental work prior.     Pre-operative Physical needed within 30 days of scheduled proceedure  Physical Therapy will be scheduled to start post op day 3-5.    For mor information regarding total joint surgery please visit our website:   [email or web address]    FORMS:   If you are needing any forms completed relating to your upcoming procedure, please send them to our office with a completed Release of Information.   Forms will be completed AFTER your procedure. A letter can be sent to your employer prior to surgery to inform them of your anticipated time off.    Please notify our staff if you would like a letter to do so.   Forms can be faxed directly to our clinic at [phone].     DO NOT BRING FORMS ON THE DATE OF SURGERY.     MEDICATION REFILL:   Please allow 3 business days for completion of medication refills for any surgery related prescription.   Medication refills submitted on Friday, may not be addressed until the following Monday.  You may request a refill via [employer], or by calling our Nurse Triage at [phone].      Call my office with any questions or concerns, [phone].

## 2025-07-16 NOTE — PROGRESS NOTES
NEUROSURGERY CLINIC CONSULT NOTE     DATE OF VISIT: 7/16/2025     SUBJECTIVE:     Kahlil Caputo is a pleasant 79 year old female with severe osteoporosis and prior L1 compression fracture (dx 2022) who presents to the clinic today for consultation on low back pain, L3 burst fracture. She is referred to the Neurosurgery Clinic by Dr. Kaiser in Primary Care. Pertinent medical history consists of right hip fracture in need of surgery.     Today, she reports a 2-month history of symptoms. She describes constant, sharp, stabbing, aching pain that initiates in the low lumbar region and radiates distally into right buttock, right hip, right groin and anterior thigh- she says it is difficult to note if this is from her right hip injury versus back. This pain is not accompanied by paresthesia, numbness and/or perceived weakness in the same distribution. Prolonged walking, standing and movement aggravate the symptoms, while alleviation is obtained by changing positions, resting. No mechanism of injury such as trauma or a fall is associated with the onset of the pain. There are no bowel or bladder changes. She admits to severe pain with walking.       She has participated in conservative therapies to include muscle relaxant, opioid, light activity. None of these modalities have provided any significant long term relief.     Last DXA 2023. Previously on fosamax and stopped due to heart burn side effects.         Current Outpatient Medications:     ACE/ARB NOT PRESCRIBED, INTENTIONAL,, Please choose reason not prescribed, below, Disp: , Rfl:     acetaminophen (TYLENOL) 500 MG tablet, Take 500-1,000 mg by mouth as needed for mild pain, Disp: , Rfl:     ascorbic acid (VITAMIN C) 500 MG tablet, Take by mouth 2 times daily, Disp: , Rfl:     ASPIRIN NOT PRESCRIBED (INTENTIONAL), Please choose reason not prescribed, below, Disp: 0 each, Rfl: 0    BETA BLOCKER NOT PRESCRIBED, INTENTIONAL,, Beta Blocker not prescribed intentionally due  "to COPD, Disp: , Rfl: 0    betaxolol (BETOPTIC) 0.5 % ophthalmic solution, INSTILL 1 DROP INTO EACH EYE QD, Disp: , Rfl: 3    calcium carbonate (OS-THEA) 500 MG tablet, 2 tablets 3 times daily , Disp: 100 tablet, Rfl: 3    chlorpheniramine (CHLOR-TRIMETON) 4 MG tablet, Take 4 mg by mouth every 6 hours as needed for allergies or rhinitis, Disp: , Rfl:     Cholecalciferol (VITAMIN D) 1000 UNITS capsule, Take 3,000 Units by mouth daily , Disp: , Rfl:     cyclobenzaprine (FLEXERIL) 5 MG tablet, Take 1-2 tablets (5-10 mg) by mouth 2 times daily as needed for muscle spasms., Disp: 40 tablet, Rfl: 0    furosemide (LASIX) 20 MG tablet, Take 1 tablet (20 mg) by mouth 2 times daily., Disp: 180 tablet, Rfl: 3    HYDROcodone-acetaminophen (NORCO) 5-325 MG tablet, Take 1 tablet by mouth every 8 hours as needed for pain or severe pain. Max 3 tablets/24 hours, Disp: 90 tablet, Rfl: 0    levothyroxine (SYNTHROID/LEVOTHROID) 150 MCG tablet, Take 1 tablet (150 mcg) by mouth every morning (before breakfast)., Disp: 90 tablet, Rfl: 1    melatonin 3 MG tablet, Take 1-2 tablets (3-6 mg) by mouth nightly as needed for sleep (OTC), Disp: , Rfl:     Multiple Vitamin (MULTI-VITAMIN PO), Take by mouth daily , Disp: , Rfl:     nystatin (MYCOSTATIN) 174230 UNIT/GM external cream, APPLY TOPICALLY TO THE AFFECTED AREA 2 TIMES DAILY, Disp: 30 g, Rfl: 2    STATIN NOT PRESCRIBED, INTENTIONAL,, 1 each See Admin Instructions Statin not prescribed intentionally due to Allergy to statin, Disp: 0 each, Rfl: 0     Allergies   Allergen Reactions    Latex Difficulty breathing     sensativity - cough, eyes water    Nsaids Shortness Of Breath and Other (See Comments)     bronchospams    Aspirin Difficulty breathing     tight cough    Morphine And Codeine GI Disturbance     upset GI    Pravastatin      Myalgias    Simvastatin      Myalgias    Tramadol      Heart burn,  Felt \"clammy\", unable to pass gas, unable to urinate and had severe nausea    Bacitracin Rash "    Neomycin-Polymyxin B Gu Blisters, Itching and Rash        Past Medical History:   Diagnosis Date    Arthritis ? 1990s    osteoarthritis    Aspirin sensitive asthma 5/9/2018    Chronic airway obstruction, not elsewhere classified     Chronic pain     hip and left shoulder    Coronary artery disease involving native coronary artery of native heart without angina pectoris 11/16/2016    Crohn's disease (H) 9/2013    with stricture in small intestine    Family history of premature CAD     History of blood transfusion 1975    post-partum hemorrhage    Migraine, unspecified, without mention of intractable migraine without mention of status migrainosus     PVC's (premature ventricular contractions)     Small bowel obstruction (H) 8/28/2013    Findings at the time of surgery include a segment of active Crohns' Ileitis with stenosis and pill cam which became lodged in the narrowed area.     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     Family History has been reviewed with the patient, there are no pertinent findings to presenting concern.     Past Surgical History:   Procedure Laterality Date    APPENDECTOMY  8/30/2013    incidental removal-part of small bowel resection    ARTHROPLASTY HIP  11/25/2013    Procedure: ARTHROPLASTY HIP;  Left Total Hip Arthroplasty  ;  Surgeon: Luis Marshall MD;  Location: [facility] OR    BIOPSY  ? 1990    left thigh-squamous cell carcinoma    BREAST SURGERY  ? 1991    left breast bx-negative    ENT SURGERY  ? 1960    T&A    EYE SURGERY  2016    R/L cataract removal with IOL placement; left cataract...    LAPAROTOMY EXPLORATORY  8/30/2013    Procedure: LAPAROTOMY EXPLORATORY;  LAPAROTOMY EXPLORATORY, Ileocecal Resection Resection, Removal of Retained Pill Cam;  Surgeon: Bria  "Mitchell ABDALLA MD;  Location: [facility] OR    RESECTION ILEOCECAL  8/30/2013    Procedure: RESECTION ILEOCECAL;;  Surgeon: Mitchell Fraser MD;  Location: [facility] OR    ZZC NONSPECIFIC PROCEDURE  1970's    D& C X 2    ZZC NONSPECIFIC PROCEDURE  1964    PILONIDAL CYSTECTOMY    ZZC NONSPECIFIC PROCEDURE  1959    T&A    ZZC NONSPECIFIC PROCEDURE  1991    BREAST BX & CERVICAL POLYP    ZZHC COLONOSCOPY THRU STOMA, DIAGNOSTIC      2003        Social History     Tobacco Use    Smoking status: Former     Current packs/day: 0.00     Average packs/day: 1 pack/day for 50.0 years (50.0 ttl pk-yrs)     Types: Cigarettes     Start date: 6/7/1960     Quit date: 6/7/2010     Years since quitting: 15.1     Passive exposure: Never    Smokeless tobacco: Never    Tobacco comments:     no tobacco use since 6/7/2010   Vaping Use    Vaping status: Never Used   Substance Use Topics    Alcohol use: Yes     Comment: occasional    Drug use: Never        OBJECTIVE:   LMP  (LMP Unknown)    There is no height or weight on file to calculate BMI.     Imaging:     EXAM: MR LUMBAR SPINE W/O CONTRAST  LOCATION: [facility]  DATE: 7/10/2025     INDICATION:  Spinal stenosis of lumbar region with radiculopathy, Spinal stenosis of lumbar region with radiculopathy, Age-related osteoporosis with current pathological fracture with routine healing, Hip pain, right  COMPARISON: 5/3/2022  TECHNIQUE: Routine Lumbar Spine MRI without IV contrast. Foraminal stenosis graded with guidelines provided by \"A Practical MRI Grading System for Lumbar Foraminal Stenosis\" www.ajronline.org/doi/full/10.2214/AJR.09.2772     FINDINGS:  5 lumbar vertebrae. Hypoplastic L1 left rib.     Alignment: Normal.  Vertebrae: L3 superior endplate burst fracture which involves the anterior/posterior walls and extends into the right pedicle as well as the superior articular process (approximately 30% central height loss); no substantial retropulsion of the posterior   wall. No " findings to suggest associated ligamentous injury. No findings to suggest additional acute/subacute fracture. Chronic L1 compression deformity. Multilevel anterior vertebral body osteophyte formation.  Marrow signal: Mixed type I/II Modic changes at L5-S1.     Paraspinal soft tissue: Edema throughout the soft tissue adjacent to the L3 fracture described above.     Intrathecal contents: Conus medullaris terminates appropriately at L1. Normal morphology of the cauda equina. No discernible intrathecal mass or epidural fluid collection.     T12-L1: Disc bulge with osteophytic ridging. Mild facet arthropathy. Mild spinal canal stenosis. No foraminal stenosis.     L1-L2: Disc bulge. Moderate facet arthropathy. No spinal canal stenosis. No foraminal stenosis.     L2-L3: Disc bulge with osteophytic ridging. Moderate facet arthropathy. Moderate spinal canal stenosis. Mild bilateral foraminal stenosis.     L3-L4: Disc bulge with osteophytic ridging. Moderate facet arthropathy. Moderate spinal canal stenosis. Mild bilateral foraminal stenosis.     L4-L5: Disc bulge with osteophytic ridging. Moderate facet arthropathy. Lateral recess narrowing without central spinal canal stenosis. Mild right and no left foraminal stenosis.     L5-S1: Disc bulge. Mild facet arthropathy. Lateral recess narrowing without central spinal canal stenosis. No foraminal stenosis.                                                                      IMPRESSION:  1.  Acute to subacute L3 superior endplate burst fracture as described above.  2.  Degenerative changes throughout the lumbar spine with moderate spinal canal stenosis at L2-L3 and L3-L4 and mild spinal canal stenosis at T12-L1. Multilevel mild foraminal stenosis.       Full radiological report in chart. Imaging was reviewed with with patient today.     Exam:   CN II-XII grossly intact, alert and appropriate with conversation and following commands.   Gait is non-antalgic.     Moving BUE  symmetrically    Lumbar spine is tender to palpation mid spine and paraspinous muscles  Intact sensation throughout lower extremities.     LE muscle strength  Right  Left    Iliopsoas (hip flexion)  5/5 -pain limiting 5/5    Quad (knee extension)  5/5 -pain limiting 5/5    Hamstring (knee flexion)  5/5  5/5    Gastrocnemius (PF)  5/5  5/5    Tibialis Ant. (DF)  5/5  5/5    EHL  5/5  5/5      Calves are soft and non-tender bilaterally.     ASSESSMENT/PLAN:     -lumbar upright XR   -orthotics for LSO bracing  -brace to be worn when up and ambulating, off with rest/sleep/hygiene  -light activity only-Please limit your lifting to no more that ten pounds and avoid excessive bending, twisting and turning at the lumbar spine. You should also avoid excessive jostling and jarring activities.   -follow up 6 weeks with XR prior    Dr. Laird reviewed history, imaging and in agreement with plans    Terra Sam PA-C  [address] [address]  Tel [phone]  Fax [phone]

## 2025-07-16 NOTE — PATIENT INSTRUCTIONS
-lumbar upright XR   -orthotics for bracing  -brace to be worn when up and ambulating, off with rest/sleep/hygiene  -light activity only-Please limit your lifting to no more that ten pounds and avoid excessive bending, twisting and turning at the lumbar spine. You should also avoid excessive jostling and jarring activities.   -follow up 6 weeks with XR prior    Terra Sam PA-C  [address] [address]  Tel [phone]  Fax [phone]  Text page via [facility] Paging/Directory

## 2025-07-20 ENCOUNTER — MYC REFILL (OUTPATIENT)
Dept: FAMILY MEDICINE | Facility: CLINIC | Age: 80
End: 2025-07-20
Payer: COMMERCIAL

## 2025-07-20 DIAGNOSIS — M48.061 SPINAL STENOSIS OF LUMBAR REGION WITH RADICULOPATHY: ICD-10-CM

## 2025-07-20 DIAGNOSIS — M54.16 SPINAL STENOSIS OF LUMBAR REGION WITH RADICULOPATHY: ICD-10-CM

## 2025-07-20 DIAGNOSIS — G89.29 OTHER CHRONIC PAIN: ICD-10-CM

## 2025-07-21 RX ORDER — CYCLOBENZAPRINE HCL 5 MG
5-10 TABLET ORAL 2 TIMES DAILY PRN
Qty: 40 TABLET | Refills: 0 | Status: SHIPPED | OUTPATIENT
Start: 2025-07-21

## 2025-07-30 ENCOUNTER — VIRTUAL VISIT (OUTPATIENT)
Dept: ORTHOPEDICS | Facility: CLINIC | Age: 80
End: 2025-07-30
Payer: COMMERCIAL

## 2025-07-30 ENCOUNTER — OFFICE VISIT (OUTPATIENT)
Dept: FAMILY MEDICINE | Facility: CLINIC | Age: 80
End: 2025-07-30
Payer: COMMERCIAL

## 2025-07-30 VITALS
WEIGHT: 152 LBS | DIASTOLIC BLOOD PRESSURE: 80 MMHG | SYSTOLIC BLOOD PRESSURE: 142 MMHG | RESPIRATION RATE: 26 BRPM | HEIGHT: 66 IN | OXYGEN SATURATION: 98 % | HEART RATE: 101 BPM | BODY MASS INDEX: 24.43 KG/M2

## 2025-07-30 DIAGNOSIS — R01.2 S4 (FOURTH HEART SOUND): ICD-10-CM

## 2025-07-30 DIAGNOSIS — M16.11 OSTEOARTHRITIS OF RIGHT HIP: Primary | ICD-10-CM

## 2025-07-30 DIAGNOSIS — Z01.818 PRE-OP EXAM: Primary | ICD-10-CM

## 2025-07-30 DIAGNOSIS — I25.10 CORONARY ARTERY DISEASE INVOLVING NATIVE CORONARY ARTERY OF NATIVE HEART WITHOUT ANGINA PECTORIS: ICD-10-CM

## 2025-07-30 DIAGNOSIS — M16.11 OSTEOARTHRITIS OF RIGHT HIP, UNSPECIFIED OSTEOARTHRITIS TYPE: ICD-10-CM

## 2025-07-30 DIAGNOSIS — I73.9 PAD (PERIPHERAL ARTERY DISEASE): ICD-10-CM

## 2025-07-30 LAB
ERYTHROCYTE [DISTWIDTH] IN BLOOD BY AUTOMATED COUNT: 12.8 % (ref 10–15)
HCT VFR BLD AUTO: 42.9 % (ref 35–47)
HGB BLD-MCNC: 13.8 G/DL (ref 11.7–15.7)
MCH RBC QN AUTO: 28.3 PG (ref 26.5–33)
MCHC RBC AUTO-ENTMCNC: 32.2 G/DL (ref 31.5–36.5)
MCV RBC AUTO: 88 FL (ref 78–100)
PLATELET # BLD AUTO: 351 10E3/UL (ref 150–450)
RBC # BLD AUTO: 4.87 10E6/UL (ref 3.8–5.2)
WBC # BLD AUTO: 7.7 10E3/UL (ref 4–11)

## 2025-07-30 PROCEDURE — 3077F SYST BP >= 140 MM HG: CPT

## 2025-07-30 PROCEDURE — 99215 OFFICE O/P EST HI 40 MIN: CPT

## 2025-07-30 PROCEDURE — 80048 BASIC METABOLIC PNL TOTAL CA: CPT

## 2025-07-30 PROCEDURE — 99207 PR NO CHARGE NURSE ONLY: CPT | Mod: 93

## 2025-07-30 PROCEDURE — 93000 ELECTROCARDIOGRAM COMPLETE: CPT

## 2025-07-30 PROCEDURE — 36415 COLL VENOUS BLD VENIPUNCTURE: CPT

## 2025-07-30 PROCEDURE — 85027 COMPLETE CBC AUTOMATED: CPT

## 2025-07-30 PROCEDURE — 3079F DIAST BP 80-89 MM HG: CPT

## 2025-07-30 NOTE — PROGRESS NOTES
Preoperative Evaluation  [facility]  [address] [address]  Phone: [phone]  Primary Provider: Alva Kaiser PA-C  Pre-op Performing Provider: MALIK Swenson CNP  Jul 30, 2025 7/29/2025   Surgical Information   What procedure is being done? RIght hip replacement   Facility or Hospital where procedure/surgery will be performed: [facility]   Who is doing the procedure / surgery? Dr. Venancio Lima   Date of surgery / procedure: 8/26/2025   Time of surgery / procedure: 2:00 pm   Where do you plan to recover after surgery? at home with family     Fax number for surgical facility: Note does not need to be faxed, will be available electronically in Epic.    Assessment & Plan     The proposed surgical procedure is considered INTERMEDIATE risk.    (Z01.818) Pre-op exam  (primary encounter diagnosis)  (M16.11) Osteoarthritis of right hip, unspecified osteoarthritis type  Comment: S4 noted on exam today.  Would like to update patient's echocardiogram.  EKG and labs updated today.  EKG noting atrial flutter, however P waves and T waves noted in multiple leads.  Discussed with Dr. Annie De León.  Patient's history of severe coronary artery disease, has not seen cardiology for more than 10 years.  Last echocardiogram more than 10 years ago.  Depending on echocardiogram patient may need to have cardiology preop clearance prior to surgery given extra heart sound as well as severe coronary artery disease. Patient fully understands and is agreeable with plan of care, at this point patient will follow up as needed unless acute concerns arise in the meantime.  Plan: EKG 12-lead complete w/read - Clinics, Basic         metabolic panel  (Ca, Cl, CO2, Creat, Gluc, K,         Na, BUN), CBC with platelets, Echocardiogram         Complete with Lumason    Addendum: upon discussion w/ colleague Kylie GAN CNP, feel it safest for patient to be  evaluated by cardiology w/ updated echocardiogram prior to procedure. Referral placed. Will have team help facilitate.     (R01.2) S4 (fourth heart sound)  (I25.10) Coronary artery disease involving native coronary artery of native heart without angina pectoris  (I73.9) PAD (peripheral artery disease)  Comment: as above.  Plan: EKG 12-lead complete w/read - Clinics,         Echocardiogram Complete with Lumason     50 minutes spent by me on the date of the encounter doing chart review, history and exam, documentation and further activities per the note     - No identified additional risk factors other than previously addressed    Antiplatelet or Anticoagulation Medication Instructions   - We reviewed the medication list and the patient is not on an antiplatelet or anticoagulation medications.    Additional Medication Instructions  Take all scheduled medications on the day of surgery EXCEPT for modifications listed below:   - Herbal medications and vitamins: DO NOT TAKE 14 days prior to surgery.   - Diuretics (furosemide, hydrochlorothiazide, chlorothalidone): DO NOT TAKE on the day of surgery.   - Opioids: Continue without modification.   956}    Recommendation  Approval given to proceed with proposed procedure pending review of diagnostic evaluation. May need cardiac evaluation prior to procedure.     Paulina Guillory is a 79 year old, presenting for the following:  Pre-Op Exam (Right hip replacement)          7/30/2025     1:21 PM   Additional Questions   Roomed by maninder waldrop     HPI: right hip arthiritis           7/29/2025   Pre-Op Questionnaire   Have you ever had a heart attack or stroke? No   Have you ever had surgery on your heart or blood vessels, such as a stent placement, a coronary artery bypass, or surgery on an artery in your head, neck, heart, or legs? No   Do you have chest pain with activity? No   Do you have a history of heart failure? No   Do you currently have a cold, bronchitis or symptoms of  other infection? No   Do you have a cough, shortness of breath, or wheezing? No   Do you or anyone in your family have previous history of blood clots? No   Do you or does anyone in your family have a serious bleeding problem such as prolonged bleeding following surgeries or cuts? No   Have you ever had problems with anemia or been told to take iron pills? No   Have you had any abnormal blood loss such as black, tarry or bloody stools, or abnormal vaginal bleeding? No   Have you ever had a blood transfusion? (!) YES   Have you ever had a transfusion reaction? No   Are you willing to have a blood transfusion if it is medically needed before, during, or after your surgery? Yes   Have you or any of your relatives ever had problems with anesthesia? No   Do you have sleep apnea, excessive snoring or daytime drowsiness? No   Do you have any artifical heart valves or other implanted medical devices like a pacemaker, defibrillator, or continuous glucose monitor? No   Do you have artificial joints? (!) YES   Are you allergic to latex? (!) YES     Advance Care Planning    Discussed advance care planning with patient; however, patient declined at this time. Patient has information, will fill out when she is able.     Preoperative Review of    reviewed - controlled substances reflected in medication list.      Status of Chronic Conditions:  See problem list for active medical problems.  Problems all longstanding and stable, except as noted/documented.  See ROS for pertinent symptoms related to these conditions.    CAD - Patient has a longstanding history of moderate-severe CAD. Patient denies recent chest pain or NTG use, denies exercise induced dyspnea or PND. Last Stress test 2015, EKG 2018.     HYPOTHYROIDISM - Patient has a longstanding history of chronic Hypothyroidism. Patient has been doing well, noting no tremor, insomnia, hair loss or changes in skin texture. Continues to take medications as directed, without  adverse reactions or side effects. Last TSH   Lab Results   Component Value Date    TSH 6.11 (H) 06/03/2025   .      Patient Active Problem List    Diagnosis Date Noted    Pain management contract signed 06/06/2023     Priority: Medium    Paroxysmal ventricular tachycardia (H) 11/17/2022     Priority: Medium    Controlled substance agreement signed 05/24/2022     Priority: Medium    Spinal stenosis of lumbar region with radiculopathy 04/25/2022     Priority: Medium    Sacroiliitis 09/25/2018     Priority: Medium    Aspirin sensitivity 06/26/2018     Priority: Medium    Statin intolerance 05/09/2018     Priority: Medium    PAD (peripheral artery disease) 05/09/2018     Priority: Medium    Age-related osteoporosis with current pathological fracture with routine healing 04/30/2018     Priority: Medium    Coronary artery disease involving native coronary artery of native heart without angina pectoris 11/16/2016     Priority: Medium     Significant coronary Ca++ on CT scans      ACP (advance care planning) 06/07/2016     Priority: Medium     Advance Care Planning 6/7/2016: ACP Facilitation Session:    Kahlil Caputo presented for ACP Facilitation session at a group session. She was accompanied by no one. [employer] Cho[employer] information provided and resources reviewed. She currently wishes to give additional consideration to ACP  She currently has the following questions or concerns about Advance Care Planning: none known.  Confirmed/documented legally designated decision maker(s).  Added by Margie Hewitt RN, Advance Care Planning Liaison.        Pulmonary nodules 11/22/2015     Priority: Medium     11/18/2015:  Low dose CT, recommend follow up CT in 1 year.   11/2020:  Low dose CT, recommend follow up CT in 1 year.      Chronic pain 10/16/2015     Priority: Medium     Patient is followed by [facility], SUSAN RACHELE for ongoing prescription of pain medication.  All refills should be approved by this provider, or covering  partner.    Medication(s): Norco 5 mg/325 mg take 1 tablet every 4-6 hour prn pain  Maximum quantity per month: 60  Clinic visit frequency required: Q 3  months     Controlled substance agreement on file: Yes       Date(s): 12/17/18    Pain Clinic evaluation in the past: Yes, 1/31/2017,[facility] pain clinic    DIRE Total Score(s): 16, 1/31/2017    Last [facility] website verification: 1/14/20   [email or web address]      Family history of premature CAD 08/24/2015     Priority: Medium    Palpitations 08/20/2015     Priority: Medium     PVCs on holter 08/2015      PVC's (premature ventricular contractions) 08/20/2015     Priority: Medium    Lumbago 06/09/2015     Priority: Medium    Ileitis (ileocecal resection 8/2013) 04/15/2014     Priority: Medium    Osteoarthritis of hip 11/29/2013     Priority: Medium     Do you wish to do the replacement in the background? yes        Stricture of small intestine (H) 09/01/2013     Priority: Medium    Hypothyroidism 07/09/2003     Priority: Medium     Problem list name updated by automated process. Provider to review        Past Medical History:   Diagnosis Date    Arthritis ? 1990s    osteoarthritis    Aspirin sensitive asthma 5/9/2018    Chronic airway obstruction, not elsewhere classified     Chronic pain     hip and left shoulder    Coronary artery disease involving native coronary artery of native heart without angina pectoris 11/16/2016    Crohn's disease (H) 9/2013    with stricture in small intestine    Family history of premature CAD     History of blood transfusion 1975    post-partum hemorrhage    Migraine, unspecified, without mention of intractable migraine without mention of status migrainosus     PVC's (premature ventricular contractions)     Small bowel obstruction (H) 8/28/2013    Findings at the time of surgery include a segment of active Crohns' Ileitis with stenosis and pill cam which became lodged in the narrowed area.     Stricture of small intestine (H) 9/2013     TOBACCO ABUSE-CONTINUOUS     Unspecified glaucoma(365.9)     removed as a diagnosis    Unspecified hypothyroidism      Past Surgical History:   Procedure Laterality Date    APPENDECTOMY  8/30/2013    incidental removal-part of small bowel resection    ARTHROPLASTY HIP  11/25/2013    Procedure: ARTHROPLASTY HIP;  Left Total Hip Arthroplasty  ;  Surgeon: Luis Marshall MD;  Location: [facility] OR    BIOPSY  ? 1990    left thigh-squamous cell carcinoma    BREAST SURGERY  ? 1991    left breast bx-negative    ENT SURGERY  ? 1960    T&A    EYE SURGERY  2016    R/L cataract removal with IOL placement; left cataract...    LAPAROTOMY EXPLORATORY  8/30/2013    Procedure: LAPAROTOMY EXPLORATORY;  LAPAROTOMY EXPLORATORY, Ileocecal Resection Resection, Removal of Retained Pill Cam;  Surgeon: Mitchell Fraser MD;  Location: [facility] OR    RESECTION ILEOCECAL  8/30/2013    Procedure: RESECTION ILEOCECAL;;  Surgeon: Mitchell Fraser MD;  Location: [facility] OR    ZZC NONSPECIFIC PROCEDURE  1970's    D& C X 2    ZZC NONSPECIFIC PROCEDURE  1964    PILONIDAL CYSTECTOMY    ZZC NONSPECIFIC PROCEDURE  1959    T&A    ZZC NONSPECIFIC PROCEDURE  1991    BREAST BX & CERVICAL POLYP    ZZHC COLONOSCOPY THRU STOMA, DIAGNOSTIC      2003     Current Outpatient Medications   Medication Sig Dispense Refill    ACE/ARB NOT PRESCRIBED, INTENTIONAL, Please choose reason not prescribed, below      acetaminophen (TYLENOL) 500 MG tablet Take 500-1,000 mg by mouth as needed for mild pain      ascorbic acid (VITAMIN C) 500 MG tablet Take by mouth 2 times daily      ASPIRIN NOT PRESCRIBED (INTENTIONAL) Please choose reason not prescribed, below 0 each 0    BETA BLOCKER NOT PRESCRIBED, INTENTIONAL, Beta Blocker not prescribed intentionally due to COPD  0    betaxolol (BETOPTIC) 0.5 % ophthalmic solution INSTILL 1 DROP INTO EACH EYE QD  3    calcium carbonate (OS-THEA) 500 MG tablet 2 tablets 3 times daily  100 tablet 3    chlorpheniramine (CHLOR-TRIMETON) 4 MG  "tablet Take 4 mg by mouth every 6 hours as needed for allergies or rhinitis      Cholecalciferol (VITAMIN D) 1000 UNITS capsule Take 3,000 Units by mouth daily       cyclobenzaprine (FLEXERIL) 5 MG tablet Take 1-2 tablets (5-10 mg) by mouth 2 times daily as needed for muscle spasms. 40 tablet 0    furosemide (LASIX) 20 MG tablet Take 1 tablet (20 mg) by mouth 2 times daily. 180 tablet 3    HYDROcodone-acetaminophen (NORCO) 5-325 MG tablet Take 1 tablet by mouth every 8 hours as needed for pain or severe pain. Max 3 tablets/24 hours 90 tablet 0    levothyroxine (SYNTHROID/LEVOTHROID) 150 MCG tablet Take 1 tablet (150 mcg) by mouth every morning (before breakfast). 90 tablet 1    melatonin 3 MG tablet Take 1-2 tablets (3-6 mg) by mouth nightly as needed for sleep (OTC)      Multiple Vitamin (MULTI-VITAMIN PO) Take by mouth daily       nystatin (MYCOSTATIN) 656695 UNIT/GM external cream APPLY TOPICALLY TO THE AFFECTED AREA 2 TIMES DAILY 30 g 2    STATIN NOT PRESCRIBED, INTENTIONAL, 1 each See Admin Instructions Statin not prescribed intentionally due to Allergy to statin 0 each 0       Allergies   Allergen Reactions    Latex Difficulty breathing     sensativity - cough, eyes water    Nsaids Shortness Of Breath and Other (See Comments)     bronchospams    Aspirin Difficulty breathing     tight cough    Morphine And Codeine GI Disturbance     upset GI    Pravastatin      Myalgias    Simvastatin      Myalgias    Tramadol      Heart burn,  Felt \"clammy\", unable to pass gas, unable to urinate and had severe nausea    Bacitracin Rash    Neomycin-Polymyxin B Gu Blisters, Itching and Rash        Social History     Tobacco Use    Smoking status: Former     Current packs/day: 0.00     Average packs/day: 1 pack/day for 50.0 years (50.0 ttl pk-yrs)     Types: Cigarettes     Start date: 6/7/1960     Quit date: 6/7/2010     Years since quitting: 15.1     Passive exposure: Never    Smokeless tobacco: Never    Tobacco comments:     no " "tobacco use since 6/7/2010   Substance Use Topics    Alcohol use: Yes     Comment: occasional     Family History   Problem Relation Age of Onset    Alcohol/Drug Mother     Heart Disease Mother         cardiomyopathy    Thyroid Disease Mother     Alcohol/Drug Father     Heart Disease Father         CAD -  1st MI mid 50's    Myocardial Infarction Father         X4    Coronary Artery Disease Father     Osteoporosis Maternal Grandmother     Cancer Maternal Grandfather         stomach or throat - martini drinker/pipe smoker    Myocardial Infarction Brother     Breast Cancer No family hx of     Cancer - colorectal No family hx of      History   Drug Use Unknown         Review of Systems  Constitutional, HEENT, cardiovascular, pulmonary, GI, , musculoskeletal, neuro, skin, endocrine and psych systems are negative, except as otherwise noted.    Objective    BP (!) 142/80 (BP Location: Left arm, Patient Position: Sitting, Cuff Size: Adult Small)   Pulse 101   Resp 26   Ht 1.676 m (5' 6\")   Wt 68.9 kg (152 lb)   LMP  (LMP Unknown)   SpO2 98%   BMI 24.53 kg/m     Estimated body mass index is 24.53 kg/m  as calculated from the following:    Height as of this encounter: 1.676 m (5' 6\").    Weight as of this encounter: 68.9 kg (152 lb).  Physical Exam  Vitals and nursing note reviewed.   Constitutional:       General: She is not in acute distress.     Appearance: Normal appearance. She is not ill-appearing.   HENT:      Right Ear: Tympanic membrane, ear canal and external ear normal. There is no impacted cerumen.      Left Ear: Tympanic membrane, ear canal and external ear normal. There is no impacted cerumen.      Nose: No congestion or rhinorrhea.      Mouth/Throat:      Mouth: Mucous membranes are moist.      Pharynx: No oropharyngeal exudate or posterior oropharyngeal erythema.   Eyes:      General:         Right eye: No discharge.         Left eye: No discharge.      Conjunctiva/sclera: Conjunctivae normal.      " Pupils: Pupils are equal, round, and reactive to light.   Cardiovascular:      Rate and Rhythm: Normal rate and regular rhythm.      Heart sounds: No murmur heard.     No friction rub. Gallop present. S4 sounds present.   Pulmonary:      Effort: No respiratory distress.      Breath sounds: Normal breath sounds. No stridor. No wheezing, rhonchi or rales.   Musculoskeletal:      Cervical back: No tenderness.   Lymphadenopathy:      Cervical: No cervical adenopathy.   Skin:     General: Skin is warm and dry.   Neurological:      General: No focal deficit present.      Mental Status: She is alert.   Psychiatric:         Mood and Affect: Mood normal.         Behavior: Behavior normal.         Thought Content: Thought content normal.         Judgment: Judgment normal.       Recent Labs   Lab Test 11/26/24  1433   HGB 13.6      POTASSIUM 4.4   CR 0.71        Diagnostics  Labs pending at this time.  Results will be reviewed when available.   EKG required for known coronary heart disease and not completed in the last 90 days.   EKG: Reads as Atrial Flutter however not consistent in all leads. P waves present along w/ T waves in multiple leads. appears normal, NSR, normal axis, normal intervals, no acute ST/T changes c/w ischemia, no LVH by voltage criteria, discussed findings  w/ Dr. Annie De León.     Revised Cardiac Risk Index (RCRI)  The patient has the following serious cardiovascular risks for perioperative complications:   - Coronary Artery Disease (MI, positive stress test, angina, Qs on EKG) = 1 point     RCRI Interpretation: 1 point: Class II (low risk - 0.9% complication rate)     Signed Electronically by: MALIK Swenson CNP  A copy of this evaluation report is provided to the requesting physician.

## 2025-07-30 NOTE — PROGRESS NOTES
Teaching Flowsheet     Visit Type: Telephone    Time Start: 10:13am  Time End: 10:46am  Total Time Spent: 33 min.    Surgeon: Dr. Lima  Location of Surgery (known or anticipated): [facility]  Type of Anesthesia: Choice  Worker's Compensation Procedure: No    Pertinent Medical History:    Past Medical History:   Diagnosis Date    Arthritis ? 1990s    osteoarthritis    Aspirin sensitive asthma 5/9/2018    Chronic airway obstruction, not elsewhere classified     Chronic pain     hip and left shoulder    Coronary artery disease involving native coronary artery of native heart without angina pectoris 11/16/2016    Crohn's disease (H) 9/2013    with stricture in small intestine    Family history of premature CAD     History of blood transfusion 1975    post-partum hemorrhage    Migraine, unspecified, without mention of intractable migraine without mention of status migrainosus     PVC's (premature ventricular contractions)     Small bowel obstruction (H) 8/28/2013    Findings at the time of surgery include a segment of active Crohns' Ileitis with stenosis and pill cam which became lodged in the narrowed area.     Stricture of small intestine (H) 9/2013    TOBACCO ABUSE-CONTINUOUS     Unspecified glaucoma(365.9)     removed as a diagnosis    Unspecified hypothyroidism        Were medical conditions reviewed and appropriate for location? Yes  BMI: Overweight BMI 25-29.9    Relevant Diagnosis: right hip fx and osteoarthritis  Teaching Topic: right total hip, possible hybrid arthroplasty    Discussed total joint online class.  Patient will call if they require help for signing up for the total joint zoom class. Patient understands they will need a preop exam within 30 days of date of surgery. Patient understands the expected length of stay and the expectation of outpatient physical therapy. Patient understands the need for an at home  after surgery and need for transportation home.  Discussed  dental/non-sterile procedure prophylaxis. Discussed the [facility] Companion service.       Person(s) involved in teaching:   Patient  : No.   Verified Patient's Phone Number: YES: [phone]    Caregiver//  Name: Kush  Phone Number: see pt contacts   Relationship: Son  Consent to Communicate on file: No       Motivation Level:  Asks Questions: Yes  Eager to Learn: Yes  Cooperative: Yes  Receptive (willing/able to accept information): Yes  Any cultural factors/Latter-day beliefs that may influence understanding or compliance? No     Patient demonstrates understanding of the following:  Reason for the appointment, diagnosis and treatment plan: Yes  Knowledge of proper use of medications and conditions for which they are ordered (with special attention to potential side effects or drug interactions): Yes  Which situations necessitate calling provider and whom to contact: Yes     Teaching Concerns Addressed:   Proper use and care of medical equip, care aids, etc.: Yes  Nutritional needs and diet plan: Yes  Pain management techniques: Yes  Wound Care: Yes  How and/when to access community resources: Yes  Need for pre-op with in 30 days: YES, will be done with PCP. I asked them to ensure they go over their daily medications during this visit and discuss what medications need to be stopped before surgery and when. If you are doing a pre-op with your PCP and they are not within the [facility], I ask them to fax it to our pre-op office. Patient verbalized understanding.       Does patient have difficulty getting a ride to appointments (post-ops, PT/OT): No  Patient's plan after discharge: home with family or spouse     Instructional Materials Used/Given:  two bottles of chlorhexidine soap, a surgery packet, and a joint booklet given to patient in clinic.   - Important Contact Info/Phone Numbers: emphasizing clinic number [phone] and after hours number [phone]  -  Map/location of surgery and follow-up appointments  - Showering instructions  - Stoplight Tool     -Next step: home care PT, OT, and HHA referral for post op.    Michelle Alvarez RN

## 2025-07-30 NOTE — Clinical Note
Can we help facilitate a couple things for Shanakerry? I have placed an echocardiogram as well as cardiology pre op clearance given hx of severe CAD and what seems to be an S4 on exam noted today. Let me know if you have any questions.   MALIK Swenson CNP

## 2025-07-31 ENCOUNTER — RESULTS FOLLOW-UP (OUTPATIENT)
Dept: FAMILY MEDICINE | Facility: CLINIC | Age: 80
End: 2025-07-31

## 2025-07-31 ENCOUNTER — TELEPHONE (OUTPATIENT)
Dept: FAMILY MEDICINE | Facility: CLINIC | Age: 80
End: 2025-07-31
Payer: COMMERCIAL

## 2025-07-31 ENCOUNTER — HOSPITAL ENCOUNTER (OUTPATIENT)
Dept: CARDIOLOGY | Facility: CLINIC | Age: 80
End: 2025-07-31
Payer: COMMERCIAL

## 2025-07-31 DIAGNOSIS — Z01.818 PRE-OP EXAM: ICD-10-CM

## 2025-07-31 DIAGNOSIS — R01.2 S4 (FOURTH HEART SOUND): ICD-10-CM

## 2025-07-31 DIAGNOSIS — I25.10 CORONARY ARTERY DISEASE INVOLVING NATIVE CORONARY ARTERY OF NATIVE HEART WITHOUT ANGINA PECTORIS: ICD-10-CM

## 2025-07-31 DIAGNOSIS — I73.9 PAD (PERIPHERAL ARTERY DISEASE): ICD-10-CM

## 2025-07-31 LAB
ANION GAP SERPL CALCULATED.3IONS-SCNC: 12 MMOL/L (ref 7–15)
BUN SERPL-MCNC: 11.4 MG/DL (ref 8–23)
CALCIUM SERPL-MCNC: 9 MG/DL (ref 8.8–10.4)
CHLORIDE SERPL-SCNC: 97 MMOL/L (ref 98–107)
CREAT SERPL-MCNC: 0.61 MG/DL (ref 0.51–0.95)
EGFRCR SERPLBLD CKD-EPI 2021: 90 ML/MIN/1.73M2
GLUCOSE SERPL-MCNC: 90 MG/DL (ref 70–99)
HCO3 SERPL-SCNC: 25 MMOL/L (ref 22–29)
LVEF ECHO: NORMAL
POTASSIUM SERPL-SCNC: 4.3 MMOL/L (ref 3.4–5.3)
SODIUM SERPL-SCNC: 134 MMOL/L (ref 135–145)

## 2025-07-31 PROCEDURE — 93306 TTE W/DOPPLER COMPLETE: CPT

## 2025-07-31 NOTE — TELEPHONE ENCOUNTER
Joseph Padilla APRN CNP  P [facility]  Can we help facilitate a couple things for Annice? I have placed an echocardiogram as well as cardiology pre op clearance given hx of severe CAD and what seems to be an S4 on exam noted today. Let me know if you have any questions.    MALIK Swenson CNP RN   Clinic RN  [facility]

## 2025-07-31 NOTE — TELEPHONE ENCOUNTER
Calling the heart clinic at this time at [phone] to schedule Urgent echo and card consult for Pre-op clearance priority 1-2 weeks.      [place] at 1:15pm [facility]  [address] [address]      Monday August 18th 1:45pm Dr. Perez in [place] same address as above but in [address].      Pt called and updated on appointments, she can make this work. She has no questions at this time.  Pt voiced understanding and agreeable to plan.  Will call the clinic if any other questions or concerns arise.        Sarah Tang RN BSN  Clinic Nurse  [facility]

## 2025-07-31 NOTE — TELEPHONE ENCOUNTER
RN spoke to patient     Reviewed results note from Joseph GAN CNP      Will follow up with cardiology as scheduled on 8/18/2025 to aid with clearing for surgery     No further questions at this time     Sofy Dial, Registered Nurse  [facility]

## 2025-08-06 ENCOUNTER — TELEPHONE (OUTPATIENT)
Dept: FAMILY MEDICINE | Facility: CLINIC | Age: 80
End: 2025-08-06
Payer: COMMERCIAL

## 2025-08-08 ENCOUNTER — MYC MEDICAL ADVICE (OUTPATIENT)
Dept: FAMILY MEDICINE | Facility: CLINIC | Age: 80
End: 2025-08-08
Payer: COMMERCIAL

## 2025-08-08 DIAGNOSIS — M48.061 SPINAL STENOSIS OF LUMBAR REGION WITH RADICULOPATHY: ICD-10-CM

## 2025-08-08 DIAGNOSIS — G89.29 OTHER CHRONIC PAIN: ICD-10-CM

## 2025-08-08 DIAGNOSIS — M54.16 SPINAL STENOSIS OF LUMBAR REGION WITH RADICULOPATHY: ICD-10-CM

## 2025-08-11 RX ORDER — HYDROCODONE BITARTRATE AND ACETAMINOPHEN 5; 325 MG/1; MG/1
1 TABLET ORAL EVERY 6 HOURS PRN
Qty: 90 TABLET | Refills: 0 | Status: SHIPPED | OUTPATIENT
Start: 2025-08-11

## 2025-08-18 ENCOUNTER — OFFICE VISIT (OUTPATIENT)
Dept: CARDIOLOGY | Facility: CLINIC | Age: 80
End: 2025-08-18
Payer: COMMERCIAL

## 2025-08-18 VITALS
BODY MASS INDEX: 23.78 KG/M2 | WEIGHT: 148 LBS | OXYGEN SATURATION: 99 % | DIASTOLIC BLOOD PRESSURE: 62 MMHG | HEIGHT: 66 IN | HEART RATE: 96 BPM | SYSTOLIC BLOOD PRESSURE: 128 MMHG

## 2025-08-18 DIAGNOSIS — R01.2 S4 (FOURTH HEART SOUND): ICD-10-CM

## 2025-08-18 DIAGNOSIS — Z01.818 PRE-OP EXAM: ICD-10-CM

## 2025-08-18 DIAGNOSIS — I25.10 CORONARY ARTERY DISEASE INVOLVING NATIVE CORONARY ARTERY OF NATIVE HEART WITHOUT ANGINA PECTORIS: ICD-10-CM

## 2025-08-18 DIAGNOSIS — I34.2 NONRHEUMATIC MITRAL VALVE STENOSIS: Primary | ICD-10-CM

## 2025-08-18 RX ORDER — GUAIFENESIN 600 MG/1
1200 TABLET, EXTENDED RELEASE ORAL 2 TIMES DAILY
COMMUNITY

## 2025-08-19 DIAGNOSIS — M16.11 PRIMARY OSTEOARTHRITIS OF RIGHT HIP: Primary | ICD-10-CM

## 2025-08-19 DIAGNOSIS — Z96.641 S/P TOTAL RIGHT HIP ARTHROPLASTY: ICD-10-CM

## 2025-08-20 ENCOUNTER — MYC REFILL (OUTPATIENT)
Dept: FAMILY MEDICINE | Facility: CLINIC | Age: 80
End: 2025-08-20
Payer: COMMERCIAL

## 2025-08-20 DIAGNOSIS — M48.061 SPINAL STENOSIS OF LUMBAR REGION WITH RADICULOPATHY: ICD-10-CM

## 2025-08-20 DIAGNOSIS — M54.16 SPINAL STENOSIS OF LUMBAR REGION WITH RADICULOPATHY: ICD-10-CM

## 2025-08-20 DIAGNOSIS — G89.29 OTHER CHRONIC PAIN: ICD-10-CM

## 2025-08-20 RX ORDER — CYCLOBENZAPRINE HCL 5 MG
5-10 TABLET ORAL 2 TIMES DAILY PRN
Qty: 40 TABLET | Refills: 0 | Status: SHIPPED | OUTPATIENT
Start: 2025-08-20

## 2025-08-21 ENCOUNTER — OFFICE VISIT (OUTPATIENT)
Dept: NEUROSURGERY | Facility: CLINIC | Age: 80
End: 2025-08-21
Payer: COMMERCIAL

## 2025-08-21 ENCOUNTER — ANCILLARY PROCEDURE (OUTPATIENT)
Dept: GENERAL RADIOLOGY | Facility: CLINIC | Age: 80
End: 2025-08-21
Attending: PHYSICIAN ASSISTANT
Payer: COMMERCIAL

## 2025-08-21 VITALS — WEIGHT: 148 LBS | BODY MASS INDEX: 23.78 KG/M2 | HEIGHT: 66 IN

## 2025-08-21 DIAGNOSIS — S32.001A CLOSED BURST FRACTURE OF LUMBAR VERTEBRA, INITIAL ENCOUNTER (H): ICD-10-CM

## 2025-08-21 DIAGNOSIS — S32.001A CLOSED BURST FRACTURE OF LUMBAR VERTEBRA, INITIAL ENCOUNTER (H): Primary | ICD-10-CM

## 2025-08-21 PROCEDURE — 72100 X-RAY EXAM L-S SPINE 2/3 VWS: CPT | Mod: TC | Performed by: RADIOLOGY

## 2025-08-21 PROCEDURE — G0463 HOSPITAL OUTPT CLINIC VISIT: HCPCS

## 2025-08-21 ASSESSMENT — PAIN SCALES - GENERAL: PAINLEVEL_OUTOF10: MODERATE PAIN (5)

## 2025-08-25 ENCOUNTER — TELEPHONE (OUTPATIENT)
Dept: ORTHOPEDICS | Facility: CLINIC | Age: 80
End: 2025-08-25
Payer: COMMERCIAL

## 2025-08-26 ENCOUNTER — ANESTHESIA (OUTPATIENT)
Dept: SURGERY | Facility: CLINIC | Age: 80
End: 2025-08-26
Payer: COMMERCIAL

## 2025-08-26 ENCOUNTER — APPOINTMENT (OUTPATIENT)
Dept: GENERAL RADIOLOGY | Facility: CLINIC | Age: 80
DRG: 940 | End: 2025-08-26
Attending: STUDENT IN AN ORGANIZED HEALTH CARE EDUCATION/TRAINING PROGRAM
Payer: COMMERCIAL

## 2025-08-26 ENCOUNTER — HOSPITAL ENCOUNTER (OUTPATIENT)
Facility: CLINIC | Age: 80
DRG: 940 | End: 2025-08-26
Attending: STUDENT IN AN ORGANIZED HEALTH CARE EDUCATION/TRAINING PROGRAM | Admitting: STUDENT IN AN ORGANIZED HEALTH CARE EDUCATION/TRAINING PROGRAM
Payer: COMMERCIAL

## 2025-08-26 ENCOUNTER — ANESTHESIA EVENT (OUTPATIENT)
Dept: SURGERY | Facility: CLINIC | Age: 80
End: 2025-08-26
Payer: COMMERCIAL

## 2025-08-26 PROBLEM — Z96.642 S/P HIP REPLACEMENT, LEFT: Status: ACTIVE | Noted: 2025-08-26

## 2025-08-26 PROCEDURE — 250N000011 HC RX IP 250 OP 636: Performed by: NURSE ANESTHETIST, CERTIFIED REGISTERED

## 2025-08-26 PROCEDURE — 250N000009 HC RX 250: Performed by: NURSE ANESTHETIST, CERTIFIED REGISTERED

## 2025-08-26 PROCEDURE — 999N000065 XR PELVIS AND HIP PORTABLE RIGHT 1 VIEW

## 2025-08-26 PROCEDURE — 258N000003 HC RX IP 258 OP 636: Performed by: NURSE ANESTHETIST, CERTIFIED REGISTERED

## 2025-08-26 PROCEDURE — 250N000011 HC RX IP 250 OP 636: Performed by: STUDENT IN AN ORGANIZED HEALTH CARE EDUCATION/TRAINING PROGRAM

## 2025-08-26 RX ORDER — HYDRALAZINE HYDROCHLORIDE 20 MG/ML
INJECTION INTRAMUSCULAR; INTRAVENOUS PRN
Status: DISCONTINUED | OUTPATIENT
Start: 2025-08-26 | End: 2025-08-26

## 2025-08-26 RX ORDER — DEXAMETHASONE SODIUM PHOSPHATE 4 MG/ML
INJECTION, SOLUTION INTRA-ARTICULAR; INTRALESIONAL; INTRAMUSCULAR; INTRAVENOUS; SOFT TISSUE PRN
Status: DISCONTINUED | OUTPATIENT
Start: 2025-08-26 | End: 2025-08-26

## 2025-08-26 RX ORDER — PROPOFOL 10 MG/ML
INJECTION, EMULSION INTRAVENOUS PRN
Status: DISCONTINUED | OUTPATIENT
Start: 2025-08-26 | End: 2025-08-26

## 2025-08-26 RX ORDER — LIDOCAINE HYDROCHLORIDE 20 MG/ML
INJECTION, SOLUTION INFILTRATION; PERINEURAL PRN
Status: DISCONTINUED | OUTPATIENT
Start: 2025-08-26 | End: 2025-08-26

## 2025-08-26 RX ORDER — SODIUM CHLORIDE, SODIUM LACTATE, POTASSIUM CHLORIDE, CALCIUM CHLORIDE 600; 310; 30; 20 MG/100ML; MG/100ML; MG/100ML; MG/100ML
INJECTION, SOLUTION INTRAVENOUS CONTINUOUS PRN
Status: DISCONTINUED | OUTPATIENT
Start: 2025-08-26 | End: 2025-08-26

## 2025-08-26 RX ORDER — FENTANYL CITRATE 50 UG/ML
INJECTION, SOLUTION INTRAMUSCULAR; INTRAVENOUS PRN
Status: DISCONTINUED | OUTPATIENT
Start: 2025-08-26 | End: 2025-08-26

## 2025-08-26 RX ORDER — LABETALOL HYDROCHLORIDE 5 MG/ML
INJECTION, SOLUTION INTRAVENOUS PRN
Status: DISCONTINUED | OUTPATIENT
Start: 2025-08-26 | End: 2025-08-26

## 2025-08-26 RX ORDER — ONDANSETRON 2 MG/ML
INJECTION INTRAMUSCULAR; INTRAVENOUS PRN
Status: DISCONTINUED | OUTPATIENT
Start: 2025-08-26 | End: 2025-08-26

## 2025-08-26 RX ORDER — GLYCOPYRROLATE 0.2 MG/ML
INJECTION, SOLUTION INTRAMUSCULAR; INTRAVENOUS PRN
Status: DISCONTINUED | OUTPATIENT
Start: 2025-08-26 | End: 2025-08-26

## 2025-08-26 RX ADMIN — ROCURONIUM BROMIDE 40 MG: 50 INJECTION, SOLUTION INTRAVENOUS at 14:47

## 2025-08-26 RX ADMIN — Medication 2 G: at 14:39

## 2025-08-26 RX ADMIN — PHENYLEPHRINE HYDROCHLORIDE 50 MCG: 10 INJECTION INTRAVENOUS at 16:16

## 2025-08-26 RX ADMIN — LABETALOL HYDROCHLORIDE 5 MG: 5 INJECTION, SOLUTION INTRAVENOUS at 15:35

## 2025-08-26 RX ADMIN — FENTANYL CITRATE 100 MCG: 50 INJECTION INTRAMUSCULAR; INTRAVENOUS at 14:59

## 2025-08-26 RX ADMIN — DEXAMETHASONE SODIUM PHOSPHATE 4 MG: 4 INJECTION, SOLUTION INTRA-ARTICULAR; INTRALESIONAL; INTRAMUSCULAR; INTRAVENOUS; SOFT TISSUE at 14:47

## 2025-08-26 RX ADMIN — GLYCOPYRROLATE 0.1 MG: 0.2 INJECTION, SOLUTION INTRAMUSCULAR; INTRAVENOUS at 14:46

## 2025-08-26 RX ADMIN — PHENYLEPHRINE HYDROCHLORIDE 50 MCG: 10 INJECTION INTRAVENOUS at 16:10

## 2025-08-26 RX ADMIN — SODIUM CHLORIDE, SODIUM LACTATE, POTASSIUM CHLORIDE, AND CALCIUM CHLORIDE: .6; .31; .03; .02 INJECTION, SOLUTION INTRAVENOUS at 14:22

## 2025-08-26 RX ADMIN — PHENYLEPHRINE HYDROCHLORIDE 50 MCG: 10 INJECTION INTRAVENOUS at 15:49

## 2025-08-26 RX ADMIN — SUGAMMADEX 100 MG: 100 INJECTION, SOLUTION INTRAVENOUS at 16:45

## 2025-08-26 RX ADMIN — PHENYLEPHRINE HYDROCHLORIDE 50 MCG: 10 INJECTION INTRAVENOUS at 16:13

## 2025-08-26 RX ADMIN — ONDANSETRON 4 MG: 2 INJECTION INTRAMUSCULAR; INTRAVENOUS at 16:50

## 2025-08-26 RX ADMIN — DEXMEDETOMIDINE HYDROCHLORIDE 4 MCG: 100 INJECTION, SOLUTION INTRAVENOUS at 15:56

## 2025-08-26 RX ADMIN — DEXMEDETOMIDINE HYDROCHLORIDE 4 MCG: 100 INJECTION, SOLUTION INTRAVENOUS at 15:32

## 2025-08-26 RX ADMIN — ROCURONIUM BROMIDE 10 MG: 50 INJECTION, SOLUTION INTRAVENOUS at 15:40

## 2025-08-26 RX ADMIN — FENTANYL CITRATE 50 MCG: 50 INJECTION INTRAMUSCULAR; INTRAVENOUS at 15:17

## 2025-08-26 RX ADMIN — SODIUM CHLORIDE, SODIUM LACTATE, POTASSIUM CHLORIDE, AND CALCIUM CHLORIDE: .6; .31; .03; .02 INJECTION, SOLUTION INTRAVENOUS at 16:17

## 2025-08-26 RX ADMIN — DEXMEDETOMIDINE HYDROCHLORIDE 4 MCG: 100 INJECTION, SOLUTION INTRAVENOUS at 15:28

## 2025-08-26 RX ADMIN — FENTANYL CITRATE 50 MCG: 50 INJECTION INTRAMUSCULAR; INTRAVENOUS at 15:19

## 2025-08-26 RX ADMIN — NOREPINEPHRINE BITARTRATE 6.4 MCG: 1 INJECTION, SOLUTION, CONCENTRATE INTRAVENOUS at 16:24

## 2025-08-26 RX ADMIN — PHENYLEPHRINE HYDROCHLORIDE 100 MCG: 10 INJECTION INTRAVENOUS at 15:08

## 2025-08-26 RX ADMIN — HYDRALAZINE HYDROCHLORIDE 4 MG: 20 INJECTION INTRAMUSCULAR; INTRAVENOUS at 15:20

## 2025-08-26 RX ADMIN — FENTANYL CITRATE 100 MCG: 50 INJECTION INTRAMUSCULAR; INTRAVENOUS at 15:22

## 2025-08-26 RX ADMIN — HYDRALAZINE HYDROCHLORIDE 2 MG: 20 INJECTION INTRAMUSCULAR; INTRAVENOUS at 15:26

## 2025-08-26 RX ADMIN — ROCURONIUM BROMIDE 20 MG: 50 INJECTION, SOLUTION INTRAVENOUS at 16:07

## 2025-08-26 RX ADMIN — NOREPINEPHRINE BITARTRATE 6.4 MCG: 1 INJECTION, SOLUTION, CONCENTRATE INTRAVENOUS at 15:07

## 2025-08-26 RX ADMIN — ROCURONIUM BROMIDE 10 MG: 50 INJECTION, SOLUTION INTRAVENOUS at 15:39

## 2025-08-26 RX ADMIN — PROPOFOL 100 MG: 10 INJECTION, EMULSION INTRAVENOUS at 14:45

## 2025-08-26 RX ADMIN — LIDOCAINE HYDROCHLORIDE 40 MG: 20 INJECTION, SOLUTION INFILTRATION; PERINEURAL at 14:45

## 2025-08-26 RX ADMIN — FENTANYL CITRATE 100 MCG: 50 INJECTION INTRAMUSCULAR; INTRAVENOUS at 16:49

## 2025-08-26 RX ADMIN — DEXMEDETOMIDINE HYDROCHLORIDE 8 MCG: 100 INJECTION, SOLUTION INTRAVENOUS at 16:49

## 2025-08-26 ASSESSMENT — ACTIVITIES OF DAILY LIVING (ADL)
ADLS_ACUITY_SCORE: 37
ADLS_ACUITY_SCORE: 27
ADLS_ACUITY_SCORE: 36

## 2025-08-26 ASSESSMENT — COLUMBIA-SUICIDE SEVERITY RATING SCALE - C-SSRS
2. HAVE YOU ACTUALLY HAD ANY THOUGHTS OF KILLING YOURSELF IN THE PAST MONTH?: NO
6. HAVE YOU EVER DONE ANYTHING, STARTED TO DO ANYTHING, OR PREPARED TO DO ANYTHING TO END YOUR LIFE?: NO
1. IN THE PAST MONTH, HAVE YOU WISHED YOU WERE DEAD OR WISHED YOU COULD GO TO SLEEP AND NOT WAKE UP?: NO

## 2025-08-26 ASSESSMENT — LIFESTYLE VARIABLES: TOBACCO_USE: 1

## 2025-08-26 ASSESSMENT — ENCOUNTER SYMPTOMS: DYSRHYTHMIAS: 1

## 2025-08-27 ENCOUNTER — APPOINTMENT (OUTPATIENT)
Dept: PHYSICAL THERAPY | Facility: CLINIC | Age: 80
DRG: 940 | End: 2025-08-27
Attending: STUDENT IN AN ORGANIZED HEALTH CARE EDUCATION/TRAINING PROGRAM
Payer: COMMERCIAL

## 2025-08-27 ENCOUNTER — APPOINTMENT (OUTPATIENT)
Dept: OCCUPATIONAL THERAPY | Facility: CLINIC | Age: 80
DRG: 940 | End: 2025-08-27
Attending: STUDENT IN AN ORGANIZED HEALTH CARE EDUCATION/TRAINING PROGRAM
Payer: COMMERCIAL

## 2025-08-27 ASSESSMENT — ACTIVITIES OF DAILY LIVING (ADL)
ADLS_ACUITY_SCORE: 49
ADLS_ACUITY_SCORE: 49
ADLS_ACUITY_SCORE: 37
ADLS_ACUITY_SCORE: 37
ADLS_ACUITY_SCORE: 49
ADLS_ACUITY_SCORE: 37
ADLS_ACUITY_SCORE: 49
ADLS_ACUITY_SCORE: 42
ADLS_ACUITY_SCORE: 49
ADLS_ACUITY_SCORE: 37
ADLS_ACUITY_SCORE: 37
ADLS_ACUITY_SCORE: 49
ADLS_ACUITY_SCORE: 49
ADLS_ACUITY_SCORE: 42
ADLS_ACUITY_SCORE: 49
ADLS_ACUITY_SCORE: 49
ADLS_ACUITY_SCORE: 42
ADLS_ACUITY_SCORE: 37
ADLS_ACUITY_SCORE: 42

## 2025-08-28 ENCOUNTER — APPOINTMENT (OUTPATIENT)
Dept: OCCUPATIONAL THERAPY | Facility: CLINIC | Age: 80
DRG: 940 | End: 2025-08-28
Attending: STUDENT IN AN ORGANIZED HEALTH CARE EDUCATION/TRAINING PROGRAM
Payer: COMMERCIAL

## 2025-08-28 ENCOUNTER — APPOINTMENT (OUTPATIENT)
Dept: PHYSICAL THERAPY | Facility: CLINIC | Age: 80
DRG: 940 | End: 2025-08-28
Attending: STUDENT IN AN ORGANIZED HEALTH CARE EDUCATION/TRAINING PROGRAM
Payer: COMMERCIAL

## 2025-08-28 ASSESSMENT — ACTIVITIES OF DAILY LIVING (ADL)
ADLS_ACUITY_SCORE: 49
ADLS_ACUITY_SCORE: 50
ADLS_ACUITY_SCORE: 49
ADLS_ACUITY_SCORE: 51
ADLS_ACUITY_SCORE: 47
ADLS_ACUITY_SCORE: 51
ADLS_ACUITY_SCORE: 50
ADLS_ACUITY_SCORE: 49
ADLS_ACUITY_SCORE: 51
ADLS_ACUITY_SCORE: 47
ADLS_ACUITY_SCORE: 49
ADLS_ACUITY_SCORE: 50
ADLS_ACUITY_SCORE: 49
ADLS_ACUITY_SCORE: 50
ADLS_ACUITY_SCORE: 49
ADLS_ACUITY_SCORE: 47
ADLS_ACUITY_SCORE: 49
ADLS_ACUITY_SCORE: 50

## 2025-08-29 ENCOUNTER — APPOINTMENT (OUTPATIENT)
Dept: PHYSICAL THERAPY | Facility: CLINIC | Age: 80
DRG: 940 | End: 2025-08-29
Attending: STUDENT IN AN ORGANIZED HEALTH CARE EDUCATION/TRAINING PROGRAM
Payer: COMMERCIAL

## 2025-08-29 PROBLEM — Z96.641 S/P TOTAL RIGHT HIP ARTHROPLASTY: Status: ACTIVE | Noted: 2025-08-29

## 2025-08-29 ASSESSMENT — ACTIVITIES OF DAILY LIVING (ADL)
ADLS_ACUITY_SCORE: 47

## 2025-08-30 ENCOUNTER — APPOINTMENT (OUTPATIENT)
Dept: PHYSICAL THERAPY | Facility: CLINIC | Age: 80
DRG: 940 | End: 2025-08-30
Attending: STUDENT IN AN ORGANIZED HEALTH CARE EDUCATION/TRAINING PROGRAM
Payer: COMMERCIAL

## 2025-08-30 ASSESSMENT — ACTIVITIES OF DAILY LIVING (ADL)
ADLS_ACUITY_SCORE: 47

## 2025-08-31 ENCOUNTER — APPOINTMENT (OUTPATIENT)
Dept: PHYSICAL THERAPY | Facility: CLINIC | Age: 80
DRG: 940 | End: 2025-08-31
Attending: STUDENT IN AN ORGANIZED HEALTH CARE EDUCATION/TRAINING PROGRAM
Payer: COMMERCIAL

## 2025-08-31 ENCOUNTER — APPOINTMENT (OUTPATIENT)
Dept: OCCUPATIONAL THERAPY | Facility: CLINIC | Age: 80
DRG: 940 | End: 2025-08-31
Payer: COMMERCIAL

## 2025-08-31 ENCOUNTER — APPOINTMENT (OUTPATIENT)
Dept: ULTRASOUND IMAGING | Facility: CLINIC | Age: 80
DRG: 940 | End: 2025-08-31
Attending: PHYSICIAN ASSISTANT
Payer: COMMERCIAL

## 2025-08-31 PROCEDURE — 93971 EXTREMITY STUDY: CPT | Mod: RT

## 2025-08-31 ASSESSMENT — ACTIVITIES OF DAILY LIVING (ADL)
ADLS_ACUITY_SCORE: 47
ADLS_ACUITY_SCORE: 48
ADLS_ACUITY_SCORE: 47
ADLS_ACUITY_SCORE: 48
ADLS_ACUITY_SCORE: 47
ADLS_ACUITY_SCORE: 47
PREVIOUS_RESPONSIBILITIES: MEAL PREP;HOUSEKEEPING;LAUNDRY;SHOPPING;MEDICATION MANAGEMENT
ADLS_ACUITY_SCORE: 47
ADLS_ACUITY_SCORE: 48

## 2025-09-01 ENCOUNTER — LAB REQUISITION (OUTPATIENT)
Dept: LAB | Facility: CLINIC | Age: 80
End: 2025-09-01
Payer: COMMERCIAL

## 2025-09-01 DIAGNOSIS — Z11.1 ENCOUNTER FOR SCREENING FOR RESPIRATORY TUBERCULOSIS: ICD-10-CM

## 2025-09-01 ASSESSMENT — ACTIVITIES OF DAILY LIVING (ADL)
ADLS_ACUITY_SCORE: 47
ADLS_ACUITY_SCORE: 48
ADLS_ACUITY_SCORE: 47
ADLS_ACUITY_SCORE: 48
ADLS_ACUITY_SCORE: 47
ADLS_ACUITY_SCORE: 48
ADLS_ACUITY_SCORE: 47
ADLS_ACUITY_SCORE: 48

## 2025-09-02 ENCOUNTER — TRANSITIONAL CARE UNIT VISIT (OUTPATIENT)
Dept: GERIATRICS | Facility: CLINIC | Age: 80
End: 2025-09-02
Payer: COMMERCIAL

## 2025-09-02 ENCOUNTER — LAB REQUISITION (OUTPATIENT)
Dept: LAB | Facility: CLINIC | Age: 80
End: 2025-09-02
Payer: COMMERCIAL

## 2025-09-02 ENCOUNTER — PATIENT OUTREACH (OUTPATIENT)
Dept: CARE COORDINATION | Facility: CLINIC | Age: 80
End: 2025-09-02

## 2025-09-02 VITALS
HEART RATE: 93 BPM | SYSTOLIC BLOOD PRESSURE: 117 MMHG | TEMPERATURE: 97 F | RESPIRATION RATE: 20 BRPM | WEIGHT: 158 LBS | OXYGEN SATURATION: 97 % | HEIGHT: 66 IN | BODY MASS INDEX: 25.39 KG/M2 | DIASTOLIC BLOOD PRESSURE: 75 MMHG

## 2025-09-02 DIAGNOSIS — Z47.89 ENCOUNTER FOR OTHER ORTHOPEDIC AFTERCARE: ICD-10-CM

## 2025-09-02 DIAGNOSIS — E87.1 CHRONIC HYPONATREMIA: Primary | ICD-10-CM

## 2025-09-02 DIAGNOSIS — E87.1 HYPO-OSMOLALITY AND HYPONATREMIA: ICD-10-CM

## 2025-09-03 ENCOUNTER — PATIENT OUTREACH (OUTPATIENT)
Dept: CARE COORDINATION | Facility: CLINIC | Age: 80
End: 2025-09-03

## 2025-09-03 LAB
ANION GAP SERPL CALCULATED.3IONS-SCNC: 8 MMOL/L (ref 7–15)
BUN SERPL-MCNC: 10.9 MG/DL (ref 8–23)
CALCIUM SERPL-MCNC: 8.5 MG/DL (ref 8.8–10.4)
CHLORIDE SERPL-SCNC: 98 MMOL/L (ref 98–107)
CREAT SERPL-MCNC: 0.61 MG/DL (ref 0.51–0.95)
EGFRCR SERPLBLD CKD-EPI 2021: 89 ML/MIN/1.73M2
ERYTHROCYTE [DISTWIDTH] IN BLOOD BY AUTOMATED COUNT: 13.2 % (ref 10–15)
GAMMA INTERFERON BACKGROUND BLD IA-ACNC: 0.04 IU/ML
GLUCOSE SERPL-MCNC: 94 MG/DL (ref 70–99)
HCO3 SERPL-SCNC: 28 MMOL/L (ref 22–29)
HCT VFR BLD AUTO: 34.1 % (ref 35–47)
HGB BLD-MCNC: 11 G/DL (ref 11.7–15.7)
M TB IFN-G BLD-IMP: NEGATIVE
M TB IFN-G CD4+ BCKGRND COR BLD-ACNC: 2.07 IU/ML
MCH RBC QN AUTO: 28.3 PG (ref 26.5–33)
MCHC RBC AUTO-ENTMCNC: 32.3 G/DL (ref 31.5–36.5)
MCV RBC AUTO: 87.7 FL (ref 78–100)
MITOGEN IGNF BCKGRD COR BLD-ACNC: 0.01 IU/ML
MITOGEN IGNF BCKGRD COR BLD-ACNC: 0.02 IU/ML
PLATELET # BLD AUTO: 402 10E3/UL (ref 150–450)
POTASSIUM SERPL-SCNC: 4.3 MMOL/L (ref 3.4–5.3)
QUANTIFERON MITOGEN: 2.11 IU/ML
QUANTIFERON NIL TUBE: 0.04 IU/ML
QUANTIFERON TB1 TUBE: 0.05 IU/ML
QUANTIFERON TB2 TUBE: 0.06
RBC # BLD AUTO: 3.89 10E6/UL (ref 3.8–5.2)
SODIUM SERPL-SCNC: 134 MMOL/L (ref 135–145)
WBC # BLD AUTO: 6.12 10E3/UL (ref 4–11)

## 2025-09-04 ENCOUNTER — DOCUMENTATION ONLY (OUTPATIENT)
Dept: GERIATRICS | Facility: CLINIC | Age: 80
End: 2025-09-04

## 2025-09-04 ENCOUNTER — TELEPHONE (OUTPATIENT)
Dept: GERIATRICS | Facility: CLINIC | Age: 80
End: 2025-09-04

## 2025-09-04 ENCOUNTER — VIRTUAL VISIT (OUTPATIENT)
Dept: FAMILY MEDICINE | Facility: CLINIC | Age: 80
End: 2025-09-04
Payer: COMMERCIAL

## 2025-09-04 DIAGNOSIS — M54.16 SPINAL STENOSIS OF LUMBAR REGION WITH RADICULOPATHY: ICD-10-CM

## 2025-09-04 DIAGNOSIS — Z96.641 S/P TOTAL RIGHT HIP ARTHROPLASTY: ICD-10-CM

## 2025-09-04 DIAGNOSIS — M80.00XD AGE-RELATED OSTEOPOROSIS WITH CURRENT PATHOLOGICAL FRACTURE WITH ROUTINE HEALING: Primary | ICD-10-CM

## 2025-09-04 DIAGNOSIS — M48.061 SPINAL STENOSIS OF LUMBAR REGION WITH RADICULOPATHY: ICD-10-CM

## 2025-09-04 DIAGNOSIS — G89.29 OTHER CHRONIC PAIN: ICD-10-CM

## 2025-09-04 RX ORDER — POLYETHYLENE GLYCOL 3350 17 G/17G
1 POWDER, FOR SOLUTION ORAL DAILY PRN
COMMUNITY
Start: 2025-09-04